# Patient Record
Sex: FEMALE | Race: WHITE | NOT HISPANIC OR LATINO | Employment: OTHER | URBAN - METROPOLITAN AREA
[De-identification: names, ages, dates, MRNs, and addresses within clinical notes are randomized per-mention and may not be internally consistent; named-entity substitution may affect disease eponyms.]

---

## 2017-03-30 ENCOUNTER — HOSPITAL ENCOUNTER (OUTPATIENT)
Dept: RADIOLOGY | Facility: MEDICAL CENTER | Age: 62
End: 2017-03-30
Attending: INTERNAL MEDICINE
Payer: COMMERCIAL

## 2017-03-30 DIAGNOSIS — Z13.9 SCREENING: ICD-10-CM

## 2017-03-30 PROCEDURE — 77063 BREAST TOMOSYNTHESIS BI: CPT

## 2017-06-08 ENCOUNTER — RESOLUTE PROFESSIONAL BILLING HOSPITAL PROF FEE (OUTPATIENT)
Dept: HOSPITALIST | Facility: MEDICAL CENTER | Age: 62
End: 2017-06-08
Payer: COMMERCIAL

## 2017-06-08 ENCOUNTER — APPOINTMENT (OUTPATIENT)
Dept: RADIOLOGY | Facility: MEDICAL CENTER | Age: 62
DRG: 853 | End: 2017-06-08
Attending: EMERGENCY MEDICINE
Payer: COMMERCIAL

## 2017-06-08 ENCOUNTER — HOSPITAL ENCOUNTER (INPATIENT)
Facility: MEDICAL CENTER | Age: 62
LOS: 14 days | DRG: 853 | End: 2017-06-22
Attending: EMERGENCY MEDICINE | Admitting: INTERNAL MEDICINE
Payer: COMMERCIAL

## 2017-06-08 ENCOUNTER — APPOINTMENT (OUTPATIENT)
Dept: RADIOLOGY | Facility: MEDICAL CENTER | Age: 62
DRG: 853 | End: 2017-06-08
Attending: INTERNAL MEDICINE
Payer: COMMERCIAL

## 2017-06-08 DIAGNOSIS — N17.9 AKI (ACUTE KIDNEY INJURY) (HCC): ICD-10-CM

## 2017-06-08 DIAGNOSIS — J96.01 ACUTE RESPIRATORY FAILURE WITH HYPOXIA (HCC): ICD-10-CM

## 2017-06-08 DIAGNOSIS — I48.91 ATRIAL FIBRILLATION, UNSPECIFIED TYPE (HCC): ICD-10-CM

## 2017-06-08 DIAGNOSIS — J18.9 PNEUMONIA DUE TO INFECTIOUS ORGANISM, UNSPECIFIED LATERALITY, UNSPECIFIED PART OF LUNG: ICD-10-CM

## 2017-06-08 DIAGNOSIS — E13.10 DIABETIC KETOACIDOSIS WITHOUT COMA ASSOCIATED WITH OTHER SPECIFIED DIABETES MELLITUS (HCC): ICD-10-CM

## 2017-06-08 DIAGNOSIS — J96.01 ACUTE HYPOXEMIC RESPIRATORY FAILURE (HCC): ICD-10-CM

## 2017-06-08 DIAGNOSIS — E87.1 HYPONATREMIA: ICD-10-CM

## 2017-06-08 DIAGNOSIS — I48.19 PERSISTENT ATRIAL FIBRILLATION (HCC): ICD-10-CM

## 2017-06-08 LAB
ALBUMIN SERPL BCP-MCNC: 3.4 G/DL (ref 3.2–4.9)
ALBUMIN/GLOB SERPL: 1 G/DL
ALP SERPL-CCNC: 147 U/L (ref 30–99)
ALT SERPL-CCNC: 78 U/L (ref 2–50)
AMORPH CRY #/AREA URNS HPF: PRESENT /HPF
ANION GAP SERPL CALC-SCNC: 12 MMOL/L (ref 0–11.9)
ANION GAP SERPL CALC-SCNC: 15 MMOL/L (ref 0–11.9)
APPEARANCE UR: ABNORMAL
APPEARANCE UR: ABNORMAL
APTT PPP: 26.7 SEC (ref 24.7–36)
AST SERPL-CCNC: 72 U/L (ref 12–45)
BACTERIA #/AREA URNS HPF: ABNORMAL /HPF
BACTERIA #/AREA URNS HPF: ABNORMAL /HPF
BASOPHILS # BLD AUTO: 0.2 % (ref 0–1.8)
BASOPHILS # BLD: 0.03 K/UL (ref 0–0.12)
BILIRUB SERPL-MCNC: 1.2 MG/DL (ref 0.1–1.5)
BILIRUB UR QL STRIP.AUTO: NEGATIVE
BILIRUB UR QL STRIP.AUTO: NEGATIVE
BNP SERPL-MCNC: 312 PG/ML (ref 0–100)
BUN SERPL-MCNC: 52 MG/DL (ref 8–22)
BUN SERPL-MCNC: 54 MG/DL (ref 8–22)
CALCIUM SERPL-MCNC: 8.4 MG/DL (ref 8.5–10.5)
CALCIUM SERPL-MCNC: 8.8 MG/DL (ref 8.5–10.5)
CHLORIDE SERPL-SCNC: 93 MMOL/L (ref 96–112)
CHLORIDE SERPL-SCNC: 98 MMOL/L (ref 96–112)
CK SERPL-CCNC: 37 U/L (ref 0–154)
CO2 SERPL-SCNC: 16 MMOL/L (ref 20–33)
CO2 SERPL-SCNC: 18 MMOL/L (ref 20–33)
COLOR UR: ABNORMAL
COLOR UR: ABNORMAL
CREAT SERPL-MCNC: 2.95 MG/DL (ref 0.5–1.4)
CREAT SERPL-MCNC: 3.02 MG/DL (ref 0.5–1.4)
CREAT UR-MCNC: 226.3 MG/DL
CULTURE IF INDICATED INDCX: YES UA CULTURE
EKG IMPRESSION: NORMAL
EKG IMPRESSION: NORMAL
EOSINOPHIL # BLD AUTO: 0.01 K/UL (ref 0–0.51)
EOSINOPHIL NFR BLD: 0.1 % (ref 0–6.9)
EPI CELLS #/AREA URNS HPF: ABNORMAL /HPF
ERYTHROCYTE [DISTWIDTH] IN BLOOD BY AUTOMATED COUNT: 43.8 FL (ref 35.9–50)
EST. AVERAGE GLUCOSE BLD GHB EST-MCNC: 140 MG/DL
GFR SERPL CREATININE-BSD FRML MDRD: 16 ML/MIN/1.73 M 2
GFR SERPL CREATININE-BSD FRML MDRD: 16 ML/MIN/1.73 M 2
GLOBULIN SER CALC-MCNC: 3.3 G/DL (ref 1.9–3.5)
GLUCOSE BLD-MCNC: 203 MG/DL (ref 65–99)
GLUCOSE BLD-MCNC: 227 MG/DL (ref 65–99)
GLUCOSE SERPL-MCNC: 199 MG/DL (ref 65–99)
GLUCOSE SERPL-MCNC: 281 MG/DL (ref 65–99)
GLUCOSE UR STRIP.AUTO-MCNC: NEGATIVE MG/DL
GLUCOSE UR STRIP.AUTO-MCNC: NEGATIVE MG/DL
GRAN CASTS #/AREA URNS LPF: ABNORMAL /LPF
HBA1C MFR BLD: 6.5 % (ref 0–5.6)
HCT VFR BLD AUTO: 30.9 % (ref 37–47)
HGB BLD-MCNC: 10.2 G/DL (ref 12–16)
HYALINE CASTS #/AREA URNS LPF: >10 /LPF
HYALINE CASTS #/AREA URNS LPF: ABNORMAL /LPF
IMM GRANULOCYTES # BLD AUTO: 0.16 K/UL (ref 0–0.11)
IMM GRANULOCYTES NFR BLD AUTO: 1.2 % (ref 0–0.9)
INR PPP: 2.05 (ref 0.87–1.13)
KETONES UR STRIP.AUTO-MCNC: NEGATIVE MG/DL
KETONES UR STRIP.AUTO-MCNC: NEGATIVE MG/DL
LACTATE BLD-SCNC: 1.3 MMOL/L (ref 0.5–2)
LACTATE BLD-SCNC: 1.4 MMOL/L (ref 0.5–2)
LACTATE BLD-SCNC: 1.8 MMOL/L (ref 0.5–2)
LACTATE BLD-SCNC: 3.2 MMOL/L (ref 0.5–2)
LEUKOCYTE ESTERASE UR QL STRIP.AUTO: ABNORMAL
LEUKOCYTE ESTERASE UR QL STRIP.AUTO: ABNORMAL
LIPASE SERPL-CCNC: 10 U/L (ref 11–82)
LV EJECT FRACT  99904: 65
LV EJECT FRACT MOD 2C 99903: 63.81
LV EJECT FRACT MOD 4C 99902: 68.96
LV EJECT FRACT MOD BP 99901: 68.75
LYMPHOCYTES # BLD AUTO: 0.65 K/UL (ref 1–4.8)
LYMPHOCYTES NFR BLD: 4.8 % (ref 22–41)
MAGNESIUM SERPL-MCNC: 1.9 MG/DL (ref 1.5–2.5)
MCH RBC QN AUTO: 28.2 PG (ref 27–33)
MCHC RBC AUTO-ENTMCNC: 33 G/DL (ref 33.6–35)
MCV RBC AUTO: 85.4 FL (ref 81.4–97.8)
MICRO URNS: ABNORMAL
MICRO URNS: ABNORMAL
MONOCYTES # BLD AUTO: 1.03 K/UL (ref 0–0.85)
MONOCYTES NFR BLD AUTO: 7.6 % (ref 0–13.4)
MUCOUS THREADS #/AREA URNS HPF: ABNORMAL /HPF
MUCOUS THREADS #/AREA URNS HPF: ABNORMAL /HPF
NEUTROPHILS # BLD AUTO: 11.65 K/UL (ref 2–7.15)
NEUTROPHILS NFR BLD: 86.1 % (ref 44–72)
NITRITE UR QL STRIP.AUTO: NEGATIVE
NITRITE UR QL STRIP.AUTO: NEGATIVE
NRBC # BLD AUTO: 0.03 K/UL
NRBC BLD AUTO-RTO: 0.2 /100 WBC
PH UR STRIP.AUTO: 5 [PH]
PH UR STRIP.AUTO: 5 [PH]
PHOSPHATE SERPL-MCNC: 5.3 MG/DL (ref 2.5–4.5)
PLATELET # BLD AUTO: 362 K/UL (ref 164–446)
PMV BLD AUTO: 9.7 FL (ref 9–12.9)
POTASSIUM SERPL-SCNC: 4.1 MMOL/L (ref 3.6–5.5)
POTASSIUM SERPL-SCNC: 4.7 MMOL/L (ref 3.6–5.5)
PROCALCITONIN SERPL-MCNC: 0.33 NG/ML
PROT SERPL-MCNC: 6.7 G/DL (ref 6–8.2)
PROT UR QL STRIP: 70 MG/DL
PROT UR QL STRIP: 70 MG/DL
PROTHROMBIN TIME: 23.8 SEC (ref 12–14.6)
RBC # BLD AUTO: 3.62 M/UL (ref 4.2–5.4)
RBC # URNS HPF: >150 /HPF
RBC # URNS HPF: >150 /HPF
RBC UR QL AUTO: ABNORMAL
RBC UR QL AUTO: ABNORMAL
SODIUM SERPL-SCNC: 124 MMOL/L (ref 135–145)
SODIUM SERPL-SCNC: 128 MMOL/L (ref 135–145)
SODIUM UR-SCNC: 16 MMOL/L
SP GR UR STRIP.AUTO: 1.02
SP GR UR STRIP.AUTO: 1.02
TROPONIN I SERPL-MCNC: <0.01 NG/ML (ref 0–0.04)
TSH SERPL DL<=0.005 MIU/L-ACNC: 1.06 UIU/ML (ref 0.3–3.7)
WBC # BLD AUTO: 13.5 K/UL (ref 4.8–10.8)
WBC #/AREA URNS HPF: ABNORMAL /HPF
WBC #/AREA URNS HPF: ABNORMAL /HPF

## 2017-06-08 PROCEDURE — 84443 ASSAY THYROID STIM HORMONE: CPT

## 2017-06-08 PROCEDURE — 80048 BASIC METABOLIC PNL TOTAL CA: CPT

## 2017-06-08 PROCEDURE — 85730 THROMBOPLASTIN TIME PARTIAL: CPT

## 2017-06-08 PROCEDURE — 83605 ASSAY OF LACTIC ACID: CPT | Mod: 91

## 2017-06-08 PROCEDURE — 93005 ELECTROCARDIOGRAM TRACING: CPT

## 2017-06-08 PROCEDURE — 81001 URINALYSIS AUTO W/SCOPE: CPT

## 2017-06-08 PROCEDURE — 94002 VENT MGMT INPAT INIT DAY: CPT

## 2017-06-08 PROCEDURE — 93306 TTE W/DOPPLER COMPLETE: CPT

## 2017-06-08 PROCEDURE — 85025 COMPLETE CBC W/AUTO DIFF WBC: CPT

## 2017-06-08 PROCEDURE — 700105 HCHG RX REV CODE 258: Performed by: EMERGENCY MEDICINE

## 2017-06-08 PROCEDURE — 99291 CRITICAL CARE FIRST HOUR: CPT | Performed by: INTERNAL MEDICINE

## 2017-06-08 PROCEDURE — 80053 COMPREHEN METABOLIC PANEL: CPT

## 2017-06-08 PROCEDURE — 51702 INSERT TEMP BLADDER CATH: CPT

## 2017-06-08 PROCEDURE — A9270 NON-COVERED ITEM OR SERVICE: HCPCS | Performed by: INTERNAL MEDICINE

## 2017-06-08 PROCEDURE — 87086 URINE CULTURE/COLONY COUNT: CPT

## 2017-06-08 PROCEDURE — 84145 PROCALCITONIN (PCT): CPT

## 2017-06-08 PROCEDURE — 71250 CT THORAX DX C-: CPT

## 2017-06-08 PROCEDURE — 84300 ASSAY OF URINE SODIUM: CPT

## 2017-06-08 PROCEDURE — 93010 ELECTROCARDIOGRAM REPORT: CPT | Performed by: INTERNAL MEDICINE

## 2017-06-08 PROCEDURE — 86713 LEGIONELLA ANTIBODY: CPT

## 2017-06-08 PROCEDURE — 93306 TTE W/DOPPLER COMPLETE: CPT | Mod: 26 | Performed by: INTERNAL MEDICINE

## 2017-06-08 PROCEDURE — 82550 ASSAY OF CK (CPK): CPT

## 2017-06-08 PROCEDURE — 87040 BLOOD CULTURE FOR BACTERIA: CPT

## 2017-06-08 PROCEDURE — 83880 ASSAY OF NATRIURETIC PEPTIDE: CPT

## 2017-06-08 PROCEDURE — 85610 PROTHROMBIN TIME: CPT

## 2017-06-08 PROCEDURE — 700102 HCHG RX REV CODE 250 W/ 637 OVERRIDE(OP): Performed by: INTERNAL MEDICINE

## 2017-06-08 PROCEDURE — 71010 DX-CHEST-PORTABLE (1 VIEW): CPT

## 2017-06-08 PROCEDURE — 82570 ASSAY OF URINE CREATININE: CPT

## 2017-06-08 PROCEDURE — 93005 ELECTROCARDIOGRAM TRACING: CPT | Performed by: EMERGENCY MEDICINE

## 2017-06-08 PROCEDURE — 770022 HCHG ROOM/CARE - ICU (200)

## 2017-06-08 PROCEDURE — 84100 ASSAY OF PHOSPHORUS: CPT

## 2017-06-08 PROCEDURE — 74176 CT ABD & PELVIS W/O CONTRAST: CPT

## 2017-06-08 PROCEDURE — 93970 EXTREMITY STUDY: CPT

## 2017-06-08 PROCEDURE — 83735 ASSAY OF MAGNESIUM: CPT

## 2017-06-08 PROCEDURE — 83036 HEMOGLOBIN GLYCOSYLATED A1C: CPT

## 2017-06-08 PROCEDURE — 93005 ELECTROCARDIOGRAM TRACING: CPT | Performed by: INTERNAL MEDICINE

## 2017-06-08 PROCEDURE — 303105 HCHG CATHETER EXTRA

## 2017-06-08 PROCEDURE — 700111 HCHG RX REV CODE 636 W/ 250 OVERRIDE (IP): Performed by: EMERGENCY MEDICINE

## 2017-06-08 PROCEDURE — 84484 ASSAY OF TROPONIN QUANT: CPT

## 2017-06-08 PROCEDURE — 304561 HCHG STAT O2

## 2017-06-08 PROCEDURE — 82962 GLUCOSE BLOOD TEST: CPT

## 2017-06-08 PROCEDURE — 96367 TX/PROPH/DG ADDL SEQ IV INF: CPT

## 2017-06-08 PROCEDURE — 83690 ASSAY OF LIPASE: CPT

## 2017-06-08 PROCEDURE — 99291 CRITICAL CARE FIRST HOUR: CPT

## 2017-06-08 PROCEDURE — 96365 THER/PROPH/DIAG IV INF INIT: CPT

## 2017-06-08 RX ORDER — AMLODIPINE BESYLATE 10 MG/1
10 TABLET ORAL DAILY
COMMUNITY
End: 2017-08-23 | Stop reason: SDUPTHER

## 2017-06-08 RX ORDER — DILTIAZEM HYDROCHLORIDE 60 MG/1
60 TABLET, FILM COATED ORAL 4 TIMES DAILY
Status: ON HOLD | COMMUNITY
End: 2017-06-22

## 2017-06-08 RX ORDER — LEVOTHYROXINE SODIUM 88 UG/1
88 TABLET ORAL
Status: DISCONTINUED | OUTPATIENT
Start: 2017-06-08 | End: 2017-06-22 | Stop reason: HOSPADM

## 2017-06-08 RX ORDER — ACETAMINOPHEN 325 MG/1
650 TABLET ORAL EVERY 6 HOURS PRN
Status: DISCONTINUED | OUTPATIENT
Start: 2017-06-08 | End: 2017-06-22 | Stop reason: HOSPADM

## 2017-06-08 RX ORDER — CEFTRIAXONE 1 G/1
1 INJECTION, POWDER, FOR SOLUTION INTRAMUSCULAR; INTRAVENOUS ONCE
Status: COMPLETED | OUTPATIENT
Start: 2017-06-08 | End: 2017-06-08

## 2017-06-08 RX ORDER — ONDANSETRON 4 MG/1
4 TABLET, ORALLY DISINTEGRATING ORAL EVERY 4 HOURS PRN
Status: DISCONTINUED | OUTPATIENT
Start: 2017-06-08 | End: 2017-06-22 | Stop reason: HOSPADM

## 2017-06-08 RX ORDER — BISACODYL 10 MG
10 SUPPOSITORY, RECTAL RECTAL
Status: DISCONTINUED | OUTPATIENT
Start: 2017-06-08 | End: 2017-06-22 | Stop reason: HOSPADM

## 2017-06-08 RX ORDER — AMOXICILLIN 250 MG
2 CAPSULE ORAL 2 TIMES DAILY
Status: DISCONTINUED | OUTPATIENT
Start: 2017-06-08 | End: 2017-06-22 | Stop reason: HOSPADM

## 2017-06-08 RX ORDER — AZITHROMYCIN 500 MG/1
500 INJECTION, POWDER, LYOPHILIZED, FOR SOLUTION INTRAVENOUS ONCE
Status: COMPLETED | OUTPATIENT
Start: 2017-06-08 | End: 2017-06-08

## 2017-06-08 RX ORDER — SODIUM CHLORIDE 9 MG/ML
2000 INJECTION, SOLUTION INTRAVENOUS ONCE
Status: DISCONTINUED | OUTPATIENT
Start: 2017-06-08 | End: 2017-06-08

## 2017-06-08 RX ORDER — ONDANSETRON 2 MG/ML
4 INJECTION INTRAMUSCULAR; INTRAVENOUS EVERY 4 HOURS PRN
Status: DISCONTINUED | OUTPATIENT
Start: 2017-06-08 | End: 2017-06-22 | Stop reason: HOSPADM

## 2017-06-08 RX ORDER — DEXTROSE MONOHYDRATE 25 G/50ML
25 INJECTION, SOLUTION INTRAVENOUS
Status: DISCONTINUED | OUTPATIENT
Start: 2017-06-08 | End: 2017-06-22 | Stop reason: HOSPADM

## 2017-06-08 RX ORDER — LEVOTHYROXINE SODIUM 88 UG/1
88 TABLET ORAL
COMMUNITY
End: 2017-11-30 | Stop reason: SDUPTHER

## 2017-06-08 RX ORDER — PIOGLITAZONEHYDROCHLORIDE 30 MG/1
30 TABLET ORAL DAILY
Status: ON HOLD | COMMUNITY
End: 2017-06-22

## 2017-06-08 RX ORDER — CLONIDINE HYDROCHLORIDE 0.2 MG/1
0.2 TABLET ORAL 2 TIMES DAILY
Status: ON HOLD | COMMUNITY
End: 2017-06-22

## 2017-06-08 RX ORDER — CARVEDILOL 25 MG/1
25 TABLET ORAL 2 TIMES DAILY WITH MEALS
Status: ON HOLD | COMMUNITY
End: 2017-06-22

## 2017-06-08 RX ORDER — POLYETHYLENE GLYCOL 3350 17 G/17G
1 POWDER, FOR SOLUTION ORAL
Status: DISCONTINUED | OUTPATIENT
Start: 2017-06-08 | End: 2017-06-22 | Stop reason: HOSPADM

## 2017-06-08 RX ORDER — LOSARTAN POTASSIUM AND HYDROCHLOROTHIAZIDE 25; 100 MG/1; MG/1
1 TABLET ORAL DAILY
Status: ON HOLD | COMMUNITY
End: 2017-06-22

## 2017-06-08 RX ORDER — FAMOTIDINE 20 MG/1
20 TABLET, FILM COATED ORAL DAILY
Status: DISCONTINUED | OUTPATIENT
Start: 2017-06-08 | End: 2017-06-09

## 2017-06-08 RX ORDER — EZETIMIBE AND SIMVASTATIN 10; 20 MG/1; MG/1
1 TABLET ORAL NIGHTLY
COMMUNITY
End: 2017-08-23 | Stop reason: SDUPTHER

## 2017-06-08 RX ORDER — SODIUM CHLORIDE 9 MG/ML
500 INJECTION, SOLUTION INTRAVENOUS
Status: DISCONTINUED | OUTPATIENT
Start: 2017-06-08 | End: 2017-06-12

## 2017-06-08 RX ADMIN — FAMOTIDINE 20 MG: 20 TABLET, FILM COATED ORAL at 14:46

## 2017-06-08 RX ADMIN — SENNOSIDES AND DOCUSATE SODIUM 2 TABLET: 8.6; 5 TABLET ORAL at 14:46

## 2017-06-08 RX ADMIN — INSULIN LISPRO 3 UNITS: 100 INJECTION, SOLUTION INTRAVENOUS; SUBCUTANEOUS at 17:48

## 2017-06-08 RX ADMIN — CEFTRIAXONE 1 G: 1 INJECTION, POWDER, FOR SOLUTION INTRAMUSCULAR; INTRAVENOUS at 10:18

## 2017-06-08 RX ADMIN — SENNOSIDES AND DOCUSATE SODIUM 2 TABLET: 8.6; 5 TABLET ORAL at 19:39

## 2017-06-08 RX ADMIN — SODIUM CHLORIDE 2000 ML: 9 INJECTION, SOLUTION INTRAVENOUS at 09:47

## 2017-06-08 RX ADMIN — LEVOTHYROXINE SODIUM 88 MCG: 88 TABLET ORAL at 14:46

## 2017-06-08 RX ADMIN — AZITHROMYCIN MONOHYDRATE 500 MG: 500 INJECTION, POWDER, LYOPHILIZED, FOR SOLUTION INTRAVENOUS at 10:56

## 2017-06-08 ASSESSMENT — PAIN SCALES - GENERAL
PAINLEVEL_OUTOF10: 0
PAINLEVEL_OUTOF10: 0

## 2017-06-08 ASSESSMENT — LIFESTYLE VARIABLES
EVER_SMOKED: NEVER
ALCOHOL_USE: NO

## 2017-06-08 ASSESSMENT — PULMONARY FUNCTION TESTS
EPAP_CMH2O: 4
EPAP_CMH2O: 7

## 2017-06-08 NOTE — ED NOTES
Chief Complaint   Patient presents with   • Weakness   • Other     O2 saturation 58% on room air     Called to lobby for lifting assitance.  Pt unable to get out of car.  Pt appears to be in respiratory distress.  Pt assisted to wheelchair and taken to triage room.  Pt spo2 58% on room air.  Pt placed on non-rebreather mask.  Pt state that she was recently placed on antibiotics for sinus infection.  Pt also recently diagnosed with a-fib. Pt to red 2.

## 2017-06-08 NOTE — ED NOTES
Report received from Darby ALVAREZ. Pt is resting, BIPAP in place, respirations rapid and semi-labored, O2 sat 94%, RT at bedside. Family member given coffee for comfort.

## 2017-06-08 NOTE — PROGRESS NOTES
"I examined the patient 6/8/2017 11:30 AM  Vital Signs:/105 mmHg  Pulse 87  Temp(Src) 36.4 °C (97.5 °F)  Resp 28  Ht 1.676 m (5' 5.98\")  Wt 113.399 kg (250 lb)  BMI 40.37 kg/m2  SpO2 96%  Cardiac examination significant for Irregular Irregular rhythmn  Pulmonary examination significant for Crackles and Rhonchi extensive  Capillary refill is slowed  Peripheral Pulse is 1+   Skin is pale  "

## 2017-06-08 NOTE — ED NOTES
The Medication Reconciliation process has been PARTIALLY completed by interviewing the patient/family with a list but it does not have strengths.  Will call [facility] when they open to obtain med strengths.    Allergies have been reviewed  Antibiotic use in 30 days - none    Home Pharmacy:  [facility]

## 2017-06-08 NOTE — ED NOTES
RACHEL Wisdom at bedside. Bedside report given. Pt transported to CT and then to T623. Alll belongings and family member w/ pt.

## 2017-06-08 NOTE — IP AVS SNAPSHOT
" <p align=\"LEFT\"><IMG SRC=\"//[email or web address]\" alt=\"Image\" WIDTH=\"50%\" HEIGHT=\"200\" BORDER=\"\"></p>                   Name:Heidi Alfaro  Medical Record Number:[id number]  CSN: [id number]    YOB: 1955   Age: 61 y.o.  Sex: female  HT:1.651 m (5' 5\") WT: 107.2 kg (236 lb 5.3 oz)          Admit Date: 6/8/2017     Discharge Date:   Today's Date: 6/22/2017  Attending Doctor:  Aaron Jarvis M.D.                  Allergies:  Review of patient's allergies indicates no known allergies.          Follow-up Information     1. Follow up with Jovan Zelaya M.D.. Go on 6/26/2017.    Specialty:  Internal Medicine    Why:  Please arrive at 8:15am for your appointment.    Contact information    [address] [place] [id number]  [phone]           Medication List      Take these Medications        Instructions    * amiodarone 400 MG tablet   Start taking on:  6/23/2017   Commonly known as:  PACERONE    Take 1 Tab by mouth every day for 5 days.   Dose:  400 mg       * amiodarone 200 MG Tabs   Start taking on:  6/28/2017   Commonly known as:  CORDARONE    Take 1 Tab by mouth every day.   Dose:  200 mg       amlodipine 10 MG Tabs   Commonly known as:  NORVASC    Take 10 mg by mouth every day.   Dose:  10 mg       digoxin 125 MCG Tabs   Commonly known as:  LANOXIN    Take 1 Tab by mouth every day at 6 PM.   Dose:  125 mcg       ELIQUIS 5mg Tabs   Generic drug:  apixaban    Take 5 mg by mouth 2 Times a Day.   Dose:  5 mg       ezetimibe-simvastatin 10-20 MG per tablet   Commonly known as:  VYTORIN    Take 0.5 Tabs by mouth every evening.   Dose:  0.5 Tab       fluconazole 200 MG Tabs   Commonly known as:  DIFLUCAN    Take 1 Tab by mouth every day for 3 days.   Dose:  200 mg       furosemide 20 MG Tabs   Commonly known as:  LASIX    Take 1 Tab by mouth every 8 hours.   Dose:  20 mg       levothyroxine 88 MCG Tabs   Commonly known as:  SYNTHROID    Take 88 mcg by mouth Every morning on an empty stomach.   "   Dose:  88 mcg       losartan 50 MG Tabs   Commonly known as:  COZAAR    Take 1 Tab by mouth every day.   Dose:  50 mg       metformin 500 MG Tabs   Commonly known as:  GLUCOPHAGE    Take 500 mg by mouth 2 times a day, with meals.   Dose:  500 mg       Metoprolol Tartrate 75 MG Tabs    Take 75 mg by mouth every 8 hours.   Dose:  75 mg       nystatin powder   Commonly known as:  MYCOSTATIN    Apply to groin.       ondansetron 4 MG Tbdp   Commonly known as:  ZOFRAN ODT    Take 1 Tab by mouth every four hours as needed for Nausea/Vomiting (give PO if no IV route available).   Dose:  4 mg       potassium chloride 20 MEQ Pack   Commonly known as:  KLOR-CON    Take 1 Packet by mouth 2 Times a Day.   Dose:  20 mEq       * Notice:  This list has 2 medication(s) that are the same as other medications prescribed for you. Read the directions carefully, and ask your doctor or other care provider to review them with you.

## 2017-06-08 NOTE — ED PROVIDER NOTES
"ED Provider Note    CHIEF COMPLAINT  Chief Complaint   Patient presents with   • Weakness   • Other     O2 saturation 58% on room air       HPI  Heidi Alfaro is a 61 y.o. female who presents with shortness of breath, follow last 5 days, dyspnea on exertion PND orthopnea no chest pain, no nausea no vomiting no diaphoresis. Evidently she was diagnosed with atrial fibrillation about a week ago, since that time has had increasing shortness of breath. Shortness of breath worse tonight.    REVIEW OF SYSTEMS  See HPI for further details. History of coronary artery disease hypertension diabetesDenies other G.I., G.U.. endrocine, cardiovascular, respriatory or neurological problems.  All other systems are negative.     PAST MEDICAL HISTORY  Past Medical History   Diagnosis Date   • CAD (coronary artery disease)      afib   • Hypertension    • Diabetes (CMS-[facility])        FAMILY HISTORY  History reviewed. No pertinent family history.    SOCIAL HISTORY  Social History     Social History   • Marital Status: Single     Spouse Name: N/A   • Number of Children: N/A   • Years of Education: N/A     Social History Main Topics   • Smoking status: None   • Smokeless tobacco: None   • Alcohol Use: No   • Drug Use: No   • Sexual Activity: Not Asked     Other Topics Concern   • None     Social History Narrative       SURGICAL HISTORY  History reviewed. No pertinent past surgical history.    CURRENT MEDICATIONS  Home Medications     **Home medications have not yet been reviewed for this encounter**          ALLERGIES  No Known Allergies    PHYSICAL EXAM  VITAL SIGNS: /105 mmHg  Pulse 66  Temp(Src) 36.4 °C (97.5 °F)  Resp 31  Ht 1.676 m (5' 5.98\")  Wt 113.399 kg (250 lb)  BMI 40.37 kg/m2  SpO2 91%  Constitutional: She is markedly obese, in acute respiratory distress  HENT: Normocephalic, Atraumatic, Bilateral external ears normal, Oropharynx moist, No oral exudates, Nose normal.   Eyes: PERRL, EOMI, Conjunctiva normal, No " discharge.   Neck: Normal range of motion, No tenderness, Supple, No stridor.   Lymphatic: No lymphadenopathy noted.   Cardiovascular: Normal heart rate, Normal rhythm, No murmurs, No rubs, No gallops.   Thorax & Lungs: Few rales and wheezes in all lung fields., No chest tenderness.   Abdomen:  No tenderness, no guarding no rigidity and the abdomen is soft.  No masses, No pulsatile masses.  Skin: Warm, Dry, No erythema, No rash.   Back: No tenderness, No CVA tenderness.   Extremities: Intact distal pulses, +2 pitting edema both lower extremities, No tenderness, No cyanosis, No clubbing.   Musculoskeletal: Good range of motion in all major joints. No tenderness to palpation or major deformities noted.   Neurologic: Alert & oriented x 3, Normal motor function, Normal sensory function, No focal deficits noted.   Psychiatric: Affect normal, Judgment normal, Mood normal. Anxiety about her shortness of breath  EKG Interpretation    Interpreted by me    Rhythm: Atrial fibrillation  Rate: 75  Axis: normal  Ectopy: none  Conduction: normal  ST Segments: no acute change  T Waves: no acute change  Q Waves: none    Clinical Impression: I do not have an old EKG to compare to      RADIOLOGY/PROCEDURES     DX-CHEST-PORTABLE (1 VIEW)   Final Result      1.  Hypoinflation with extensive multifocal pneumonia.   2.  Mild cardiomegaly.   3.  No pneumothorax.            COURSE & MEDICAL DECISION MAKING  Pertinent Labs & Imaging studies reviewed. (See chart for details) lactic acid 1.8, BNP 31., Urinalysis greater than 150 red cells/10 white cells electrolytes, sodium critically low 4, bicarb of 16, glucose, 291  Potassium 4.7 last normal troponin normal INR, 2.05 and white count elevated at 13.5 and 31 platelet count normal. 1st lactic acid 3.2  Cultures pending.    She has new onset atrial fibrillation, now shortness of breath,, chest x-ray consistent with pneumonia. She is given aggressive fluids, placed on BiPAP. Blood cultures are  pending. She will be given Rocephin, azithromycin,  Does have a low sodium, even after correction, still hypokalemia. Given IV normal saline. Also mild diabetic ketoacidosis treated with normal saline.  FINAL IMPRESSION  1.   1. Atrial fibrillation, unspecified type (CMS-HCC)    2. Pneumonia due to infectious organism, unspecified laterality, unspecified part of lung    3. Hyponatremia    4. Diabetic ketoacidosis without coma associated with other specified diabetes mellitus            2.   3.     Disposition  I have talked with with hospitalist about admission, pulmonology will consult. I have also spoken with Dr. Janes Zelaya who has that hospitalist admit this patient.    Critical care time that I spent with this patient excluding any procedure, 40 minutes.  Electronically signed by: Oc Don, 6/8/2017 6:11 AM

## 2017-06-08 NOTE — CARE PLAN
Problem: Safety  Goal: Will remain free from falls  Outcome: PROGRESSING AS EXPECTED  Patient educated on unit policies and procedures to prevent fall risk, including: hourly rounding, call light usage, bed alarms, treaded socks and calling for assistance. Patient Heidi verbalizes understanding of teaching. Fall prevention measures assessed and in place.     Problem: Skin Integrity  Goal: Risk for impaired skin integrity will decrease  Outcome: PROGRESSING AS EXPECTED  Skin assessed at start of shift. Nutrition, moisture, sensory, activity, mobility, friction and shear assessed and addressed with patient and family. Carlos botello.

## 2017-06-08 NOTE — PROGRESS NOTES
Pt transferred to [facility] with RT and tech. Pt on bipap, fatigued. Sister, Crystal, at bedside. Skin assessed, no wounds noted. All questions answered at this time.

## 2017-06-08 NOTE — IP AVS SNAPSHOT
6/22/2017    Heidi Alfaro  [address] [place] [id number]    Dear Heidi:    [facility] wants to ensure your discharge home is safe and you or your loved ones have had all of your questions answered regarding your care after you leave the hospital.    Below is a list of resources and contact information should you have any questions regarding your hospital stay, follow-up instructions, or active medical symptoms.    Questions or Concerns Regarding… Contact   Medical Questions Related to Your Discharge  (7 days a week, 8am-5pm) Contact a Nurse Care Coordinator   [phone]   Medical Questions Not Related to Your Discharge  (24 hours a day / 7 days a week)  Contact the Nurse Health Line   [phone]    Medications or Discharge Instructions Refer to your discharge packet   or contact your [facility] Primary Care Provider   [phone]   Follow-up Appointment(s) Schedule your appointment via [employer]   or contact Scheduling [phone]   Billing Review your statement via [employer]  or contact Billing [phone]   Medical Records Review your records via [employer]   or contact Medical Records [phone]     You may receive a telephone call within two days of discharge. This call is to make certain you understand your discharge instructions and have the opportunity to have any questions answered. You can also easily access your medical information, test results and upcoming appointments via the [employer] free online health management tool. You can learn more and sign up at [employer]. For assistance setting up your [employer] account, please call [phone].    Once again, we want to ensure your discharge home is safe and that you have a clear understanding of any next steps in your care. If you have any questions or concerns, please do not hesitate to contact us, we are here for you. Thank you for choosing [facility] for your healthcare needs.    Sincerely,    Your [facility] Healthcare Team

## 2017-06-08 NOTE — RESPIRATORY CARE
Bipap/Cpap mask placed.  Can the patient demonstrate removal of mask? Yes  If no, please notify physician.

## 2017-06-08 NOTE — IP AVS SNAPSHOT
[employer] Access Code: Activation code not generated  Current IndyGeek Status: Patient Declined    Your email address is not on file at [facility].  Email Addresses are required for you to sign up for [employer], please contact [phone] to verify your personal information and to provide your email address prior to attempting to register for [employer].    Heidi Alfaro  [address] [place] [address]    [employer]  A secure, online tool to manage your health information     [employer]’s IndyGeek® is a secure, online tool that connects you to your personalized health information from the privacy of your home -- day or night - making it very easy for you to manage your healthcare. Once the activation process is completed, you can even access your medical information using the [employer]Geek mikey, which is available for free in the Apple Mikey store or Google Play store.     To learn more about IndyGeek, visit [email or web address]    There are two levels of access available (as shown below):   My Chart Features  [facility] Primary Care Doctor [facility]  Specialists [facility]  Urgent  Care Non-[facility] Primary Care Doctor   Email your healthcare team securely and privately 24/7 X X X    Manage appointments: schedule your next appointment; view details of past/upcoming appointments X      Request prescription refills. X      View recent personal medical records, including lab and immunizations X X X X   View health record, including health history, allergies, medications X X X X   Read reports about your outpatient visits, procedures, consult and ER notes X X X X   See your discharge summary, which is a recap of your hospital and/or ER visit that includes your diagnosis, lab results, and care plan X X  X     How to register for [employer]:  Once your e-mail address has been verified, follow the following steps to sign up for [employer].     1. Go to  [email or web address]  2. Click on the Sign Up Now box, which takes you to the New Member  Sign Up page. You will need to provide the following information:  a. Enter your [employer] Access Code exactly as it appears at the top of this page. (You will not need to use this code after you’ve completed the sign-up process. If you do not sign up before the expiration date, you must request a new code.)   b. Enter your date of birth.   c. Enter your home email address.   d. Click Submit, and follow the next screen’s instructions.  3. Create a [employer] ID. This will be your [employer] login ID and cannot be changed, so think of one that is secure and easy to remember.  4. Create a [employer] password. You can change your password at any time.  5. Enter your Password Reset Question and Answer. This can be used at a later time if you forget your password.   6. Enter your e-mail address. This allows you to receive e-mail notifications when new information is available in [employer].  7. Click Sign Up. You can now view your health information.    For assistance activating your [employer] account, call [phone]

## 2017-06-08 NOTE — ED NOTES
Med rec complete per patient's home pharmacy [facility] @[phone].  Last doses verified by med rec tech before me.

## 2017-06-08 NOTE — ED NOTES
Pt respirations remain labored, improved since arrival, pt states feeling better, sister at bedside states that pt became very short of breath last night and has had a nonproductive cough for a couple of weeks, denies pt having any pain or n/v/d recently, skin coloring has improved since arrival, bipap in place sister at bedside

## 2017-06-08 NOTE — CONSULTS
Cardiology Consult Note:    Refugio Rosario  Date & Time note created:    6/8/2017   3:22 PM     Referring MD:  Dr. Manuel    Patient ID:   Name:             Heidi Alfaro     YOB: 1955  Age:                 61 y.o.  female   MRN:               [id number]                                                             Reason for Consult:      Concern for CHF    History of Present Illness:    Patient is on Bipap and can answer limited numbers of questions, history is obtained from chart abstraction and discussion with other medical professionals.  60 y/o F with recent Dx of atrial fibrillation by her PCP.  She was started on a DOAC and a BB.  Additionally she was started or continued on medications for her HTN. She was also diagnosed with a sinus infection and was started on amoxicillin, though it is unclear if this was before or after her diagnosis of atrial fibrillation.  4 days prior to admission she started to develop SOB and a sensation of needing to cough associated with worsening SOB and YOUNG.  She presented to the ER and was found to have new onset renal failure, hyponatremia, and bilateral pulmonary infiltrates.  A CT scan showed small pericardial effusions and groundglass opacities.  She was started on Bipap and transferred to the CCU.        Review of Systems:      Unable to obtain       Past Medical History:   Past Medical History   Diagnosis Date   • CAD (coronary artery disease)      afib   • Hypertension    • Diabetes (CMS-HCC)      pre diabetic     Active Hospital Problems    Diagnosis   • KIRT (acute kidney injury) (CMS-HCC) [N17.9]   • Acute hypoxemic respiratory failure (CMS-HCC) [J96.01]       Past Surgical History:  Past Surgical History   Procedure Laterality Date   • Other       tonsils cataract       Hospital Medications:  Current Facility-Administered Medications   Medication Dose   • Respiratory Care per Protocol     • NS (BOLUS) infusion 500 mL  500 mL   • insulin lispro  (HUMALOG) injection 2-9 Units  2-9 Units   • glucose 4 g chewable tablet 16 g  16 g    And   • dextrose 50% (D50W) injection 25 mL  25 mL   • [START ON 6/9/2017] cefTRIAXone (ROCEPHIN) 2 g in  mL IVPB  2 g   • levothyroxine (SYNTHROID) tablet 88 mcg  88 mcg   • senna-docusate (PERICOLACE or SENOKOT S) 8.6-50 MG per tablet 2 Tab  2 Tab    And   • polyethylene glycol/lytes (MIRALAX) PACKET 1 Packet  1 Packet    And   • magnesium hydroxide (MILK OF MAGNESIA) suspension 30 mL  30 mL    And   • bisacodyl (DULCOLAX) suppository 10 mg  10 mg   • ondansetron (ZOFRAN) syringe/vial injection 4 mg  4 mg   • ondansetron (ZOFRAN ODT) dispertab 4 mg  4 mg   • acetaminophen (TYLENOL) tablet 650 mg  650 mg   • [START ON 6/9/2017] azithromycin (ZITHROMAX) 500 mg in D5W 250 mL IVPB  500 mg   • famotidine (PEPCID) tablet 20 mg  20 mg         Current Outpatient Medications:  Prescriptions prior to admission   Medication Sig Dispense Refill Last Dose   • apixaban (ELIQUIS) 5mg Tab Take 5 mg by mouth 2 Times a Day.   6/7/2017 at pm   • amlodipine (NORVASC) 10 MG Tab Take 10 mg by mouth every day.   6/7/2017 at am   • carvedilol (COREG) 25 MG Tab Take 25 mg by mouth 2 times a day, with meals.   6/7/2017 at pm   • clonidine (CATAPRESS) 0.2 MG Tab Take 0.2 mg by mouth 2 times a day.   6/7/2017 at pm   • ezetimibe-simvastatin (VYTORIN) 10-20 MG per tablet Take 0.5 Tabs by mouth every evening.   6/7/2017 at pm   • levothyroxine (SYNTHROID) 88 MCG Tab Take 88 mcg by mouth Every morning on an empty stomach.   6/7/2017 at am   • losartan-hydrochlorothiazide (HYZAAR) 100-25 MG per tablet Take 1 Tab by mouth every day.   6/7/2017 at am   • metformin (GLUCOPHAGE) 500 MG Tab Take 500 mg by mouth 2 times a day, with meals.   6/7/2017 at pm   • diltiazem (CARDIZEM) 60 MG Tab Take 60 mg by mouth 4 times a day.   6/7/2017 at pm   • pioglitazone (ACTOS) 30 MG Tab Take 30 mg by mouth every day.   6/7/2017 at am       Medication Allergy:  No Known  "Allergies    Family History:  History reviewed. No pertinent family history.    Social History:  Social History     Social History   • Marital Status: Single     Spouse Name: N/A   • Number of Children: N/A   • Years of Education: N/A     Occupational History   • Not on file.     Social History Main Topics   • Smoking status: Never Smoker    • Smokeless tobacco: Not on file   • Alcohol Use: No   • Drug Use: No   • Sexual Activity: Not on file     Other Topics Concern   • Not on file     Social History Narrative         Physical Exam:  Vitals/ General Appearance:   Weight/BMI: Body mass index is 42.26 kg/(m^2).  Blood pressure 129/105, pulse 91, temperature 35.9 °C (96.7 °F), resp. rate 26, height 1.676 m (5' 5.98\"), weight 118.7 kg (261 lb 11 oz), SpO2 94 %, not currently breastfeeding.  Filed Vitals:    06/08/17 1246 06/08/17 1330 06/08/17 1400 06/08/17 1410   BP:       Pulse: 71 73 91    Temp:   35.9 °C (96.7 °F)    Resp: 27 25 26    Height:       Weight:   118.7 kg (261 lb 11 oz)    SpO2: 94%  94% 94%     Oxygen Therapy:  Pulse Oximetry: 94 %, O2 (LPM): 15, O2 Delivery: BIPAP    Constitutional:   Well developed, Well nourished, moderate respiratory distress  HENMT:  Normocephalic, Atraumatic, Oropharynx moist mucous membranes, No oral exudates, Nose normal.  No thyromegaly.  Eyes:  EOMI, Conjunctiva normal, No discharge.  Neck:  Normal range of motion, No cervical tenderness,  no JVD.  Cardiovascular:  Normal heart rate, Normal rhythm, No murmurs, No rubs, No gallops.   Extremitites with intact distal pulses, no cyanosis, or edema.  Lungs:  Normal breath sounds, breath sounds clear to auscultation bilaterally,  no crackles, no wheezing.   Abdomen: Bowel sounds normal, Soft, No tenderness, No guarding, No rebound, No masses, No hepatosplenomegaly.  Skin: Warm, Dry, No erythema, No rash, no induration.  Neurologic: Alert & oriented x 3, No focal deficits noted, cranial nerves II through X are grossly " intact.  Psychiatric: Affect normal, Judgment normal, Mood normal.      MDM (Data Review):     Records reviewed and summarized in current documentation    Lab Data Review:  Recent Results (from the past 24 hour(s))   EKG (ER)    Collection Time: 17  5:55 AM   Result Value Ref Range    Report       [facility]    Test Date:  2017  Pt Name:    NIDA LARES               Department: ER  MRN:        [id number]                      Room:       [facility]  Gender:     F                            Technician: [id number]  :        1955                   Requested By:ER TRIAGE PROTOCOL  Order #:    [id number]                    Reading MD:    Measurements  Intervals                                Axis  Rate:       74                           P:  VA:                                      QRS:        17  QRSD:       104                          T:          3  QT:         440  QTc:        489    Interpretive Statements  ATRIAL FIBRILLATION  BORDERLINE PROLONGED QT INTERVAL  No previous ECG available for comparison     Lactic acid (lactate)    Collection Time: 17  6:07 AM   Result Value Ref Range    Lactic Acid 3.2 (H) 0.5 - 2.0 mmol/L   CBC WITH DIFFERENTIAL    Collection Time: 17  6:07 AM   Result Value Ref Range    WBC 13.5 (H) 4.8 - 10.8 K/uL    RBC 3.62 (L) 4.20 - 5.40 M/uL    Hemoglobin 10.2 (L) 12.0 - 16.0 g/dL    Hematocrit 30.9 (L) 37.0 - 47.0 %    MCV 85.4 81.4 - 97.8 fL    MCH 28.2 27.0 - 33.0 pg    MCHC 33.0 (L) 33.6 - 35.0 g/dL    RDW 43.8 35.9 - 50.0 fL    Platelet Count 362 164 - 446 K/uL    MPV 9.7 9.0 - 12.9 fL    Neutrophils-Polys 86.10 (H) 44.00 - 72.00 %    Lymphocytes 4.80 (L) 22.00 - 41.00 %    Monocytes 7.60 0.00 - 13.40 %    Eosinophils 0.10 0.00 - 6.90 %    Basophils 0.20 0.00 - 1.80 %    Immature Granulocytes 1.20 (H) 0.00 - 0.90 %    Nucleated RBC 0.20 /100 WBC    Neutrophils (Absolute) 11.65 (H) 2.00 - 7.15 K/uL    Lymphs (Absolute) 0.65 (L) 1.00  - 4.80 K/uL    Monos (Absolute) 1.03 (H) 0.00 - 0.85 K/uL    Eos (Absolute) 0.01 0.00 - 0.51 K/uL    Baso (Absolute) 0.03 0.00 - 0.12 K/uL    Immature Granulocytes (abs) 0.16 (H) 0.00 - 0.11 K/uL    NRBC (Absolute) 0.03 K/uL   COMP METABOLIC PANEL    Collection Time: 06/08/17  6:07 AM   Result Value Ref Range    Sodium 124 (L) 135 - 145 mmol/L    Potassium 4.7 3.6 - 5.5 mmol/L    Chloride 93 (L) 96 - 112 mmol/L    Co2 16 (L) 20 - 33 mmol/L    Anion Gap 15.0 (H) 0.0 - 11.9    Glucose 281 (H) 65 - 99 mg/dL    Bun 52 (H) 8 - 22 mg/dL    Creatinine 2.95 (H) 0.50 - 1.40 mg/dL    Calcium 8.8 8.5 - 10.5 mg/dL    AST(SGOT) 72 (H) 12 - 45 U/L    ALT(SGPT) 78 (H) 2 - 50 U/L    Alkaline Phosphatase 147 (H) 30 - 99 U/L    Total Bilirubin 1.2 0.1 - 1.5 mg/dL    Albumin 3.4 3.2 - 4.9 g/dL    Total Protein 6.7 6.0 - 8.2 g/dL    Globulin 3.3 1.9 - 3.5 g/dL    A-G Ratio 1.0 g/dL   BTYPE NATRIURETIC PEPTIDE    Collection Time: 06/08/17  6:07 AM   Result Value Ref Range    B Natriuretic Peptide 312 (H) 0 - 100 pg/mL   LIPASE    Collection Time: 06/08/17  6:07 AM   Result Value Ref Range    Lipase 10 (L) 11 - 82 U/L   PROTHROMBIN TIME    Collection Time: 06/08/17  6:07 AM   Result Value Ref Range    PT 23.8 (H) 12.0 - 14.6 sec    INR 2.05 (H) 0.87 - 1.13   APTT    Collection Time: 06/08/17  6:07 AM   Result Value Ref Range    APTT 26.7 24.7 - 36.0 sec   TROPONIN    Collection Time: 06/08/17  6:07 AM   Result Value Ref Range    Troponin I <0.01 0.00 - 0.04 ng/mL   ESTIMATED GFR    Collection Time: 06/08/17  6:07 AM   Result Value Ref Range    GFR If  19 (A) >60 mL/min/1.73 m 2    GFR If Non  16 (A) >60 mL/min/1.73 m 2   URINALYSIS CULTURE, IF INDICATED    Collection Time: 06/08/17  6:45 AM   Result Value Ref Range    Micro Urine Req Microscopic     Color Dk. Brown     Character Cloudy (A)     Specific Gravity 1.023 <1.035    Ph 5.0 5.0-8.0    Glucose Negative Negative mg/dL    Ketones Negative Negative  mg/dL    Protein 70 (A) Negative mg/dL    Bilirubin Negative Negative    Nitrite Negative Negative    Leukocyte Esterase Moderate (A) Negative    Occult Blood Large (A) Negative    Culture Indicated Yes UA Culture   URINE MICROSCOPIC (W/UA)    Collection Time: 17  6:45 AM   Result Value Ref Range    WBC 5-10 (A) /hpf    RBC >150 (A) /hpf    Bacteria Few (A) None /hpf    Epithelial Cells Few /hpf    Mucous Threads Few /hpf    Hyaline Cast 6-10 (A) /lpf    Granular Casts 0-2 /lpf   Lactic acid (lactate)    Collection Time: 17  8:22 AM   Result Value Ref Range    Lactic Acid 1.8 0.5 - 2.0 mmol/L   EKG (ER)    Collection Time: 17 12:05 PM   Result Value Ref Range    Report       [facility]    Test Date:  2017  Pt Name:    NIDA LARES               Department: ER  MRN:        [id number]                      Room:       [facility]  Gender:     F                            Technician: PC  :        1955                   Requested By:PUSHPA MASTERS  Order #:    [id number]                    Reading MD: Chyna Pak MD    Measurements  Intervals                                Axis  Rate:       76                           P:  DE:                                      QRS:        42  QRSD:       110                          T:          29  QT:         428  QTc:        482    Interpretive Statements  ATRIAL FIBRILLATION  NONSPECIFIC INTRAVENTRICULAR CONDUCTION DELAY  Compared to ECG 2017 05:55:27  NO SIGNIFICANT CHANGE    Electronically Signed On 2017 14:40:07 PDT by Chyna Pak MD     ACCU-CHEK GLUCOSE    Collection Time: 17 12:15 PM   Result Value Ref Range    Glucose - Accu-Ck 203 (H) 65 - 99 mg/dL   Procalcitonin    Collection Time: 17 12:46 PM   Result Value Ref Range    Procalcitonin 0.33 (H) <0.25 ng/mL   Lactic Acid -STAT Once    Collection Time: 17 12:46 PM   Result Value Ref Range    Lactic Acid 1.4 0.5 - 2.0 mmol/L   HEMOGLOBIN A1C     Collection Time: 06/08/17 12:46 PM   Result Value Ref Range    Glycohemoglobin 6.5 (H) 0.0 - 5.6 %    Est Avg Glucose 140 mg/dL   URINE SODIUM RANDOM    Collection Time: 06/08/17 12:46 PM   Result Value Ref Range    Sodium, Urine -per volume 16 mmol/L   URINE CREATININE RANDOM    Collection Time: 06/08/17 12:46 PM   Result Value Ref Range    Creatinine, Random Urine 226.30 mg/dL   TSH (Thyroid Stimulating Hormone)    Collection Time: 06/08/17 12:46 PM   Result Value Ref Range    TSH 1.060 0.300 - 3.700 uIU/mL   MAGNESIUM    Collection Time: 06/08/17 12:46 PM   Result Value Ref Range    Magnesium 1.9 1.5 - 2.5 mg/dL   PHOSPHORUS    Collection Time: 06/08/17 12:46 PM   Result Value Ref Range    Phosphorus 5.3 (H) 2.5 - 4.5 mg/dL   CREATINE KINASE    Collection Time: 06/08/17 12:46 PM   Result Value Ref Range    CPK Total 37 0 - 154 U/L   BASIC METABOLIC PANEL    Collection Time: 06/08/17 12:46 PM   Result Value Ref Range    Sodium 128 (L) 135 - 145 mmol/L    Potassium 4.1 3.6 - 5.5 mmol/L    Chloride 98 96 - 112 mmol/L    Co2 18 (L) 20 - 33 mmol/L    Glucose 199 (H) 65 - 99 mg/dL    Bun 54 (H) 8 - 22 mg/dL    Creatinine 3.02 (H) 0.50 - 1.40 mg/dL    Calcium 8.4 (L) 8.5 - 10.5 mg/dL    Anion Gap 12.0 (H) 0.0 - 11.9   ESTIMATED GFR    Collection Time: 06/08/17 12:46 PM   Result Value Ref Range    GFR If  19 (A) >60 mL/min/1.73 m 2    GFR If Non  16 (A) >60 mL/min/1.73 m 2   URINALYSIS    Collection Time: 06/08/17 12:46 PM   Result Value Ref Range    Micro Urine Req Microscopic     Color Dk. Brown     Character Cloudy (A)     Specific Gravity 1.020 <1.035    Ph 5.0 5.0-8.0    Glucose Negative Negative mg/dL    Ketones Negative Negative mg/dL    Protein 70 (A) Negative mg/dL    Bilirubin Negative Negative    Nitrite Negative Negative    Leukocyte Esterase Moderate (A) Negative    Occult Blood Large (A) Negative   URINE MICROSCOPIC (W/UA)    Collection Time: 06/08/17 12:46 PM   Result Value  Ref Range    WBC 5-10 (A) /hpf    RBC >150 (A) /hpf    Bacteria Few (A) None /hpf    Mucous Threads Few /hpf    Amorphous Crystal Present /hpf    Hyaline Cast >10 (A) /lpf   Lactic Acid Every four hours after STAT order    Collection Time: 06/08/17  2:35 PM   Result Value Ref Range    Lactic Acid 1.3 0.5 - 2.0 mmol/L       Imaging/Procedures Review:    Chest Xray:  Reviewed    EKG:   A-fib with controlled VR    ECHO:  EF normal, no valve disease, dilated RV with preserved RV function    MDM (Assessment and Plan):     Active Hospital Problems    Diagnosis   • JUAN PABLO (acute kidney injury) (CMS-[facility]) [N17.9]   • Acute hypoxemic respiratory failure (CMS-[facility]) [J96.01]     62 y/o F with a-fib with controlled VR and pulmonary infiltrates.  Her presentation does appear consistent with diffuse alveolar hemmorhage but will defer this to pulmonary.  I also discussed the pulmonary-renal syndromes with nephrology but will defer the JUAN PABLO to them.  Her RV dilation is likely from a combination of TRACEY/Obesity and acute pulmonary process.  We will hold off on anticoagulation for now given the concern for DAH.  DAH has been reported in cases of apixaban within a few days of administration.    1. A-fib with controlled VR    - hold all anticoag    - rate controlled, hold meds, start dig load if rate increases    - add metop 25 BID if rate > 110    2. Pulm     - defer    3. Juan Pablo    - defer    Critical Care Time: 45 minutes    Thank for you allowing me to take part in your patient's care, please call should you have any questions or would like to discuss this patient.

## 2017-06-08 NOTE — ED NOTES
Assumed care with ANTONIO Milton. Pt resting comfortably, tolerating bipap without difficulty. Family at bedside. Family provided coffee. Call light in reach.

## 2017-06-08 NOTE — PROGRESS NOTES
Addendum:   CT chest is concerning for diffuse alveolar hemorrhage. Avoid anticoagulation. Unlikely to be infectious. Discussed with Dr Clemente.   CT renal negative for obstructive process.   DVT duplex is negative. Await formal report.   TTE reveals concern for RV dysfunction. Good LV function on this study. Discussed with Dr Rosario.   Nephrology on board.     Case discussed with Dr Zelaya's office (PCP). Recent visits reviewed below.   On 5/31/2017, presented with cough, Runny nose and PND. Treated with Augmentin. Dx Acute sinusitis.   On 06/05/2017, presented with SOB. Very pale. Dx with A-Fib. Started on Eliquis and cardizem. Admission advised, patient refused.   On 06/07/2017, seen again. Feeling much better. Echocardiogram scheduled for outpatient.     Last known renal function per them:   9/30/2016. BUN 23. Creatinine 1.17. HbA1C 6.2.     At this point patter most fits diffuse alveolar hemorrhage c/b hypoxemic respiratory failure.   Patient remains critically ill. High risk of de escalation and needs for emergent intubation.   Maintain BIPAP.   Need for bronchoscopy per pulmonary team.     Anson Manuel M.D.

## 2017-06-08 NOTE — H&P
"PRIMARY CARE PHYSICIAN:  Jovan Zelaya MD    CHIEF COMPLAINT:  \"I passed out.\"    HISTORY OF PRESENTING ILLNESS:  This is a pleasant 61-year-old female who   presents to the emergency room for further evaluation of a plethora of   complaints.  First of all, patient was recently diagnosed with atrial   fibrillation by her primary care physician.  She was initiated on   anticoagulation with Eliquis and carvedilol was added.  She is on a ton of   antihypertensive medications, which includes high dose amlodipine, high dose   carvedilol, high dose clonidine, high dose of losartan and   hydrochlorothiazide.  Roughly 1 week ago, patient was diagnosed with   sinusitis/upper respiratory tract infection and patient received amoxicillin   therapy.  Around 4 days ago, patient has been having development of cough.    She reports that she feels as if she has phlegm, but she cannot cough it out.    Otherwise, over the last 4 days, patient has been having ongoing shortness of   breath with worsening dyspnea on exertion.  She has never required oxygen at   home.  In addition, she denies having any fevers, but has been having chills   at home.  There has been accompanying nausea and vomiting also.  Patient lives   with her sister.  She also had a syncopal episode yesterday.  Prior to this,   she has been independent with activities of daily living and IADLs, but over   the course of last 2 days, patient has been severely weak, lethargic, and   unable to participate in activities of daily living.  At baseline, the patient   has underlying diabetes mellitus.  In addition, she has been having profound   dizziness.  Otherwise, the patient denies having any headaches or chest pain.    She denies any abdominal pain, diarrhea, constipation, blood in bowel   movements or melena.  She denies any dysuria, frequency, urgency, or   hematuria.  She and her family have noticed that she has been having bilateral   lower extremity swelling.  " Otherwise, she denies any feelings of   palpitations, orthopnea, or paroxysmal nocturnal dyspnea, but reports overall   globally being short of breath and having dyspnea on exertion.    HOME MEDICATIONS:  1.  Eliquis 5 mg twice a day.  2.  Amlodipine 10 mg daily.  3.  Carvedilol 25 mg twice a day.  4.  Clonidine 0.2 mg twice a day.  5.  Ezetimibe/simvastatin 10-20 half tablet daily.  6.  Synthroid 88 mcg daily.  7.  Losartan 100 mg daily.  8.  Hydrochlorothiazide 25 mg daily.  9.  Metformin 500 mg twice a day.  10.  Diltiazem 60 mg every 4 hours as needed.  11.  Pioglitazone 30 mg daily.    PAST MEDICAL HISTORY:  1.  Diabetes mellitus type 2.  2.  Atrial fibrillation.  3.  Hypertension.  4.  Hypothyroidism.  5.  Hyperlipidemia.  6.  Coronary artery disease.    PAST SURGICAL HISTORY:  Tonsillectomy and cataract surgery.    FAMILY HISTORY:  Mother with history of stroke, hypertension, congestive heart   failure, and grandmother with history of coronary artery disease.    SOCIAL HISTORY:  Patient does not smoke, use alcohol, or use drugs of abuse.    She is currently living with her sister.    ALLERGIES:  Patient has no known allergies.    REVIEW OF SYSTEMS:  A detailed review of system was reviewed with the patient   and negative other than as listed in the history of presenting illness and   past medical history.    PHYSICAL EXAMINATION:  VITAL SIGNS:  On presentation, temperature of 36.4 degrees Celsius, pulse of   68, respiratory rate of 20, blood pressure of 129/105, weight of 113.3 kg, and   oxygen saturation of 90% on nonrebreather mask on presentation.  GENERAL:  Patient is alert and oriented x4, severely dyspneic and short of   breath.  NECK:  No jugular venous distention evident, but a bedside ultrasound revealed   distended IJ.  CARDIOVASCULAR:  Irregularly irregular rhythm.  S1 plus S2 concern for   underlying apical systolic murmur.  RESPIRATORY:  The patient is on a BiPAP, extensive bilateral crackles and    rhonchi are noted, overall diminished.  No wheezing is noted.  ABDOMEN:  Soft, nontender, and nondistended.  Bowel sounds positive and normal   in frequency.  GENITOURINARY:  System not examined.  MUSCULOSKELETAL:  No joint swelling or tenderness on examination.  SKIN:  No rashes, erythema, or wounds.  Cold and clammy in bilateral lower   extremity.  EXTREMITIES:  Pulses 1+ in bilateral lower extremity, +1 edema, no cyanosis.    LABORATORY STUDIES:  White blood cell count of 13.5, hemoglobin of 10.2, and   platelet count of 362.  Sodium of 124, potassium of 4.7, BUN of 52, and   creatinine of 2.95.  Transaminitis is noted.  Lactic acid level of 3.2.  Beta   natriuretic peptide of 312.  INR of 2.05.  Urinalysis concerning for   underlying urinary tract infection with marked hematuria.    IMAGING STUDIES:  1.  Chest x-ray obtained on presentation reveals hyperinflation with extensive   multifocal pneumonia.  Based on my personal review, this is more concerning   for bilateral profound pulmonary venous congestion and pulmonary edema,   pneumonia atypical etiologies could not be ruled out based upon my review.  2.  EKG obtained today and personally reviewed by me reveals the patient to be   in atrial fibrillation.  Q-waves are noted in lead III.  Minimal ST   depression in lead V3.  Otherwise, there are no ST-T wave changes concerning   for ischemia.    ASSESSMENT AND PLAN:  1.  Severe sepsis.  Patient's presentation is concerning for severe sepsis.    She meets systemic inflammatory response syndrome criteria given her   underlying leukocytosis with tachypnea and hypotension.  Source of sepsis is   presumed pulmonary and genitourinary in origin.  At this point in time, this   patient is not a candidate for aggressive IV fluid hydration that is 30 mL/kg   kilogram crystalloid bolus given her underlying profound acute hypoxemic   respiratory failure requiring BiPAP therapy and concern for underlying acute   systolic  heart failure.  She received a 2 liter IV fluid bolus in the   emergency room.  At this point in time, patient will be admitted to the   hospital.  She will be initiated on sepsis protocol with ongoing monitoring of   her vital signs and lactic acid levels.  Based on clinical merit during her   ongoing evaluations need for further IV fluid hydration and boluses will be   determined.  She meets criteria for severe sepsis given underlying acute   kidney injury, which can be potentially related to sepsis.  2.  Acute hypoxemic respiratory failure, unlikely related to sepsis.  Most   likely based on the imaging findings, there is concern that this may be   secondary to underlying pulmonary edema.  Community-acquired pneumonia could   not be ruled out.  I have discussed the case with Dr. Feliciano who is the   consulting pulmonologist.  Patient at this point in time has been initiated on   BiPAP therapy.  They tried to take the patient off BiPAP, but she could not   tolerate it.  Patient will be admitted in critical condition to the intensive   care unit.  At this point in time for determination of her presentation, we   will obtain a noncontrasted CT of the chest for better evaluation of the lung   parenchyma to see if there are any consolidative findings or not.  In   addition, we will obtain an echocardiogram.  Cardiology consultation will be   also requested.  Patient will be admitted to the intensive care unit in   critical condition.  Antibiotic therapy will be continued and based on her   evaluation, further management will be initiated.  3.  Suspected community-acquired pneumonia.  At this point in time, we will   initiate the patient on empiric antibiotic therapy, obtain culture data   including blood and sputum cultures, check a procalcitonin level, and   deescalate antibiotics as clinically appropriate.  Given her hyponatremia, we   will recommend checking legionella antibodies in addition.  4.  Urinary tract  infection with profound hematuria and underlying acute   kidney injury.  Patient has been initiated on empiric antibiotics.  Deescalate   as clinically appropriate.  Check a CT of the abdomen to rule out obstructive   nephrolithiasis given her underlying hematuria, infection, and sepsis.  This   has been ordered by me and pending at the time of this dictation.  5.  Acute kidney injury.  This can be secondary to multiple etiologies.    First, the patient can be having sepsis with sepsis related end organ   dysfunction.  Secondly, she can be in heart failure based on her presentation   with cardiorenal presentation.  Secondly, patient is on a plethora of   antihypertensive medications.  She had a syncopal episode and this can be   related to hypotension related acute tubular necrosis.  At this point in time,   a CT renal protocol has been ordered, which would rule out obstructive   pathologies.  Further, we will obtain urinalysis and fractional excretion of   sodium.  In addition, I have requested nephrology consultation and Dr. Najjar   will be evaluating this patient.  6.  Atrial fibrillation.  Anticoagulated with Eliquis in the setting of   abnormal renal function.  At this point in time, I have withheld her   anticoagulation.  She should be therapeutically anticoagulated at least for   the next 24 hours.  After that, consider initiation of a heparin drip.  For   now given her hypotension, I am not initiating any rate control.  I have   requested consultation from cardiology, which is pending at the time of this   dictation.  7.  Suspected congestive heart failure.  An echocardiogram has been ordered.    In addition, I have requested cardiology consultation, which is pending at   this time.  This was suspected based on her pulmonary exam findings, bilateral   lower extremity edema, and findings of distended internal jugular on   ultrasound.  In addition, BNP is elevated.  For now withhold diuresis given   concern  for underlying sepsis.  Patient reports she has not seen a   cardiologist at this point in time.  8.  Hyponatremia.  Patient received IV fluid hydration in the emergency room   equivalent to 2 liters.  We will continue to monitor this during her   hospitalization.  We will recheck a basic metabolic panel today around 5 p.m.   to see how her hyponatremia and kidney function is moving after receiving the   IV fluid hydration in the emergency room.  9.  Anion gap metabolic acidosis, presumed secondary to underlying lactic   acidosis secondary to sepsis.  Patient will be monitored on sepsis protocol.    We will repeat the basic metabolic panel later in the day.  10.  Transaminitis.  This is either sepsis related or secondary to underlying   congestive hepatopathy.  We will continue to monitor renal functions.  If   needed, ultrasound of the liver and biliary system will be obtained.  11.  Hypertension.  Patient was hypotensive on presentation concern for   underlying acute tubular necrosis.  Sepsis is noted.  All antihypertensive   therapy has been withheld for now.  12.  Diabetes mellitus type 2, withholding oral hypoglycemic regimen.  Patient   has been initiated on sliding scale insulin therapy.  Patient is no further   candidate for metformin given her deranged renal function.  Hemoglobin A1c has   been requested for evaluation of her diabetic control.  13.  Anemia, probably secondary to consumption from sepsis.  Chronic anemia   could not be ruled out.  Recommend evaluation once her acute issues are   resolved with primary care physician following discharge from the hospital.  14.  Hyperlipidemia.  Check lipid profile.  For now withholding statin given   presentation with transaminitis.  This should be reinitiated as appropriate   during her hospitalization.  15.  Hypothyroidism.  Continue Synthroid and check a TSH level.  16.  Preventive prophylaxis.  Deep vein thrombosis preventive prophylaxis are   for now not  indicated given the patient has been on therapeutic   anticoagulation.  This should be considered over the next 24 hours.  Stress   ulcer prophylaxis have been ordered given use of non-positive pressure   ventilation.  17.  Code status was discussed with the patient.  Patient wants to proceed   with a full code status.  This has been updated in the electronic medical   record system to reflect patient's wishes.    This patient has critical/life threatening illness requiring my direct   presence, involvement, and maximal preparedness.  I have personally spent 40   minutes providing critical care to this patient.    Time spent on critical care excludes time spent on procedures if any.       ____________________________________     MD BOZENA SMITH / ELIZABETH    DD:  06/08/2017 11:45:13  DT:  06/08/2017 13:03:22    D#:  [id number]  Job#:  [id number]

## 2017-06-08 NOTE — IP AVS SNAPSHOT
" Home Care Instructions                                                                                                                  Name:Heidi Alfaro  Medical Record Number:[id number]  CSN: [id number]    YOB: 1955   Age: 61 y.o.  Sex: female  HT:1.651 m (5' 5\") WT: 107.2 kg (236 lb 5.3 oz)          Admit Date: 6/8/2017     Discharge Date:   Today's Date: 6/22/2017  Attending Doctor:  Aaron Jarvis M.D.                  Allergies:  Review of patient's allergies indicates no known allergies.            Discharge Instructions       Discharge Instructions    Discharged to other by ambulance with escort. Discharged via ambulance, hospital escort: Yes.  Special equipment needed: Not Applicable    Be sure to schedule a follow-up appointment with your primary care doctor or any specialists as instructed.     Discharge Plan:   Diet Plan: Discussed  Activity Level: Discussed  Confirmed Follow up Appointment: Appointment Scheduled  Confirmed Symptoms Management: Discussed  Medication Reconciliation Updated: Yes  Influenza Vaccine Indication: Not indicated: Previously immunized this influenza season and > 8 years of age    I understand that a diet low in cholesterol, fat, and sodium is recommended for good health. Unless I have been given specific instructions below for another diet, I accept this instruction as my diet prescription.   Other diet: Regular    Special Instructions: None    · Is patient discharged on Warfarin / Coumadin?   No     · Is patient Post Blood Transfusion?  No      Diet tube feeds, will be re-evaluated at SNF   Activities as tolerated   Follow ups with PCP in 7-10 days, call for appointment   Meds per med rec sheet   No smoking, no alcohol, no caffeine   Wear seat belt in motorized vehicle   Take medications as perscribed   Keep appointments   If symptoms worsen call PCP, 911 or urgent care.      Respiratory failure is when your lungs are not working well and your breathing " (respiratory) system fails. When respiratory failure occurs, it is difficult for your lungs to get enough oxygen, get rid of carbon dioxide, or both. Respiratory failure can be life threatening.   Respiratory failure can be acute or chronic. Acute respiratory failure is sudden, severe, and requires emergency medical treatment. Chronic respiratory failure is less severe, happens over time, and requires ongoing treatment.   WHAT ARE THE CAUSES OF ACUTE RESPIRATORY FAILURE?   Any problem affecting the heart or lungs can cause acute respiratory failure. Some of these causes include the following:  · Chronic bronchitis and emphysema (COPD).    · Blood clot going to a lung (pulmonary embolism).    · Having water in the lungs caused by heart failure, lung injury, or infection (pulmonary edema).    · Collapsed lung (pneumothorax).    · Pneumonia.    · Pulmonary fibrosis.    · Obesity.    · Asthma.    · Heart failure.    · Any type of trauma to the chest that can make breathing difficult.    · Nerve or muscle diseases making chest movements difficult.  WHAT SYMPTOMS SHOULD YOU WATCH FOR?   If you have any of these signs or symptoms, you should seek immediate medical care:   · You have shortness of breath (dyspnea) with or without activity.    · You have rapid, fast breathing (tachypnea).    · You are wheezing.  · You are unable to say more than a few words without having to catch your breath.  · You find it very difficult to function normally.  · You have a fast heart rate.    · You have a bluish color to your finger or toe nail beds.    · You have confusion or drowsiness or both.    HOW WILL MY ACUTE RESPIRATORY FAILURE BE TREATED?   Treatment of acute respiratory failure depends on the cause of the respiratory failure. Usually, you will stay in the intensive care unit so your breathing can be watched closely. Treatment can include the following:  · Oxygen. Oxygen can be delivered through the following:  ¨ Nasal cannula.  This is small tubing that goes in your nose to give you oxygen.  ¨ Face mask. A face mask covers your nose and mouth to give you oxygen.  · Medicine. Different medicines can be given to help with breathing. These can include:  ¨ Nebulizers. Nebulizers deliver medicines to open the air passages (bronchodilators). These medicines help to open or relax the airways in the lungs so you can breathe better. They can also help loosen mucus from your lungs.  ¨ Diuretics. Diuretic medicines can help you breathe better by getting rid of extra water in your body.  ¨ Steroids. Steroid medicines can help decrease swelling (inflammation) in your lungs.  ¨ Antibiotics.  · Chest tube. If you have a collapsed lung (pneumothorax), a chest tube is placed to help reinflate the lung.  · Non-invasive positive pressure ventilation (NPPV). This is a tight-fitting mask that goes over your nose and mouth. The mask has tubing that is attached to a machine. The machine blows air into the tubing, which helps to keep the tiny air sacs (alveoli) in your lungs open. This machine allows you to breathe on your own.  · Ventilator. A ventilator is a breathing machine. When on a ventilator, a breathing tube is put into the lungs. A ventilator is used when you can no longer breathe well enough on your own. You may have low oxygen levels or high carbon dioxide (CO2) levels in your blood. When you are on a ventilator, sedation and pain medicines are given to make you sleep so your lungs can heal.     This information is not intended to replace advice given to you by your health care provider. Make sure you discuss any questions you have with your health care provider.     Document Released: 12/23/2014 Document Revised: 01/08/2016 Document Reviewed: 12/23/2014  [employer] Interactive Patient Education ©2016 [employer]    Acute Kidney Injury  Acute kidney injury is any condition in which there is sudden (acute) damage to the kidneys. Acute kidney injury was  previously known as acute kidney failure or acute renal failure. The kidneys are two organs that lie on either side of the spine between the middle of the back and the front of the abdomen. The kidneys:  · Remove wastes and extra water from the blood.    · Produce important hormones. These help keep bones strong, regulate blood pressure, and help create red blood cells.    · Balance the fluids and chemicals in the blood and tissues.  A small amount of kidney damage may not cause problems, but a large amount of damage may make it difficult or impossible for the kidneys to work the way they should. Acute kidney injury may develop into long-lasting (chronic) kidney disease. It may also develop into a life-threatening disease called end-stage kidney disease. Acute kidney injury can get worse very quickly, so it should be treated right away. Early treatment may prevent other kidney diseases from developing.  CAUSES   · A problem with blood flow to the kidneys. This may be caused by:    ¨ Blood loss.    ¨ Heart disease.    ¨ Severe burns.    ¨ Liver disease.  · Direct damage to the kidneys. This may be caused by:  ¨ Some medicines.    ¨ A kidney infection.    ¨ Poisoning or consuming toxic substances.    ¨ A surgical wound.    ¨ A blow to the kidney area.    · A problem with urine flow. This may be caused by:    ¨ Cancer.    ¨ Kidney stones.    ¨ An enlarged prostate.  SIGNS AND SYMPTOMS   · Swelling (edema) of the legs, ankles, or feet.    · Tiredness (lethargy).    · Nausea or vomiting.    · Confusion.    · Problems with urination, such as:    ¨ Painful or burning feeling during urination.    ¨ Decreased urine production.    ¨ Frequent accidents in children who are potty trained.    ¨ Bloody urine.    · Muscle twitches and cramps.    · Shortness of breath.    · Seizures.    · Chest pain or pressure.  Sometimes, no symptoms are present.   DIAGNOSIS  Acute kidney injury may be detected and diagnosed by tests, including  blood, urine, imaging, or kidney biopsy tests.   TREATMENT  Treatment of acute kidney injury varies depending on the cause and severity of the kidney damage. In mild cases, no treatment may be needed. The kidneys may heal on their own. If acute kidney injury is more severe, your health care provider will treat the cause of the kidney damage, help the kidneys heal, and prevent complications from occurring. Severe cases may require a procedure to remove toxic wastes from the body (dialysis) or surgery to repair kidney damage. Surgery may involve:   · Repair of a torn kidney.    · Removal of an obstruction.  HOME CARE INSTRUCTIONS  · Follow your prescribed diet.  · Take medicines only as directed by your health care provider.   · Do not take any new medicines (prescription, over-the-counter, or nutritional supplements) unless approved by your health care provider. Many medicines can worsen your kidney damage or may need to have the dose adjusted.    · Keep all follow-up visits as directed by your health care provider. This is important.  · Observe your condition to make sure you are healing as expected.  SEEK IMMEDIATE MEDICAL CARE IF:  · You are feeling ill or have severe pain in the back or side.    · Your symptoms return or you have new symptoms.  · You have any symptoms of end-stage kidney disease. These include:    ¨ Persistent itchiness.    ¨ Loss of appetite.    ¨ Headaches.    ¨ Abnormally dark or light skin.  ¨ Numbness in the hands or feet.    ¨ Easy bruising.    ¨ Frequent hiccups.    ¨ Menstruation stops.    · You have a fever.  · You have increased urine production.  · You have pain or bleeding when urinating.  MAKE SURE YOU:   · Understand these instructions.  · Will watch your condition.  · Will get help right away if you are not doing well or get worse.     This information is not intended to replace advice given to you by your health care provider. Make sure you discuss any questions you have with your  health care provider.     Document Released: 07/02/2012 Document Revised: 01/08/2016 Document Reviewed: 08/16/2013  [employer] Interactive Patient Education ©2016 [employer]        Depression / Suicide Risk    As [facility] are [facility], it is important to learn how to keep safe from harming yourself.    Recognize the warning signs:  · Abrupt changes in personality, positive or negative- including increase in energy   · Giving away possessions  · Change in eating patterns- significant weight changes-  positive or negative  · Change in sleeping patterns- unable to sleep or sleeping all the time   · Unwillingness or inability to communicate  · Depression  · Unusual sadness, discouragement and loneliness  · Talk of wanting to die  · Neglect of personal appearance   · Rebelliousness- reckless behavior  · Withdrawal from people/activities they love  · Confusion- inability to concentrate     If you or a loved one observes any of these behaviors or has concerns about self-harm, here's what you can do:  · Talk about it- your feelings and reasons for harming yourself  · Remove any means that you might use to hurt yourself (examples: pills, rope, extension cords, firearm)  · Get professional help from the community (Mental Health, Substance Abuse, psychological counseling)  · Do not be alone:Call your Safe Contact- someone whom you trust who will be there for you.  · Call your local CRISIS HOTLINE 100-6015 or 665-733-1638  · Call your local Children's Mobile Crisis Response Team [place] (383) 788-2316 or www.EVault  · Call the toll free National Suicide Prevention Hotlines   · National Suicide Prevention Lifeline 825-751-GAMK (3699)  · National Hope Line Network 800-SUICIDE (280-4547)        Follow-up Information     1. Follow up with Jovan Zelaya M.D.. Go on 6/26/2017.    Specialty:  Internal Medicine    Why:  Please arrive at 8:15am for your appointment.    Contact  information    [address]   [address]  [place] [address]  [phone]           Discharge Medication Instructions:    Below are the medications your physician expects you to take upon discharge:    Review all your home medications and newly ordered medications with your doctor and/or pharmacist. Follow medication instructions as directed by your doctor and/or pharmacist.    Please keep your medication list with you and share with your physician.               Medication List      START taking these medications        Instructions    Morning Afternoon Evening Bedtime    * amiodarone 400 MG tablet   Start taking on:  6/23/2017   Last time this was given:  400 mg on 6/22/2017  9:23 AM   Commonly known as:  PACERONE        Take 1 Tab by mouth every day for 5 days.   Dose:  400 mg                        * amiodarone 200 MG Tabs   Start taking on:  6/28/2017   Last time this was given:  400 mg on 6/22/2017  9:23 AM   Commonly known as:  CORDARONE        Take 1 Tab by mouth every day.   Dose:  200 mg                        digoxin 125 MCG Tabs   Last time this was given:  125 mcg on 6/21/2017  6:13 PM   Commonly known as:  LANOXIN        Take 1 Tab by mouth every day at 6 PM.   Dose:  125 mcg                        fluconazole 200 MG Tabs   Commonly known as:  DIFLUCAN        Take 1 Tab by mouth every day for 3 days.   Dose:  200 mg                        furosemide 20 MG Tabs   Last time this was given:  20 mg on 6/22/2017  1:27 PM   Commonly known as:  LASIX        Take 1 Tab by mouth every 8 hours.   Dose:  20 mg                        losartan 50 MG Tabs   Last time this was given:  50 mg on 6/22/2017  9:22 AM   Commonly known as:  COZAAR        Take 1 Tab by mouth every day.   Dose:  50 mg                        Metoprolol Tartrate 75 MG Tabs   Last time this was given:  75 mg on 6/22/2017  1:27 PM        Take 75 mg by mouth every 8 hours.   Dose:  75 mg                        nystatin powder   Last time this was given:   1,500,000 Units on 6/22/2017  9:23 AM   Commonly known as:  MYCOSTATIN        Apply to groin.                        ondansetron 4 MG Tbdp   Commonly known as:  ZOFRAN ODT        Take 1 Tab by mouth every four hours as needed for Nausea/Vomiting (give PO if no IV route available).   Dose:  4 mg                        potassium chloride 20 MEQ Pack   Last time this was given:  20 mEq on 6/22/2017  9:22 AM   Commonly known as:  KLOR-CON        Take 1 Packet by mouth 2 Times a Day.   Dose:  20 mEq                        * Notice:  This list has 2 medication(s) that are the same as other medications prescribed for you. Read the directions carefully, and ask your doctor or other care provider to review them with you.      CONTINUE taking these medications        Instructions    Morning Afternoon Evening Bedtime    amlodipine 10 MG Tabs   Commonly known as:  NORVASC        Take 10 mg by mouth every day.   Dose:  10 mg                        ELIQUIS 5mg Tabs   Last time this was given:  5 mg on 6/22/2017  9:22 AM   Generic drug:  apixaban        Take 5 mg by mouth 2 Times a Day.   Dose:  5 mg                        ezetimibe-simvastatin 10-20 MG per tablet   Commonly known as:  VYTORIN        Take 0.5 Tabs by mouth every evening.   Dose:  0.5 Tab                        levothyroxine 88 MCG Tabs   Last time this was given:  88 mcg on 6/22/2017  5:30 AM   Commonly known as:  SYNTHROID        Take 88 mcg by mouth Every morning on an empty stomach.   Dose:  88 mcg                        metformin 500 MG Tabs   Commonly known as:  GLUCOPHAGE        Take 500 mg by mouth 2 times a day, with meals.   Dose:  500 mg                             Where to Get Your Medications      Information about where to get these medications is not yet available     ! Ask your nurse or doctor about these medications    - amiodarone 200 MG Tabs  - amiodarone 400 MG tablet  - digoxin 125 MCG Tabs  - fluconazole 200 MG Tabs  - furosemide 20 MG Tabs  -  losartan 50 MG Tabs  - Metoprolol Tartrate 75 MG Tabs  - nystatin powder  - ondansetron 4 MG Tbdp  - potassium chloride 20 MEQ Pack            Orders for after discharge     REFERRAL TO SKILLED NURSING FACILITY    Complete by:  As directed        REFERRAL TO SKILLED NURSING FACILITY    Complete by:  As directed              Instructions           Diet / Nutrition:    Follow any diet instructions given to you by your doctor or the dietician, including how much salt (sodium) you are allowed each day.    If you are overweight, talk to your doctor about a weight reduction plan.    Activity:    Remain physically active following your doctor's instructions about exercise and activity.    Rest often.     Any time you become even a little tired or short of breath, SIT DOWN and rest.    Worsening Symptoms:    Report any of the following signs and symptoms to the doctor's office immediately:    *Pain of jaw, arm, or neck  *Chest pain not relieved by medication                               *Dizziness or loss of consciousness  *Difficulty breathing even when at rest   *More tired than usual                                       *Bleeding drainage or swelling of surgical site  *Swelling of feet, ankles, legs or stomach                 *Fever (>100ºF)  *Pink or blood tinged sputum  *Weight gain (3lbs/day or 5lbs /week)           *Shock from internal defibrillator (if applicable)  *Palpitations or irregular heartbeats                *Cool and/or numb extremities    Stroke Awareness    Common Risk Factors for Stroke include:    Age  Atrial Fibrillation  Carotid Artery Stenosis  Diabetes Mellitus  Excessive alcohol consumption  High blood pressure  Overweight   Physical inactivity  Smoking    Warning signs and symptoms of a stroke include:    *Sudden numbness or weakness of the face, arm or leg (especially on one side of the body).  *Sudden confusion, trouble speaking or understanding.  *Sudden trouble seeing in one or both  eyes.  *Sudden trouble walking, dizziness, loss of balance or coordination.Sudden severe headache with no known cause.    It is very important to get treatment quickly when a stroke occurs. If you experience any of the above warning signs, call 911 immediately.                   Disclaimer         Quit Smoking / Tobacco Use:    I understand the use of any tobacco products increases my chance of suffering from future heart disease or stroke and could cause other illnesses which may shorten my life. Quitting the use of tobacco products is the single most important thing I can do to improve my health. For further information on smoking / tobacco cessation call a Toll Free Quit Line at 1-904.942.5630 (*[employer]) or 1-431.165.5367 (American Lung Association) or you can access the web based program at www.[employer]TMMI [employer]org.    [place] Tobacco Users Help Line:  (511) 200-7052       Toll Free: 1-277.878.4941  Quit Tobacco Program [facility] (293)669-8911    Crisis Hotline:    [place] Crisis Hotline:  3-578-DYGABAK or 1-198.677.7563    [place] Crisis Hotline:    1-642.255.4190 or 038-816-3072    Discharge Survey:   Thank you for choosing [facility]. We hope we did everything we could to make your hospital stay a pleasant one. You may be receiving a phone survey and we would appreciate your time and participation in answering the questions. Your input is very valuable to us in our efforts to improve our service to our patients and their families.        My signature on this form indicates that:    1. I have reviewed and understand the above information.  2. My questions regarding this information have been answered to my satisfaction.  3. I have formulated a plan with my discharge nurse to obtain my prescribed medications for home.                  Disclaimer         __________________________________                     __________       ________                       Patient Signature                                                  Date                    Time

## 2017-06-09 ENCOUNTER — APPOINTMENT (OUTPATIENT)
Dept: RADIOLOGY | Facility: MEDICAL CENTER | Age: 62
DRG: 853 | End: 2017-06-09
Attending: INTERNAL MEDICINE
Payer: COMMERCIAL

## 2017-06-09 PROBLEM — I10 HTN (HYPERTENSION): Status: ACTIVE | Noted: 2017-06-09

## 2017-06-09 PROBLEM — J96.01 ACUTE HYPOXEMIC RESPIRATORY FAILURE (HCC): Status: RESOLVED | Noted: 2017-06-08 | Resolved: 2017-06-09

## 2017-06-09 PROBLEM — E87.1 HYPONATREMIA: Status: ACTIVE | Noted: 2017-06-09

## 2017-06-09 PROBLEM — E03.9 HYPOTHYROID: Status: ACTIVE | Noted: 2017-06-09

## 2017-06-09 PROBLEM — R65.20 SEVERE SEPSIS(995.92): Status: ACTIVE | Noted: 2017-06-09

## 2017-06-09 PROBLEM — A41.9 SEVERE SEPSIS(995.92): Status: ACTIVE | Noted: 2017-06-09

## 2017-06-09 PROBLEM — I48.91 ATRIAL FIBRILLATION (HCC): Status: ACTIVE | Noted: 2017-06-09

## 2017-06-09 PROBLEM — E11.59 TYPE 2 DIABETES MELLITUS WITH CIRCULATORY DISORDER (HCC): Status: ACTIVE | Noted: 2017-06-09

## 2017-06-09 PROBLEM — J96.01 ACUTE RESPIRATORY FAILURE WITH HYPOXIA (HCC): Status: ACTIVE | Noted: 2017-06-09

## 2017-06-09 LAB
ALBUMIN SERPL BCP-MCNC: 3 G/DL (ref 3.2–4.9)
ALBUMIN/GLOB SERPL: 1 G/DL
ALP SERPL-CCNC: 133 U/L (ref 30–99)
ALT SERPL-CCNC: 58 U/L (ref 2–50)
ANION GAP SERPL CALC-SCNC: 12 MMOL/L (ref 0–11.9)
APPEARANCE FLD: NORMAL
AST SERPL-CCNC: 31 U/L (ref 12–45)
BASE EXCESS BLDA CALC-SCNC: -2 MMOL/L (ref -4–3)
BASOPHILS # BLD AUTO: 0.1 % (ref 0–1.8)
BASOPHILS # BLD: 0.01 K/UL (ref 0–0.12)
BILIRUB SERPL-MCNC: 0.7 MG/DL (ref 0.1–1.5)
BODY FLD TYPE: NORMAL
BODY TEMPERATURE: ABNORMAL DEGREES
BUN SERPL-MCNC: 57 MG/DL (ref 8–22)
C3 SERPL-MCNC: 160 MG/DL (ref 87–200)
C4 SERPL-MCNC: 42 MG/DL (ref 19–52)
CALCIUM SERPL-MCNC: 8.4 MG/DL (ref 8.5–10.5)
CHLORIDE SERPL-SCNC: 98 MMOL/L (ref 96–112)
CHOLEST SERPL-MCNC: 82 MG/DL (ref 100–199)
CO2 BLDA-SCNC: 27 MMOL/L (ref 20–33)
CO2 SERPL-SCNC: 20 MMOL/L (ref 20–33)
COLOR FLD: NORMAL
CREAT SERPL-MCNC: 2.23 MG/DL (ref 0.5–1.4)
EOSINOPHIL # BLD AUTO: 0.03 K/UL (ref 0–0.51)
EOSINOPHIL NFR BLD: 0.3 % (ref 0–6.9)
ERYTHROCYTE [DISTWIDTH] IN BLOOD BY AUTOMATED COUNT: 43.2 FL (ref 35.9–50)
GFR SERPL CREATININE-BSD FRML MDRD: 22 ML/MIN/1.73 M 2
GLOBULIN SER CALC-MCNC: 3 G/DL (ref 1.9–3.5)
GLUCOSE BLD-MCNC: 148 MG/DL (ref 65–99)
GLUCOSE BLD-MCNC: 178 MG/DL (ref 65–99)
GLUCOSE BLD-MCNC: 247 MG/DL (ref 65–99)
GLUCOSE BLD-MCNC: 258 MG/DL (ref 65–99)
GLUCOSE SERPL-MCNC: 152 MG/DL (ref 65–99)
GRAM STN SPEC: NORMAL
HCO3 BLDA-SCNC: 25 MMOL/L (ref 17–25)
HCT VFR BLD AUTO: 29 % (ref 37–47)
HDLC SERPL-MCNC: 28 MG/DL
HGB BLD-MCNC: 9.9 G/DL (ref 12–16)
IMM GRANULOCYTES # BLD AUTO: 0.15 K/UL (ref 0–0.11)
IMM GRANULOCYTES NFR BLD AUTO: 1.3 % (ref 0–0.9)
LDLC SERPL CALC-MCNC: 39 MG/DL
LYMPHOCYTES # BLD AUTO: 0.48 K/UL (ref 1–4.8)
LYMPHOCYTES NFR BLD: 4.1 % (ref 22–41)
LYMPHOCYTES NFR FLD: 3 %
MAGNESIUM SERPL-MCNC: 2 MG/DL (ref 1.5–2.5)
MCH RBC QN AUTO: 28.9 PG (ref 27–33)
MCHC RBC AUTO-ENTMCNC: 34.1 G/DL (ref 33.6–35)
MCV RBC AUTO: 84.5 FL (ref 81.4–97.8)
MONOCYTES # BLD AUTO: 0.97 K/UL (ref 0–0.85)
MONOCYTES NFR BLD AUTO: 8.3 % (ref 0–13.4)
NEUTROPHILS # BLD AUTO: 10.06 K/UL (ref 2–7.15)
NEUTROPHILS NFR BLD: 85.9 % (ref 44–72)
NEUTROPHILS NFR FLD: 97 %
NRBC # BLD AUTO: 0.04 K/UL
NRBC BLD AUTO-RTO: 0.3 /100 WBC
O2/TOTAL GAS SETTING VFR VENT: 100 %
PCO2 BLDA: 54.1 MMHG (ref 26–37)
PCO2 TEMP ADJ BLDA: 54.6 MMHG (ref 26–37)
PH BLDA: 7.27 [PH] (ref 7.4–7.5)
PH TEMP ADJ BLDA: 7.27 [PH] (ref 7.4–7.5)
PHOSPHATE SERPL-MCNC: 4.6 MG/DL (ref 2.5–4.5)
PLATELET # BLD AUTO: 306 K/UL (ref 164–446)
PMV BLD AUTO: 9.1 FL (ref 9–12.9)
PO2 BLDA: 105 MMHG (ref 64–87)
PO2 TEMP ADJ BLDA: 106 MMHG (ref 64–87)
POTASSIUM SERPL-SCNC: 3.4 MMOL/L (ref 3.6–5.5)
PROT SERPL-MCNC: 6 G/DL (ref 6–8.2)
RBC # BLD AUTO: 3.43 M/UL (ref 4.2–5.4)
RBC # FLD: 3000 CELLS/UL
SAO2 % BLDA: 97 % (ref 93–99)
SIGNIFICANT IND 70042: NORMAL
SITE SITE: NORMAL
SODIUM SERPL-SCNC: 130 MMOL/L (ref 135–145)
SOURCE SOURCE: NORMAL
SPECIMEN DRAWN FROM PATIENT: ABNORMAL
TRIGL SERPL-MCNC: 77 MG/DL (ref 0–149)
WBC # BLD AUTO: 11.7 K/UL (ref 4.8–10.8)
WBC # FLD: 320 CELLS/UL

## 2017-06-09 PROCEDURE — 86255 FLUORESCENT ANTIBODY SCREEN: CPT

## 2017-06-09 PROCEDURE — 94002 VENT MGMT INPAT INIT DAY: CPT

## 2017-06-09 PROCEDURE — 0B998ZX DRAINAGE OF LINGULA BRONCHUS, VIA NATURAL OR ARTIFICIAL OPENING ENDOSCOPIC, DIAGNOSTIC: ICD-10-PCS | Performed by: INTERNAL MEDICINE

## 2017-06-09 PROCEDURE — 36556 INSERT NON-TUNNEL CV CATH: CPT

## 2017-06-09 PROCEDURE — 86038 ANTINUCLEAR ANTIBODIES: CPT

## 2017-06-09 PROCEDURE — 36600 WITHDRAWAL OF ARTERIAL BLOOD: CPT

## 2017-06-09 PROCEDURE — 87205 SMEAR GRAM STAIN: CPT

## 2017-06-09 PROCEDURE — 86235 NUCLEAR ANTIGEN ANTIBODY: CPT

## 2017-06-09 PROCEDURE — 82803 BLOOD GASES ANY COMBINATION: CPT

## 2017-06-09 PROCEDURE — 88305 TISSUE EXAM BY PATHOLOGIST: CPT

## 2017-06-09 PROCEDURE — 700111 HCHG RX REV CODE 636 W/ 250 OVERRIDE (IP): Performed by: INTERNAL MEDICINE

## 2017-06-09 PROCEDURE — 82962 GLUCOSE BLOOD TEST: CPT

## 2017-06-09 PROCEDURE — 99291 CRITICAL CARE FIRST HOUR: CPT | Mod: 25 | Performed by: INTERNAL MEDICINE

## 2017-06-09 PROCEDURE — 84100 ASSAY OF PHOSPHORUS: CPT

## 2017-06-09 PROCEDURE — 99233 SBSQ HOSP IP/OBS HIGH 50: CPT | Performed by: HOSPITALIST

## 2017-06-09 PROCEDURE — 02HV33Z INSERTION OF INFUSION DEVICE INTO SUPERIOR VENA CAVA, PERCUTANEOUS APPROACH: ICD-10-PCS | Performed by: INTERNAL MEDICINE

## 2017-06-09 PROCEDURE — 5A1955Z RESPIRATORY VENTILATION, GREATER THAN 96 CONSECUTIVE HOURS: ICD-10-PCS | Performed by: INTERNAL MEDICINE

## 2017-06-09 PROCEDURE — 0B9B8ZX DRAINAGE OF LEFT LOWER LOBE BRONCHUS, VIA NATURAL OR ARTIFICIAL OPENING ENDOSCOPIC, DIAGNOSTIC: ICD-10-PCS | Performed by: INTERNAL MEDICINE

## 2017-06-09 PROCEDURE — 87206 SMEAR FLUORESCENT/ACID STAI: CPT

## 2017-06-09 PROCEDURE — 86225 DNA ANTIBODY NATIVE: CPT

## 2017-06-09 PROCEDURE — 92950 HEART/LUNG RESUSCITATION CPR: CPT

## 2017-06-09 PROCEDURE — 88112 CYTOPATH CELL ENHANCE TECH: CPT

## 2017-06-09 PROCEDURE — 71010 DX-CHEST-PORTABLE (1 VIEW): CPT

## 2017-06-09 PROCEDURE — B548ZZA ULTRASONOGRAPHY OF SUPERIOR VENA CAVA, GUIDANCE: ICD-10-PCS | Performed by: INTERNAL MEDICINE

## 2017-06-09 PROCEDURE — 36556 INSERT NON-TUNNEL CV CATH: CPT | Performed by: INTERNAL MEDICINE

## 2017-06-09 PROCEDURE — 700102 HCHG RX REV CODE 250 W/ 637 OVERRIDE(OP): Performed by: INTERNAL MEDICINE

## 2017-06-09 PROCEDURE — C1751 CATH, INF, PER/CENT/MIDLINE: HCPCS

## 2017-06-09 PROCEDURE — 94003 VENT MGMT INPAT SUBQ DAY: CPT

## 2017-06-09 PROCEDURE — 0BH17EZ INSERTION OF ENDOTRACHEAL AIRWAY INTO TRACHEA, VIA NATURAL OR ARTIFICIAL OPENING: ICD-10-PCS | Performed by: INTERNAL MEDICINE

## 2017-06-09 PROCEDURE — 302978 HCHG BRONCHOSCOPY-DIAGNOSTIC

## 2017-06-09 PROCEDURE — 94640 AIRWAY INHALATION TREATMENT: CPT

## 2017-06-09 PROCEDURE — 31500 INSERT EMERGENCY AIRWAY: CPT

## 2017-06-09 PROCEDURE — 89051 BODY FLUID CELL COUNT: CPT

## 2017-06-09 PROCEDURE — A9270 NON-COVERED ITEM OR SERVICE: HCPCS | Performed by: INTERNAL MEDICINE

## 2017-06-09 PROCEDURE — 80061 LIPID PANEL: CPT

## 2017-06-09 PROCEDURE — 31500 INSERT EMERGENCY AIRWAY: CPT | Performed by: INTERNAL MEDICINE

## 2017-06-09 PROCEDURE — 80053 COMPREHEN METABOLIC PANEL: CPT

## 2017-06-09 PROCEDURE — 700101 HCHG RX REV CODE 250: Performed by: HOSPITALIST

## 2017-06-09 PROCEDURE — 87102 FUNGUS ISOLATION CULTURE: CPT

## 2017-06-09 PROCEDURE — 87015 SPECIMEN INFECT AGNT CONCNTJ: CPT

## 2017-06-09 PROCEDURE — 76937 US GUIDE VASCULAR ACCESS: CPT

## 2017-06-09 PROCEDURE — 86160 COMPLEMENT ANTIGEN: CPT

## 2017-06-09 PROCEDURE — 770022 HCHG ROOM/CARE - ICU (200)

## 2017-06-09 PROCEDURE — 85025 COMPLETE CBC W/AUTO DIFF WBC: CPT

## 2017-06-09 PROCEDURE — 83735 ASSAY OF MAGNESIUM: CPT

## 2017-06-09 PROCEDURE — 87116 MYCOBACTERIA CULTURE: CPT

## 2017-06-09 PROCEDURE — 31645 BRNCHSC W/THER ASPIR 1ST: CPT | Performed by: INTERNAL MEDICINE

## 2017-06-09 PROCEDURE — 700111 HCHG RX REV CODE 636 W/ 250 OVERRIDE (IP)

## 2017-06-09 PROCEDURE — 0B9D8ZX DRAINAGE OF RIGHT MIDDLE LUNG LOBE, VIA NATURAL OR ARTIFICIAL OPENING ENDOSCOPIC, DIAGNOSTIC: ICD-10-PCS | Performed by: INTERNAL MEDICINE

## 2017-06-09 PROCEDURE — 87070 CULTURE OTHR SPECIMN AEROBIC: CPT

## 2017-06-09 PROCEDURE — 700101 HCHG RX REV CODE 250: Performed by: INTERNAL MEDICINE

## 2017-06-09 PROCEDURE — 700105 HCHG RX REV CODE 258: Performed by: INTERNAL MEDICINE

## 2017-06-09 RX ORDER — IPRATROPIUM BROMIDE AND ALBUTEROL SULFATE 2.5; .5 MG/3ML; MG/3ML
3 SOLUTION RESPIRATORY (INHALATION)
Status: DISCONTINUED | OUTPATIENT
Start: 2017-06-09 | End: 2017-06-18

## 2017-06-09 RX ORDER — BISACODYL 10 MG
10 SUPPOSITORY, RECTAL RECTAL
Status: DISCONTINUED | OUTPATIENT
Start: 2017-06-09 | End: 2017-06-09

## 2017-06-09 RX ORDER — FAMOTIDINE 20 MG/1
20 TABLET, FILM COATED ORAL DAILY
Status: DISCONTINUED | OUTPATIENT
Start: 2017-06-10 | End: 2017-06-12

## 2017-06-09 RX ORDER — ROCURONIUM BROMIDE 10 MG/ML
50 INJECTION, SOLUTION INTRAVENOUS ONCE
Status: COMPLETED | OUTPATIENT
Start: 2017-06-09 | End: 2017-06-09

## 2017-06-09 RX ORDER — IPRATROPIUM BROMIDE AND ALBUTEROL SULFATE 2.5; .5 MG/3ML; MG/3ML
3 SOLUTION RESPIRATORY (INHALATION)
Status: DISCONTINUED | OUTPATIENT
Start: 2017-06-09 | End: 2017-06-22 | Stop reason: HOSPADM

## 2017-06-09 RX ORDER — CHLORHEXIDINE GLUCONATE ORAL RINSE 1.2 MG/ML
15 SOLUTION DENTAL 2 TIMES DAILY
Status: DISCONTINUED | OUTPATIENT
Start: 2017-06-09 | End: 2017-06-18

## 2017-06-09 RX ORDER — LIDOCAINE HYDROCHLORIDE 10 MG/ML
1-2 INJECTION, SOLUTION INFILTRATION; PERINEURAL
Status: DISCONTINUED | OUTPATIENT
Start: 2017-06-09 | End: 2017-06-18

## 2017-06-09 RX ORDER — POLYETHYLENE GLYCOL 3350 17 G/17G
1 POWDER, FOR SOLUTION ORAL
Status: DISCONTINUED | OUTPATIENT
Start: 2017-06-09 | End: 2017-06-09

## 2017-06-09 RX ORDER — PROPOFOL 10 MG/ML
100 INJECTION, EMULSION INTRAVENOUS ONCE
Status: COMPLETED | OUTPATIENT
Start: 2017-06-09 | End: 2017-06-09

## 2017-06-09 RX ORDER — IPRATROPIUM BROMIDE AND ALBUTEROL SULFATE 2.5; .5 MG/3ML; MG/3ML
3 SOLUTION RESPIRATORY (INHALATION)
Status: DISCONTINUED | OUTPATIENT
Start: 2017-06-09 | End: 2017-06-09

## 2017-06-09 RX ORDER — AMOXICILLIN 250 MG
2 CAPSULE ORAL 2 TIMES DAILY
Status: DISCONTINUED | OUTPATIENT
Start: 2017-06-09 | End: 2017-06-09

## 2017-06-09 RX ADMIN — ROCURONIUM BROMIDE 50 MG: 10 INJECTION INTRAVENOUS at 17:05

## 2017-06-09 RX ADMIN — AZITHROMYCIN FOR INJECTION INJECTION, POWDER, LYOPHILIZED, FOR SOLUTION 500 MG: 500 INJECTION INTRAVENOUS at 08:47

## 2017-06-09 RX ADMIN — IPRATROPIUM BROMIDE AND ALBUTEROL SULFATE 3 ML: .5; 3 SOLUTION RESPIRATORY (INHALATION) at 22:22

## 2017-06-09 RX ADMIN — CEFTRIAXONE SODIUM 2 G: 2 INJECTION, POWDER, FOR SOLUTION INTRAMUSCULAR; INTRAVENOUS at 09:06

## 2017-06-09 RX ADMIN — CHLORHEXIDINE GLUCONATE 15 ML: 1.2 RINSE ORAL at 21:20

## 2017-06-09 RX ADMIN — SENNOSIDES AND DOCUSATE SODIUM 2 TABLET: 8.6; 5 TABLET ORAL at 08:46

## 2017-06-09 RX ADMIN — PROPOFOL 50 MG: 10 INJECTION, EMULSION INTRAVENOUS at 17:05

## 2017-06-09 RX ADMIN — LEVOTHYROXINE SODIUM 88 MCG: 88 TABLET ORAL at 06:02

## 2017-06-09 RX ADMIN — FENTANYL CITRATE 50 MCG: 50 INJECTION, SOLUTION INTRAMUSCULAR; INTRAVENOUS at 19:14

## 2017-06-09 RX ADMIN — INSULIN LISPRO 3 UNITS: 100 INJECTION, SOLUTION INTRAVENOUS; SUBCUTANEOUS at 17:55

## 2017-06-09 RX ADMIN — SENNOSIDES AND DOCUSATE SODIUM 2 TABLET: 8.6; 5 TABLET ORAL at 22:16

## 2017-06-09 RX ADMIN — FAMOTIDINE 20 MG: 20 TABLET, FILM COATED ORAL at 08:46

## 2017-06-09 RX ADMIN — INSULIN LISPRO 5 UNITS: 100 INJECTION, SOLUTION INTRAVENOUS; SUBCUTANEOUS at 12:25

## 2017-06-09 RX ADMIN — INSULIN LISPRO 2 UNITS: 100 INJECTION, SOLUTION INTRAVENOUS; SUBCUTANEOUS at 00:20

## 2017-06-09 RX ADMIN — PROPOFOL 30 MCG/KG/MIN: 10 INJECTION, EMULSION INTRAVENOUS at 17:53

## 2017-06-09 RX ADMIN — INSULIN LISPRO 2 UNITS: 100 INJECTION, SOLUTION INTRAVENOUS; SUBCUTANEOUS at 23:55

## 2017-06-09 RX ADMIN — PROPOFOL 35 MCG/KG/MIN: 10 INJECTION, EMULSION INTRAVENOUS at 21:22

## 2017-06-09 ASSESSMENT — ENCOUNTER SYMPTOMS
WHEEZING: 0
CONSTITUTIONAL NEGATIVE: 1
ORTHOPNEA: 0
BRUISES/BLEEDS EASILY: 0
WEAKNESS: 0
LOSS OF CONSCIOUSNESS: 0
STRIDOR: 0
COUGH: 0
CHILLS: 0
SORE THROAT: 0
PALPITATIONS: 0
CARDIOVASCULAR NEGATIVE: 1
DIZZINESS: 0
MUSCULOSKELETAL NEGATIVE: 1
SPUTUM PRODUCTION: 0
SHORTNESS OF BREATH: 0
HEMOPTYSIS: 0
EYES NEGATIVE: 1
NEUROLOGICAL NEGATIVE: 1
FEVER: 0
GASTROINTESTINAL NEGATIVE: 1
PND: 0
CLAUDICATION: 0
RESPIRATORY NEGATIVE: 1

## 2017-06-09 ASSESSMENT — PULMONARY FUNCTION TESTS
EPAP_CMH2O: 7
EPAP_CMH2O: 7

## 2017-06-09 ASSESSMENT — PAIN SCALES - GENERAL
PAINLEVEL_OUTOF10: 0

## 2017-06-09 ASSESSMENT — COPD QUESTIONNAIRES
DO YOU EVER COUGH UP ANY MUCUS OR PHLEGM?: YES, A FEW DAYS A WEEK OR MONTH
DURING THE PAST 4 WEEKS HOW MUCH DID YOU FEEL SHORT OF BREATH: NONE/LITTLE OF THE TIME
COPD SCREENING SCORE: 3
HAVE YOU SMOKED AT LEAST 100 CIGARETTES IN YOUR ENTIRE LIFE: NO/DON'T KNOW

## 2017-06-09 NOTE — PROGRESS NOTES
Cardiology Progress Note               Author: Refugio Rosario Date & Time created: 6/9/2017  9:58 AM     Interval History:  Resp status improving  Cr mildly improved  CXR continues to show infiltrate    Review of Systems   Constitutional: Negative.  Negative for fever, chills and malaise/fatigue.   HENT: Negative.  Negative for sore throat.    Eyes: Negative.    Respiratory: Negative.  Negative for cough, hemoptysis, sputum production, shortness of breath, wheezing and stridor.    Cardiovascular: Negative.  Negative for chest pain, palpitations, orthopnea, claudication, leg swelling and PND.   Gastrointestinal: Negative.    Genitourinary: Negative.    Musculoskeletal: Negative.    Skin: Negative.    Neurological: Negative.  Negative for dizziness, loss of consciousness and weakness.   Endo/Heme/Allergies: Negative.  Does not bruise/bleed easily.   All other systems reviewed and are negative.      Physical Exam   Constitutional: She is oriented to person, place, and time. She appears well-developed and well-nourished. No distress.   HENT:   Head: Normocephalic.   Mouth/Throat: Oropharynx is clear and moist.   Eyes: EOM are normal. Pupils are equal, round, and reactive to light. Right eye exhibits no discharge. Left eye exhibits no discharge. No scleral icterus.   Neck: Normal range of motion. Neck supple. No JVD present. No tracheal deviation present.   Cardiovascular: Normal rate, regular rhythm, S1 normal, S2 normal, normal heart sounds, intact distal pulses and normal pulses.  Exam reveals no gallop, no S3, no S4 and no friction rub.    No murmur heard.   No systolic murmur is present    No diastolic murmur is present   Pulses:       Carotid pulses are 2+ on the right side, and 2+ on the left side.       Radial pulses are 2+ on the right side, and 2+ on the left side.        Dorsalis pedis pulses are 2+ on the right side, and 2+ on the left side.        Posterior tibial pulses are 2+ on the right side, and 2+  on the left side.   Pulmonary/Chest: Effort normal and breath sounds normal. No respiratory distress. She has no wheezes. She has no rales.   Abdominal: Soft. Bowel sounds are normal. She exhibits no distension and no mass. There is no tenderness. There is no rebound and no guarding.   Musculoskeletal: She exhibits no edema.   Neurological: She is alert and oriented to person, place, and time. No cranial nerve deficit.   Skin: Skin is warm and dry. She is not diaphoretic. No pallor.   Psychiatric: She has a normal mood and affect. Her behavior is normal. Judgment and thought content normal.   Nursing note and vitals reviewed.      Hemodynamics:  Temp (24hrs), Av.6 °C (97.9 °F), Min:35.9 °C (96.7 °F), Max:37.4 °C (99.3 °F)  Temperature: 37.4 °C (99.3 °F)  Pulse  Av.9  Min: 62  Max: 112Heart Rate (Monitored): (!) 113  NIBP: 128/59 mmHg     Respiratory:    Respiration: (!) 31, Pulse Oximetry: 95 %, O2 Daily Delivery Respiratory : Highflow Nasal Cannula     Work Of Breathing / Effort: Moderate;Shallow  RUL Breath Sounds: Clear, RML Breath Sounds: Diminished, RLL Breath Sounds: Diminished, CAROLANN Breath Sounds: Clear, LLL Breath Sounds: Diminished  Fluids:  Date 17 0700 - 06/10/17 0659   Shift 3867-7250 7653-4644 8153-8014 24 Hour Total   I  N  T  A  K  E   Shift Total       O  U  T  P  U  T   Urine 185   185    Shift Total 185   185   Weight (kg) 112.2 112.2 112.2 112.2       Weight: 112.2 kg (247 lb 5.7 oz)  GI/Nutrition:  Orders Placed This Encounter   Procedures   • DIET ORDER     Standing Status: Standing      Number of Occurrences: 1      Standing Expiration Date:      Order Specific Question:  Diet:     Answer:  Clear Liquid [10]     Lab Results:  Recent Labs      17   0607  17   0355   WBC  13.5*  11.7*   RBC  3.62*  3.43*   HEMOGLOBIN  10.2*  9.9*   HEMATOCRIT  30.9*  29.0*   MCV  85.4  84.5   MCH  28.2  28.9   MCHC  33.0*  34.1   RDW  43.8  43.2   PLATELETCT  362  306   MPV  9.7  9.1      Recent Labs      06/08/17   0607  06/08/17   1246  06/09/17   0355   SODIUM  124*  128*  130*   POTASSIUM  4.7  4.1  3.4*   CHLORIDE  93*  98  98   CO2  16*  18*  20   GLUCOSE  281*  199*  152*   BUN  52*  54*  57*   CREATININE  2.95*  3.02*  2.23*   CALCIUM  8.8  8.4*  8.4*     Recent Labs      06/08/17   0607   APTT  26.7   INR  2.05*     Recent Labs      06/08/17   0607   BNPBTYPENAT  312*     Recent Labs      06/08/17   0607  06/08/17   1246   CPKTOTAL   --   37   TROPONINI  <0.01   --    BNPBTYPENAT  312*   --      Recent Labs      06/09/17   0355   TRIGLYCERIDE  77   HDL  28*   LDL  39         Medical Decision Making, by Problem:  Active Hospital Problems    Diagnosis   • Severe sepsis (CMS-HCC) [A41.9, R65.20]   • Acute respiratory failure with hypoxia (CMS-HCC) [J96.01]   • Atrial fibrillation (CMS-HCC) [I48.91]   • 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  Continue to hold all Xa medications in the setting of her resp status.  I would like a definitive diagnosis as this condition can cross over to other Xa medications, possibly warfarin and/or direct thrombin inhibitors.  I will discuss with Pulm whether we can get a bronch.    EKG reviewed, Medications reviewed, Radiology images reviewed and Labs reviewed  Tamayo catheter: No Tamayo        DVT prophylaxis - mechanical: SCDs  Ulcer prophylaxis: No

## 2017-06-09 NOTE — ASSESSMENT & PLAN NOTE
Cr on admit was 2.95 with baseline 1.1 per review of the records from 2016.   Consistent with ATN, resolved with IVF's and other therapy modalities as described elsewhere.

## 2017-06-09 NOTE — ASSESSMENT & PLAN NOTE
-s/p intubation, now extubated, still requiring 4-5 L O2  -likely dealing with pulmonary edema at this point now too  -continue forced diuresis with current dosing of lasix, no change in dosing planned today

## 2017-06-09 NOTE — PROGRESS NOTES
Received report from RN; assumed patient care.  Assessment complete, A & O x 4, VSS, BiPAP in place with pt able to demonstrate removal.  No c/o pain, repositioning provided for patient comfort.  Updated on POC, verbalizes understanding.  Bed in lowest position, call light within reach, frequent rounding in effect.

## 2017-06-09 NOTE — PROGRESS NOTES
Hospital Medicine Progress Note, Adult, Complex               Author: Isaías Najjar Date & Time created: 6/9/2017  4:37 PM     Interval History:  Pt with recently diagnosed AFib, presented with resp failure, still requiring BiPaP.  Also KIRT, hematuria.  chest CT scan images reviewed     Review of Systems:  Review of Systems   Unable to perform ROS: medical condition       Physical Exam:  Physical Exam   Constitutional: She is oriented to person, place, and time. She appears ill. She appears distressed. Face mask in place.   HENT:   Head: Normocephalic and atraumatic.   Right Ear: External ear normal.   Left Ear: External ear normal.   Nose: Nose normal.   BiPaP mask   Eyes: Right eye exhibits no discharge. Left eye exhibits no discharge. No scleral icterus.   Neck: Normal range of motion. Neck supple. No JVD present. No tracheal deviation present. No thyromegaly present.   Cardiovascular: Normal rate and normal heart sounds.  An irregularly irregular rhythm present.   No murmur heard.  Pulmonary/Chest: No stridor. She is in respiratory distress. She has rhonchi. She has rales.   Abdominal: Soft. Bowel sounds are normal. She exhibits no distension. There is no tenderness. There is no rebound.   Musculoskeletal: She exhibits no edema.   Lymphadenopathy:     She has no cervical adenopathy.   Neurological: She is alert and oriented to person, place, and time.   Skin: Skin is warm and dry. She is not diaphoretic. No erythema.   Psychiatric: She has a normal mood and affect. Her behavior is normal.   Nursing note and vitals reviewed.      Labs:        Invalid input(s): [id number]  Recent Labs      06/08/17   0607  06/08/17   1246   CPKTOTAL   --   37   TROPONINI  <0.01   --    BNPBTYPENAT  312*   --      Recent Labs      06/08/17   0607  06/08/17   1246  06/09/17   0355   SODIUM  124*  128*  130*   POTASSIUM  4.7  4.1  3.4*   CHLORIDE  93*  98  98   CO2  16*  18*  20   BUN  52*  54*  57*   CREATININE  2.95*  3.02*   2.23*   MAGNESIUM   --   1.9  2.0   PHOSPHORUS   --   5.3*  4.6*   CALCIUM  8.8  8.4*  8.4*     Recent Labs      17   0617   1246  17   0355   ALTSGPT  78*   --   58*   ASTSGOT  72*   --   31   ALKPHOSPHAT  147*   --   133*   TBILIRUBIN  1.2   --   0.7   LIPASE  10*   --    --    GLUCOSE  281*  199*  152*     Recent Labs      17   0617   0355   RBC  3.62*  3.43*   HEMOGLOBIN  10.2*  9.9*   HEMATOCRIT  30.9*  29.0*   PLATELETCT  362  306   PROTHROMBTM  23.8*   --    APTT  26.7   --    INR  2.05*   --      Recent Labs      17   0607  17   0355   WBC  13.5*  11.7*   NEUTSPOLYS  86.10*  85.90*   LYMPHOCYTES  4.80*  4.10*   MONOCYTES  7.60  8.30   EOSINOPHILS  0.10  0.30   BASOPHILS  0.20  0.10   ASTSGOT  72*  31   ALTSGPT  78*  58*   ALKPHOSPHAT  147*  133*   TBILIRUBIN  1.2  0.7           Hemodynamics:  Temp (24hrs), Av.9 °C (98.4 °F), Min:36.3 °C (97.3 °F), Max:37.4 °C (99.3 °F)  Temperature: 36.7 °C (98 °F)  Pulse  Av.6  Min: 62  Max: 121Heart Rate (Monitored): (!) 102  NIBP: 112/64 mmHg     Respiratory:    Respiration: (!) 28, Pulse Oximetry: 90 %, O2 Daily Delivery Respiratory : Highflow Nasal Cannula     Work Of Breathing / Effort: Moderate;Shallow  RUL Breath Sounds: Clear, RML Breath Sounds: Diminished, RLL Breath Sounds: Diminished, CAROLANN Breath Sounds: Clear, LLL Breath Sounds: Diminished  Fluids:    Intake/Output Summary (Last 24 hours) at 17 1637  Last data filed at 17 1200   Gross per 24 hour   Intake    420 ml   Output   1450 ml   Net  -1030 ml     Weight: 112.2 kg (247 lb 5.7 oz)  GI/Nutrition:  Orders Placed This Encounter   Procedures   • DIET ORDER     Standing Status: Standing      Number of Occurrences: 1      Standing Expiration Date:      Order Specific Question:  Diet:     Answer:  Clear Liquid [10]     Medical Decision Making, by Problem:  Active Hospital Problems    Diagnosis   • Severe sepsis (CMS-HCC) [A41.9, R65.20]   •  Acute respiratory failure with hypoxia (CMS-HCC) [J96.01]   • Atrial fibrillation (CMS-HCC) [I48.91]   • 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.59]   • Hyponatremia [E87.1]   • Hypothyroid [E03.9]   • KIRT (acute kidney injury) (CMS-HCC) [N17.9]   • Acute hypoxemic respiratory failure (CMS-HCC) [J96.01]       Labs reviewed, Medications reviewed and Radiology images reviewed  Tamayo catheter: Critically Ill - Requiring Accurate Measurement of Urinary Output                1 KIRT: no clear etiology , pulmonary renal syndrome is still a posibility  2 resp failure with possible pulmonary hemorrhage  3 AFib  4 Anemia  5 Hyponatremia  6 Hypokalemia  Plan  no acute need for HD  Await serology test  IV Lasix as needed  Daily labs  Plan for bronchoscopy   If positive pulmonary hemorrhage will start high dose steroid   Renal dose all meds  Avoid nephrotoxins  Prognosis guarded  D/W Dr Mata

## 2017-06-09 NOTE — ASSESSMENT & PLAN NOTE
-continue eliquis  Cardiology consulted.  Converted to sinus on 6/12.  EF 65%.  Re-occurred on 6/16  Amio restarted, remains in a fib, no e/o overt bleeding at this time, monitor closely

## 2017-06-09 NOTE — DISCHARGE PLANNING
Resp failure KIRT.  HF 02.  60/100%.     62 y/o female.   Has family.  [facility] insurance.     This chart has been reviewed by the hospital case management team. Based on the chart review and the patient's current medical status, the anticipated discharge plan is pending medical team collaboration.

## 2017-06-09 NOTE — CONSULTS
DATE OF SERVICE:  06/08/2017    DATE OF CONSULTATION:  06/08/2017    REQUESTING PHYSICIAN:  Dr. Anson Manuel.    REASON FOR CONSULTATION:  Acute kidney injury.    The patient seen and examined, medical records were reviewed.  Unfortunately,   the patient is a poor historian because of the acuity of her respiratory   failure.  She is on BiPAP mask.  Most of the story was through with discussing   the case with Dr. Manuel and reviewing the medical records.    HISTORY OF PRESENT ILLNESS:  The patient is an unfortunate 61-year-old lady   with a past medical history significant for longstanding hypertension,   diabetes mellitus, her recent history starts about couple of weeks ago where   she was diagnosed with sinusitis.  She was started on amoxicillin, however,   earlier this week on Monday, June 6, she was diagnosed with atrial   fibrillation, she was started on anticoagulation with Eliquis and also   carvedilol, patient has decreased p.o. intake, generalized fatigue and   weakness.  She continued to have worsening shortness of breath.  She also had   an episode of syncope yesterday.  Patient presented to the hospital today   where she was found in respiratory failure, also she was hypotensive.    Also, her creatinine was elevated with a baseline creatinine back in September 2016 was 1.1 and today it was 2.9, patient uses Aleve regularly.  She has no   hematuria, no dysuria, no recent use of IV contrast.    PAST MEDICAL HISTORY:  1.  Hypertension.  2.  History of diabetes mellitus.  3.  Obesity.  4.  Recently diagnosed atrial fibrillation.    ALLERGIES:  No known drug allergies.    SOCIAL HISTORY:  Patient has no smoking or alcohol use.  She lives with her   sister.    FAMILY HISTORY:  Positive for hypertension.    REVIEW OF SYSTEMS:  Patient is very short of breath and weak.  The rest of   review of system was unobtainable because of the acuity of the patient's   respiratory failure and she is again on BiPAP  facemask.    HOME MEDICATIONS:  Reviewed.    PHYSICAL EXAMINATION:  GENERAL:  Patient is in mild respiratory failure.  VITAL SIGNS:  Showed blood pressure of 98/59, heart rate was 94.  HEENT:  Patient has face mask.  Sclerae anicteric.  NECK:  No lymphadenopathy.  CHEST:  Normal.  LUNGS:  Coarse breath.  HEART:  S1, S2.  Irregularly irregular.  ABDOMEN:  Soft, nontender.  EXTREMITIES:  There is no lower extremity edema.  SKIN:  No skin rashes.  NEUROLOGIC:  The patient is alert and oriented x3.    LABORATORY DATA:  Recent labs were reviewed.  Also I did review the chest   x-ray myself, which showed cardiomegaly and airspace disease, possible   pneumonia.  Patient has an abdominal CT scan and I reviewed the image of the   kidney that showed no hydronephrosis.    ASSESSMENT AND PLAN:  1.  Acute kidney injury.  The etiology is not very clear.  The possibility   includes prerenal component secondary to decreased cardiac output, there is   also a possibility of acute tubular necrosis from recent hypotension/ischemia,   patient's urinalysis showed hematuria and especially with her representation   of lung disease, we need to rule out pulmonary renal syndrome.  2.  Hyponatremia.  3.  Metabolic acidosis.  4.  Anemia.  5.  Respiratory failure.  6.  Lactic acidosis.    PLAN:  1.  There is no acute need for renal replacement.  2.  I will hold any fluid resuscitation at this point.  3.  Check urine sodium, creatinine as well as protein.  4.  Renal dose all medications.  5.  Avoid nephrotoxins.  6.  Lasix on an as needed basis.  7.  Await cardiology input.  8.  We might consider bronchoscopy to rule out any pulmonary hemorrhage.  9.  Prognosis is guarded.    Plan discussed in detail with Dr. Manuel.       ____________________________________     FADI NAJJAR, MD FN / ELIZABETH    DD:  06/08/2017 14:00:26  DT:  06/08/2017 17:58:02    D#:  [id number]  Job#:  [id number]

## 2017-06-09 NOTE — CARE PLAN
Problem: Nutritional:  Goal: Achieve adequate nutritional intake  Patient’s diet will advance past clears and will consume >50% of meals  Outcome: NOT MET

## 2017-06-09 NOTE — PROGRESS NOTES
Attempted to obtain sputum culture; pt cough non-productive despite coaching.  SpO2 desaturates to 78% after 30s off BiPAP mask.  BiPAP replaced; antibiotics started.

## 2017-06-09 NOTE — PROGRESS NOTES
[facility]    Date of Service: 2017    Chief Complaint  61 y.o. female admitted 2017 with syncope.    Interval Problem Update  Ms. Alfaro has a hx of htn and recently diagnosed afib.  CT chest in the ER revealed alveolar hemorrhage. She required rescue bipap and was admitted to the ICU. This morning she is on high-flow oxygen 100% and has required intubation.   She remains in afib.   Consultants/Specialty  Pulmonology  Cardiology  Nephrology    Disposition  ICU        Review of Systems   Unable to perform ROS: intubated      Physical Exam  Laboratory/Imaging   Hemodynamics  Temp (24hrs), Av.6 °C (97.9 °F), Min:35.9 °C (96.7 °F), Max:37.4 °C (99.3 °F)   Temperature: 37.4 °C (99.3 °F)  Pulse  Av.7  Min: 62  Max: 108 Heart Rate (Monitored): (!) 101  NIBP: 128/59 mmHg      Respiratory      Respiration: (!) 25, Pulse Oximetry: 93 %     Work Of Breathing / Effort: Moderate;Shallow  RUL Breath Sounds: Clear, RML Breath Sounds: Diminished, RLL Breath Sounds: Diminished, CAROLANN Breath Sounds: Clear, LLL Breath Sounds: Diminished    Fluids    Intake/Output Summary (Last 24 hours) at 17 0831  Last data filed at 17 0800   Gross per 24 hour   Intake    100 ml   Output   1240 ml   Net  -1140 ml       Nutrition  Orders Placed This Encounter   Procedures   • Diet NPO     Standing Status: Standing      Number of Occurrences: 1      Standing Expiration Date:      Order Specific Question:  Restrict to:     Answer:  Sips with Medications [3]     Physical Exam   Constitutional: She appears well-nourished. No distress.   HENT:   Head: Normocephalic and atraumatic.   Cardiovascular:   Distant, irregular heart sounds.    Pulmonary/Chest:   Vent, moderate air movement. No wheezing. +crackles.    Abdominal: Soft. She exhibits no distension.   Genitourinary:   Tamayo cath.   Musculoskeletal: She exhibits edema. She exhibits no tenderness.   Neurological:   Sedated on propofol   Skin: No  erythema. There is pallor.       Recent Labs      06/08/17   0607  06/09/17   0355   WBC  13.5*  11.7*   RBC  3.62*  3.43*   HEMOGLOBIN  10.2*  9.9*   HEMATOCRIT  30.9*  29.0*   MCV  85.4  84.5   MCH  28.2  28.9   MCHC  33.0*  34.1   RDW  43.8  43.2   PLATELETCT  362  306   MPV  9.7  9.1     Recent Labs      06/08/17   0607  06/08/17   1246  06/09/17   0355   SODIUM  124*  128*  130*   POTASSIUM  4.7  4.1  3.4*   CHLORIDE  93*  98  98   CO2  16*  18*  20   GLUCOSE  281*  199*  152*   BUN  52*  54*  57*   CREATININE  2.95*  3.02*  2.23*   CALCIUM  8.8  8.4*  8.4*     Recent Labs      06/08/17   0607   APTT  26.7   INR  2.05*     Recent Labs      06/08/17   0607   BNPBTYPENAT  312*     Recent Labs      06/09/17   0355   TRIGLYCERIDE  77   HDL  28*   LDL  39          Assessment/Plan     Severe sepsis (CMS-HCC) (present on admission)  Assessment & Plan  Source appears respiratory. IV abx.  The organ system dysfunction associated with this disease process is the respiratory system as is evidenced by respiratory failure requiring intubation.     Acute respiratory failure with hypoxia (CMS-HCC) (present on admission)  Assessment & Plan  Requiring rescue bipap followed by intubation on 6/9. Bronch done 6/9.  Pulmonology consultation.   Echo showed normal EF at 65%.    CT chest:    Extensive bilateral patchy and groundglass airspace opacities may represent pulmonary edema, multifocal pneumonia. Hemorrhage is also a consideration in the correct clinical setting. Follow-up to radiographic resolution is recommended.    Small bilateral pleural effusions, right greater than left.    Cardiomegaly.    Mildly prominent mediastinal lymph nodes are nonspecific.    Coronary atherosclerotic plaque.    Small hiatal hernia.    KIRT (acute kidney injury) (CMS-HCC) (present on admission)  Assessment & Plan  Cr on admit was 2.95 with baseline 1.1 per review of the records from 2016.   Consistent with ATN. IV fluids have been given.    Nephrology consultation.     Atrial fibrillation (CMS-HCC) (present on admission)  Assessment & Plan  Eliquis as an outpatient.  Recently diagnosed.    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-HCC) (present on admission)  Assessment & Plan  Sliding scale    Hyponatremia (present on admission)  Assessment & Plan  Na 124 on admit. NS given.    Hypothyroid (present on admission)  Assessment & Plan  replacement    HTN (hypertension) (present on admission)  Assessment & Plan  On cardizem, hyzaar 100/25, clonidine, coreg, norvasc as an outpatient.      Core Measures

## 2017-06-09 NOTE — CARE PLAN
Problem: Ventilation Defect:  Goal: Ability to achieve and maintain unassisted ventilation or tolerate decreased levels of ventilator support  Intervention: Support and monitor invasive and noninvasive mechanical ventilation  BiPAP day #2  IPAP 12  EPAP 7  FiO2 80%

## 2017-06-09 NOTE — CONSULTS
DATE OF SERVICE:  06/08/2017    PULMONARY CRITICAL CARE CONSULTATION    REQUESTING PHYSICIAN:  Dr. Manuel.    REASON FOR REQUEST:  Acute hypoxic respiratory failure.    HISTORY OF PRESENT ILLNESS:  This is a 61-year-old female with known past   medical history of diabetes, hypertension and dyslipidemia.  History was taken   from patient herself as well as her half-sister at bedside.  It seems that   the patient had started developing increased dyspnea on exertion, dry cough as   well as pillow orthopnea and paroxysmal nocturnal dyspnea approximately a   week and a half ago.  The patient was seen by an outside provider and   prescribed an antibiotic course for concern of possible sinusitis for which   she was given amoxicillin.  The patient did not notice any improvement, had   continued symptoms progressing in nature over the past several days, was seen   again this past Monday, at which point in time she was identified to be a new   onset atrial fibrillation, was started on new medication for rate control as   well as placed on anticoagulation.  The patient was scheduled for an   echocardiogram later today; however, she continued to have the issue and last   night her half-sister states after her blood pressure medications, she briefly   became unresponsive.  She had to get a cold wet towel to help wake her up at   which point she did and this morning _____ to going for outpatient echo,   decided to come into the emergency room for more definitive management.    Patient denies having at any point fever, chills, or sweats.  Her cough has   been dry in nature, nonproductive, states feels like she needs to bring   something up, but would not come up.  She denies any significant weight loss.    No night sweats.  She does indicate that she has had significant water   retention over the past week.  Her legs are swollen considerably.    Furthermore, of note, patient has had worsening paroxysmal nocturnal dyspnea   and  pillow orthopnea over the course of time.  She denies having had any   headache, visual changes, sore throat, chest pain, nausea, vomiting, diarrhea,   or abdominal pain.    ALLERGIES:  No known drug allergies.    HOME MEDICATIONS:  Eliquis, Norvasc, Coreg, Catapres, Vytorin, Synthroid,   Hyzaar, metformin, Cardizem, Actos.    FAMILY HISTORY:  Noncontributory.    PAST MEDICAL HISTORY:  1.  Diabetes.  2.  Hypertension.  3.  Dyslipidemia.  4.  Recently diagnosed atrial fibrillation.    SOCIAL HISTORY:  She does not smoke tobacco or drink alcohol.    REVIEW OF SYSTEMS:  Otherwise, negative for AMA/CMS criteria.    PHYSICAL EXAMINATION:  VITAL SIGNS:  Temperature 36.4, pulse rate 77, respiratory rate 24, blood   pressure 91/45, satting 95% on 60% FiO2 with BiPAP settings of 12 and 8.  GENERAL:  Patient does not appear in acute distress, able to speak through   mask and appears comfortable at this present time.  HEENT:  Normocephalic and atraumatic, anicteric.  CARDIOVASCULAR:  Irregularly irregular, rate within normal limits.  RESPIRATORY:  Very diminished with bibasilar rales and scattered faint end   expiratory wheeze.  ABDOMEN:  Mildly distended; however, nontender, positive bowel sounds.  EXTREMITIES:  With 1+ edema bilaterally and symmetrically.  NEUROLOGIC: Alert, oriented, and nonfocal.  PSYCHIATRIC:  Normal affect and behavior.    LABORATORY DATA:  White count 13, H and H of 10 and 30, platelet count 362, no   bands.  Positive left shift.  Chem panel shows sodium 124, potassium 4.7,   chloride 93, bicarbonate 16, gap of 15, glucose 281, BUN 52, creatinine 2.95,   calcium 8.8, AST of 72, ALT of 78, alkaline phosphatase 147, total bilirubin   1.2.  Lipase is 10 and lactic acid initially 3.2, shortly after becoming 1.8,   troponin less than 0.01.  BNP is 312.  INR is 2.05.  Urinalysis is with large   occult blood, 70 protein, 5-10 white cells, greater than 150 red blood cells.    IMAGING:  Chest x-ray with diffuse  patchy bilateral opacities, slight   hyperinflation concerning for underlying pulmonary edema versus early ARDS   versus multifocal pneumonia.    ASSESSMENT:  1.  Acute hypoxic respiratory failure per clinical history and examination   consistent with pulmonary edema and heart failure.  2.  Leukocytosis with left shift, no bands.  3.  Hyponatremia in setting of hypervolemia.  4.  Anion gap metabolic acidosis secondary to lactic acidosis.  5.  Acute kidney injury.  Query whether this was related to hypotensive   episodes post-antihypertensive medications at home versus acute tubular   necrosis from sepsis or cardiogenic etiology.  6.  Elevated liver transaminases.  This could be congestive hepatopathy, when   ultrasound probe was placed over IVC, it was very enlarged and   noncompressible.  No complaints of nausea, vomiting, or abdominal discomfort.  7.  Coagulopathy.  The patient is on anticoagulative therapy for her atrial   fibrillation.  8.  Recently diagnosed atrial fibrillation with rapid ventricular response.    The patient has been placed on rate controlling medication as well as   anticoagulative therapy earlier this week.  9.  Chronic hypertension.  10.  Chronic dyslipidemia.  11.  Chronic diabetes.    PLAN:  1.  Acute hypoxic respiratory failure.  I suspect that this is mainly related   to pulmonary edema.  She does, however, have a slightly elevated white count   as well as left shift.  We will initiate RT and O2 protocols.  The patient   will be maintained on BiPAP therapy for the time being.  I will empirically   treat with antibiotic therapy while awaiting return of procalcitonin level.    At the present time, we will hold on aggressive diuresis today given the   patient's intermittent labile blood pressure, as well as what appears to be   acute kidney injury.  We would anticipate initiation of diuresis starting   tomorrow, patient is somewhat stabilized.  2.  Acute kidney injury.  Patient will have  renal imaging to ensure no   underlying obstructive process.  We will check a CPK, urine studies in place.    I suspect that this is related to hypoperfusion in setting of hypotensive   episodes as indicated last night when the patient was unarousable after taking   her medications and was noted to be moderately hypotensive.  She has also had   labile pressures intermittently while in the emergency department.  Further   she has had known atrial fibrillation with rapid ventricular response and   perhaps low renal perfusion secondary to this.  Minimize nephrotoxic   substances and renally dose medications.  3.  Lactic acidosis, improving.  We would hold on further hydration.  I have   placed an ultrasound probe to the patient's IJs, which were very enlarged in   the upright position and poorly compressible as well as her IVC suggesting a   moderate amount of intravascular volume at this present time.  4.  New onset atrial fibrillation, presently rate controlled.  Patient has   been started on anticoagulative therapy prior to this hospitalization.  We   will get an echocardiogram to further evaluate cardiac function.  Meanwhile,   we will continue rate control.  5.  Total critical care time not including billable procedures 50 minutes.       ____________________________________     MD JACKI Parra / ELIZABETH    DD:  06/08/2017 12:21:49  DT:  06/08/2017 18:05:36    D#:  [id number]  Job#:  [id number]

## 2017-06-09 NOTE — PROGRESS NOTES
Pulmonary Critical Care Progress Note        Date of service: 6/9/2017    Chief Complaint: Shortness of Breath    History of Present Illness: A 61 y.o. female with a history of atrial fibrillation who presents from the Emergency Department with pneumonia.    ROS:  Respiratory: unable to perform due to the patient's inability to effectively communicate, Cardiac: unable to perform due to the patient's inability to effectively communicate, GI: unable to perform due to the patient's inability to effectively communicate.    All other systems negative.    Interval Events:  24 hour interval history reviewed     Chest CT showed concern for an alveolar hemorrage.  BiPAP swapped to high flow in order to eat at 0900.  Noted to desat quickly.  Slightly lethargic but alert, no pain.  A-fib rate controlled  Systolic BPs 110-120s  Weak pulses, found with doppler  Afebrile  No BMs, last on 6/7/17.  UOP adequate.  Dark yesterday, clear yellow today.  Day 2/5 azythromycin, ceftriaxone    PFSH:  No change.    Physical Exam:  General:  Awake, alert, following. Remains on BiPAP 12/7 65% FiO2.  HEENT:  PERRL, trachea midline, neck supple  CVS:  Distant, irregular, no murmur  Respiratory:  Clear anteriorly, scattered coarse crackles posteriorly  Abdomen:  Adipose, soft, NT/ND  Extremities:  1-2+ pitting edema, 1+ DP bilaterally upper and lower  Skin:  Warm, dry, perfused.  Cap refill intact.  Neuro/Psych:  Awake, alert, following.    Respiratory:  Pulse Oximetry: 94 %  ImagingAvailable data reviewed         Invalid input(s): [id number]    HemoDynamics:  Pulse: 94, Heart Rate (Monitored): 97  NIBP: 129/62 mmHg     Imaging: Available data reviewed  Recent Labs      06/08/17   0607  06/08/17   1246   CPKTOTAL   --   37   TROPONINI  <0.01   --    BNPBTYPENAT  312*   --        Neuro:  GCS    Imaging: Available data reviewed    Fluids:  Intake/Output       06/07/17 0700 - 06/08/17 0659 (Not Admitted) 06/08/17 0700 - 06/09/17 0659 06/09/17  700 - 06/10/17 0659       Total  Total  Total       Intake    P.O.  --  -- --  --  100 100  320  -- 320    P.O. -- -- -- -- 100 100 320 -- 320    Total Intake -- -- -- -- 100 100 320 -- 320       Output    Urine  --  -- --  200  855 1055  535  -- 535    Indwelling Cathether -- -- -- -- -- -- 535 -- 535    Void (ml) -- -- --  -- -- --    Total Output -- -- --  535 -- 535       Net I/O     -- -- -- -200 -755 -955 -215 -- -215        Weight: 112.2 kg (247 lb 5.7 oz)  Recent Labs      17   0607  17   1246  17   0355   SODIUM  124*  128*  130*   POTASSIUM  4.7  4.1  3.4*   CHLORIDE  93*  98  98   CO2  16*  18*  20   BUN  52*  54*  57*   CREATININE  2.95*  3.02*  2.23*   MAGNESIUM   --   1.9  2.0   PHOSPHORUS   --   5.3*  4.6*   CALCIUM  8.8  8.4*  8.4*       GI/Nutrition:  Imaging: Available data reviewed  Liver Function  Recent Labs      17   0607  17   1246  17   0355   ALTSGPT  78*   --   58*   ASTSGOT  72*   --   31   ALKPHOSPHAT  147*   --   133*   TBILIRUBIN  1.2   --   0.7   LIPASE  10*   --    --    GLUCOSE  281*  199*  152*       Heme:  Recent Labs      17   0607  17   0355   RBC  3.62*  3.43*   HEMOGLOBIN  10.2*  9.9*   HEMATOCRIT  30.9*  29.0*   PLATELETCT  362  306   PROTHROMBTM  23.8*   --    APTT  26.7   --    INR  2.05*   --        Infectious Disease:  Temp  Av.6 °C (97.9 °F)  Min: 35.9 °C (96.7 °F)  Max: 37.4 °C (99.3 °F)  Micro: reviewed  Recent Labs      17   0607  17   0355   WBC  13.5*  11.7*   NEUTSPOLYS  86.10*  85.90*   LYMPHOCYTES  4.80*  4.10*   MONOCYTES  7.60  8.30   EOSINOPHILS  0.10  0.30   BASOPHILS  0.20  0.10   ASTSGOT  72*  31   ALTSGPT  78*  58*   ALKPHOSPHAT  147*  133*   TBILIRUBIN  1.2  0.7     Current Facility-Administered Medications   Medication Dose Frequency Provider Last Rate Last Dose   • Respiratory Care per Protocol   Continuous RT  Anson Manuel M.D.       • NS (BOLUS) infusion 500 mL  500 mL Once PRN Anson Manuel M.D.       • insulin lispro (HUMALOG) injection 2-9 Units  2-9 Units Q6HRS Anson Manuel M.D.   5 Units at 06/09/17 1225   • glucose 4 g chewable tablet 16 g  16 g Q15 MIN PRN Anson Manuel M.D.        And   • dextrose 50% (D50W) injection 25 mL  25 mL Q15 MIN PRN Anson Manuel M.D.       • cefTRIAXone (ROCEPHIN) 2 g in  mL IVPB  2 g Q24HRS Anson Manuel M.D.   Stopped at 06/09/17 0936   • levothyroxine (SYNTHROID) tablet 88 mcg  88 mcg AM ES Anson Manuel M.D.   88 mcg at 06/09/17 0602   • senna-docusate (PERICOLACE or SENOKOT S) 8.6-50 MG per tablet 2 Tab  2 Tab BID Anson Manuel M.D.   2 Tab at 06/09/17 0846    And   • polyethylene glycol/lytes (MIRALAX) PACKET 1 Packet  1 Packet QDAY PRN Anson Manuel M.D.        And   • magnesium hydroxide (MILK OF MAGNESIA) suspension 30 mL  30 mL QDAY PRN Anson Manuel M.D.        And   • bisacodyl (DULCOLAX) suppository 10 mg  10 mg QDAY PRN Anson Manuel M.D.       • ondansetron (ZOFRAN) syringe/vial injection 4 mg  4 mg Q4HRS PRN Anson Manuel M.D.       • ondansetron (ZOFRAN ODT) dispertab 4 mg  4 mg Q4HRS PRN Anson Manuel M.D.       • acetaminophen (TYLENOL) tablet 650 mg  650 mg Q6HRS PRN Anson Manuel M.D.       • azithromycin (ZITHROMAX) 500 mg in D5W 250 mL IVPB  500 mg Q24HRS Anson Manuel M.D.   Stopped at 06/09/17 0947   • famotidine (PEPCID) tablet 20 mg  20 mg DAILY Anson Manuel M.D.   20 mg at 06/09/17 0846     Last reviewed on 6/8/2017  9:34 AM by Mindi Benites, Pharmacy Int    Quality  Measures:  Core Measures & Quality Metrics    Assessment / Plan:    Acute hypoxic respiratory failure    - ? pulmonary edema, pna, DAH   - on BiPAP, 80%   - long d/w nephro, patient; rec intubation, BAL to r/o DAH   - intubated w/o difficulty   - BAL L lingula; NO Hemorrhage seen; no purulence   - most consistent with pulmonary edema  Leukocytosis with left shift   -no bands.   - empiric abx   -  BAL today and f/u cx's  Hyponatremia in setting of hypervolemia.  Anion gap metabolic acidosis    -secondary to lactic acidosis.  Acute kidney injury.     - ? hypotensive episodes post-antihypertensive medications, ATN, doubt pulm-renal syndrome   - serology pending   - BAL clear, no DAH   - would hold off steroids   Elevated liver transaminases.     - ? congestive hepatopathy; IVC,very enlarged and noncompressible  Coagulopathy.     -recently started on apixaban for new atrial fibrillation.  Recently diagnosed atrial fibrillation with rapid ventricular response.     The patient has been placed on rate controlling medication as well as anticoagulative therapy earlier this week.  Chronic hypertension.  Chronic dyslipidemia.  Chronic diabetes.      Discussed patient condition and risk of morbidity and/or mortality with RN, RT and QA team.    The patient remains critically ill.  Critical care time = 35 minutes in directly providing and coordinating critical care and extensive data review.  No time overlap and excludes procedures.       Sancho CABRALES (Toñito), am scribing for, and in the presence of, Antonio Leonardo MD.  Electronically signed by: Sancho Ly (Toñito), 6/9/2017  Antonio CABRALES MD found personally performed the services described in this documentation, as scribed by Sancho Ly in my presence, and it is both accurate and complete.

## 2017-06-09 NOTE — DIETARY
Nutrition Services: Poor PO on Nutrition Admit Screen   61 year old feamle with admit dx of KIRT, acute hypoxemic respiratory failure  Past Pertinent Med Hx: DM type 2, atrial fibrillation, HTN, hypothyroidism, hyperlipidemia, coronary artery disease    Attempted to visit pt, pt was busy. Per rounds pt started CLD and per RN pt is tolerating clears. Also per rounds pt was on high flow oxygen so pt was able to eat and was going to switch back to bipap after pt was finished eating. No PO documented yet in chart.     Ht: 167.6 cm   Wt: 112.2 kg via bed scale  BMI: 39.94  Diet: Clear Liquid x1 day  Pertinent Labs: Na 130, K 3.4, Glucose 152, BUN 57, Creatinine 2.23, Phosphorus 4.6, POC glucose 148, 178, 227  Pertinent Meds: zithromax, rocephin, pepcid, humalog, synthroid, pericolace  Fluids: No IV fluids noted in MAR  Skin: No skin breakdown noted in chart  GI: Last BM 6/9    PLAN/RECOMMEND -   1) Nutrition rep to see patient daily for meal and snack preferences, when diet advances past clears  2) Advance diet past clears when medically feasible. Encourage PO  3) Weekly weights to monitor fluid and nutrition status  4) Obtain supplement order per RD as needed.  5) Please document PO intake for each meal as percentage of meals consumed    RD following

## 2017-06-10 ENCOUNTER — APPOINTMENT (OUTPATIENT)
Dept: RADIOLOGY | Facility: MEDICAL CENTER | Age: 62
DRG: 853 | End: 2017-06-10
Attending: INTERNAL MEDICINE
Payer: COMMERCIAL

## 2017-06-10 LAB
ANION GAP SERPL CALC-SCNC: 10 MMOL/L (ref 0–11.9)
BACTERIA UR CULT: NORMAL
BACTERIA UR CULT: NORMAL
BASE EXCESS BLDA CALC-SCNC: 0 MMOL/L (ref -4–3)
BASOPHILS # BLD AUTO: 0.3 % (ref 0–1.8)
BASOPHILS # BLD: 0.03 K/UL (ref 0–0.12)
BODY TEMPERATURE: ABNORMAL DEGREES
BUN SERPL-MCNC: 41 MG/DL (ref 8–22)
CALCIUM SERPL-MCNC: 8.3 MG/DL (ref 8.5–10.5)
CHLORIDE SERPL-SCNC: 102 MMOL/L (ref 96–112)
CO2 BLDA-SCNC: 25 MMOL/L (ref 20–33)
CO2 SERPL-SCNC: 24 MMOL/L (ref 20–33)
CREAT SERPL-MCNC: 1.56 MG/DL (ref 0.5–1.4)
EOSINOPHIL # BLD AUTO: 0.14 K/UL (ref 0–0.51)
EOSINOPHIL NFR BLD: 1.3 % (ref 0–6.9)
ERYTHROCYTE [DISTWIDTH] IN BLOOD BY AUTOMATED COUNT: 43.7 FL (ref 35.9–50)
GFR SERPL CREATININE-BSD FRML MDRD: 34 ML/MIN/1.73 M 2
GLUCOSE BLD-MCNC: 176 MG/DL (ref 65–99)
GLUCOSE BLD-MCNC: 178 MG/DL (ref 65–99)
GLUCOSE BLD-MCNC: 193 MG/DL (ref 65–99)
GLUCOSE BLD-MCNC: 196 MG/DL (ref 65–99)
GLUCOSE SERPL-MCNC: 190 MG/DL (ref 65–99)
HCO3 BLDA-SCNC: 23.8 MMOL/L (ref 17–25)
HCT VFR BLD AUTO: 31.2 % (ref 37–47)
HGB BLD-MCNC: 10.4 G/DL (ref 12–16)
IMM GRANULOCYTES # BLD AUTO: 0.15 K/UL (ref 0–0.11)
IMM GRANULOCYTES NFR BLD AUTO: 1.4 % (ref 0–0.9)
LYMPHOCYTES # BLD AUTO: 0.57 K/UL (ref 1–4.8)
LYMPHOCYTES NFR BLD: 5.5 % (ref 22–41)
MAGNESIUM SERPL-MCNC: 2.1 MG/DL (ref 1.5–2.5)
MCH RBC QN AUTO: 28.3 PG (ref 27–33)
MCHC RBC AUTO-ENTMCNC: 33.3 G/DL (ref 33.6–35)
MCV RBC AUTO: 84.8 FL (ref 81.4–97.8)
MONOCYTES # BLD AUTO: 0.89 K/UL (ref 0–0.85)
MONOCYTES NFR BLD AUTO: 8.6 % (ref 0–13.4)
NEUTROPHILS # BLD AUTO: 8.62 K/UL (ref 2–7.15)
NEUTROPHILS NFR BLD: 82.9 % (ref 44–72)
NRBC # BLD AUTO: 0 K/UL
NRBC BLD AUTO-RTO: 0 /100 WBC
O2/TOTAL GAS SETTING VFR VENT: 80 %
PCO2 BLDA: 36 MMHG (ref 26–37)
PCO2 TEMP ADJ BLDA: 35.7 MMHG (ref 26–37)
PH BLDA: 7.43 [PH] (ref 7.4–7.5)
PH TEMP ADJ BLDA: 7.43 [PH] (ref 7.4–7.5)
PHOSPHATE SERPL-MCNC: 3.1 MG/DL (ref 2.5–4.5)
PLATELET # BLD AUTO: 304 K/UL (ref 164–446)
PMV BLD AUTO: 8.7 FL (ref 9–12.9)
PO2 BLDA: 188 MMHG (ref 64–87)
PO2 TEMP ADJ BLDA: 187 MMHG (ref 64–87)
POTASSIUM SERPL-SCNC: 3.2 MMOL/L (ref 3.6–5.5)
RBC # BLD AUTO: 3.68 M/UL (ref 4.2–5.4)
RHODAMINE-AURAMINE STN SPEC: NORMAL
SAO2 % BLDA: 100 % (ref 93–99)
SIGNIFICANT IND 70042: NORMAL
SITE SITE: NORMAL
SODIUM SERPL-SCNC: 136 MMOL/L (ref 135–145)
SOURCE SOURCE: NORMAL
SPECIMEN DRAWN FROM PATIENT: ABNORMAL
WBC # BLD AUTO: 10.4 K/UL (ref 4.8–10.8)

## 2017-06-10 PROCEDURE — 700105 HCHG RX REV CODE 258: Performed by: INTERNAL MEDICINE

## 2017-06-10 PROCEDURE — 99291 CRITICAL CARE FIRST HOUR: CPT | Performed by: INTERNAL MEDICINE

## 2017-06-10 PROCEDURE — 83735 ASSAY OF MAGNESIUM: CPT

## 2017-06-10 PROCEDURE — A9270 NON-COVERED ITEM OR SERVICE: HCPCS | Performed by: INTERNAL MEDICINE

## 2017-06-10 PROCEDURE — 700111 HCHG RX REV CODE 636 W/ 250 OVERRIDE (IP): Performed by: INTERNAL MEDICINE

## 2017-06-10 PROCEDURE — 94640 AIRWAY INHALATION TREATMENT: CPT

## 2017-06-10 PROCEDURE — 700102 HCHG RX REV CODE 250 W/ 637 OVERRIDE(OP): Performed by: INTERNAL MEDICINE

## 2017-06-10 PROCEDURE — 700111 HCHG RX REV CODE 636 W/ 250 OVERRIDE (IP): Performed by: HOSPITALIST

## 2017-06-10 PROCEDURE — 94003 VENT MGMT INPAT SUBQ DAY: CPT

## 2017-06-10 PROCEDURE — 770022 HCHG ROOM/CARE - ICU (200)

## 2017-06-10 PROCEDURE — 700105 HCHG RX REV CODE 258

## 2017-06-10 PROCEDURE — 700101 HCHG RX REV CODE 250: Performed by: HOSPITALIST

## 2017-06-10 PROCEDURE — 82962 GLUCOSE BLOOD TEST: CPT

## 2017-06-10 PROCEDURE — 84100 ASSAY OF PHOSPHORUS: CPT

## 2017-06-10 PROCEDURE — 71010 DX-CHEST-PORTABLE (1 VIEW): CPT

## 2017-06-10 PROCEDURE — 700111 HCHG RX REV CODE 636 W/ 250 OVERRIDE (IP)

## 2017-06-10 PROCEDURE — 36600 WITHDRAWAL OF ARTERIAL BLOOD: CPT

## 2017-06-10 PROCEDURE — 82803 BLOOD GASES ANY COMBINATION: CPT

## 2017-06-10 PROCEDURE — 99233 SBSQ HOSP IP/OBS HIGH 50: CPT | Performed by: HOSPITALIST

## 2017-06-10 PROCEDURE — 85025 COMPLETE CBC W/AUTO DIFF WBC: CPT

## 2017-06-10 PROCEDURE — 80048 BASIC METABOLIC PNL TOTAL CA: CPT

## 2017-06-10 RX ORDER — HEPARIN SODIUM 5000 [USP'U]/ML
5000 INJECTION, SOLUTION INTRAVENOUS; SUBCUTANEOUS EVERY 8 HOURS
Status: DISCONTINUED | OUTPATIENT
Start: 2017-06-10 | End: 2017-06-12

## 2017-06-10 RX ORDER — FUROSEMIDE 10 MG/ML
60 INJECTION INTRAMUSCULAR; INTRAVENOUS
Status: DISCONTINUED | OUTPATIENT
Start: 2017-06-10 | End: 2017-06-13

## 2017-06-10 RX ORDER — AZITHROMYCIN 250 MG/1
500 TABLET, FILM COATED ORAL DAILY
Status: COMPLETED | OUTPATIENT
Start: 2017-06-11 | End: 2017-06-12

## 2017-06-10 RX ORDER — POTASSIUM CHLORIDE 1.5 G/1.58G
20 POWDER, FOR SOLUTION ORAL DAILY
Status: DISCONTINUED | OUTPATIENT
Start: 2017-06-10 | End: 2017-06-11

## 2017-06-10 RX ORDER — POTASSIUM CHLORIDE 20 MEQ/1
40 TABLET, EXTENDED RELEASE ORAL DAILY
Status: DISCONTINUED | OUTPATIENT
Start: 2017-06-10 | End: 2017-06-11

## 2017-06-10 RX ORDER — POTASSIUM CHLORIDE 1.5 G/1.58G
20 POWDER, FOR SOLUTION ORAL ONCE
Status: DISCONTINUED | OUTPATIENT
Start: 2017-06-10 | End: 2017-06-10

## 2017-06-10 RX ORDER — SODIUM CHLORIDE 9 MG/ML
INJECTION, SOLUTION INTRAVENOUS
Status: COMPLETED
Start: 2017-06-10 | End: 2017-06-10

## 2017-06-10 RX ADMIN — FUROSEMIDE 60 MG: 10 INJECTION, SOLUTION INTRAVENOUS at 17:06

## 2017-06-10 RX ADMIN — AZITHROMYCIN FOR INJECTION INJECTION, POWDER, LYOPHILIZED, FOR SOLUTION 500 MG: 500 INJECTION INTRAVENOUS at 09:30

## 2017-06-10 RX ADMIN — IPRATROPIUM BROMIDE AND ALBUTEROL SULFATE 3 ML: .5; 3 SOLUTION RESPIRATORY (INHALATION) at 22:05

## 2017-06-10 RX ADMIN — IPRATROPIUM BROMIDE AND ALBUTEROL SULFATE 3 ML: .5; 3 SOLUTION RESPIRATORY (INHALATION) at 14:46

## 2017-06-10 RX ADMIN — IPRATROPIUM BROMIDE AND ALBUTEROL SULFATE 3 ML: .5; 3 SOLUTION RESPIRATORY (INHALATION) at 10:40

## 2017-06-10 RX ADMIN — HEPARIN SODIUM 5000 UNITS: 5000 INJECTION, SOLUTION INTRAVENOUS; SUBCUTANEOUS at 14:24

## 2017-06-10 RX ADMIN — IPRATROPIUM BROMIDE AND ALBUTEROL SULFATE 3 ML: .5; 3 SOLUTION RESPIRATORY (INHALATION) at 18:16

## 2017-06-10 RX ADMIN — PROPOFOL 50 MCG/KG/MIN: 10 INJECTION, EMULSION INTRAVENOUS at 05:23

## 2017-06-10 RX ADMIN — PROPOFOL 50 MCG/KG/MIN: 10 INJECTION, EMULSION INTRAVENOUS at 08:17

## 2017-06-10 RX ADMIN — PROPOFOL 50 MCG/KG/MIN: 10 INJECTION, EMULSION INTRAVENOUS at 11:00

## 2017-06-10 RX ADMIN — PROPOFOL 45 MCG/KG/MIN: 10 INJECTION, EMULSION INTRAVENOUS at 23:21

## 2017-06-10 RX ADMIN — PROPOFOL 50 MCG/KG/MIN: 10 INJECTION, EMULSION INTRAVENOUS at 14:25

## 2017-06-10 RX ADMIN — CHLORHEXIDINE GLUCONATE 15 ML: 1.2 RINSE ORAL at 08:17

## 2017-06-10 RX ADMIN — IPRATROPIUM BROMIDE AND ALBUTEROL SULFATE 3 ML: .5; 3 SOLUTION RESPIRATORY (INHALATION) at 06:42

## 2017-06-10 RX ADMIN — INSULIN LISPRO 2 UNITS: 100 INJECTION, SOLUTION INTRAVENOUS; SUBCUTANEOUS at 12:30

## 2017-06-10 RX ADMIN — HEPARIN SODIUM 5000 UNITS: 5000 INJECTION, SOLUTION INTRAVENOUS; SUBCUTANEOUS at 20:01

## 2017-06-10 RX ADMIN — SENNOSIDES AND DOCUSATE SODIUM 2 TABLET: 8.6; 5 TABLET ORAL at 08:17

## 2017-06-10 RX ADMIN — PROPOFOL 45 MCG/KG/MIN: 10 INJECTION, EMULSION INTRAVENOUS at 19:48

## 2017-06-10 RX ADMIN — POTASSIUM CHLORIDE 20 MEQ: 1.5 POWDER, FOR SOLUTION ORAL at 11:36

## 2017-06-10 RX ADMIN — LEVOTHYROXINE SODIUM 88 MCG: 88 TABLET ORAL at 06:42

## 2017-06-10 RX ADMIN — INSULIN LISPRO 3 UNITS: 100 INJECTION, SOLUTION INTRAVENOUS; SUBCUTANEOUS at 18:05

## 2017-06-10 RX ADMIN — CHLORHEXIDINE GLUCONATE 15 ML: 1.2 RINSE ORAL at 20:00

## 2017-06-10 RX ADMIN — CEFTRIAXONE SODIUM 2 G: 2 INJECTION, POWDER, FOR SOLUTION INTRAMUSCULAR; INTRAVENOUS at 08:47

## 2017-06-10 RX ADMIN — IPRATROPIUM BROMIDE AND ALBUTEROL SULFATE 3 ML: .5; 3 SOLUTION RESPIRATORY (INHALATION) at 02:40

## 2017-06-10 RX ADMIN — PROPOFOL 50 MCG/KG/MIN: 10 INJECTION, EMULSION INTRAVENOUS at 17:05

## 2017-06-10 RX ADMIN — PROPOFOL 35 MCG/KG/MIN: 10 INJECTION, EMULSION INTRAVENOUS at 01:56

## 2017-06-10 RX ADMIN — FUROSEMIDE 60 MG: 10 INJECTION, SOLUTION INTRAVENOUS at 11:37

## 2017-06-10 RX ADMIN — SODIUM CHLORIDE 250 ML: 9 INJECTION, SOLUTION INTRAVENOUS at 09:00

## 2017-06-10 RX ADMIN — FAMOTIDINE 20 MG: 20 TABLET, FILM COATED ORAL at 08:17

## 2017-06-10 RX ADMIN — INSULIN LISPRO 2 UNITS: 100 INJECTION, SOLUTION INTRAVENOUS; SUBCUTANEOUS at 05:21

## 2017-06-10 NOTE — PROGRESS NOTES
Cardiology Progress Note               Author: Laloshwetayas BALDO Marlene Date & Time created: 6/10/2017  8:50 AM     Interval History:  Intubated yesterday for bronch  No bloody BAL fluid  Cr improving    Review of Systems   Unable to perform ROS      Physical Exam   Constitutional: She is oriented to person, place, and time. She appears well-developed and well-nourished. No distress.   HENT:   Head: Normocephalic.   Mouth/Throat: Oropharynx is clear and moist.   Eyes: EOM are normal. Pupils are equal, round, and reactive to light. Right eye exhibits no discharge. Left eye exhibits no discharge. No scleral icterus.   Neck: Normal range of motion. Neck supple. No JVD present. No tracheal deviation present.   Cardiovascular: Normal rate, regular rhythm, S1 normal, S2 normal, normal heart sounds, intact distal pulses and normal pulses.  Exam reveals no gallop, no S3, no S4 and no friction rub.    No murmur heard.   No systolic murmur is present    No diastolic murmur is present   Pulses:       Carotid pulses are 2+ on the right side, and 2+ on the left side.       Radial pulses are 2+ on the right side, and 2+ on the left side.        Dorsalis pedis pulses are 2+ on the right side, and 2+ on the left side.        Posterior tibial pulses are 2+ on the right side, and 2+ on the left side.   Pulmonary/Chest: Effort normal and breath sounds normal. No respiratory distress. She has no wheezes. She has no rales.   Abdominal: Soft. Bowel sounds are normal. She exhibits no distension and no mass. There is no tenderness. There is no rebound and no guarding.   Musculoskeletal: She exhibits no edema.   Neurological: She is alert and oriented to person, place, and time. No cranial nerve deficit.   Skin: Skin is warm and dry. She is not diaphoretic. No pallor.   Psychiatric: She has a normal mood and affect. Her behavior is normal. Judgment and thought content normal.   Nursing note and vitals reviewed.      Hemodynamics:  Temp (24hrs),  Av °C (98.6 °F), Min:36.6 °C (97.9 °F), Max:37.6 °C (99.7 °F)  Temperature: 36.6 °C (97.9 °F)  Pulse  Av.7  Min: 62  Max: 121Heart Rate (Monitored): 91  NIBP: 107/62 mmHg     Respiratory:  [place] Vent Mode: APVCMV, Rate (breaths/min): 26, PEEP/CPAP: 12, FiO2: 50, P Peak (PIP): 28, P MEAN: 17 Respiration: (!) 26, Pulse Oximetry: 99 %, O2 Daily Delivery Respiratory : Highflow Nasal Cannula     Work Of Breathing / Effort: Vented  RUL Breath Sounds: Fine Crackles, RML Breath Sounds: Diminished;Fine Crackles, RLL Breath Sounds: Diminished, CAROLANN Breath Sounds: Fine Crackles, LLL Breath Sounds: Diminished  Fluids:     Weight: 114 kg (251 lb 5.2 oz)  GI/Nutrition:  Orders Placed This Encounter   Procedures   • Diet NPO     Standing Status: Standing      Number of Occurrences: 1      Standing Expiration Date:      Order Specific Question:  Type:     Answer:  Now [1]     Order Specific Question:  Restrict to:     Answer:  Strict [1]     Lab Results:  Recent Labs      17   0607  17   0355  06/10/17   0351   WBC  13.5*  11.7*  10.4   RBC  3.62*  3.43*  3.68*   HEMOGLOBIN  10.2*  9.9*  10.4*   HEMATOCRIT  30.9*  29.0*  31.2*   MCV  85.4  84.5  84.8   MCH  28.2  28.9  28.3   MCHC  33.0*  34.1  33.3*   RDW  43.8  43.2  43.7   PLATELETCT  362  306  304   MPV  9.7  9.1  8.7*     Recent Labs      17   1246  17   0355  06/10/17   0351   SODIUM  128*  130*  136   POTASSIUM  4.1  3.4*  3.2*   CHLORIDE  98  98  102   CO2  18*  20  24   GLUCOSE  199*  152*  190*   BUN  54*  57*  41*   CREATININE  3.02*  2.23*  1.56*   CALCIUM  8.4*  8.4*  8.3*     Recent Labs      17   0607   APTT  26.7   INR  2.05*     Recent Labs      17   0607   BNPBTYPENAT  312*     Recent Labs      17   0607  17   1246   CPKTOTAL   --   37   TROPONINI  <0.01   --    BNPBTYPENAT  312*   --      Recent Labs      17   0355   TRIGLYCERIDE  77   HDL  28*   LDL  39         Medical Decision Making, by  Problem:  Active Hospital Problems    Diagnosis   • Severe sepsis (CMS-HCC) [A41.9, R65.20]   • Acute respiratory failure with hypoxia (CMS-HCC) [J96.01]   • Atrial fibrillation (CMS-HCC) [I48.91]   • 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.59]   • Hyponatremia [E87.1]   • Hypothyroid [E03.9]   • HTN (hypertension) [I10]   • KIRT (acute kidney injury) (CMS-HCC) [N17.9]       Plan:  62 y/o F with pleural effusions, KIRT of unknown etiology improving.  We will hold anticoagulation for now.  Rate is controlled a-fib.  We will await improvement in Cr before pursuiing diuresis.    1. A-fib    - rate controlled    - hold anticoag    2. KIRT    - defer    3. Lung infiltrates    - defer    EKG reviewed, Medications reviewed, Radiology images reviewed and Labs reviewed  Tamayo catheter: No Tamayo        DVT prophylaxis - mechanical: SCDs  Ulcer prophylaxis: No

## 2017-06-10 NOTE — PROCEDURES
Date: 6/9/2017    Procedure:  Semi-urgent orotracheal intubation    Indication: Respiratory failure    Physician:  Dr. Antonio Mata MD    Consent:  Emergent     Procedure:  This patient has developed increasing respiratory failure, failing BiPAP and requires emergent intubation.  RSI given with propofol and rocuronium.  Using a #4 glidescope, a 7.5 ETT was placed on the first attempt w/o complication.  Tube placement confirmed by direct visualization of placement, end-tidal CO2 monitor color change and equal breath sounds.  No immediate complications.  Vitals remained stable throughout the procedure.  A post-procedure CXR will be reviewed.

## 2017-06-10 NOTE — CARE PLAN
Problem: Respiratory:  Goal: Respiratory status will improve  Outcome: PROGRESSING AS EXPECTED  Pt 02 being weaned per RT.  Currently at 40%.  Peep at 12.  Keeping pt suctioned and Lasix given for diuresis.

## 2017-06-10 NOTE — PROGRESS NOTES
Hospital Medicine Progress Note, Adult, Complex               Author: Isaías Najjar Date & Time created: 6/10/2017  12:14 PM     Interval History:  Pt with recently diagnosed AFib, presented with resp failure.  Also KIRT, hematuria.  Events last 24h noted  Now VDRF    Review of Systems:  Review of Systems   Unable to perform ROS: intubated       Physical Exam:  Physical Exam   Constitutional: She appears ill. She appears distressed. She is sedated and intubated.   HENT:   Head: Normocephalic and atraumatic.   Right Ear: External ear normal.   Left Ear: External ear normal.   Nose: Nose normal.   ETT   Eyes: Right eye exhibits no discharge. Left eye exhibits no discharge. No scleral icterus.   Neck: Normal range of motion. Neck supple. No JVD present. No tracheal deviation present. No thyromegaly present.   Cardiovascular: Normal rate and normal heart sounds.  An irregularly irregular rhythm present.   No murmur heard.  Pulmonary/Chest: No stridor. She is intubated. She has rhonchi. She has rales.   Vented  Coarse BS   Abdominal: Soft. Bowel sounds are normal. She exhibits no distension. There is no tenderness. There is no rebound.   Musculoskeletal: She exhibits no edema.   Lymphadenopathy:     She has no cervical adenopathy.   Neurological: She is unresponsive.   Skin: Skin is warm and dry. She is not diaphoretic. No erythema.   Psychiatric: She has a normal mood and affect. Her behavior is normal.   Nursing note and vitals reviewed.      Labs:  Recent Labs      06/09/17   1958  06/10/17   0459   ISTATAPH  7.272*  7.428   ISTATAPCO2  54.1*  36.0   ISTATAPO2  105*  188*   ISTATATCO2  27  25   [id number]  97  100*   ISTATARTHCO3  25.0  23.8   ISTATARTBE  -2  0   ISTATTEMP  37.2 C  36.8 C   ISTATFIO2  100  80   ISTATSPEC  Arterial  Arterial   ISTATAPHTC  7.269*  7.431   [id number]  106*  187*     Recent Labs      06/08/17   0607  06/08/17   1246   CPKTOTAL   --   37   TROPONINI  <0.01   --    BNPBTYPENAT  312*   --       Recent Labs      06/08/17   1246  06/09/17   0355  06/10/17   035   SODIUM  128*  130*  136   POTASSIUM  4.1  3.4*  3.2*   CHLORIDE  98  98  102   CO2  18*  20  24   BUN  54*  57*  41*   CREATININE  3.02*  2.23*  1.56*   MAGNESIUM  1.9  2.0  2.1   PHOSPHORUS  5.3*  4.6*  3.1   CALCIUM  8.4*  8.4*  8.3*     Recent Labs      17   0607  06/08/17   1246  06/09/17   0355  06/10/17   0351   ALTSGPT  78*   --   58*   --    ASTSGOT  72*   --   31   --    ALKPHOSPHAT  147*   --   133*   --    TBILIRUBIN  1.2   --   0.7   --    LIPASE  10*   --    --    --    GLUCOSE  281*  199*  152*  190*     Recent Labs      06/08/17   0607  06/09/17   0355  06/10/17   0351   RBC  3.62*  3.43*  3.68*   HEMOGLOBIN  10.2*  9.9*  10.4*   HEMATOCRIT  30.9*  29.0*  31.2*   PLATELETCT  362  306  304   PROTHROMBTM  23.8*   --    --    APTT  26.7   --    --    INR  2.05*   --    --      Recent Labs      06/08/17   0607  06/09/17   0355  06/10/17   0351   WBC  13.5*  11.7*  10.4   NEUTSPOLYS  86.10*  85.90*  82.90*   LYMPHOCYTES  4.80*  4.10*  5.50*   MONOCYTES  7.60  8.30  8.60   EOSINOPHILS  0.10  0.30  1.30   BASOPHILS  0.20  0.10  0.30   ASTSGOT  72*  31   --    ALTSGPT  78*  58*   --    ALKPHOSPHAT  147*  133*   --    TBILIRUBIN  1.2  0.7   --            Hemodynamics:  Temp (24hrs), Av °C (98.6 °F), Min:36.6 °C (97.9 °F), Max:37.6 °C (99.7 °F)  Temperature: 36.6 °C (97.9 °F)  Pulse  Av.9  Min: 62  Max: 121Heart Rate (Monitored): (!) 102  NIBP: 125/66 mmHg     Respiratory:  [place] Vent Mode: APVCMV, Rate (breaths/min): 26, PEEP/CPAP: 12, FiO2: 40, P Peak (PIP): 27, P MEAN: 17 Respiration: (!) 26, Pulse Oximetry: 99 %, O2 Daily Delivery Respiratory : Highflow Nasal Cannula     Work Of Breathing / Effort: Vented  RUL Breath Sounds: Fine Crackles, RML Breath Sounds: Crackles;Diminished, RLL Breath Sounds: Diminished, CAROLANN Breath Sounds: Fine Crackles, LLL Breath Sounds: Diminished  Fluids:    Intake/Output Summary (Last 24  hours) at 06/10/17 1214  Last data filed at 06/10/17 1200   Gross per 24 hour   Intake 1311.5 ml   Output   2015 ml   Net -703.5 ml     Weight: 114 kg (251 lb 5.2 oz)  GI/Nutrition:  Orders Placed This Encounter   Procedures   • Diet NPO     Standing Status: Standing      Number of Occurrences: 1      Standing Expiration Date:      Order Specific Question:  Type:     Answer:  Now [1]     Order Specific Question:  Restrict to:     Answer:  Strict [1]     Medical Decision Making, by Problem:  Active Hospital Problems    Diagnosis   • Severe sepsis (CMS-HCC) [A41.9, R65.20]   • Acute respiratory failure with hypoxia (CMS-HCC) [J96.01]   • Atrial fibrillation (CMS-HCC) [I48.91]   • 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.59]   • Hyponatremia [E87.1]   • Hypothyroid [E03.9]   • KIRT (acute kidney injury) (CMS-HCC) [N17.9]   • Acute hypoxemic respiratory failure (CMS-HCC) [J96.01]       Labs reviewed, Medications reviewed and Radiology images reviewed  Tamayo catheter: Critically Ill - Requiring Accurate Measurement of Urinary Output                1 KIRT: no clear etiology , pulmonary renal syndrome is less likely , complements normal, no pulmonary hemorrhage, cr is better  2 resp failure  3 AFib  4 Anemia  5 Hyponatremia  6 Hypokalemia  7 volume overload  Plan  no acute need for HD  Await serology test  IV Lasix  K supplements  Daily labs  Renal dose all meds  Avoid nephrotoxins  Prognosis guarded  D/W Dr Mata

## 2017-06-10 NOTE — CARE PLAN
Problem: Fluid Volume:  Goal: Will maintain balanced intake and output  Outcome: PROGRESSING AS EXPECTED  Renal function improving.  Lasix given with good urine output.

## 2017-06-10 NOTE — CARE PLAN
Problem: Mechanical Ventilation  Goal: Safe management of artificial airway and ventilation  Outcome: PROGRESSING AS EXPECTED  In collaboration with RT, new ET tube managed safely.    Problem: Hemodynamic Status  Goal: Vital Signs and Fluid Balance Management  Outcome: PROGRESSING AS EXPECTED  Continuous cardiac monitoring in place; A fib rate controlled.

## 2017-06-10 NOTE — PROGRESS NOTES
Pulmonary Critical Care Progress Note        Date of service: 6/10/2017    Chief Complaint: Shortness of Breath    History of Present Illness: A 61 y.o. female with a history of atrial fibrillation who presents from the Emergency Department with pneumonia.    ROS:  Respiratory: unable to perform due to the patient's inability to effectively communicate, Cardiac: unable to perform due to the patient's inability to effectively communicate, GI: unable to perform due to the patient's inability to effectively communicate.    All other systems negative.    Interval Events:  24 hour interval history reviewed   Intubation and bronchoscopy procedures yesterday.   Sedated, follows commands with sedation vacation. Prop 50.   A fib in 90s, BP ok.   TF at 25   Tamayo with 900 cc UOP, Nephrology following   CXR shows diffuse bilateral alveolar infiltrates, slightly improved   Day 3 abx, Cultures with NGTD       PFSH:  No change.      Physical Exam:  General:  On full ventilatory support.   HEENT:  PERRL, trachea midline, neck supple, left nare cortrak in place with TF tolerated. ETT in place.   CVS:  Distant, irregular and tachycardic at 100, no murmur  Respiratory:  Scattered coarse breath sounds. Good air entry.   Abdomen:  Adipose, soft, NT/ND, bowel sounds present.   Extremities:  1+ lower extremity edema, 1+ DP bilaterally upper and lower extremities.   Skin:  Warm, dry, perfused.  Capillary refill intact.  Neuro/Psych:  Sedated on prop 50.       Respiratory:  Pulse Oximetry: 97 %  ImagingAvailable data reviewed     Recent Labs      06/09/17   1959  06/10/17   0459   ISTATAPH  7.272*  7.428   ISTATAPCO2  54.1*  36.0   ISTATAPO2  105*  188*   ISTATATCO2  27  25   [id number]  97  100*   ISTATARTHCO3  25.0  23.8   ISTATARTBE  -2  0   ISTATTEMP  37.2 C  36.8 C   ISTATFIO2  100  80   ISTATSPEC  Arterial  Arterial   ISTATAPHTC  7.269*  7.431   [id number]  106*  187*       HemoDynamics:  Pulse: 93, Heart Rate (Monitored): 95   NIBP: (!) 92/65 mmHg     Imaging: Available data reviewed  Recent Labs      06/08/17   0607  06/08/17   1246   CPKTOTAL   --   37   TROPONINI  <0.01   --    BNPBTYPENAT  312*   --        Neuro:  GCS Total Ismael Coma Score: 10  Imaging: Available data reviewed    Fluids:  Intake/Output       06/08/17 0700 - 06/09/17 0659 06/09/17 0700 - 06/10/17 0659 06/10/17 0700 - 06/11/17 0659      [id number] 3918-6363 Total 0700-1859 1900-0659 Total 5095-1148 3298-8666 Total       Intake    P.O.  --  100 100  440  -- 440  --  -- --    P.O. -- 100 100 440 -- 440 -- -- --    I.V.  --  -- --  7.4  301.9 309.3  --  -- --    Propofol Volume -- -- -- 7.4 301.9 309.3 -- -- --    Other  --  -- --  --  30 30  --  -- --    Medications (P.O./ Enteral Liquids) -- -- -- -- 30 30 -- -- --    Enteral  --  -- --  --  320 320  --  -- --    Enteral Volume -- -- -- -- 200 200 -- -- --    Free Water / Tube Flush -- -- -- -- 120 120 -- -- --    Total Intake -- 100 100 447.4 651.9 1099.3 -- -- --       Output    Urine  200  855 1055  1095  900 1995  --  -- --    Indwelling Cathether -- -- -- 1095 900 1995 -- -- --    Void (ml)  -- -- -- -- -- --    Stool  --  -- --  --  -- --  --  -- --    Number of Times Stooled -- -- -- -- 1 x 1 x -- -- --    Total Output  8713 145 7212 -- -- --       Net I/O     -200 -755 -955 -647.6 -248.1 -895.7 -- -- --        Weight: 114 kg (251 lb 5.2 oz)  Recent Labs      06/08/17   1246  06/09/17   0355  06/10/17   0351   SODIUM  128*  130*  136   POTASSIUM  4.1  3.4*  3.2*   CHLORIDE  98  98  102   CO2  18*  20  24   BUN  54*  57*  41*   CREATININE  3.02*  2.23*  1.56*   MAGNESIUM  1.9  2.0  2.1   PHOSPHORUS  5.3*  4.6*  3.1   CALCIUM  8.4*  8.4*  8.3*       GI/Nutrition:  Imaging: Available data reviewed     Liver Function  Recent Labs      06/08/17   0607  06/08/17   1246  06/09/17   0355  06/10/17   0351   ALTSGPT  78*   --   58*   --    ASTSGOT  72*   --   31   --    ALKPHOSPHAT  147*   --    133*   --    TBILIRUBIN  1.2   --   0.7   --    LIPASE  10*   --    --    --    GLUCOSE  281*  199*  152*  190*       Heme:  Recent Labs      06/08/17   0607  06/09/17   0355  06/10/17   0351   RBC  3.62*  3.43*  3.68*   HEMOGLOBIN  10.2*  9.9*  10.4*   HEMATOCRIT  30.9*  29.0*  31.2*   PLATELETCT  362  306  304   PROTHROMBTM  23.8*   --    --    APTT  26.7   --    --    INR  2.05*   --    --        Infectious Disease:  Temp  Av.1 °C (98.7 °F)  Min: 36.6 °C (97.9 °F)  Max: 37.6 °C (99.7 °F)  Micro: reviewed     Recent Labs      06/08/17   0607  06/09/17   0355  06/10/17   0351   WBC  13.5*  11.7*  10.4   NEUTSPOLYS  86.10*  85.90*  82.90*   LYMPHOCYTES  4.80*  4.10*  5.50*   MONOCYTES  7.60  8.30  8.60   EOSINOPHILS  0.10  0.30  1.30   BASOPHILS  0.20  0.10  0.30   ASTSGOT  72*  31   --    ALTSGPT  78*  58*   --    ALKPHOSPHAT  147*  133*   --    TBILIRUBIN  1.2  0.7   --      Current Facility-Administered Medications   Medication Dose Frequency Provider Last Rate Last Dose   • propofol (DIPRIVAN) injection  5-80 mcg/kg/min Continuous EMILY ArzolaD 33.7 mL/hr at 06/10/17 0523 50 mcg/kg/min at 06/10/17 0523   • Respiratory Care per Protocol   Continuous RT Antonio Mata M.D.       • chlorhexidine (PERIDEX) 0.12 % solution 15 mL  15 mL BID Antonio Mata M.D.   15 mL at 17   • lidocaine (XYLOCAINE) 1%  injection  1-2 mL Q30 MIN PRN Antonio Mata M.D.       • MD ALERT...Adult ICU Electrolyte Replacement per Pharmacy Protocol   pharmacy to dose Antonio Mata M.D.       • fentaNYL (SUBLIMAZE) injection 25 mcg  25 mcg Q HOUR PRN Antonio Mata M.D.        Or   • fentaNYL (SUBLIMAZE) injection 50 mcg  50 mcg Q HOUR PRN Antonio Mata M.D.        Or   • fentaNYL (SUBLIMAZE) injection 100 mcg  100 mcg Q HOUR PRN Antonio Mata M.D.       • famotidine (PEPCID) tablet 20 mg  20 mg DAILY Antonio Mata M.D.        Or   • famotidine (PEPCID) injection 20 mg  20 mg DAILY Antonio Mata M.D.        • ipratropium-albuterol (DUONEB) nebulizer solution 3 mL  3 mL Q2HRS PRN (RT) Antonio Mata M.D.       • ipratropium-albuterol (DUONEB) nebulizer solution 3 mL  3 mL Q4HRS (RT) Jaime Bush M.D.   3 mL at 06/10/17 0642   • NS (BOLUS) infusion 500 mL  500 mL Once PRN Anson Manuel M.D.       • insulin lispro (HUMALOG) injection 2-9 Units  2-9 Units Q6HRS Anson Manuel M.D.   2 Units at 06/10/17 0521   • glucose 4 g chewable tablet 16 g  16 g Q15 MIN PRN Anson Manuel M.D.        And   • dextrose 50% (D50W) injection 25 mL  25 mL Q15 MIN PRN Anson Manuel M.D.       • cefTRIAXone (ROCEPHIN) 2 g in  mL IVPB  2 g Q24HRS Anson Manuel M.D.   Stopped at 06/09/17 0936   • levothyroxine (SYNTHROID) tablet 88 mcg  88 mcg AM ES Anson Manuel M.D.   88 mcg at 06/10/17 0642   • senna-docusate (PERICOLACE or SENOKOT S) 8.6-50 MG per tablet 2 Tab  2 Tab BID Anson Manuel M.D.   2 Tab at 06/09/17 2216    And   • polyethylene glycol/lytes (MIRALAX) PACKET 1 Packet  1 Packet QDAY PRN Anson Manuel M.D.        And   • magnesium hydroxide (MILK OF MAGNESIA) suspension 30 mL  30 mL QDAY PRN Anson Manuel M.D.        And   • bisacodyl (DULCOLAX) suppository 10 mg  10 mg QDAY PRN Anson Manuel M.D.       • ondansetron (ZOFRAN) syringe/vial injection 4 mg  4 mg Q4HRS PRN Anson Manuel M.D.       • ondansetron (ZOFRAN ODT) dispertab 4 mg  4 mg Q4HRS PRN Anson Manuel M.D.       • acetaminophen (TYLENOL) tablet 650 mg  650 mg Q6HRS PRN Anson Manuel M.D.       • azithromycin (ZITHROMAX) 500 mg in D5W 250 mL IVPB  500 mg Q24HRS Anson Manuel M.D.   Stopped at 06/09/17 0947     Last reviewed on 6/8/2017  9:34 AM by Mindi Benites, Pharmacy Int    Quality  Measures:  Labs reviewed, Medications reviewed and Radiology images reviewed                      Assessment / Plan:    Acute hypoxic respiratory failure    - ? pulmonary edema, pna,   - intubated 6/9   - BAL L lingula; No Hemorrhage seen; no purulence   - most consistent with pulmonary  edema   - start lasix; d/w'd nephro  Leukocytosis with left shift   - no bands.   - empiric abx   - BAL and f/u cx's  Hyponatremia in setting of hypervolemia.  Anion gap metabolic acidosis    -secondary to lactic acidosis.  Acute kidney injury.     - ? hypotensive episodes post-antihypertensive medications, ATN, doubt pulm-renal syndrome   - serology pending   - BAL clear, no DAH   - no steroids   Elevated liver transaminases.     - ? congestive hepatopathy; IVC,very enlarged and noncompressible  Coagulopathy.     -recently started on apixaban for new atrial fibrillation.  Recently diagnosed atrial fibrillation with rapid ventricular response.     The patient has been placed on rate controlling medication as well as anticoagulative therapy earlier this week.  Chronic hypertension.  Chronic dyslipidemia.  Chronic diabetes.        Discussed patient condition and risk of morbidity and/or mortality with RN, RT and QA team.    The patient remains critically ill.  Critical care time = 35 minutes in directly providing and coordinating critical care and extensive data review.  No time overlap and excludes procedures.      Agnes CABRALES (Toñito), am scribing for, and in the presence of, Antonio Mata M.D..  Electronically signed by: Agnes Carvajal (Toñito), 6/10/2017  Antonio CABRALES M.D. personally performed the services described in this documentation, as scribed by Agnes Carvajal in my presence, and it is both accurate and complete.

## 2017-06-10 NOTE — ASSESSMENT & PLAN NOTE
-on multiple agents pta  -will slowly re-start some of these medications  -continue metoprolol  Will add back hyzaar today

## 2017-06-10 NOTE — CARE PLAN
Problem: Nutritional:  Goal: Achieve adequate nutritional intake  Patient’s diet will advance past clears and will consume >50% of meals   Outcome: NOT MET  -TF started 6/10  Goal: Nutrition support tolerated and meeting greater than 85% of estimated needs  Outcome: NOT MET

## 2017-06-10 NOTE — PROGRESS NOTES
[facility] Hospitalist Progress Note    Date of Service: 6/10/2017    Chief Complaint  61 y.o. female admitted 2017 with syncope.    Interval Problem Update  Ms. Alfaro has a hx of htn and recently diagnosed afib.  CT chest in the ER revealed alveolar hemorrhage. She required rescue bipap and was admitted to the ICU. She required intubation on  with bronchoscopy.   She remains in afib rate controlled. She is tolerating tube feeds. 900 ml urine over night.  Her sister is at bedside and we discussed her condition. Ms. Alfaro does not have any children and is not . She has 2 sisters that live locally.  Consultants/Specialty  Pulmonology  Cardiology  Nephrology    Disposition  ICU        Review of Systems   Unable to perform ROS: intubated      Physical Exam  Laboratory/Imaging   Hemodynamics  Temp (24hrs), Av °C (98.6 °F), Min:36.6 °C (97.9 °F), Max:37.6 °C (99.7 °F)   Temperature: 36.6 °C (97.9 °F)  Pulse  Av.7  Min: 62  Max: 121 Heart Rate (Monitored): 91  NIBP: 107/62 mmHg      Respiratory  [place] Vent Mode: APVCMV, Rate (breaths/min): 26, PEEP/CPAP: 12, PEEP/CPAP: 12, FiO2: 50, P Peak (PIP): 28, P MEAN: 17   Respiration: (!) 26, Pulse Oximetry: 99 %, O2 Daily Delivery Respiratory : Highflow Nasal Cannula     Work Of Breathing / Effort: Vented  RUL Breath Sounds: Fine Crackles, RML Breath Sounds: Diminished;Fine Crackles, RLL Breath Sounds: Diminished, CAROLANN Breath Sounds: Fine Crackles, LLL Breath Sounds: Diminished    Fluids    Intake/Output Summary (Last 24 hours) at 06/10/17 0842  Last data filed at 06/10/17 0600   Gross per 24 hour   Intake 1099.3 ml   Output   1810 ml   Net -710.7 ml       Nutrition  Orders Placed This Encounter   Procedures   • Diet NPO     Standing Status: Standing      Number of Occurrences: 1      Standing Expiration Date:      Order Specific Question:  Type:     Answer:  Now [1]     Order Specific Question:  Restrict to:     Answer:  Strict [1]     Physical Exam    Constitutional: She appears well-nourished. No distress.   HENT:   Head: Normocephalic.   Poor dentition.   cortrak Nares.    Cardiovascular:   Distant, irregular heart sounds.    Pulmonary/Chest:   Vent, moderate air movement. No wheezing. +crackles.    Abdominal: Soft. She exhibits no distension.   Genitourinary:   Tamayo cath.   Musculoskeletal: She exhibits edema. She exhibits no tenderness.   Neurological:   Sedated on propofol. She opens her eyes and withdraws to pain.    Skin: No erythema. There is pallor.   Very long toenails with severe onychomycosis.       Recent Labs      06/08/17   0607  06/09/17   0355  06/10/17   0351   WBC  13.5*  11.7*  10.4   RBC  3.62*  3.43*  3.68*   HEMOGLOBIN  10.2*  9.9*  10.4*   HEMATOCRIT  30.9*  29.0*  31.2*   MCV  85.4  84.5  84.8   MCH  28.2  28.9  28.3   MCHC  33.0*  34.1  33.3*   RDW  43.8  43.2  43.7   PLATELETCT  362  306  304   MPV  9.7  9.1  8.7*     Recent Labs      06/08/17   1246  06/09/17   0355  06/10/17   0351   SODIUM  128*  130*  136   POTASSIUM  4.1  3.4*  3.2*   CHLORIDE  98  98  102   CO2  18*  20  24   GLUCOSE  199*  152*  190*   BUN  54*  57*  41*   CREATININE  3.02*  2.23*  1.56*   CALCIUM  8.4*  8.4*  8.3*     Recent Labs      06/08/17   0607   APTT  26.7   INR  2.05*     Recent Labs      06/08/17   0607   BNPBTYPENAT  312*     Recent Labs      06/09/17   0355   TRIGLYCERIDE  77   HDL  28*   LDL  39          Assessment/Plan     Severe sepsis (CMS-HCC) (present on admission)  Assessment & Plan  Source appears respiratory. IV abx.  The organ system dysfunction associated with this disease process is the respiratory system as is evidenced by respiratory failure requiring intubation.     Acute respiratory failure with hypoxia (CMS-HCC) (present on admission)  Assessment & Plan  Requiring rescue bipap followed by intubation on 6/9. Bronch done 6/9.   PEEP 12 with FiO2 80%.   Pulmonology consultation.   Echo showed normal EF at 65%.    CT  chest:    Extensive bilateral patchy and groundglass airspace opacities may represent pulmonary edema, multifocal pneumonia. Hemorrhage is also a consideration in the correct clinical setting. Follow-up to radiographic resolution is recommended.    Small bilateral pleural effusions, right greater than left.    Cardiomegaly.    Mildly prominent mediastinal lymph nodes are nonspecific.    Coronary atherosclerotic plaque.    Small hiatal hernia.    KIRT (acute kidney injury) (CMS-[facility]) (present on admission)  Assessment & Plan  Cr on admit was 2.95 with baseline 1.1 per review of the records from 2016.   Consistent with ATN. IV fluids have been given.   Nephrology consultation.   Follow daily BMP.    Atrial fibrillation (CMS-HCC) (present on admission)  Assessment & Plan  Eliquis as an outpatient. Rate control on coreg as an outpatient.    Recently diagnosed.  Holding full-dose anticoag given possible pulmonary hemorrhage seen on CT chest.     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-[facility]) (present on admission)  Assessment & Plan  Sliding scale    Hyponatremia (present on admission)  Assessment & Plan  Na 124 on admit. NS given.    Hypothyroid (present on admission)  Assessment & Plan  replacement    HTN (hypertension) (present on admission)  Assessment & Plan  On cardizem, hyzaar 100/25, clonidine, coreg, norvasc as an outpatient.      Labs reviewed and Medications reviewed  Tamayo catheter: Unconscious / Sedated Patient on a Ventilator  Central line in place: Sepsis    DVT Prophylaxis: Heparin

## 2017-06-10 NOTE — CARE PLAN
Problem: Ventilation Defect:  Goal: Ability to achieve and maintain unassisted ventilation or tolerate decreased levels of ventilator support  Adult Ventilation Update    Total Vent Days: 2      Patient Lines/Drains/Airways Status    Active Airway      Name: Placement date: Placement time: Site: Days:     Airway Group ET Tube 7.5 06/09/17 1710    2                 Plateau Pressure (Q Shift): 24 (06/10/17 0240)  Static Compliance (ml / cm H2O): 37 (06/10/17 0240)    Patient failed trials because of Barriers to Wean: FiO2 >50% OR PEEP >8 (PMA Only) (06/09/17 2005)    Sputum/Suction  Cough: Productive (06/10/17 0400)  Sputum Amount: Moderate (06/10/17 0400)  Sputum Color: Tan;Clear (06/10/17 0400)  Sputum Consistency: Frothy;Thin (06/10/17 0400)    Events/Summary/Plan: Vent check - no changes @ this time (06/10/17 0240)  APVCMV  RR 26  Vt 360  PEEP 12  FiO2 50%

## 2017-06-10 NOTE — DIETARY
"Nutrition Support (Gastric Cortrak) Assessment - Female    Heidi Alfaro is a 61 y.o. female with admitting DX of KIRT (acute kidney injury) (CMS-[facility])  Acute hypoxemic respiratory failure (CMS-[facility])  Pertinent History: Diabetes mellitus type 2, a-fib, hypertension, hypothyroidism, hyperlipidemia, CAD, obesity.   Allergies: Review of patient's allergies indicates no known allergies.    Height: 167.6 cm (5' 6\")  Weight: 114 kg (251 lb 5.2 oz)  Weight to Use in Calculations: 114 kg (251 lb 5.2 oz)  Ideal Body Weight: 59 kg (130 lb 1.1 oz)  Percent Ideal Body Weight: 193.2  Body mass index is 40.58 kg/(m^2).    Pertinent Labs:   Recent Labs      06/08/17   0607   06/09/17   0355   06/09/17   2354  06/10/17   0351  06/10/17   0518   SODIUM 101  124*   < >  130*   --    --   136   --    POTASSIUM 102  4.7   < >  3.4*   --    --   3.2*   --      52*   < >  57*   --    --   41*   --    CREATININE 109  2.95*   < >  2.23*   --    --   1.56*   --    GLUCOSE 112  281*   < >  152*   --    --   190*   --    CALCIUM 105  8.8   < >  8.4*   --    --   8.3*   --    PHOSPHORUS 120   --    < >  4.6*   --    --   3.1   --      72*   --   31   --    --    --    --      78*   --   58*   --    --    --    --    ALBUMIN 111  3.4   --   3.0*   --    --    --    --    TOTAL BILIRUBIN 113  1.2   --   0.7   --    --    --    --    MAGNESIUM 561   --    < >  2.0   --    --   2.1   --    WBC 1501  13.5*   --   11.7*   --    --   10.4   --    HGB 1503  10.2*   --   9.9*   --    --   10.4*   --    HCT 1504  30.9*   --   29.0*   --    --   31.2*   --    RBC 1502  3.62*   --   3.43*   --    --   3.68*   --    ACCU CHECK GLUCOSE 788   --    < >   --    < >  196*   --   178*    < > = values in this interval not displayed.   adjusted calcium = 9.1 (WNL)    Last BM: 06/09/17  Pertinent Medications: Rocephin, Pepcid, Lasix, SSI, Synthroid, electrolyte replacement per RPh, K-Dur, Klor-Con, bowel care, propofol.   Pertinent Fluids: free " water from TF, med flushes.  Skin:  No pressure ulcers noted per chart review.      Estimated Needs per MSJ x1 = 1719 kcal/day; per PSU = 1926 kcal/day x65-70% REE (Temp (24hrs), Av °C (98.6 °F), Min:36.6 °C (97.9 °F), Max:37.6 °C (99.7 °F)):  Total Calories / day: 1252 - 1348 (Calories / k - 12)  Total Grams Protein / day: 89 - 118 (Grams Protein / k.5 - 2.0)  Total Fluids ml / day: 1713 ml        Assessment / Evaluation: Enteral nutrition support appropriate d/t pt intubated, unable to tolerate PO diet safely at this time.  Pt with gastric Cortrak, hx obesity and diabetes mellitus type 2 will benefit from carbohydrate-controlled TF at this time to aid in managing POC BS.  Propofol currently providing 887 kcal from lipid - RD to monitor needs and adjust TF prn.  Due to difficulty meeting increased protein needs indicated with hypocaloric feeds, will feed closer to REE until propofol requirements are lower.     Plan / Recommendation:   1. WITH propofol, please start Peptamen Intense VHP at 25 ml/hr and advance to goal rate per protocol of 45 ml/hr continuously = 1080 kcal (1967 kcal with propofol), 101 grams protein, 907 mL free water.  2. Without propofol, please change TF formula to Replete with Fiber, goal rate of 80 ml/hr continuously = 1920 kcal, 120 grams protein, 1613 ml free water per day.   3. Fluids per MD.

## 2017-06-10 NOTE — PROGRESS NOTES
Dr. Mata at bedside; pt continues to need max BiPAP support.  Pt agrees to intubation, bronchoscopy, and central line placement.

## 2017-06-10 NOTE — PROCEDURES
DATE OF SERVICE:  06/09/2017    NAME OF PROCREDURE:  Diagnostic bronchoscopy with serial bronchoalveolar   lavage, left lingula.    INDICATION:  Diffuse infiltrates, rule out alveolar hemorrhage syndrome.    Consent obtained from patient and included in the medical record after   description of risks, benefits, indications, and alternatives of the   procedure.    PROCEDURE:  The patient is on BiPAP, in respiratory distress with diffuse   bilateral infiltrates and acute renal failure, possibility of diffuse alveolar   hemorrhage syndrome.  The bronchoscopy is indicated as above.  Patient had   been semi-electively intubated.  Bronchoscopy was then indicated for serial   sequential BAL to rule out alveolar hemorrhage syndrome.    A flexible fiberoptic bronchoscope was inserted through the endotracheal tube   without difficulty.  All airways were examined to subsegmental level.  Airway   mucosa was normal in appearance.  There were no significant purulence or   endobronchial secretions.  There was no evidence of any airway bleeding.    The bronchoscope was wedged into the segment of the left lingula.  30 mL of   saline was instilled sequentially and collected in 3 separate traps.  There   was good return of clear foamy BAL fluid with no evidence of alveolar   hemorrhage.    Bronchoscope was then wedged into the segment of the right middle lobe and   another 30 mL of saline was instilled with good return of relatively clear   foamy BAL fluid.  This will be sent for appropriate culture.  There were no   immediate complications.    IMPRESSION:  1.  Relatively normal airway exam with no purulence or any evidence of   alveolar hemorrhage.  2.  Serial bronchoalveolar lavage left lingula with no evidence of alveolar   hemorrhage.  3.  Bronchoalveolar lavage right middle lobe and sent for culture.    The patient tolerated the procedure well with no immediate complications.       ____________________________________     RODRIGO  MD CLEO KUMAR / ELIZABETH    DD:  06/09/2017 19:18:57  DT:  06/09/2017 21:20:01    D#:  [id number]  Job#:  [id number]

## 2017-06-10 NOTE — PROCEDURES
"Date: 6/9/2017    Procedure:  Central Venous Catheter Insertion    Indication: Respiratory failure    Physician:  Dr. Antonio Mata MD    Consent:  Obtained from NOK and included in the medical record; time out performed    Procedure:  The patient was placed in the [place] position.  Appropriate \"time out\" was performed.  The left neck was prepped and draped in the usual sterile fashion.  Under full body sterile barrier precautions, a triple lumen central venous catheter was placed without difficulty in the left IJ position.  US was used for localization and placement.  All lumens were flushed with sterile saline and sterile caps were applied.  The line was sutured in place and a sterile dressing was applied.  There were no immediate complications.  A post-procedure CXR will be reviewed.  Estimated blood loss < 5cc.      "

## 2017-06-10 NOTE — RESPIRATORY CARE
Cardiopulmonary Resuscitation/Intubation Assist    Intubation assist performed yes  Reason for intubation respiratory failure  Positive Color Change on EZCap? yes  Difficult Intubation/Number of attempts 1  Evidence of aspiration no           Rate (breaths/min): 22 (06/09/17 1710)  Vt Target (mL): 360 (06/09/17 1710)  FiO2: 100 (06/09/17 1710)  PEEP/CPAP: 12 (06/09/17 1710)       Events/Summary/Plan: Pt intubated (06/09/17 1710)

## 2017-06-10 NOTE — PROGRESS NOTES
Cortrak Placement    Tube Team verified patient name and medical record number prior to tube placement.  Cortrak tube (43 inches, 10 Ivorian) placed at 85 cm in left nare.  Per Cortrak picture, tube appears to be in the small bowel.  Nursing Instructions: Awaiting KUB to confirm placement before use for medications or feeding. Once placement confirmed, flush tube with 30 ml of water, and then remove and save stylet, in patient medication drawer.

## 2017-06-10 NOTE — PROGRESS NOTES
Monitor Summary:    Atrial fibrillation at rates of     - / 0.08 / -      12h chart check and report given to oncoming RN.

## 2017-06-11 ENCOUNTER — APPOINTMENT (OUTPATIENT)
Dept: RADIOLOGY | Facility: MEDICAL CENTER | Age: 62
DRG: 853 | End: 2017-06-11
Attending: INTERNAL MEDICINE
Payer: COMMERCIAL

## 2017-06-11 LAB
ANION GAP SERPL CALC-SCNC: 10 MMOL/L (ref 0–11.9)
BACTERIA SPEC RESP CULT: NORMAL
BASE EXCESS BLDA CALC-SCNC: 7 MMOL/L (ref -4–3)
BASOPHILS # BLD AUTO: 0.1 % (ref 0–1.8)
BASOPHILS # BLD: 0.01 K/UL (ref 0–0.12)
BODY TEMPERATURE: ABNORMAL DEGREES
BUN SERPL-MCNC: 32 MG/DL (ref 8–22)
CALCIUM SERPL-MCNC: 8.6 MG/DL (ref 8.5–10.5)
CHLORIDE SERPL-SCNC: 99 MMOL/L (ref 96–112)
CO2 BLDA-SCNC: 32 MMOL/L (ref 20–33)
CO2 SERPL-SCNC: 29 MMOL/L (ref 20–33)
CREAT SERPL-MCNC: 1.16 MG/DL (ref 0.5–1.4)
EOSINOPHIL # BLD AUTO: 0.48 K/UL (ref 0–0.51)
EOSINOPHIL NFR BLD: 5.1 % (ref 0–6.9)
ERYTHROCYTE [DISTWIDTH] IN BLOOD BY AUTOMATED COUNT: 44 FL (ref 35.9–50)
GFR SERPL CREATININE-BSD FRML MDRD: 47 ML/MIN/1.73 M 2
GLUCOSE BLD-MCNC: 131 MG/DL (ref 65–99)
GLUCOSE BLD-MCNC: 140 MG/DL (ref 65–99)
GLUCOSE BLD-MCNC: 162 MG/DL (ref 65–99)
GLUCOSE BLD-MCNC: 209 MG/DL (ref 65–99)
GLUCOSE SERPL-MCNC: 154 MG/DL (ref 65–99)
GRAM STN SPEC: NORMAL
HCO3 BLDA-SCNC: 30.7 MMOL/L (ref 17–25)
HCT VFR BLD AUTO: 32.5 % (ref 37–47)
HGB BLD-MCNC: 10.8 G/DL (ref 12–16)
IMM GRANULOCYTES # BLD AUTO: 0.13 K/UL (ref 0–0.11)
IMM GRANULOCYTES NFR BLD AUTO: 1.4 % (ref 0–0.9)
L PNEUMO IGG TITR SER IF: NORMAL {TITER}
LYMPHOCYTES # BLD AUTO: 0.86 K/UL (ref 1–4.8)
LYMPHOCYTES NFR BLD: 9.2 % (ref 22–41)
MAGNESIUM SERPL-MCNC: 1.6 MG/DL (ref 1.5–2.5)
MCH RBC QN AUTO: 28.1 PG (ref 27–33)
MCHC RBC AUTO-ENTMCNC: 33.2 G/DL (ref 33.6–35)
MCV RBC AUTO: 84.6 FL (ref 81.4–97.8)
MONOCYTES # BLD AUTO: 1.02 K/UL (ref 0–0.85)
MONOCYTES NFR BLD AUTO: 10.9 % (ref 0–13.4)
NEUTROPHILS # BLD AUTO: 6.85 K/UL (ref 2–7.15)
NEUTROPHILS NFR BLD: 73.3 % (ref 44–72)
NRBC # BLD AUTO: 0 K/UL
NRBC BLD AUTO-RTO: 0 /100 WBC
NUCLEAR IGG SER QL IA: NORMAL
O2/TOTAL GAS SETTING VFR VENT: 40 %
PCO2 BLDA: 41.4 MMHG (ref 26–37)
PCO2 TEMP ADJ BLDA: 40.9 MMHG (ref 26–37)
PH BLDA: 7.48 [PH] (ref 7.4–7.5)
PH TEMP ADJ BLDA: 7.48 [PH] (ref 7.4–7.5)
PHOSPHATE SERPL-MCNC: 2.6 MG/DL (ref 2.5–4.5)
PLATELET # BLD AUTO: 343 K/UL (ref 164–446)
PMV BLD AUTO: 8.6 FL (ref 9–12.9)
PO2 BLDA: 88 MMHG (ref 64–87)
PO2 TEMP ADJ BLDA: 86 MMHG (ref 64–87)
POTASSIUM SERPL-SCNC: 3 MMOL/L (ref 3.6–5.5)
RBC # BLD AUTO: 3.84 M/UL (ref 4.2–5.4)
SAO2 % BLDA: 97 % (ref 93–99)
SIGNIFICANT IND 70042: NORMAL
SITE SITE: NORMAL
SODIUM SERPL-SCNC: 138 MMOL/L (ref 135–145)
SOURCE SOURCE: NORMAL
SPECIMEN DRAWN FROM PATIENT: ABNORMAL
TRIGL SERPL-MCNC: 131 MG/DL (ref 0–149)
WBC # BLD AUTO: 9.4 K/UL (ref 4.8–10.8)

## 2017-06-11 PROCEDURE — A9270 NON-COVERED ITEM OR SERVICE: HCPCS | Performed by: INTERNAL MEDICINE

## 2017-06-11 PROCEDURE — 700101 HCHG RX REV CODE 250: Performed by: HOSPITALIST

## 2017-06-11 PROCEDURE — 36600 WITHDRAWAL OF ARTERIAL BLOOD: CPT

## 2017-06-11 PROCEDURE — 700102 HCHG RX REV CODE 250 W/ 637 OVERRIDE(OP): Performed by: INTERNAL MEDICINE

## 2017-06-11 PROCEDURE — 82803 BLOOD GASES ANY COMBINATION: CPT

## 2017-06-11 PROCEDURE — 700111 HCHG RX REV CODE 636 W/ 250 OVERRIDE (IP)

## 2017-06-11 PROCEDURE — 770022 HCHG ROOM/CARE - ICU (200)

## 2017-06-11 PROCEDURE — 700111 HCHG RX REV CODE 636 W/ 250 OVERRIDE (IP): Performed by: HOSPITALIST

## 2017-06-11 PROCEDURE — 94640 AIRWAY INHALATION TREATMENT: CPT

## 2017-06-11 PROCEDURE — 83735 ASSAY OF MAGNESIUM: CPT

## 2017-06-11 PROCEDURE — 80048 BASIC METABOLIC PNL TOTAL CA: CPT

## 2017-06-11 PROCEDURE — 84100 ASSAY OF PHOSPHORUS: CPT

## 2017-06-11 PROCEDURE — 71010 DX-CHEST-PORTABLE (1 VIEW): CPT

## 2017-06-11 PROCEDURE — 700111 HCHG RX REV CODE 636 W/ 250 OVERRIDE (IP): Performed by: INTERNAL MEDICINE

## 2017-06-11 PROCEDURE — 94003 VENT MGMT INPAT SUBQ DAY: CPT

## 2017-06-11 PROCEDURE — 99233 SBSQ HOSP IP/OBS HIGH 50: CPT | Performed by: HOSPITALIST

## 2017-06-11 PROCEDURE — 700105 HCHG RX REV CODE 258: Performed by: INTERNAL MEDICINE

## 2017-06-11 PROCEDURE — 82962 GLUCOSE BLOOD TEST: CPT | Mod: 91

## 2017-06-11 PROCEDURE — 84478 ASSAY OF TRIGLYCERIDES: CPT

## 2017-06-11 PROCEDURE — 85025 COMPLETE CBC W/AUTO DIFF WBC: CPT

## 2017-06-11 PROCEDURE — 99291 CRITICAL CARE FIRST HOUR: CPT | Performed by: INTERNAL MEDICINE

## 2017-06-11 RX ORDER — MAGNESIUM SULFATE HEPTAHYDRATE 40 MG/ML
2 INJECTION, SOLUTION INTRAVENOUS ONCE
Status: COMPLETED | OUTPATIENT
Start: 2017-06-11 | End: 2017-06-11

## 2017-06-11 RX ORDER — POTASSIUM CHLORIDE 29.8 MG/ML
40 INJECTION INTRAVENOUS ONCE
Status: COMPLETED | OUTPATIENT
Start: 2017-06-11 | End: 2017-06-11

## 2017-06-11 RX ORDER — POTASSIUM CHLORIDE 1.5 G/1.58G
40 POWDER, FOR SOLUTION ORAL 2 TIMES DAILY
Status: DISCONTINUED | OUTPATIENT
Start: 2017-06-11 | End: 2017-06-14

## 2017-06-11 RX ADMIN — HEPARIN SODIUM 5000 UNITS: 5000 INJECTION, SOLUTION INTRAVENOUS; SUBCUTANEOUS at 13:31

## 2017-06-11 RX ADMIN — CHLORHEXIDINE GLUCONATE 15 ML: 1.2 RINSE ORAL at 08:37

## 2017-06-11 RX ADMIN — HEPARIN SODIUM 5000 UNITS: 5000 INJECTION, SOLUTION INTRAVENOUS; SUBCUTANEOUS at 21:06

## 2017-06-11 RX ADMIN — IPRATROPIUM BROMIDE AND ALBUTEROL SULFATE 3 ML: .5; 3 SOLUTION RESPIRATORY (INHALATION) at 18:29

## 2017-06-11 RX ADMIN — PROPOFOL 50 MCG/KG/MIN: 10 INJECTION, EMULSION INTRAVENOUS at 16:36

## 2017-06-11 RX ADMIN — FUROSEMIDE 60 MG: 10 INJECTION, SOLUTION INTRAVENOUS at 05:15

## 2017-06-11 RX ADMIN — POTASSIUM CHLORIDE 40 MEQ: 1.5 POWDER, FOR SOLUTION ORAL at 21:06

## 2017-06-11 RX ADMIN — IPRATROPIUM BROMIDE AND ALBUTEROL SULFATE 3 ML: .5; 3 SOLUTION RESPIRATORY (INHALATION) at 10:50

## 2017-06-11 RX ADMIN — SENNOSIDES AND DOCUSATE SODIUM 2 TABLET: 8.6; 5 TABLET ORAL at 08:37

## 2017-06-11 RX ADMIN — PROPOFOL 50 MCG/KG/MIN: 10 INJECTION, EMULSION INTRAVENOUS at 22:38

## 2017-06-11 RX ADMIN — PROPOFOL 50 MCG/KG/MIN: 10 INJECTION, EMULSION INTRAVENOUS at 08:38

## 2017-06-11 RX ADMIN — FUROSEMIDE 60 MG: 10 INJECTION, SOLUTION INTRAVENOUS at 15:00

## 2017-06-11 RX ADMIN — CHLORHEXIDINE GLUCONATE 15 ML: 1.2 RINSE ORAL at 21:06

## 2017-06-11 RX ADMIN — POTASSIUM CHLORIDE 40 MEQ: 29.8 INJECTION, SOLUTION INTRAVENOUS at 10:17

## 2017-06-11 RX ADMIN — LEVOTHYROXINE SODIUM 88 MCG: 88 TABLET ORAL at 05:23

## 2017-06-11 RX ADMIN — IPRATROPIUM BROMIDE AND ALBUTEROL SULFATE 3 ML: .5; 3 SOLUTION RESPIRATORY (INHALATION) at 14:00

## 2017-06-11 RX ADMIN — PROPOFOL 50 MCG/KG/MIN: 10 INJECTION, EMULSION INTRAVENOUS at 19:42

## 2017-06-11 RX ADMIN — INSULIN LISPRO 3 UNITS: 100 INJECTION, SOLUTION INTRAVENOUS; SUBCUTANEOUS at 23:39

## 2017-06-11 RX ADMIN — INSULIN LISPRO 4 UNITS: 100 INJECTION, SOLUTION INTRAVENOUS; SUBCUTANEOUS at 12:03

## 2017-06-11 RX ADMIN — PROPOFOL 50 MCG/KG/MIN: 10 INJECTION, EMULSION INTRAVENOUS at 02:13

## 2017-06-11 RX ADMIN — PROPOFOL 50 MCG/KG/MIN: 10 INJECTION, EMULSION INTRAVENOUS at 05:07

## 2017-06-11 RX ADMIN — IPRATROPIUM BROMIDE AND ALBUTEROL SULFATE 3 ML: .5; 3 SOLUTION RESPIRATORY (INHALATION) at 02:12

## 2017-06-11 RX ADMIN — CEFTRIAXONE SODIUM 2 G: 2 INJECTION, POWDER, FOR SOLUTION INTRAMUSCULAR; INTRAVENOUS at 08:37

## 2017-06-11 RX ADMIN — SENNOSIDES AND DOCUSATE SODIUM 2 TABLET: 8.6; 5 TABLET ORAL at 21:06

## 2017-06-11 RX ADMIN — AZITHROMYCIN 500 MG: 250 TABLET, FILM COATED ORAL at 08:37

## 2017-06-11 RX ADMIN — PROPOFOL 50 MCG/KG/MIN: 10 INJECTION, EMULSION INTRAVENOUS at 12:05

## 2017-06-11 RX ADMIN — INSULIN LISPRO 3 UNITS: 100 INJECTION, SOLUTION INTRAVENOUS; SUBCUTANEOUS at 17:04

## 2017-06-11 RX ADMIN — HEPARIN SODIUM 5000 UNITS: 5000 INJECTION, SOLUTION INTRAVENOUS; SUBCUTANEOUS at 05:08

## 2017-06-11 RX ADMIN — IPRATROPIUM BROMIDE AND ALBUTEROL SULFATE 3 ML: .5; 3 SOLUTION RESPIRATORY (INHALATION) at 07:27

## 2017-06-11 RX ADMIN — MAGNESIUM SULFATE IN WATER 2 G: 40 INJECTION, SOLUTION INTRAVENOUS at 08:49

## 2017-06-11 RX ADMIN — IPRATROPIUM BROMIDE AND ALBUTEROL SULFATE 3 ML: .5; 3 SOLUTION RESPIRATORY (INHALATION) at 22:22

## 2017-06-11 RX ADMIN — FAMOTIDINE 20 MG: 20 TABLET, FILM COATED ORAL at 08:37

## 2017-06-11 RX ADMIN — PROPOFOL 50 MCG/KG/MIN: 10 INJECTION, EMULSION INTRAVENOUS at 13:33

## 2017-06-11 RX ADMIN — POTASSIUM CHLORIDE 20 MEQ: 1.5 POWDER, FOR SOLUTION ORAL at 08:37

## 2017-06-11 ASSESSMENT — ENCOUNTER SYMPTOMS
PALPITATIONS: 0
SORE THROAT: 0
EYES NEGATIVE: 1
DIZZINESS: 0
CONSTITUTIONAL NEGATIVE: 1
CARDIOVASCULAR NEGATIVE: 1
WHEEZING: 0
CLAUDICATION: 0
ORTHOPNEA: 0
GASTROINTESTINAL NEGATIVE: 1
CHILLS: 0
BRUISES/BLEEDS EASILY: 0
WEAKNESS: 0
SHORTNESS OF BREATH: 0
FEVER: 0
HEMOPTYSIS: 0
COUGH: 0
NEUROLOGICAL NEGATIVE: 1
RESPIRATORY NEGATIVE: 1
PND: 0
LOSS OF CONSCIOUSNESS: 0
STRIDOR: 0
MUSCULOSKELETAL NEGATIVE: 1
SPUTUM PRODUCTION: 0

## 2017-06-11 NOTE — CARE PLAN
Problem: Infection  Goal: Will remain free from infection  Intervention: Assess signs and symptoms of infection  Patients individual infection risk assessed. Monitored for signs and symptoms of infection throughout shift. Washed hands or foamed in and out every encounter. Utilized universal precautions. Scrubbed hub before access to IV lines per protocol.           Problem: Pain Management  Goal: Pain level will decrease to patient’s comfort goal  Intervention: Follow pain managment plan developed in collaboration with patient and Interdisciplinary Team  Pain assessed and documented per unit protocol and appropriate pharmacological and non-pharmacological interventions implemented. Reviewed pain goals with patient and family. Nods no to pain

## 2017-06-11 NOTE — CARE PLAN
Problem: Respiratory:  Goal: Respiratory status will improve  Collaboration w/ RT. Continuous O2 monitoring in place. Pt suctioned Q2h and PRN. Oral care performed     Problem: Skin Integrity  Goal: Risk for impaired skin integrity will decrease  Pt turned Q2h. Pillows used for positioning. Sheets/linen kept clean and dry. Skin assessed and documented

## 2017-06-11 NOTE — PROGRESS NOTES
Pulmonary Critical Care Progress Note        Date of service: 6/11/2017    Chief Complaint: Shortness of Breath    History of Present Illness: A 61 y.o. female with a history of atrial fibrillation who presents from the Emergency Department with pneumonia.    ROS:  Respiratory: unable to perform due to the patient's inability to effectively communicate, Cardiac: unable to perform due to the patient's inability to effectively communicate, GI: unable to perform due to the patient's inability to effectively communicate.    All other systems negative.    Interval Events:  24 hour interval history reviewed     No significant overnight events.   Following commands. Prop 50.   Afib 70s-120s  Systolic BP 90s-120s after Lasix this morning.   No mobility   PEEP 12 40% FiO2. On CMV settings.     CXR: Unchanged. Diffuse pulmonary edema. Patchy infiltrates. Small bilateral pleural effusions.     PFSH:  No change.      Physical Exam:  General:  On full ventilatory support.   HEENT:  PERRL, trachea midline, neck supple, left nare cortrak in place with TF tolerated. ETT in place. Left IJ CDC is CDI.   CVS:  Distant and irregular and tachycardic at 100, no murmur  Respiratory:  Coarse anteriorly. No wheezing. Good air entry.   Abdomen:  soft, NT/ND, bowel sounds present.   Extremities:  1+ lower extremity edema, 2+ DP bilaterally upper and lower extremities.   Skin: Cool and dry. Capillary refill intact.  Neuro/Psych:  Sedated on Propofol. Moves all extremities symmetrically. No focal neurologic deficits.     Respiratory:  Pulse Oximetry: 98 %  ImagingAvailable data reviewed     Recent Labs      06/09/17   1958  06/10/17   0459   ISTATAPH  7.272*  7.428   ISTATAPCO2  54.1*  36.0   ISTATAPO2  105*  188*   ISTATATCO2  27  25   [id number]  97  100*   ISTATARTHCO3  25.0  23.8   ISTATARTBE  -2  0   ISTATTEMP  37.2 C  36.8 C   ISTATFIO2  100  80   ISTATSPEC  Arterial  Arterial   ISTATAPHTC  7.269*  7.431   [id number]  106*  187*        HemoDynamics:  Pulse: (!) 110, Heart Rate (Monitored): (!) 110  NIBP: 130/76 mmHg     Imaging: Available data reviewed  Recent Labs      06/08/17   1246   CPKTOTAL  37       Neuro:  GCS Total Ismael Coma Score: 10  Imaging: Available data reviewed    Fluids:  Intake/Output       06/09/17 0700 - 06/10/17 0659 06/10/17 0700 - 06/11/17 0659 06/11/17 0700 - 06/12/17 0659      0700-1859 1900-0659 Total 0700-1859 1900-0659 Total 0700-1859 1900-0659 Total       Intake    P.O.  440  -- 440  --  -- --  --  -- --    P.O. 440 -- 440 -- -- -- -- -- --    I.V.  7.4  301.9 309.3  337  383.3 720.3  --  -- --    Propofol Volume 7.4 301.9 309.3 337 383.3 720.3 -- -- --    Other  --  30 30  60  60 120  --  -- --    Medications (P.O./ Enteral Liquids) -- 30 30 60 60 120 -- -- --    Enteral  --  320 320  420  520 940  --  -- --    Enteral Volume -- 200 200 300 460 760 -- -- --    Free Water / Tube Flush -- 120 120 120 60 180 -- -- --    Total Intake 447.4 651.9 1099.3 817 963.3 1780.3 -- -- --       Output    Urine  1095  900 1995  4005  3000 7005  --  -- --    Indwelling Cathether 5473 724 8413 4005 3000 7005 -- -- --    Stool  --  -- --  --  -- --  --  -- --    Number of Times Stooled -- 1 x 1 x 1 x -- 1 x -- -- --    Total Output 7329 096 4915 4005 3000 7005 -- -- --       Net I/O     -647.6 -248.1 -895.7 -3188 -2036.7 -5224.7 -- -- --        Weight: 115 kg (253 lb 8.5 oz)  Recent Labs      06/09/17   0355  06/10/17   0351  06/11/17   0424   SODIUM  130*  136  138   POTASSIUM  3.4*  3.2*  3.0*   CHLORIDE  98  102  99   CO2  20  24  29   BUN  57*  41*  32*   CREATININE  2.23*  1.56*  1.16   MAGNESIUM  2.0  2.1  1.6   PHOSPHORUS  4.6*  3.1  2.6   CALCIUM  8.4*  8.3*  8.6       GI/Nutrition:  Imaging: Available data reviewed     Liver Function  Recent Labs      06/09/17   0355  06/10/17   0351  06/11/17   0424   ALTSGPT  58*   --    --    ASTSGOT  31   --    --    ALKPHOSPHAT  133*   --    --    TBILIRUBIN  0.7   --    --     GLUCOSE  152*  190*  154*       Heme:  Recent Labs      06/09/17   0355  06/10/17   0351  06/11/17   0424   RBC  3.43*  3.68*  3.84*   HEMOGLOBIN  9.9*  10.4*  10.8*   HEMATOCRIT  29.0*  31.2*  32.5*   PLATELETCT  306  304  343       Infectious Disease:  Temp  Av.9 °C (98.4 °F)  Min: 36.4 °C (97.5 °F)  Max: 37.6 °C (99.7 °F)  Micro: reviewed     Recent Labs      06/09/17   0355  06/10/17   0351  06/11/17   0424   WBC  11.7*  10.4  9.4   NEUTSPOLYS  85.90*  82.90*  73.30*   LYMPHOCYTES  4.10*  5.50*  9.20*   MONOCYTES  8.30  8.60  10.90   EOSINOPHILS  0.30  1.30  5.10   BASOPHILS  0.10  0.30  0.10   ASTSGOT  31   --    --    ALTSGPT  58*   --    --    ALKPHOSPHAT  133*   --    --    TBILIRUBIN  0.7   --    --      Current Facility-Administered Medications   Medication Dose Frequency Provider Last Rate Last Dose   • insulin lispro (HUMALOG) injection 3-14 Units  3-14 Units Q6HRS Antonio Mata M.D.   Stopped at 17 0000   • heparin injection 5,000 Units  5,000 Units Q8HRS Jaime Bush M.D.   5,000 Units at 17 0508   • furosemide (LASIX) injection 60 mg  60 mg BID DIURETIC Fadi Najjar, M.D.   60 mg at 17 0515   • potassium chloride SA (Kdur) tablet 40 mEq  40 mEq DAILY Fadi Najjar, M.D.        Or   • potassium chloride (KLOR-CON) 20 MEQ packet 20 mEq  20 mEq DAILY Fadi Najjar, M.D.   20 mEq at 06/10/17 1136   • azithromycin (ZITHROMAX) tablet 500 mg  500 mg DAILY Anson Manuel M.D.       • propofol (DIPRIVAN) injection  5-80 mcg/kg/min Continuous Sid Patino PHARMD 33.7 mL/hr at 17 0507 50 mcg/kg/min at 17 0507   • Respiratory Care per Protocol   Continuous RT Antonio Mata M.D.       • chlorhexidine (PERIDEX) 0.12 % solution 15 mL  15 mL BID Antonio Mata M.D.   15 mL at 06/10/17 2000   • lidocaine (XYLOCAINE) 1%  injection  1-2 mL Q30 MIN PRN Antonio Mata M.D.       • MD ALERT...Adult ICU Electrolyte Replacement per Pharmacy Protocol   pharmacy to dose Antonio CULP  ERAN Mata       • fentaNYL (SUBLIMAZE) injection 25 mcg  25 mcg Q HOUR PRN Antonio Mata M.D.        Or   • fentaNYL (SUBLIMAZE) injection 50 mcg  50 mcg Q HOUR PRN Antonio Mata M.D.        Or   • fentaNYL (SUBLIMAZE) injection 100 mcg  100 mcg Q HOUR PRN Antonio Mata M.D.       • famotidine (PEPCID) tablet 20 mg  20 mg DAILY Antonio Mata M.D.   20 mg at 06/10/17 0817    Or   • famotidine (PEPCID) injection 20 mg  20 mg DAILY Antonio Mata M.D.       • ipratropium-albuterol (DUONEB) nebulizer solution 3 mL  3 mL Q2HRS PRN (RT) Antonio Mata M.D.       • ipratropium-albuterol (DUONEB) nebulizer solution 3 mL  3 mL Q4HRS (RT) Jaime Bush M.D.   3 mL at 06/11/17 0212   • NS (BOLUS) infusion 500 mL  500 mL Once PRN Anson Manuel M.D.       • glucose 4 g chewable tablet 16 g  16 g Q15 MIN PRN Anson Manuel M.D.        And   • dextrose 50% (D50W) injection 25 mL  25 mL Q15 MIN PRN Anson Manuel M.D.       • cefTRIAXone (ROCEPHIN) 2 g in  mL IVPB  2 g Q24HRS Anson Manuel M.D.   Stopped at 06/10/17 0917   • levothyroxine (SYNTHROID) tablet 88 mcg  88 mcg AM ES Anson Manuel M.D.   88 mcg at 06/11/17 0523   • senna-docusate (PERICOLACE or SENOKOT S) 8.6-50 MG per tablet 2 Tab  2 Tab BID Anson Manuel M.D.   Stopped at 06/10/17 2100    And   • polyethylene glycol/lytes (MIRALAX) PACKET 1 Packet  1 Packet QDAY PRN Anson Manuel M.D.        And   • magnesium hydroxide (MILK OF MAGNESIA) suspension 30 mL  30 mL QDAY PRN Anson Manuel M.D.        And   • bisacodyl (DULCOLAX) suppository 10 mg  10 mg QDAY PRN Anson Manuel M.D.       • ondansetron (ZOFRAN) syringe/vial injection 4 mg  4 mg Q4HRS PRN Anson Manuel M.D.       • ondansetron (ZOFRAN ODT) dispertab 4 mg  4 mg Q4HRS PRN Anson Manuel M.D.       • acetaminophen (TYLENOL) tablet 650 mg  650 mg Q6HRS PRN Anson Manuel M.D.         Last reviewed on 6/8/2017  9:34 AM by Mindi Benites, Pharmacy Int    Quality  Measures:  Labs reviewed, Medications reviewed  and Radiology images reviewed                      Assessment / Plan:    Acute hypoxic respiratory failure    - ? pulmonary edema, pna,   - intubated 6/9   - decrease PEEP today   - BAL L lingula; No Hemorrhage seen; no purulence   - most consistent with pulmonary edema   - lasix - 7L diuresis; d/w'd nephro - continue    - may start SBTs in am  Leukocytosis with left shift   - no bands.   - empiric abx - short course; f/u BAL  Hyponatremia in setting of hypervolemia.  Anion gap metabolic acidosis    -secondary to lactic acidosis.  Acute kidney injury.     - ? hypotensive episodes post-antihypertensive medications, ATN, doubt pulm-renal syndrome   - serology pending   - BAL clear, no DAH   - no steroids   Elevated liver transaminases.     - ? congestive hepatopathy; IVC,very enlarged and noncompressible  Coagulopathy.     -recently started on apixaban for new atrial fibrillation.  Recently diagnosed atrial fibrillation with rapid ventricular response.     The patient has been placed on rate controlling medication as well as anticoagulative therapy earlier this week.  Chronic hypertension.  Chronic dyslipidemia.  Chronic diabetes.      Discussed patient condition and risk of morbidity and/or mortality with RN, RT and QA team.    The patient remains critically ill.  Critical care time = 34 minutes in directly providing and coordinating critical care and extensive data review.  No time overlap and excludes procedures.    Kay CABRALES (Scribe), am scribing for, and in the presence of, Antonio Mata M.D.  Electronically signed by: Kay Stevenson ([place]), 6/11/2017  Antonio CABRALES M.D. personally performed the services described in this documentation, as scribed by Kay Stevenson in my presence, and it is both accurate and complete.

## 2017-06-11 NOTE — PROGRESS NOTES
Cardiology Progress Note               Author: Refugio Rosario Date & Time created: 6/11/2017  12:36 PM     Interval History:  Cr normalizing  Minimal vent needs  Rate borderline controlled    Review of Systems   Unable to perform ROS  Constitutional: Negative.  Negative for fever, chills and malaise/fatigue.   HENT: Negative.  Negative for sore throat.    Eyes: Negative.    Respiratory: Negative.  Negative for cough, hemoptysis, sputum production, shortness of breath, wheezing and stridor.    Cardiovascular: Negative.  Negative for chest pain, palpitations, orthopnea, claudication, leg swelling and PND.   Gastrointestinal: Negative.    Genitourinary: Negative.    Musculoskeletal: Negative.    Skin: Negative.    Neurological: Negative.  Negative for dizziness, loss of consciousness and weakness.   Endo/Heme/Allergies: Negative.  Does not bruise/bleed easily.   All other systems reviewed and are negative.      Physical Exam   Constitutional: She appears well-developed and well-nourished. No distress.   HENT:   Head: Normocephalic.   Mouth/Throat: Oropharynx is clear and moist.   Eyes: EOM are normal. Pupils are equal, round, and reactive to light. Right eye exhibits no discharge. Left eye exhibits no discharge. No scleral icterus.   Neck: Normal range of motion. Neck supple. No JVD present. No tracheal deviation present.   Cardiovascular: Normal rate, regular rhythm, S1 normal, S2 normal, normal heart sounds, intact distal pulses and normal pulses.  Exam reveals no gallop, no S3, no S4 and no friction rub.    No murmur heard.   No systolic murmur is present    No diastolic murmur is present   Pulses:       Carotid pulses are 2+ on the right side, and 2+ on the left side.       Radial pulses are 2+ on the right side, and 2+ on the left side.        Dorsalis pedis pulses are 2+ on the right side, and 2+ on the left side.        Posterior tibial pulses are 2+ on the right side, and 2+ on the left side.    Pulmonary/Chest: Effort normal and breath sounds normal. No respiratory distress. She has no wheezes. She has no rales.   Abdominal: Soft. Bowel sounds are normal. She exhibits no distension and no mass. There is no tenderness. There is no rebound and no guarding.   Musculoskeletal: She exhibits no edema.   Neurological: No cranial nerve deficit.   Skin: Skin is warm and dry. She is not diaphoretic. No pallor.   Nursing note and vitals reviewed.      Hemodynamics:  Temp (24hrs), Av.9 °C (98.5 °F), Min:36.4 °C (97.5 °F), Max:37.6 °C (99.7 °F)  Temperature: 37.1 °C (98.7 °F)  Pulse  Av.4  Min: 62  Max: 121Heart Rate (Monitored): 99  NIBP: 105/66 mmHg     Respiratory:  [place] Vent Mode: APVCMV, Rate (breaths/min): 20, PEEP/CPAP: 12, FiO2: 40, P Peak (PIP): 23, P MEAN: 15 Respiration: 20, Pulse Oximetry: 95 %     Work Of Breathing / Effort: Vented  RUL Breath Sounds: Crackles, RML Breath Sounds: Crackles, RLL Breath Sounds: Diminished, CAROLANN Breath Sounds: Clear, LLL Breath Sounds: Diminished  Fluids:  Date 17 0700 - 17 0659   Shift 0755-7552 5894-5512 7248-8903 24 Hour Total   I  N  T  A  K  E   I.V. 134.8   134.8    Enteral 210   210    Shift Total 344.8   344.8   O  U  T  P  U  T   Urine 1800   1800    Shift Total 1800   1800   Weight (kg) 115 115 115 115       Weight: 115 kg (253 lb 8.5 oz)  GI/Nutrition:  Orders Placed This Encounter   Procedures   • Diet NPO     Standing Status: Standing      Number of Occurrences: 1      Standing Expiration Date:      Order Specific Question:  Type:     Answer:  Now [1]     Order Specific Question:  Restrict to:     Answer:  Strict [1]     Lab Results:  Recent Labs      17   0355  06/10/17   0351  17   0424   WBC  11.7*  10.4  9.4   RBC  3.43*  3.68*  3.84*   HEMOGLOBIN  9.9*  10.4*  10.8*   HEMATOCRIT  29.0*  31.2*  32.5*   MCV  84.5  84.8  84.6   MCH  28.9  28.3  28.1   MCHC  34.1  33.3*  33.2*   RDW  43.2  43.7  44.0   PLATELETCT  306  304   343   MPV  9.1  8.7*  8.6*     Recent Labs      06/09/17   0355  06/10/17   0351  06/11/17   0424   SODIUM  130*  136  138   POTASSIUM  3.4*  3.2*  3.0*   CHLORIDE  98  102  99   CO2  20  24  29   GLUCOSE  152*  190*  154*   BUN  57*  41*  32*   CREATININE  2.23*  1.56*  1.16   CALCIUM  8.4*  8.3*  8.6             Recent Labs      06/08/17   1246   CPKTOTAL  37     Recent Labs      06/09/17   0355  06/11/17   0424   TRIGLYCERIDE  77  131   HDL  28*   --    LDL  39   --          Medical Decision Making, by Problem:  Active Hospital Problems    Diagnosis   • Severe sepsis (CMS-[facility]) [A41.9, R65.20]   • Acute respiratory failure with hypoxia (CMS-[facility]) [J96.01]   • Atrial fibrillation (CMS-[facility]) [I48.91]   • 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-[facility]) [E11.59]   • Hyponatremia [E87.1]   • Hypothyroid [E03.9]   • HTN (hypertension) [I10]   • KIRT (acute kidney injury) (CMS-[facility]) [N17.9]       Plan:  62 y/o F with unknown cause of decompensation, likely no longer DAH.  We will likely hold her Xa inhibitors and recommend either a DTI or coumadin.    1. A-fib    - rate controlled    - hold anticoag for now    2. KIRT    - resolving    EKG reviewed, Medications reviewed, Radiology images reviewed and Labs reviewed  Tamayo catheter: Critically Ill - Requiring Accurate Measurement of Urinary Output      DVT Prophylaxis: Heparin  DVT prophylaxis - mechanical: SCDs  Ulcer prophylaxis: No

## 2017-06-11 NOTE — CARE PLAN
Problem: Ventilation Defect:  Goal: Ability to achieve and maintain unassisted ventilation or tolerate decreased levels of ventilator support  Intervention: Support and monitor invasive and noninvasive mechanical ventilation  Adult Ventilation Update    Total Vent Days: 3      Patient Lines/Drains/Airways Status    Active Airway      Name: Placement date: Placement time: Site: Days:     Airway Group ET Tube 7.5 06/09/17  1710    1                 ICough: Non Productive (06/11/17 1600)  Sputum Amount: Small (06/11/17 1600)  Sputum Color: White;Bloody;Grey (06/11/17 1600)  Sputum Consistency: Thick (06/11/17 1600)    Mobility Group  Activity Performed: Unable to mobilize (06/11/17 1600)  Time Activity Tolerated: 60 min (06/09/17 1200)  Assistance / Tolerance: Assistance of Two or More (06/11/17 0800)  Pt Calls for Assistance: No (06/11/17 1600)  Staff Present for Mobilization: RN (06/09/17 1200)  Assistive Devices: Hand held assist (06/09/17 1200)  Reason Not Mobilized: Unstable condition (06/11/17 1600)  Mobilization Comments: PEEP 10 (06/11/17 1600)    Events/Summary/Plan: PEEP weaned to 10, pt tolerating well progressing as expected

## 2017-06-11 NOTE — CARE PLAN
Problem: Ventilation Defect:  Goal: Ability to achieve and maintain unassisted ventilation or tolerate decreased levels of ventilator support  Adult Ventilation Update    Total Vent Days: 2      Patient Lines/Drains/Airways Status    Active Airway      Name: Placement date: Placement time: Site: Days:     Airway Group ET Tube 7.5 06/09/17 1710    1                 In the last 24 hours, the patient tolerated SBT for none on settings of n/a.             Plateau Pressure (Q Shift): 26 (06/10/17 0642)  Static Compliance (ml / cm H2O): 61 (06/10/17 1446)    Patient failed trials because of Barriers to Wean: Continuous Propofol Infusion;FiO2 >50% OR PEEP >8 (PMA Only) (06/10/17 1446)  Barriers to SBT    Length of Weaning Trial      n/a day post trache.  The patient is currently in pulmonary wean according to protocol.       ASV Inspiratory Pressures  % Spontaneous n/a    Sputum/Suction small  Cough: Productive (06/10/17 1600)  Sputum Amount: Small (06/10/17 1600)  Sputum Color: Yellow (06/10/17 1600)  Sputum Consistency: Thin (06/10/17 1600)    Mobility Group  Activity Performed: Unable to mobilize (06/09/17 2000)  Time Activity Tolerated: 60 min (06/09/17 1200)  Assistance / Tolerance: Assistance of Two or More;Tolerates Well;Tires quickly;General Weakness (06/09/17 1200)  Pt Calls for Assistance: No (06/10/17 1600)  Staff Present for Mobilization: RN (06/09/17 1200)  Assistive Devices: Hand held assist (06/09/17 1200)  Reason Not Mobilized: Unstable condition (intubated less than 24 hours) (06/10/17 1600)  Mobilization Comments:  (intubated less than 24 hrs) (06/10/17 0800)    Events/Summary/Plan: Dr. Mata changed rate to 20, no further changes at this time (06/10/17 1446)no SBT attempt as PE$EP at 12 and Propofol infusion, DuoNeb rxs given q4 hr as per order

## 2017-06-12 ENCOUNTER — APPOINTMENT (OUTPATIENT)
Dept: RADIOLOGY | Facility: MEDICAL CENTER | Age: 62
DRG: 853 | End: 2017-06-12
Attending: INTERNAL MEDICINE
Payer: COMMERCIAL

## 2017-06-12 PROBLEM — I27.20 PULMONARY HYPERTENSION (HCC): Status: ACTIVE | Noted: 2017-06-12

## 2017-06-12 LAB
ALBUMIN SERPL BCP-MCNC: 2.8 G/DL (ref 3.2–4.9)
ALBUMIN/GLOB SERPL: 0.8 G/DL
ALP SERPL-CCNC: 200 U/L (ref 30–99)
ALT SERPL-CCNC: 28 U/L (ref 2–50)
ANCA IGG TITR SER IF: NORMAL {TITER}
ANION GAP SERPL CALC-SCNC: 9 MMOL/L (ref 0–11.9)
AST SERPL-CCNC: 14 U/L (ref 12–45)
BASE EXCESS BLDA CALC-SCNC: 12 MMOL/L (ref -4–3)
BASOPHILS # BLD AUTO: 0.3 % (ref 0–1.8)
BASOPHILS # BLD: 0.03 K/UL (ref 0–0.12)
BILIRUB SERPL-MCNC: 0.4 MG/DL (ref 0.1–1.5)
BODY TEMPERATURE: ABNORMAL DEGREES
BUN SERPL-MCNC: 33 MG/DL (ref 8–22)
CALCIUM SERPL-MCNC: 9 MG/DL (ref 8.5–10.5)
CHLORIDE SERPL-SCNC: 98 MMOL/L (ref 96–112)
CO2 BLDA-SCNC: 38 MMOL/L (ref 20–33)
CO2 SERPL-SCNC: 33 MMOL/L (ref 20–33)
CREAT SERPL-MCNC: 1.02 MG/DL (ref 0.5–1.4)
CRP SERPL HS-MCNC: 8.1 MG/DL (ref 0–0.75)
EOSINOPHIL # BLD AUTO: 0.37 K/UL (ref 0–0.51)
EOSINOPHIL NFR BLD: 4.1 % (ref 0–6.9)
ERYTHROCYTE [DISTWIDTH] IN BLOOD BY AUTOMATED COUNT: 44.6 FL (ref 35.9–50)
GFR SERPL CREATININE-BSD FRML MDRD: 55 ML/MIN/1.73 M 2
GLOBULIN SER CALC-MCNC: 3.3 G/DL (ref 1.9–3.5)
GLUCOSE BLD-MCNC: 143 MG/DL (ref 65–99)
GLUCOSE BLD-MCNC: 161 MG/DL (ref 65–99)
GLUCOSE BLD-MCNC: 169 MG/DL (ref 65–99)
GLUCOSE BLD-MCNC: 179 MG/DL (ref 65–99)
GLUCOSE SERPL-MCNC: 166 MG/DL (ref 65–99)
HCO3 BLDA-SCNC: 36.6 MMOL/L (ref 17–25)
HCT VFR BLD AUTO: 34 % (ref 37–47)
HGB BLD-MCNC: 11.1 G/DL (ref 12–16)
IMM GRANULOCYTES # BLD AUTO: 0.14 K/UL (ref 0–0.11)
IMM GRANULOCYTES NFR BLD AUTO: 1.5 % (ref 0–0.9)
LYMPHOCYTES # BLD AUTO: 0.83 K/UL (ref 1–4.8)
LYMPHOCYTES NFR BLD: 9.1 % (ref 22–41)
MAGNESIUM SERPL-MCNC: 1.7 MG/DL (ref 1.5–2.5)
MCH RBC QN AUTO: 28 PG (ref 27–33)
MCHC RBC AUTO-ENTMCNC: 32.6 G/DL (ref 33.6–35)
MCV RBC AUTO: 85.6 FL (ref 81.4–97.8)
MONOCYTES # BLD AUTO: 1.12 K/UL (ref 0–0.85)
MONOCYTES NFR BLD AUTO: 12.3 % (ref 0–13.4)
NEUTROPHILS # BLD AUTO: 6.59 K/UL (ref 2–7.15)
NEUTROPHILS NFR BLD: 72.7 % (ref 44–72)
NRBC # BLD AUTO: 0 K/UL
NRBC BLD AUTO-RTO: 0 /100 WBC
O2/TOTAL GAS SETTING VFR VENT: 40 %
PCO2 BLDA: 49.2 MMHG (ref 26–37)
PCO2 TEMP ADJ BLDA: 47.7 MMHG (ref 26–37)
PH BLDA: 7.48 [PH] (ref 7.4–7.5)
PH TEMP ADJ BLDA: 7.49 [PH] (ref 7.4–7.5)
PHOSPHATE SERPL-MCNC: 2.6 MG/DL (ref 2.5–4.5)
PLATELET # BLD AUTO: 374 K/UL (ref 164–446)
PMV BLD AUTO: 9 FL (ref 9–12.9)
PO2 BLDA: 96 MMHG (ref 64–87)
PO2 TEMP ADJ BLDA: 92 MMHG (ref 64–87)
POTASSIUM SERPL-SCNC: 4 MMOL/L (ref 3.6–5.5)
PREALB SERPL-MCNC: 12 MG/DL (ref 18–38)
PROT SERPL-MCNC: 6.1 G/DL (ref 6–8.2)
RBC # BLD AUTO: 3.97 M/UL (ref 4.2–5.4)
SAO2 % BLDA: 98 % (ref 93–99)
SODIUM SERPL-SCNC: 140 MMOL/L (ref 135–145)
SPECIMEN DRAWN FROM PATIENT: ABNORMAL
WBC # BLD AUTO: 9.1 K/UL (ref 4.8–10.8)

## 2017-06-12 PROCEDURE — 99291 CRITICAL CARE FIRST HOUR: CPT | Performed by: INTERNAL MEDICINE

## 2017-06-12 PROCEDURE — 84100 ASSAY OF PHOSPHORUS: CPT

## 2017-06-12 PROCEDURE — A9270 NON-COVERED ITEM OR SERVICE: HCPCS | Performed by: HOSPITALIST

## 2017-06-12 PROCEDURE — 83735 ASSAY OF MAGNESIUM: CPT

## 2017-06-12 PROCEDURE — 94003 VENT MGMT INPAT SUBQ DAY: CPT

## 2017-06-12 PROCEDURE — 700102 HCHG RX REV CODE 250 W/ 637 OVERRIDE(OP): Performed by: HOSPITALIST

## 2017-06-12 PROCEDURE — A9270 NON-COVERED ITEM OR SERVICE: HCPCS | Performed by: INTERNAL MEDICINE

## 2017-06-12 PROCEDURE — 700111 HCHG RX REV CODE 636 W/ 250 OVERRIDE (IP): Performed by: INTERNAL MEDICINE

## 2017-06-12 PROCEDURE — 700111 HCHG RX REV CODE 636 W/ 250 OVERRIDE (IP): Performed by: HOSPITALIST

## 2017-06-12 PROCEDURE — 86140 C-REACTIVE PROTEIN: CPT

## 2017-06-12 PROCEDURE — 85025 COMPLETE CBC W/AUTO DIFF WBC: CPT

## 2017-06-12 PROCEDURE — 700105 HCHG RX REV CODE 258: Performed by: INTERNAL MEDICINE

## 2017-06-12 PROCEDURE — 700102 HCHG RX REV CODE 250 W/ 637 OVERRIDE(OP): Performed by: INTERNAL MEDICINE

## 2017-06-12 PROCEDURE — 71010 DX-CHEST-PORTABLE (1 VIEW): CPT

## 2017-06-12 PROCEDURE — 80053 COMPREHEN METABOLIC PANEL: CPT

## 2017-06-12 PROCEDURE — 700101 HCHG RX REV CODE 250: Performed by: HOSPITALIST

## 2017-06-12 PROCEDURE — 770022 HCHG ROOM/CARE - ICU (200)

## 2017-06-12 PROCEDURE — 700102 HCHG RX REV CODE 250 W/ 637 OVERRIDE(OP)

## 2017-06-12 PROCEDURE — 94640 AIRWAY INHALATION TREATMENT: CPT

## 2017-06-12 PROCEDURE — A9270 NON-COVERED ITEM OR SERVICE: HCPCS

## 2017-06-12 PROCEDURE — 94150 VITAL CAPACITY TEST: CPT

## 2017-06-12 PROCEDURE — 82803 BLOOD GASES ANY COMBINATION: CPT

## 2017-06-12 PROCEDURE — 700111 HCHG RX REV CODE 636 W/ 250 OVERRIDE (IP)

## 2017-06-12 PROCEDURE — 36600 WITHDRAWAL OF ARTERIAL BLOOD: CPT

## 2017-06-12 PROCEDURE — 99232 SBSQ HOSP IP/OBS MODERATE 35: CPT | Performed by: HOSPITALIST

## 2017-06-12 PROCEDURE — 82962 GLUCOSE BLOOD TEST: CPT | Mod: 91

## 2017-06-12 PROCEDURE — 84134 ASSAY OF PREALBUMIN: CPT

## 2017-06-12 RX ORDER — MAGNESIUM SULFATE HEPTAHYDRATE 40 MG/ML
2 INJECTION, SOLUTION INTRAVENOUS ONCE
Status: COMPLETED | OUTPATIENT
Start: 2017-06-12 | End: 2017-06-12

## 2017-06-12 RX ORDER — FAMOTIDINE 20 MG/1
20 TABLET, FILM COATED ORAL 2 TIMES DAILY
Status: DISCONTINUED | OUTPATIENT
Start: 2017-06-12 | End: 2017-06-18

## 2017-06-12 RX ADMIN — IPRATROPIUM BROMIDE AND ALBUTEROL SULFATE 3 ML: .5; 3 SOLUTION RESPIRATORY (INHALATION) at 06:16

## 2017-06-12 RX ADMIN — HEPARIN SODIUM 5000 UNITS: 5000 INJECTION, SOLUTION INTRAVENOUS; SUBCUTANEOUS at 05:46

## 2017-06-12 RX ADMIN — IPRATROPIUM BROMIDE AND ALBUTEROL SULFATE 3 ML: .5; 3 SOLUTION RESPIRATORY (INHALATION) at 14:53

## 2017-06-12 RX ADMIN — PROPOFOL 55 MCG/KG/MIN: 10 INJECTION, EMULSION INTRAVENOUS at 12:37

## 2017-06-12 RX ADMIN — APIXABAN 5 MG: 5 TABLET, FILM COATED ORAL at 11:50

## 2017-06-12 RX ADMIN — FAMOTIDINE 20 MG: 20 TABLET, FILM COATED ORAL at 19:57

## 2017-06-12 RX ADMIN — CHLORHEXIDINE GLUCONATE 15 ML: 1.2 RINSE ORAL at 19:58

## 2017-06-12 RX ADMIN — POTASSIUM CHLORIDE 40 MEQ: 1.5 POWDER, FOR SOLUTION ORAL at 08:28

## 2017-06-12 RX ADMIN — CEFTRIAXONE SODIUM 2 G: 2 INJECTION, POWDER, FOR SOLUTION INTRAMUSCULAR; INTRAVENOUS at 08:27

## 2017-06-12 RX ADMIN — FAMOTIDINE 20 MG: 20 TABLET, FILM COATED ORAL at 08:28

## 2017-06-12 RX ADMIN — PROPOFOL 55 MCG/KG/MIN: 10 INJECTION, EMULSION INTRAVENOUS at 15:17

## 2017-06-12 RX ADMIN — PROPOFOL 50 MCG/KG/MIN: 10 INJECTION, EMULSION INTRAVENOUS at 04:09

## 2017-06-12 RX ADMIN — PROPOFOL 50 MCG/KG/MIN: 10 INJECTION, EMULSION INTRAVENOUS at 21:25

## 2017-06-12 RX ADMIN — LEVOTHYROXINE SODIUM 88 MCG: 88 TABLET ORAL at 05:46

## 2017-06-12 RX ADMIN — IPRATROPIUM BROMIDE AND ALBUTEROL SULFATE 3 ML: .5; 3 SOLUTION RESPIRATORY (INHALATION) at 02:19

## 2017-06-12 RX ADMIN — FUROSEMIDE 60 MG: 10 INJECTION, SOLUTION INTRAVENOUS at 15:13

## 2017-06-12 RX ADMIN — INSULIN LISPRO 3 UNITS: 100 INJECTION, SOLUTION INTRAVENOUS; SUBCUTANEOUS at 06:05

## 2017-06-12 RX ADMIN — SENNOSIDES AND DOCUSATE SODIUM 2 TABLET: 8.6; 5 TABLET ORAL at 19:57

## 2017-06-12 RX ADMIN — FUROSEMIDE 60 MG: 10 INJECTION, SOLUTION INTRAVENOUS at 05:46

## 2017-06-12 RX ADMIN — FENTANYL CITRATE 25 MCG: 50 INJECTION, SOLUTION INTRAMUSCULAR; INTRAVENOUS at 15:26

## 2017-06-12 RX ADMIN — PROPOFOL 60 MCG/KG/MIN: 10 INJECTION, EMULSION INTRAVENOUS at 10:15

## 2017-06-12 RX ADMIN — INSULIN LISPRO 3 UNITS: 100 INJECTION, SOLUTION INTRAVENOUS; SUBCUTANEOUS at 17:27

## 2017-06-12 RX ADMIN — PROPOFOL 50 MCG/KG/MIN: 10 INJECTION, EMULSION INTRAVENOUS at 18:34

## 2017-06-12 RX ADMIN — CHLORHEXIDINE GLUCONATE 15 ML: 1.2 RINSE ORAL at 08:29

## 2017-06-12 RX ADMIN — MAGNESIUM SULFATE IN WATER 2 G: 40 INJECTION, SOLUTION INTRAVENOUS at 09:20

## 2017-06-12 RX ADMIN — IPRATROPIUM BROMIDE AND ALBUTEROL SULFATE 3 ML: .5; 3 SOLUTION RESPIRATORY (INHALATION) at 19:06

## 2017-06-12 RX ADMIN — POLYETHYLENE GLYCOL 3350 1 PACKET: 17 POWDER, FOR SOLUTION ORAL at 09:20

## 2017-06-12 RX ADMIN — IPRATROPIUM BROMIDE AND ALBUTEROL SULFATE 3 ML: .5; 3 SOLUTION RESPIRATORY (INHALATION) at 23:39

## 2017-06-12 RX ADMIN — PROPOFOL 50 MCG/KG/MIN: 10 INJECTION, EMULSION INTRAVENOUS at 00:59

## 2017-06-12 RX ADMIN — SENNOSIDES AND DOCUSATE SODIUM 2 TABLET: 8.6; 5 TABLET ORAL at 08:29

## 2017-06-12 RX ADMIN — IPRATROPIUM BROMIDE AND ALBUTEROL SULFATE 3 ML: .5; 3 SOLUTION RESPIRATORY (INHALATION) at 11:38

## 2017-06-12 RX ADMIN — AZITHROMYCIN 500 MG: 250 TABLET, FILM COATED ORAL at 08:28

## 2017-06-12 RX ADMIN — PROPOFOL 50 MCG/KG/MIN: 10 INJECTION, EMULSION INTRAVENOUS at 07:39

## 2017-06-12 RX ADMIN — APIXABAN 5 MG: 5 TABLET, FILM COATED ORAL at 19:57

## 2017-06-12 RX ADMIN — POTASSIUM CHLORIDE 40 MEQ: 1.5 POWDER, FOR SOLUTION ORAL at 19:57

## 2017-06-12 ASSESSMENT — PULMONARY FUNCTION TESTS: FVC: .8

## 2017-06-12 ASSESSMENT — LIFESTYLE VARIABLES: REASON UNABLE TO ASSESS: INTUBATED/SEDATED

## 2017-06-12 NOTE — PROGRESS NOTES
Pulmonary Critical Care Progress Note        Date of service: 6/12/2017    Interval Events:  24 hour interval history reviewed  Reason for visit:  Respiratory failure, acute pulmonary edema  Unable to provide CC or ROS due to intubation and sedation      Converted to normal SR this morning at 6AM  Increased agitation today, increased Propofol   Follows commands   Systolic BP in 120s   Afebrile   TF running at goal, tolerating   On bed rest  PEEP 10 - lower to 8  Vent day 4   CMV settings      PFSH:  No change.    Respiratory:  Pulse Oximetry: 97 %  CXR with improved edema  Few crackles bilaterally    Recent Labs      06/09/17   1958  06/10/17   0459  06/11/17   0408   ISTATAPH  7.272*  7.428  7.478   ISTATAPCO2  54.1*  36.0  41.4*   ISTATAPO2  105*  188*  88*   ISTATATCO2  27  25  32   [id number]  97  100*  97   ISTATARTHCO3  25.0  23.8  30.7*   ISTATARTBE  -2  0  7*   ISTATTEMP  37.2 C  36.8 C  98.1 F   ISTATFIO2  100  80  40   ISTATSPEC  Arterial  Arterial  Arterial   ISTATAPHTC  7.269*  7.431  7.482   [id number]  106*  187*  86       HemoDynamics:  Pulse: 97, Heart Rate (Monitored): (!) 115  NIBP: (!) 87/64 mmHg     SR    Neuro:  Sedated  Propofol 50    Fluids:  Intake/Output       06/10/17 0700 - 06/11/17 0659 06/11/17 0700 - 06/12/17 0659 06/12/17 0700 - 06/13/17 0659      0700-1859 1900-0659 Total 0700-1859 1900-0659 Total 4790-4972 9139-0341 Total       Intake    I.V.  337  383.3 720.3  404.4  337 741.4  --  -- --    Propofol Volume 337 383.3 720.3 404.4 337 741.4 -- -- --    Other  60  60 120  --  30 30  --  -- --    Medications (P.O./ Enteral Liquids) 60 60 120 -- 30 30 -- -- --    Enteral  420  520 940  630  480 1110  --  -- --    Enteral Volume 300 460 760 540 450 990 -- -- --    Free Water / Tube Flush 120 60 180 90 30 120 -- -- --    Total Intake 817 963.3 1780.3 1034.4 847 1881.4 -- -- --       Output    Urine  4005  3000 7005  3600  400 4000  --  -- --    Indwelling Cathether 4005 3000 7005 3600 400  4000 -- -- --    Stool  --  -- --  --  -- --  --  -- --    Number of Times Stooled 1 x -- 1 x 0 x -- 0 x -- -- --    Total Output 4005 3000 7005 3600 400 4000 -- -- --       Net I/O     -3188 -2036.7 -5224.7 -2565.6 447 -3558.6 -- -- --           Recent Labs      06/10/17   0351  06/11/17   042   SODIUM  136  138   POTASSIUM  3.2*  3.0*   CHLORIDE  102  99   CO2  24  29   BUN  41*  32*   CREATININE  1.56*  1.16   MAGNESIUM  2.1  1.6   PHOSPHORUS  3.1  2.6   CALCIUM  8.3*  8.6       GI/Nutrition:  Abd soft ND/NT  Tolerating enteral TF    Liver Function  Recent Labs      06/10/17   0351  06/11/17   0424   GLUCOSE  190*  154*       Heme:  Recent Labs      06/10/17   0351  06/11/17   0424   RBC  3.68*  3.84*   HEMOGLOBIN  10.4*  10.8*   HEMATOCRIT  31.2*  32.5*   PLATELETCT  304  343       Infectious Disease:  Temp  Av.8 °C (98.3 °F)  Min: 36.6 °C (97.9 °F)  Max: 37.1 °C (98.7 °F)    Recent Labs      06/10/17   0351  06/11/17   0424   WBC  10.4  9.4   NEUTSPOLYS  82.90*  73.30*   LYMPHOCYTES  5.50*  9.20*   MONOCYTES  8.60  10.90   EOSINOPHILS  1.30  5.10   BASOPHILS  0.30  0.10     Current Facility-Administered Medications   Medication Dose Frequency Provider Last Rate Last Dose   • potassium chloride (KLOR-CON) 20 MEQ packet 40 mEq  40 mEq BID Antonio Mata M.D.   40 mEq at 17   • insulin lispro (HUMALOG) injection 3-14 Units  3-14 Units Q6HRS Antonio Mata M.D.   3 Units at 17   • heparin injection 5,000 Units  5,000 Units Q8HRS Jaime Bush M.D.   5,000 Units at 17   • furosemide (LASIX) injection 60 mg  60 mg BID DIURETIC Fadi Najjar, M.D.   60 mg at 17 1500   • azithromycin (ZITHROMAX) tablet 500 mg  500 mg DAILY Anson Manuel M.D.   500 mg at 17 0837   • propofol (DIPRIVAN) injection  5-80 mcg/kg/min Continuous Sid Patino, TORRI 33.7 mL/hr at 17 0409 50 mcg/kg/min at 17 0409   • Respiratory Care per Protocol   Continuous RT Antonio Mata,  M.D.       • chlorhexidine (PERIDEX) 0.12 % solution 15 mL  15 mL BID Antonio Mata M.D.   15 mL at 06/11/17 2106   • lidocaine (XYLOCAINE) 1%  injection  1-2 mL Q30 MIN PRN Antonio Mata M.D.       • MD ALERT...Adult ICU Electrolyte Replacement per Pharmacy Protocol   pharmacy to dose Antonio Mata M.D.       • fentaNYL (SUBLIMAZE) injection 25 mcg  25 mcg Q HOUR PRN Antonio Mata M.D.        Or   • fentaNYL (SUBLIMAZE) injection 50 mcg  50 mcg Q HOUR PRN Antonio Mata M.D.        Or   • fentaNYL (SUBLIMAZE) injection 100 mcg  100 mcg Q HOUR PRN Antonio Mata M.D.       • famotidine (PEPCID) tablet 20 mg  20 mg DAILY Antonio Mata M.D.   20 mg at 06/11/17 0837    Or   • famotidine (PEPCID) injection 20 mg  20 mg DAILY Antonio Mata M.D.       • ipratropium-albuterol (DUONEB) nebulizer solution 3 mL  3 mL Q2HRS PRN (RT) Antonio Mata M.D.       • ipratropium-albuterol (DUONEB) nebulizer solution 3 mL  3 mL Q4HRS (RT) Jaime Bush M.D.   3 mL at 06/12/17 0219   • NS (BOLUS) infusion 500 mL  500 mL Once PRN Anson Manuel M.D.       • glucose 4 g chewable tablet 16 g  16 g Q15 MIN PRN Anson Manuel M.D.        And   • dextrose 50% (D50W) injection 25 mL  25 mL Q15 MIN PRN Anson Manuel M.D.       • cefTRIAXone (ROCEPHIN) 2 g in  mL IVPB  2 g Q24HRS Antonio Mata M.D.   Stopped at 06/11/17 0907   • levothyroxine (SYNTHROID) tablet 88 mcg  88 mcg AM ES Anson Manuel M.D.   88 mcg at 06/11/17 0523   • senna-docusate (PERICOLACE or SENOKOT S) 8.6-50 MG per tablet 2 Tab  2 Tab BID Anson Manuel M.D.   2 Tab at 06/11/17 2106    And   • polyethylene glycol/lytes (MIRALAX) PACKET 1 Packet  1 Packet QDAY PRN Anson Manuel M.D.        And   • magnesium hydroxide (MILK OF MAGNESIA) suspension 30 mL  30 mL QDAY PRN Anson Manuel M.D.        And   • bisacodyl (DULCOLAX) suppository 10 mg  10 mg QDAY PRN Anson Manuel M.D.       • ondansetron (ZOFRAN) syringe/vial injection 4 mg  4 mg Q4HRS PRN Anson Manuel M.D.        • ondansetron (ZOFRAN ODT) dispertab 4 mg  4 mg Q4HRS PRN Anson Manuel M.D.       • acetaminophen (TYLENOL) tablet 650 mg  650 mg Q6HRS PRN Anson Manuel M.D.         Last reviewed on 6/8/2017  9:34 AM by Mindi Benites, Pharmacy Int    Quality  Measures:  Labs reviewed, Medications reviewed and Radiology images reviewed  Tamayo catheter: Critically Ill - Requiring Accurate Measurement of Urinary Output  Central line in place: Need for access      DVT prophylaxis - mechanical: SCDs  Ulcer prophylaxis: Yes  Antibiotics: Treating active infection/contamination beyond 24 hours perioperative coverage          Assessment and Plan:    VDRF - intubated 6/9   - decrease PEEP   - SBT as tolerated  Acute hypoxemic respiratory failure  Acute pulmonary edema   - force diuresis as tolerated  Query pneumonia   - finish 5 days of Rocephin and Zithromax today - observe off antibiotics   - leukocytosis resolved  PAF - now in SR   - optimize K and Mg   - anticoagulated with apixaban  Suspect acute on chronic diastolic heart failure   - force diuresis as tolerated  DM 2 - cont insulin  Hx of HTN  Dyslipidemia    Critical Care Time:  35 minutes  79018  No time overlap  Time excludes procedures  Discussed with RN, RT, Team    I, Kay Stevenson (Scribe), am scribing for, and in the presence of, Evens Riggs M.D.  Electronically signed by: Kay Stevenson (Scribe), 6/12/2017  IEvens M.D. personally performed the services described in this documentation, as scribed by Kay Stevenson in my presence, and it is both accurate and complete.

## 2017-06-12 NOTE — PROGRESS NOTES
[facility] Hospitalist Progress Note    Date of Service: 2017    Chief Complaint  61 y.o. female admitted 2017 with syncope.    Interval Problem Update  Ms. Alfaro has a hx of htn and recently diagnosed afib.  CT chest in the ER revealed alveolar hemorrhage. She required rescue bipap and was admitted to the ICU. She required intubation on  with bronchoscopy.   She was in afib that converted to sinus this morning. She is tolerating tube feeds. 3,000 ml urine over night.   Her sister is at bedside and we discussed her condition. She is on propofol at 55. She is tolerating diuresis well. I discussed with her nurse.   Consultants/Specialty  Pulmonology  Cardiology  Nephrology  I discussed with Dr. Riggs on Hot Rounds.   I discussed with Dr. Sebastian.  Disposition  ICU        Review of Systems   Unable to perform ROS: intubated      Physical Exam  Laboratory/Imaging   Hemodynamics  Temp (24hrs), Av.8 °C (98.2 °F), Min:36.6 °C (97.9 °F), Max:37 °C (98.6 °F)   Temperature: 36.8 °C (98.2 °F)  Pulse  Av.7  Min: 62  Max: 121 Heart Rate (Monitored): 87  NIBP: 133/69 mmHg      Respiratory  [place] Vent Mode: APVCMV, Rate (breaths/min): 20, PEEP/CPAP: 10, PEEP/CPAP: 10, FiO2: 30, P Peak (PIP): 27, P MEAN: 15   Respiration: 20, Pulse Oximetry: 98 %     Work Of Breathing / Effort: Vented  RUL Breath Sounds: Clear, RML Breath Sounds: Diminished, RLL Breath Sounds: Diminished, CAROLANN Breath Sounds: Clear, LLL Breath Sounds: Diminished    Fluids    Intake/Output Summary (Last 24 hours) at 17 0813  Last data filed at 17 0600   Gross per 24 hour   Intake 1851.4 ml   Output   4300 ml   Net -2448.6 ml       Nutrition  Orders Placed This Encounter   Procedures   • Diet NPO     Standing Status: Standing      Number of Occurrences: 1      Standing Expiration Date:      Order Specific Question:  Type:     Answer:  Now [1]     Order Specific Question:  Restrict to:     Answer:  Strict [1]     Physical Exam    Constitutional: She appears well-nourished. No distress.   HENT:   Poor dentition.   cortrak Nares.    Eyes: No scleral icterus.   Neck:   Right sided central line without bleeding.   Cardiovascular:   Distant, regular heart sounds.    Pulmonary/Chest: She has rales.   Vent, moderate air movement. No wheezing.    Abdominal: Soft. She exhibits no distension.   Genitourinary:   Tamayo cath.   Musculoskeletal: She exhibits edema. She exhibits no tenderness.   Edema hands   Neurological:   Sedated though opens eyes to stimulation.     Skin: She is not diaphoretic. No erythema. There is pallor.   Very long, sharp toenails with onychomycosis.       Recent Labs      06/10/17   0351  06/11/17   0424  06/12/17   0600   WBC  10.4  9.4  9.1   RBC  3.68*  3.84*  3.97*   HEMOGLOBIN  10.4*  10.8*  11.1*   HEMATOCRIT  31.2*  32.5*  34.0*   MCV  84.8  84.6  85.6   MCH  28.3  28.1  28.0   MCHC  33.3*  33.2*  32.6*   RDW  43.7  44.0  44.6   PLATELETCT  304  343  374   MPV  8.7*  8.6*  9.0     Recent Labs      06/10/17   0351  06/11/17   0424  06/12/17   0600   SODIUM  136  138  140   POTASSIUM  3.2*  3.0*  4.0   CHLORIDE  102  99  98   CO2  24  29  33   GLUCOSE  190*  154*  166*   BUN  41*  32*  33*   CREATININE  1.56*  1.16  1.02   CALCIUM  8.3*  8.6  9.0             Recent Labs      06/11/17   0424   TRIGLYCERIDE  131          Assessment/Plan     Severe sepsis (CMS-HCC) (present on admission)  Assessment & Plan  Source appears respiratory. IV abx azithromycin and rocephin with stop dates. Query non-infectious SIRS with cardiac source.  The organ system dysfunction associated with this disease process is the respiratory system as is evidenced by respiratory failure requiring intubation.     Acute respiratory failure with hypoxia (CMS-HCC) (present on admission)  Assessment & Plan  Requiring rescue bipap followed by intubation on 6/9. Bronch done 6/9.      Pulmonology consulted and following.   Echo showed normal EF at  65%.  Aggressive IV diuresis.     CT chest:    Extensive bilateral patchy and groundglass airspace opacities may represent pulmonary edema, multifocal pneumonia. Hemorrhage is also a consideration in the correct clinical setting. Follow-up to radiographic resolution is recommended.    Small bilateral pleural effusions, right greater than left.    Cardiomegaly.    Mildly prominent mediastinal lymph nodes are nonspecific.    Coronary atherosclerotic plaque.    Small hiatal hernia.    KITR (acute kidney injury) (CMS-[facility]) (present on admission)  Assessment & Plan  Cr on admit was 2.95 with baseline 1.1 per review of the records from 2016.   Consistent with ATN. IV fluids have been given.   Nephrology consultation.   Follow daily BMP.    Atrial fibrillation (CMS-[facility]) (present on admission)  Assessment & Plan  Eliquis as an outpatient. Rate control on coreg as an outpatient.    Recently diagnosed.  Initiatllly full-dose anticoag given possible pulmonary hemorrhage seen on CT chest.  Okay to start Eliquis  Cardiology consulted.  Converted to sinus on 6/12.  EF 65%.    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-[facility]) (present on admission)  Assessment & Plan  Sliding scale    Hyponatremia (present on admission)  Assessment & Plan  Na 124 on admit. NS given.    Hypothyroid (present on admission)  Assessment & Plan  replacement    HTN (hypertension) (present on admission)  Assessment & Plan  On cardizem, hyzaar 100/25, clonidine, coreg, norvasc as an outpatient.    Pulmonary hypertension (CMS-[facility]) (present on admission)  Assessment & Plan  RVSP 50 mm Hg  She is high risk of volume overload.      Labs reviewed and Medications reviewed  Tamayo catheter: Unconscious / Sedated Patient on a Ventilator  Central line in place: Sepsis    DVT Prophylaxis: Heparin

## 2017-06-12 NOTE — CARE PLAN
Adult Ventilation Update    Total Vent Days: 4    Patient Lines/Drains/Airways Status    Active Airway     Name: Placement date: Placement time: Site: Days:    Airway Group ET Tube 7.5 06/09/17 1710    2                    Plateau Pressure (Q Shift): 22 (06/11/17 1831)  Static Compliance (ml / cm H2O): 37 (06/12/17 0428)    Patient failed trials because of Barriers to Wean: FiO2 >50% OR PEEP >8 (PMA Only) (06/11/17 1831)  Barriers to SBT    Length of Weaning Trial          Sputum/Suction   Cough: Non Productive (06/12/17 0400)  Sputum Amount: Small (06/12/17 0400)  Sputum Color: White;Bloody;Grey (06/12/17 0400)  Sputum Consistency: Thick (06/12/17 0400)    Mobility Group  Activity Performed: Unable to mobilize (06/11/17 2000)  Time Activity Tolerated: 60 min (06/09/17 1200)  Assistance / Tolerance: Assistance of Two or More (06/11/17 0800)  Pt Calls for Assistance: No (06/11/17 2000)  Staff Present for Mobilization: RN (06/09/17 1200)  Assistive Devices: Hand held assist (06/09/17 1200)  Reason Not Mobilized: Unstable condition (06/11/17 2000)  Mobilization Comments: PEEP 10 (06/11/17 2000)    Events/Summary/Plan: PEEP weaned to 10, pt tolerating well progressing as expected

## 2017-06-12 NOTE — PROGRESS NOTES
Hospital Medicine Progress Note, Adult, Complex               Author: Isaías Najjar Date & Time created: 6/11/2017  6:18 PM     Interval History:  Pt with recently diagnosed AFib, presented with resp failure.  Also KIRT, hematuria.  Events last 24h noted  Still VDRF    Review of Systems:  Review of Systems   Unable to perform ROS: intubated       Physical Exam:  Physical Exam   Constitutional: She appears ill. She appears distressed. She is sedated and intubated.   HENT:   Head: Normocephalic and atraumatic.   Right Ear: External ear normal.   Left Ear: External ear normal.   Nose: Nose normal.   ETT   Eyes: Right eye exhibits no discharge. Left eye exhibits no discharge. No scleral icterus.   Neck: Normal range of motion. Neck supple. No JVD present. No tracheal deviation present. No thyromegaly present.   Cardiovascular: Normal rate and normal heart sounds.  An irregularly irregular rhythm present.   No murmur heard.  Pulmonary/Chest: No stridor. She is intubated. She has rhonchi. She has rales.   Vented  Coarse BS   Abdominal: Soft. Bowel sounds are normal. She exhibits no distension. There is no tenderness. There is no rebound.   Musculoskeletal: She exhibits no edema.   Lymphadenopathy:     She has no cervical adenopathy.   Neurological: She is unresponsive.   Skin: Skin is warm and dry. She is not diaphoretic. No erythema.   Psychiatric: She has a normal mood and affect. Her behavior is normal.   Nursing note and vitals reviewed.      Labs:  Recent Labs      06/09/17   1958  06/10/17   0459  06/11/17   0408   ISTATAPH  7.272*  7.428  7.478   ISTATAPCO2  54.1*  36.0  41.4*   ISTATAPO2  105*  188*  88*   ISTATATCO2  27  25  32   [id number]  97  100*  97   ISTATARTHCO3  25.0  23.8  30.7*   ISTATARTBE  -2  0  7*   ISTATTEMP  37.2 C  36.8 C  98.1 F   ISTATFIO2  100  80  40   ISTATSPEC  Arterial  Arterial  Arterial   ISTATAPHTC  7.269*  7.431  7.482   [id number]  106*  187*  86         Recent Labs      06/09/17    0355  06/10/17   0351  06/11/17   0424   SODIUM  130*  136  138   POTASSIUM  3.4*  3.2*  3.0*   CHLORIDE  98  102  99   CO2  20  24  29   BUN  57*  41*  32*   CREATININE  2.23*  1.56*  1.16   MAGNESIUM  2.0  2.1  1.6   PHOSPHORUS  4.6*  3.1  2.6   CALCIUM  8.4*  8.3*  8.6     Recent Labs      06/09/17   0355  06/10/17   0351  06/11/17   0424   ALTSGPT  58*   --    --    ASTSGOT  31   --    --    ALKPHOSPHAT  133*   --    --    TBILIRUBIN  0.7   --    --    GLUCOSE  152*  190*  154*     Recent Labs      06/09/17   0355  06/10/17   0351  06/11/17   0424   RBC  3.43*  3.68*  3.84*   HEMOGLOBIN  9.9*  10.4*  10.8*   HEMATOCRIT  29.0*  31.2*  32.5*   PLATELETCT  306  304  343     Recent Labs      06/09/17   0355  06/10/17   0351  06/11/17   0424   WBC  11.7*  10.4  9.4   NEUTSPOLYS  85.90*  82.90*  73.30*   LYMPHOCYTES  4.10*  5.50*  9.20*   MONOCYTES  8.30  8.60  10.90   EOSINOPHILS  0.30  1.30  5.10   BASOPHILS  0.10  0.30  0.10   ASTSGOT  31   --    --    ALTSGPT  58*   --    --    ALKPHOSPHAT  133*   --    --    TBILIRUBIN  0.7   --    --            Hemodynamics:  Temp (24hrs), Av.7 °C (98.1 °F), Min:36.4 °C (97.5 °F), Max:37.1 °C (98.7 °F)  Temperature: 36.8 °C (98.2 °F)  Pulse  Av.9  Min: 62  Max: 121Heart Rate (Monitored): 93  NIBP: 120/75 mmHg     Respiratory:  [place] Vent Mode: APVCMV, Rate (breaths/min): 20, PEEP/CPAP: 10, FiO2: 40, P Peak (PIP): 21, P MEAN: 13 Respiration: 13, Pulse Oximetry: 100 %     Work Of Breathing / Effort: Vented  RUL Breath Sounds: Clear, RML Breath Sounds: Diminished, RLL Breath Sounds: Diminished, CAROLANN Breath Sounds: Clear, LLL Breath Sounds: Diminished  Fluids:    Intake/Output Summary (Last 24 hours) at 17 1818  Last data filed at 17 1800   Gross per 24 hour   Intake 1997.66 ml   Output   6600 ml   Net -4602.34 ml     Weight: 115 kg (253 lb 8.5 oz)  GI/Nutrition:  Orders Placed This Encounter   Procedures   • Diet NPO     Standing Status: Standing      Number  of Occurrences: 1      Standing Expiration Date:      Order Specific Question:  Type:     Answer:  Now [1]     Order Specific Question:  Restrict to:     Answer:  Strict [1]     Medical Decision Making, by Problem:  Active Hospital Problems    Diagnosis   • Severe sepsis (CMS-HCC) [A41.9, R65.20]   • Acute respiratory failure with hypoxia (CMS-HCC) [J96.01]   • Atrial fibrillation (CMS-HCC) [I48.91]   • 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.59]   • Hyponatremia [E87.1]   • Hypothyroid [E03.9]   • KIRT (acute kidney injury) (CMS-HCC) [N17.9]   • Acute hypoxemic respiratory failure (CMS-HCC) [J96.01]       Labs reviewed, Medications reviewed and Radiology images reviewed  Tamayo catheter: Critically Ill - Requiring Accurate Measurement of Urinary Output                1 KIRT: no clear etiology , pulmonary renal syndrome is less likely , complements normal, no pulmonary hemorrhage, cr is better, UOP is much better  2 resp failure  3 AFib  4 Anemia  5 Hyponatremia  6 Hypokalemia  7 volume overload  Plan  no acute need for HD  Continue Lasix  K supplements  Daily labs  Renal dose all meds  Avoid nephrotoxins  Prognosis guarded  D/W Dr Mata

## 2017-06-12 NOTE — CARE PLAN
Problem: Nutritional:  Goal: Nutrition support tolerated and meeting greater than 85% of estimated needs  Outcome: MET Date Met:  06/12/17

## 2017-06-12 NOTE — PROGRESS NOTES
Cardiology Progress Note               Author: Nishant Sebastian Date & Time created: 2017  9:48 AM     Interval History:  Pt admitted with sepsis,KIRT and VDRF. Cardiology consulted for atrial fib and history of HTN.    Review of Systems   Unable to perform ROS: intubated       Physical Exam   Neck: No JVD present.   Cardiovascular: Normal rate, regular rhythm, S1 normal and S2 normal.  Exam reveals no gallop, no S3 and no S4.    No murmur heard.  Pulmonary/Chest: Effort normal. She has no rales.   Abdominal: Soft. There is no tenderness.   Musculoskeletal: She exhibits no edema.       Hemodynamics:  Temp (24hrs), Av.7 °C (98 °F), Min:36.2 °C (97.2 °F), Max:37 °C (98.6 °F)  Temperature: 36.2 °C (97.2 °F)  Pulse  Av.5  Min: 62  Max: 121Heart Rate (Monitored): 83  NIBP: 115/69 mmHg     Respiratory:  [place] Vent Mode: APVCMV, Rate (breaths/min): 20, PEEP/CPAP: 10, FiO2: 30, P Peak (PIP): 27, P MEAN: 15 Respiration: 20, Pulse Oximetry: 94 %     Work Of Breathing / Effort: Vented  RUL Breath Sounds: Clear, RML Breath Sounds: Diminished, RLL Breath Sounds: Diminished, CAROLANN Breath Sounds: Clear, LLL Breath Sounds: Diminished  Fluids:  Date 17 0700 - 17 0659   Shift 0670-4127 6289-7711 4583-4191 24 Hour Total   I  N  T  A  K  E   I.V. 138   138    Other 100   100    Enteral 210   210    Shift Total 448   448   O  U  T  P  U  T   Urine 1400   1400    Shift Total 1400   1400   Weight (kg) 103.9 103.9 103.9 103.9       Weight: 103.9 kg (229 lb 0.9 oz)  GI/Nutrition:  Orders Placed This Encounter   Procedures   • Diet NPO     Standing Status: Standing      Number of Occurrences: 1      Standing Expiration Date:      Order Specific Question:  Type:     Answer:  Now [1]     Order Specific Question:  Restrict to:     Answer:  Strict [1]     Lab Results:  Recent Labs      06/10/17   0351  17   0424  17   0600   WBC  10.4  9.4  9.1   RBC  3.68*  3.84*  3.97*   HEMOGLOBIN  10.4*  10.8*  11.1*    HEMATOCRIT  31.2*  32.5*  34.0*   MCV  84.8  84.6  85.6   MCH  28.3  28.1  28.0   MCHC  33.3*  33.2*  32.6*   RDW  43.7  44.0  44.6   PLATELETCT  304  343  374   MPV  8.7*  8.6*  9.0     Recent Labs      06/10/17   0351  06/11/17   0424  06/12/17   0600   SODIUM  136  138  140   POTASSIUM  3.2*  3.0*  4.0   CHLORIDE  102  99  98   CO2  24  29  33   GLUCOSE  190*  154*  166*   BUN  41*  32*  33*   CREATININE  1.56*  1.16  1.02   CALCIUM  8.3*  8.6  9.0                 Recent Labs      06/11/17   0424   TRIGLYCERIDE  131       CTA Chest:    Extensive bilateral patchy and groundglass airspace opacities may represent pulmonary edema, multifocal pneumonia. Hemorrhage is also a consideration in the correct clinical setting. Follow-up to radiographic resolution is recommended.    Small bilateral pleural effusions, right greater than left.    Cardiomegaly.    Mildly prominent mediastinal lymph nodes are nonspecific.    Coronary atherosclerotic plaque.    Small hiatal hernia.           Reading Provider Reading Date     Ciaran Hoskins M.D. Jun 8, 2017           Echo:  CONCLUSIONS  No prior study is available for comparison.   Left ventricular ejection fraction is visually estimated to be 65%.  Mild concentric left ventricular hypertrophy.  Diastolic function is difficult to assess with atrial fibrillation.    Estimated right ventricular systolic pressure is 50 mmHg.  Small pleural effusion noted.  Moderate tricuspid regurgitation.      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 Renal function continues to improve.    Plan:  Would recommend anticoagulation if no contraindications.    Core Measures

## 2017-06-12 NOTE — PROGRESS NOTES
Nephrology Progress Note, Adult, Complex               Author: Doris Knoxduncanbarbara Date & Time created: 6/12/2017  11:31 AM     Interval History:  Pt with recently diagnosed AFib, presented with resp failure, int/vent, KIRT  Cood UOP  Creat improving  No acute events  Review of Systems:  Review of Systems   Unable to perform ROS: intubated       Physical Exam:  Physical Exam   Constitutional: She appears ill. She appears distressed. She is sedated and intubated.   HENT:   Head: Normocephalic and atraumatic.   Right Ear: External ear normal.   Left Ear: External ear normal.   Nose: Nose normal.   ETT   Eyes: Right eye exhibits no discharge. Left eye exhibits no discharge. No scleral icterus.   Neck: Normal range of motion. Neck supple. No JVD present. No tracheal deviation present. No thyromegaly present.   Cardiovascular: Normal rate and normal heart sounds.  An irregularly irregular rhythm present.   No murmur heard.  Pulmonary/Chest: No stridor. She is intubated. She has rhonchi. She has rales.   Vented  Coarse BS   Abdominal: Soft. Bowel sounds are normal. She exhibits no distension. There is no tenderness. There is no rebound.   Musculoskeletal: She exhibits no edema.   Lymphadenopathy:     She has no cervical adenopathy.   Neurological: She is unresponsive.   Skin: Skin is warm and dry. She is not diaphoretic. No erythema.   Psychiatric: She has a normal mood and affect. Her behavior is normal.   Nursing note and vitals reviewed.      Labs:  Recent Labs      06/10/17   0459  06/11/17   0408  06/12/17   0426   ISTATAPH  7.428  7.478  7.479   ISTATAPCO2  36.0  41.4*  49.2*   ISTATAPO2  188*  88*  96*   ISTATATCO2  25  32  38*   RHYPNDZ0BPP  100*  97  98   ISTATARTHCO3  23.8  30.7*  36.6*   ISTATARTBE  0  7*  12*   ISTATTEMP  36.8 C  98.1 F  97.3 F   ISTATFIO2  80  40  40   ISTATSPEC  Arterial  Arterial  Arterial   ISTATAPHTC  7.431  7.482  7.490   [id number]  187*  86  92*         Recent Labs      06/10/17   0351   17   SODIUM  136  138  140   POTASSIUM  3.2*  3.0*  4.0   CHLORIDE  102  99  98   CO2  24  29  33   BUN  41*  32*  33*   CREATININE  1.56*  1.16  1.02   MAGNESIUM  2.1  1.6  1.7   PHOSPHORUS  3.1  2.6  2.6   CALCIUM  8.3*  8.6  9.0     Recent Labs      06/10/17   0351  06/11/17   0424  06/12/17   0600   ALTSGPT   --    --   28   ASTSGOT   --    --   14   ALKPHOSPHAT   --    --   200*   TBILIRUBIN   --    --   0.4   PREALBUMIN   --    --   12.0*   GLUCOSE  190*  154*  166*     Recent Labs      06/10/17   0351  06/11/17   0424  06/12/17   0600   RBC  3.68*  3.84*  3.97*   HEMOGLOBIN  10.4*  10.8*  11.1*   HEMATOCRIT  31.2*  32.5*  34.0*   PLATELETCT  304  343  374     Recent Labs      06/10/17   0351  06/11/17   0424  06/12/17   0600   WBC  10.4  9.4  9.1   NEUTSPOLYS  82.90*  73.30*  72.70*   LYMPHOCYTES  5.50*  9.20*  9.10*   MONOCYTES  8.60  10.90  12.30   EOSINOPHILS  1.30  5.10  4.10   BASOPHILS  0.30  0.10  0.30   ASTSGOT   --    --   14   ALTSGPT   --    --   28   ALKPHOSPHAT   --    --   200*   TBILIRUBIN   --    --   0.4           Hemodynamics:  Temp (24hrs), Av.7 °C (98 °F), Min:36.2 °C (97.2 °F), Max:37 °C (98.6 °F)  Temperature: 36.2 °C (97.2 °F)  Pulse  Av.5  Min: 62  Max: 121Heart Rate (Monitored): 83  NIBP: 115/69 mmHg     Respiratory:  [place] Vent Mode: APVCMV, Rate (breaths/min): 20, PEEP/CPAP: 10, FiO2: 30, P Peak (PIP): 27, P MEAN: 15 Respiration: 20, Pulse Oximetry: 94 %     Work Of Breathing / Effort: Vented  RUL Breath Sounds: Clear, RML Breath Sounds: Diminished, RLL Breath Sounds: Diminished, CAROLANN Breath Sounds: Clear, LLL Breath Sounds: Diminished  Fluids:    Intake/Output Summary (Last 24 hours) at 17 1131  Last data filed at 17 1000   Gross per 24 hour   Intake 2141.99 ml   Output   5100 ml   Net -2958.01 ml     Weight: 103.9 kg (229 lb 0.9 oz)  GI/Nutrition:  Orders Placed This Encounter   Procedures   • Diet NPO     Standing Status:  Standing      Number of Occurrences: 1      Standing Expiration Date:      Order Specific Question:  Type:     Answer:  Now [1]     Order Specific Question:  Restrict to:     Answer:  Strict [1]     Medical Decision Making, by Problem:  Active Hospital Problems    Diagnosis   • Severe sepsis (CMS-HCC) [A41.9, R65.20]   • Acute respiratory failure with hypoxia (CMS-HCC) [J96.01]   • Atrial fibrillation (CMS-HCC) [I48.91]   • 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.59]   • Hyponatremia [E87.1]   • Hypothyroid [E03.9]   • KIRT (acute kidney injury) (CMS-HCC) [N17.9]   • Acute hypoxemic respiratory failure (CMS-HCC) [J96.01]       Labs reviewed, Medications reviewed and Radiology images reviewed  Tamayo catheter: Critically Ill - Requiring Accurate Measurement of Urinary Output                1 KIRT: recovering  2 resp failure/vent  3 AFib  4 Anemia: Hb stable  5 Electrolytes : well controlled  7 volume overload  Plan  no acute need for HD  Daily labs  Renal dose all meds  Avoid nephrotoxins  Prognosis guarded

## 2017-06-13 ENCOUNTER — APPOINTMENT (OUTPATIENT)
Dept: RADIOLOGY | Facility: MEDICAL CENTER | Age: 62
DRG: 853 | End: 2017-06-13
Attending: INTERNAL MEDICINE
Payer: COMMERCIAL

## 2017-06-13 PROBLEM — I25.10 CORONARY ARTERY CALCIFICATION SEEN ON CT SCAN: Status: ACTIVE | Noted: 2017-06-13

## 2017-06-13 LAB
ANION GAP SERPL CALC-SCNC: 6 MMOL/L (ref 0–11.9)
BACTERIA BLD CULT: NORMAL
BACTERIA BLD CULT: NORMAL
BASE EXCESS BLDA CALC-SCNC: 14 MMOL/L (ref -4–3)
BASOPHILS # BLD AUTO: 0.3 % (ref 0–1.8)
BASOPHILS # BLD: 0.03 K/UL (ref 0–0.12)
BODY TEMPERATURE: ABNORMAL DEGREES
BUN SERPL-MCNC: 36 MG/DL (ref 8–22)
CALCIUM SERPL-MCNC: 8.9 MG/DL (ref 8.5–10.5)
CHLORIDE SERPL-SCNC: 100 MMOL/L (ref 96–112)
CO2 BLDA-SCNC: 40 MMOL/L (ref 20–33)
CO2 SERPL-SCNC: 35 MMOL/L (ref 20–33)
CREAT SERPL-MCNC: 1.03 MG/DL (ref 0.5–1.4)
EOSINOPHIL # BLD AUTO: 0.36 K/UL (ref 0–0.51)
EOSINOPHIL NFR BLD: 4 % (ref 0–6.9)
ERYTHROCYTE [DISTWIDTH] IN BLOOD BY AUTOMATED COUNT: 45 FL (ref 35.9–50)
GFR SERPL CREATININE-BSD FRML MDRD: 54 ML/MIN/1.73 M 2
GLUCOSE BLD-MCNC: 142 MG/DL (ref 65–99)
GLUCOSE BLD-MCNC: 144 MG/DL (ref 65–99)
GLUCOSE BLD-MCNC: 165 MG/DL (ref 65–99)
GLUCOSE BLD-MCNC: 179 MG/DL (ref 65–99)
GLUCOSE BLD-MCNC: 182 MG/DL (ref 65–99)
GLUCOSE SERPL-MCNC: 165 MG/DL (ref 65–99)
HCO3 BLDA-SCNC: 38.6 MMOL/L (ref 17–25)
HCT VFR BLD AUTO: 33.8 % (ref 37–47)
HGB BLD-MCNC: 10.9 G/DL (ref 12–16)
IMM GRANULOCYTES # BLD AUTO: 0.21 K/UL (ref 0–0.11)
IMM GRANULOCYTES NFR BLD AUTO: 2.3 % (ref 0–0.9)
LYMPHOCYTES # BLD AUTO: 0.93 K/UL (ref 1–4.8)
LYMPHOCYTES NFR BLD: 10.4 % (ref 22–41)
MAGNESIUM SERPL-MCNC: 1.9 MG/DL (ref 1.5–2.5)
MCH RBC QN AUTO: 27.7 PG (ref 27–33)
MCHC RBC AUTO-ENTMCNC: 32.2 G/DL (ref 33.6–35)
MCV RBC AUTO: 85.8 FL (ref 81.4–97.8)
MONOCYTES # BLD AUTO: 1.04 K/UL (ref 0–0.85)
MONOCYTES NFR BLD AUTO: 11.6 % (ref 0–13.4)
NEUTROPHILS # BLD AUTO: 6.41 K/UL (ref 2–7.15)
NEUTROPHILS NFR BLD: 71.4 % (ref 44–72)
NRBC # BLD AUTO: 0 K/UL
NRBC BLD AUTO-RTO: 0 /100 WBC
O2/TOTAL GAS SETTING VFR VENT: 35 %
PCO2 BLDA: 47.2 MMHG (ref 26–37)
PCO2 TEMP ADJ BLDA: 48.3 MMHG (ref 26–37)
PH BLDA: 7.52 [PH] (ref 7.4–7.5)
PH TEMP ADJ BLDA: 7.51 [PH] (ref 7.4–7.5)
PLATELET # BLD AUTO: 365 K/UL (ref 164–446)
PMV BLD AUTO: 8.7 FL (ref 9–12.9)
PO2 BLDA: 77 MMHG (ref 64–87)
PO2 TEMP ADJ BLDA: 79 MMHG (ref 64–87)
POTASSIUM SERPL-SCNC: 4.2 MMOL/L (ref 3.6–5.5)
RBC # BLD AUTO: 3.94 M/UL (ref 4.2–5.4)
SAO2 % BLDA: 96 % (ref 93–99)
SIGNIFICANT IND 70042: NORMAL
SIGNIFICANT IND 70042: NORMAL
SITE SITE: NORMAL
SITE SITE: NORMAL
SODIUM SERPL-SCNC: 141 MMOL/L (ref 135–145)
SOURCE SOURCE: NORMAL
SOURCE SOURCE: NORMAL
SPECIMEN DRAWN FROM PATIENT: ABNORMAL
WBC # BLD AUTO: 9 K/UL (ref 4.8–10.8)

## 2017-06-13 PROCEDURE — 700102 HCHG RX REV CODE 250 W/ 637 OVERRIDE(OP): Performed by: INTERNAL MEDICINE

## 2017-06-13 PROCEDURE — 99291 CRITICAL CARE FIRST HOUR: CPT | Performed by: INTERNAL MEDICINE

## 2017-06-13 PROCEDURE — 700111 HCHG RX REV CODE 636 W/ 250 OVERRIDE (IP)

## 2017-06-13 PROCEDURE — A9270 NON-COVERED ITEM OR SERVICE: HCPCS

## 2017-06-13 PROCEDURE — A9270 NON-COVERED ITEM OR SERVICE: HCPCS | Performed by: HOSPITALIST

## 2017-06-13 PROCEDURE — 770022 HCHG ROOM/CARE - ICU (200)

## 2017-06-13 PROCEDURE — 99233 SBSQ HOSP IP/OBS HIGH 50: CPT | Performed by: HOSPITALIST

## 2017-06-13 PROCEDURE — 82962 GLUCOSE BLOOD TEST: CPT | Mod: 91

## 2017-06-13 PROCEDURE — 700101 HCHG RX REV CODE 250: Performed by: HOSPITALIST

## 2017-06-13 PROCEDURE — 700102 HCHG RX REV CODE 250 W/ 637 OVERRIDE(OP)

## 2017-06-13 PROCEDURE — 700105 HCHG RX REV CODE 258

## 2017-06-13 PROCEDURE — 85025 COMPLETE CBC W/AUTO DIFF WBC: CPT

## 2017-06-13 PROCEDURE — 700111 HCHG RX REV CODE 636 W/ 250 OVERRIDE (IP): Performed by: INTERNAL MEDICINE

## 2017-06-13 PROCEDURE — 71010 DX-CHEST-PORTABLE (1 VIEW): CPT

## 2017-06-13 PROCEDURE — 83735 ASSAY OF MAGNESIUM: CPT

## 2017-06-13 PROCEDURE — 94003 VENT MGMT INPAT SUBQ DAY: CPT

## 2017-06-13 PROCEDURE — A9270 NON-COVERED ITEM OR SERVICE: HCPCS | Performed by: INTERNAL MEDICINE

## 2017-06-13 PROCEDURE — 700111 HCHG RX REV CODE 636 W/ 250 OVERRIDE (IP): Performed by: HOSPITALIST

## 2017-06-13 PROCEDURE — 80048 BASIC METABOLIC PNL TOTAL CA: CPT

## 2017-06-13 PROCEDURE — 82803 BLOOD GASES ANY COMBINATION: CPT

## 2017-06-13 PROCEDURE — 700102 HCHG RX REV CODE 250 W/ 637 OVERRIDE(OP): Performed by: HOSPITALIST

## 2017-06-13 PROCEDURE — 36600 WITHDRAWAL OF ARTERIAL BLOOD: CPT

## 2017-06-13 PROCEDURE — 94640 AIRWAY INHALATION TREATMENT: CPT

## 2017-06-13 RX ORDER — FUROSEMIDE 20 MG/1
40 TABLET ORAL
Status: DISCONTINUED | OUTPATIENT
Start: 2017-06-13 | End: 2017-06-14

## 2017-06-13 RX ORDER — MAGNESIUM SULFATE HEPTAHYDRATE 40 MG/ML
2 INJECTION, SOLUTION INTRAVENOUS ONCE
Status: COMPLETED | OUTPATIENT
Start: 2017-06-13 | End: 2017-06-13

## 2017-06-13 RX ORDER — ATORVASTATIN CALCIUM 40 MG/1
40 TABLET, FILM COATED ORAL
Status: DISCONTINUED | OUTPATIENT
Start: 2017-06-13 | End: 2017-06-22 | Stop reason: HOSPADM

## 2017-06-13 RX ORDER — SODIUM CHLORIDE 9 MG/ML
INJECTION, SOLUTION INTRAVENOUS
Status: COMPLETED
Start: 2017-06-13 | End: 2017-06-13

## 2017-06-13 RX ADMIN — IPRATROPIUM BROMIDE AND ALBUTEROL SULFATE 3 ML: .5; 3 SOLUTION RESPIRATORY (INHALATION) at 19:19

## 2017-06-13 RX ADMIN — SENNOSIDES AND DOCUSATE SODIUM 2 TABLET: 8.6; 5 TABLET ORAL at 21:19

## 2017-06-13 RX ADMIN — APIXABAN 5 MG: 5 TABLET, FILM COATED ORAL at 08:32

## 2017-06-13 RX ADMIN — FUROSEMIDE 40 MG: 20 TABLET ORAL at 16:30

## 2017-06-13 RX ADMIN — CHLORHEXIDINE GLUCONATE 15 ML: 1.2 RINSE ORAL at 08:32

## 2017-06-13 RX ADMIN — INSULIN LISPRO 3 UNITS: 100 INJECTION, SOLUTION INTRAVENOUS; SUBCUTANEOUS at 12:38

## 2017-06-13 RX ADMIN — POTASSIUM CHLORIDE 40 MEQ: 1.5 POWDER, FOR SOLUTION ORAL at 21:19

## 2017-06-13 RX ADMIN — POTASSIUM CHLORIDE 40 MEQ: 1.5 POWDER, FOR SOLUTION ORAL at 08:32

## 2017-06-13 RX ADMIN — MAGNESIUM SULFATE IN WATER 2 G: 40 INJECTION, SOLUTION INTRAVENOUS at 07:31

## 2017-06-13 RX ADMIN — IPRATROPIUM BROMIDE AND ALBUTEROL SULFATE 3 ML: .5; 3 SOLUTION RESPIRATORY (INHALATION) at 14:30

## 2017-06-13 RX ADMIN — INSULIN LISPRO 3 UNITS: 100 INJECTION, SOLUTION INTRAVENOUS; SUBCUTANEOUS at 22:50

## 2017-06-13 RX ADMIN — IPRATROPIUM BROMIDE AND ALBUTEROL SULFATE 3 ML: .5; 3 SOLUTION RESPIRATORY (INHALATION) at 06:30

## 2017-06-13 RX ADMIN — IPRATROPIUM BROMIDE AND ALBUTEROL SULFATE 3 ML: .5; 3 SOLUTION RESPIRATORY (INHALATION) at 03:00

## 2017-06-13 RX ADMIN — PROPOFOL 50 MCG/KG/MIN: 10 INJECTION, EMULSION INTRAVENOUS at 03:16

## 2017-06-13 RX ADMIN — PROPOFOL 50 MCG/KG/MIN: 10 INJECTION, EMULSION INTRAVENOUS at 00:21

## 2017-06-13 RX ADMIN — SODIUM CHLORIDE 500 ML: 9 INJECTION, SOLUTION INTRAVENOUS at 19:30

## 2017-06-13 RX ADMIN — PROPOFOL 50 MCG/KG/MIN: 10 INJECTION, EMULSION INTRAVENOUS at 06:56

## 2017-06-13 RX ADMIN — FAMOTIDINE 20 MG: 20 TABLET, FILM COATED ORAL at 08:32

## 2017-06-13 RX ADMIN — IPRATROPIUM BROMIDE AND ALBUTEROL SULFATE 3 ML: .5; 3 SOLUTION RESPIRATORY (INHALATION) at 23:12

## 2017-06-13 RX ADMIN — SENNOSIDES AND DOCUSATE SODIUM 2 TABLET: 8.6; 5 TABLET ORAL at 08:32

## 2017-06-13 RX ADMIN — FAMOTIDINE 20 MG: 20 TABLET, FILM COATED ORAL at 21:00

## 2017-06-13 RX ADMIN — PROPOFOL 50 MCG/KG/MIN: 10 INJECTION, EMULSION INTRAVENOUS at 19:25

## 2017-06-13 RX ADMIN — APIXABAN 5 MG: 5 TABLET, FILM COATED ORAL at 21:31

## 2017-06-13 RX ADMIN — ATORVASTATIN CALCIUM 40 MG: 40 TABLET, FILM COATED ORAL at 21:19

## 2017-06-13 RX ADMIN — IPRATROPIUM BROMIDE AND ALBUTEROL SULFATE 3 ML: .5; 3 SOLUTION RESPIRATORY (INHALATION) at 10:56

## 2017-06-13 RX ADMIN — LEVOTHYROXINE SODIUM 88 MCG: 88 TABLET ORAL at 05:03

## 2017-06-13 RX ADMIN — FUROSEMIDE 60 MG: 10 INJECTION, SOLUTION INTRAVENOUS at 04:53

## 2017-06-13 RX ADMIN — CHLORHEXIDINE GLUCONATE 15 ML: 1.2 RINSE ORAL at 21:19

## 2017-06-13 RX ADMIN — FUROSEMIDE 40 MG: 20 TABLET ORAL at 09:39

## 2017-06-13 RX ADMIN — INSULIN LISPRO 3 UNITS: 100 INJECTION, SOLUTION INTRAVENOUS; SUBCUTANEOUS at 18:31

## 2017-06-13 NOTE — PROGRESS NOTES
Cardiology Progress Note               Author: Nishant Sebastian Date & Time created: 2017  8:59 AM     Interval History:  Pt admitted with sepsis,KIRT and VDRF. Cardiology consulted for atrial fib and history of HTN.    Review of Systems   Unable to perform ROS: intubated       Physical Exam   Neck: No JVD present.   Cardiovascular: Normal rate, regular rhythm, S1 normal and S2 normal.  Exam reveals no gallop, no S3 and no S4.    No murmur heard.  Pulmonary/Chest: Effort normal. She has no rales.   Abdominal: Soft. There is no tenderness.   Musculoskeletal: She exhibits no edema.       Hemodynamics:  Temp (24hrs), Av.5 °C (97.7 °F), Min:36.2 °C (97.2 °F), Max:36.8 °C (98.2 °F)  Temperature: 36.6 °C (97.9 °F)  Pulse  Av.4  Min: 62  Max: 121Heart Rate (Monitored): (!) 102  NIBP: 146/71 mmHg     Respiratory:  [place] Vent Mode: Spont, Rate (breaths/min): 16, PEEP/CPAP: 8, FiO2: 30, P Peak (PIP): 15, P MEAN: 10 Respiration: (!) 30, Pulse Oximetry: 93 %     Work Of Breathing / Effort: Vented  RUL Breath Sounds: Coarse Crackles, RML Breath Sounds: Coarse Crackles, RLL Breath Sounds: Coarse Crackles, CAROLANN Breath Sounds: Coarse Crackles, LLL Breath Sounds: Coarse Crackles  Fluids:  Date 17 0700 - 17 0659   Shift 4359-2814 0253-4565 2134-8681 24 Hour Total   I  N  T  A  K  E   I.V. 138   138    Other 100   100    Enteral 210   210    Shift Total 448   448   O  U  T  P  U  T   Urine 1400   1400    Shift Total 1400   1400   Weight (kg) 103.9 103.9 103.9 103.9       Weight: 105.3 kg (232 lb 2.3 oz)  GI/Nutrition:  Orders Placed This Encounter   Procedures   • Diet NPO     Standing Status: Standing      Number of Occurrences: 1      Standing Expiration Date:      Order Specific Question:  Type:     Answer:  Now [1]     Order Specific Question:  Restrict to:     Answer:  Strict [1]     Lab Results:  Recent Labs      17   0424  17   0600  17   0418   WBC  9.4  9.1  9.0   RBC  3.84*  3.97*   3.94*   HEMOGLOBIN  10.8*  11.1*  10.9*   HEMATOCRIT  32.5*  34.0*  33.8*   MCV  84.6  85.6  85.8   MCH  28.1  28.0  27.7   MCHC  33.2*  32.6*  32.2*   RDW  44.0  44.6  45.0   PLATELETCT  343  374  365   MPV  8.6*  9.0  8.7*     Recent Labs      06/11/17   0424  06/12/17   0600  06/13/17   0418   SODIUM  138  140  141   POTASSIUM  3.0*  4.0  4.2   CHLORIDE  99  98  100   CO2  29  33  35*   GLUCOSE  154*  166*  165*   BUN  32*  33*  36*   CREATININE  1.16  1.02  1.03   CALCIUM  8.6  9.0  8.9                 Recent Labs      06/11/17   0424   TRIGLYCERIDE  131       CTA Chest:    Extensive bilateral patchy and groundglass airspace opacities may represent pulmonary edema, multifocal pneumonia. Hemorrhage is also a consideration in the correct clinical setting. Follow-up to radiographic resolution is recommended.    Small bilateral pleural effusions, right greater than left.    Cardiomegaly.    Mildly prominent mediastinal lymph nodes are nonspecific.    Coronary atherosclerotic plaque.    Small hiatal hernia.           Reading Provider Reading Date     Ciaran Hoskins M.D. Jun 8, 2017           Echo:  CONCLUSIONS  No prior study is available for comparison.   Left ventricular ejection fraction is visually estimated to be 65%.  Mild concentric left ventricular hypertrophy.  Diastolic function is difficult to assess with atrial fibrillation.    Estimated right ventricular systolic pressure is 50 mmHg.  Small pleural effusion noted.  Moderate tricuspid regurgitation.  Exam Date:         06/08/2017    Medical Decision Making, by Problem:  Active Hospital Problems    Diagnosis   • Severe sepsis (CMS-[facility]) [A41.9, R65.20]   • Acute respiratory failure with hypoxia (CMS-[facility]) [J96.01]   • Atrial fibrillation (CMS-[facility]) [I48.91]   • 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-[facility]) [E11.59]   • Hyponatremia [E87.1]   • Hypothyroid [E03.9]   • HTN (hypertension) [I10]   • KIRT (acute kidney injury) (CMS-[facility]) [N17.9]      Pt now in SR but still on vent. Renal function improved. Coronary calcium on chest CT noted.    Plan:  Will start statin  Change furosemide to po    Core Measures

## 2017-06-13 NOTE — PROGRESS NOTES
Pulmonary Critical Care Progress Note     Date of service: 6/13/2017    Interval Events:  24 hour interval history reviewed  Reason for visit:  Respiratory failure, acute pulmonary edema  Unable to provide CC or ROS due to intubation and sedation      Prop 50  Nods appropriately, follows commands, moves all extremities when sedation weaned  SR,  systolic.   SBT 1 hr yesterday 5/8 and 1hr today, NiF -28, VC 0.8, RSBI 60  CXR with unchanged from yesterday        PFSH:  No change.    Respiratory:  Pulse Oximetry: 95 %  Vent:  CMV, PEEP 8, 40%  Vent waveforms and airway  reviewed in detail  CXR with unchanged edema and bilateral opacities  Coarse crackles bilaterally  No wheezing    Recent Labs      06/11/17   0408  06/12/17   0426  06/13/17   0443   ISTATAPH  7.478  7.479  7.520*   ISTATAPCO2  41.4*  49.2*  47.2*   ISTATAPO2  88*  96*  77   ISTATATCO2  32  38*  40*   NITMFOB9PTH  97  98  96   ISTATARTHCO3  30.7*  36.6*  38.6*   ISTATARTBE  7*  12*  14*   ISTATTEMP  98.1 F  97.3 F  99.5 F   ISTATFIO2  40  40  35   ISTATSPEC  Arterial  Arterial  Arterial   ISTATAPHTC  7.482  7.490  7.512*   [id number]  86  92*  79     HemoDynamics:  Pulse: 94, Heart Rate (Monitored): 93  NIBP: 136/68 mmHg     SR    Neuro:  Sedated  Propofol 50    Fluids:  Intake/Output       06/11/17 0700 - 06/12/17 0659 06/12/17 0700 - 06/13/17 0659 06/13/17 0700 - 06/14/17 0659      0380-8742 4040-3477 Total 5327-6337 5199-5585 Total 1496-7398 0600-8511 Total       Intake    I.V.  404.4  404.4 808.8  436.9  405 841.9  --  -- --    Propofol Volume 404.4 404.4 808.8 436.9 405 841.9 -- -- --    Other  --  30 30  130  -- 130  --  -- --    Medications (P.O./ Enteral Liquids) -- 30 30 130 -- 130 -- -- --    Enteral  630  570 1200  660  540 1200  --  -- --    Enteral Volume   -- -- --    Free Water / Tube Flush 90 30 120 120 -- 120 -- -- --    Total Intake 1034.4 1004.4 2038.8 1226.9 945 2171.9 -- -- --       Output     Urine  3600   5600  2900  1350 4250  --  -- --    Indwelling Cathether 3600  560 2900 1350 4250 -- -- --    Stool  --  -- --  --  -- --  --  -- --    Number of Times Stooled 0 x -- 0 x 1 x -- 1 x -- -- --    Total Output 3600  5600 2900 1350 4250 -- -- --       Net I/O     -2565.6 -995.6 -3561.2 -1673.1 -405.1 -2078.1 -- -- --        Weight: 105.3 kg (232 lb 2.3 oz)  Recent Labs      17   SODIUM  138  140  141   POTASSIUM  3.0*  4.0  4.2   CHLORIDE  99  98  100   CO2  29  33  35*   BUN  32*  33*  36*   CREATININE  1.16  1.02  1.03   MAGNESIUM  1.6  1.7  1.9   PHOSPHORUS  2.6  2.6   --    CALCIUM  8.6  9.0  8.9     GI/Nutrition:  Abd soft ND/NT  Tolerating enteral TF    Liver Function  Recent Labs      17   ALTSGPT   --   28   --    ASTSGOT   --   14   --    ALKPHOSPHAT   --   200*   --    TBILIRUBIN   --   0.4   --    PREALBUMIN   --   12.0*   --    GLUCOSE  154*  166*  165*     Heme:  Recent Labs      17   RBC  3.84*  3.97*  3.94*   HEMOGLOBIN  10.8*  11.1*  10.9*   HEMATOCRIT  32.5*  34.0*  33.8*   PLATELETCT  343  374  365     Infectious Disease:  Temp  Av.4 °C (97.6 °F)  Min: 36.2 °C (97.2 °F)  Max: 36.8 °C (98.2 °F)     Recent Labs      17   WBC  9.4  9.1  9.0   NEUTSPOLYS  73.30*  72.70*  71.40   LYMPHOCYTES  9.20*  9.10*  10.40*   MONOCYTES  10.90  12.30  11.60   EOSINOPHILS  5.10  4.10  4.00   BASOPHILS  0.10  0.30  0.30   ASTSGOT   --   14   --    ALTSGPT   --   28   --    ALKPHOSPHAT   --   200*   --    TBILIRUBIN   --   0.4   --      Current Facility-Administered Medications   Medication Dose Frequency Provider Last Rate Last Dose   • magnesium sulfate IVPB premix 2 g  2 g Once Jaime Bush M.D.       • famotidine (PEPCID) tablet 20 mg  20 mg BID Josefa Moreno, PHARMD   20 mg at 17   •  apixaban (ELIQUIS) 2.5mg tablet 5 mg  5 mg BID Jaime Bush M.D.   5 mg at 06/12/17 1957   • potassium chloride (KLOR-CON) 20 MEQ packet 40 mEq  40 mEq BID Antonio Mata M.D.   40 mEq at 06/12/17 1957   • insulin lispro (HUMALOG) injection 3-14 Units  3-14 Units Q6HRS Antonio Mata M.D.   Stopped at 06/13/17 0000   • furosemide (LASIX) injection 60 mg  60 mg BID DIURETIC Fadi Najjar, M.D.   60 mg at 06/13/17 0453   • propofol (DIPRIVAN) injection  5-80 mcg/kg/min Continuous EMILY ArzolaD 33.7 mL/hr at 06/13/17 0656 50 mcg/kg/min at 06/13/17 0656   • Respiratory Care per Protocol   Continuous RT Antonio Mata M.D.       • chlorhexidine (PERIDEX) 0.12 % solution 15 mL  15 mL BID Antonio Mata M.D.   15 mL at 06/12/17 1958   • lidocaine (XYLOCAINE) 1%  injection  1-2 mL Q30 MIN PRN Antonio Mata M.D.       • MD ALERT...Adult ICU Electrolyte Replacement per Pharmacy Protocol   pharmacy to dose Antonio Mata M.D.       • fentaNYL (SUBLIMAZE) injection 25 mcg  25 mcg Q HOUR PRN Antonio Mata M.D.   25 mcg at 06/12/17 1526    Or   • fentaNYL (SUBLIMAZE) injection 50 mcg  50 mcg Q HOUR PRN Antonio Mata M.D.        Or   • fentaNYL (SUBLIMAZE) injection 100 mcg  100 mcg Q HOUR PRN Antonio Mata M.D.       • ipratropium-albuterol (DUONEB) nebulizer solution 3 mL  3 mL Q2HRS PRN (RT) Antonio Mata M.D.       • ipratropium-albuterol (DUONEB) nebulizer solution 3 mL  3 mL Q4HRS (RT) Jaime Bush M.D.   3 mL at 06/13/17 0630   • glucose 4 g chewable tablet 16 g  16 g Q15 MIN PRN Anson Manuel M.D.        And   • dextrose 50% (D50W) injection 25 mL  25 mL Q15 MIN PRN Anson Manuel M.D.       • levothyroxine (SYNTHROID) tablet 88 mcg  88 mcg AM ES Anson Manuel M.D.   88 mcg at 06/13/17 0503   • senna-docusate (PERICOLACE or SENOKOT S) 8.6-50 MG per tablet 2 Tab  2 Tab BID Anson Manuel M.D.   2 Tab at 06/12/17 1957    And   • polyethylene glycol/lytes (MIRALAX) PACKET 1 Packet  1 Packet QDAY PRN  Anson Manuel M.D.   1 Packet at 06/12/17 0920    And   • magnesium hydroxide (MILK OF MAGNESIA) suspension 30 mL  30 mL QDAY PRN Anson Manuel M.D.        And   • bisacodyl (DULCOLAX) suppository 10 mg  10 mg QDAY PRN Anson Manuel M.D.       • ondansetron (ZOFRAN) syringe/vial injection 4 mg  4 mg Q4HRS PRN Anson Manuel M.D.       • ondansetron (ZOFRAN ODT) dispertab 4 mg  4 mg Q4HRS PRN Anson Manuel M.D.       • acetaminophen (TYLENOL) tablet 650 mg  650 mg Q6HRS PRN Anson Manuel M.D.         Last reviewed on 6/8/2017  9:34 AM by Mindi Benites, Pharmacy Int    Quality  Measures:  Labs reviewed, Medications reviewed and Radiology images reviewed  Tamayo catheter: Critically Ill - Requiring Accurate Measurement of Urinary Output  Central line in place: Need for access      DVT prophylaxis - mechanical: SCDs  Ulcer prophylaxis: Yes  Antibiotics: Treating active infection/contamination beyond 24 hours perioperative coverage      Assessment and Plan:    VDRF - intubated 6/9   - cont vent support   - SBT as tolerated  Acute hypoxemic respiratory failure  Acute pulmonary edema   - force diuresis as tolerated  Query pneumonia   - finished 5 days of Rocephin and Zithromax 6/12 - observe off antibiotics  PAF - now in SR   - optimize K and Mg   - anticoagulated with apixaban  Suspect acute on chronic diastolic heart failure   - force diuresis as tolerated  DM 2 - cont insulin  Hx of HTN  Dyslipidemia    Critical Care Time:  32 minutes  22899  No time overlap  Time excludes procedures  Discussed with RN, RT, Team    I, Lilli Abel (Scribe), am scribing for, and in the presence of, Evens Riggs M.D.    Electronically signed by: Lilli Abel (Scribe), 6/13/2017    IEvens M.D. personally performed the services described in this documentation, as scribed by Lilli Abel in my presence, and it is both accurate and complete.

## 2017-06-13 NOTE — PROGRESS NOTES
Nephrology Progress Note, Adult, Complex               Author: Doris Knoxduncanbarbara Date & Time created: 6/13/2017  1:56 PM     Interval History:  Pt with recently diagnosed AFib, presented with resp failure, int/vent, KIRT  Cood UOP  Creat stable  No acute events  Review of Systems:  Review of Systems   Unable to perform ROS: intubated       Physical Exam:  Physical Exam   Constitutional: She appears well-developed and well-nourished. She appears lethargic. She appears ill. No distress. She is sedated and intubated.   HENT:   Head: Normocephalic and atraumatic.   Right Ear: External ear normal.   Left Ear: External ear normal.   Nose: Nose normal.   ETT   Eyes: Right eye exhibits no discharge. Left eye exhibits no discharge. No scleral icterus.   Neck: Normal range of motion. Neck supple. No JVD present. No tracheal deviation present. No thyromegaly present.   Cardiovascular: Normal rate and normal heart sounds.  An irregularly irregular rhythm present.   No murmur heard.  Pulmonary/Chest: No stridor. She is intubated. She has no wheezes. She has rhonchi. She has no rales.   Vented  Coarse BS   Abdominal: Soft. Bowel sounds are normal. She exhibits no distension. There is no tenderness. There is no rebound.   Musculoskeletal: She exhibits no edema.   Lymphadenopathy:     She has no cervical adenopathy.   Neurological: She appears lethargic.   Skin: Skin is warm and dry. She is not diaphoretic. No erythema.   Psychiatric: She has a normal mood and affect. Her behavior is normal.   Nursing note and vitals reviewed.      Labs:  Recent Labs      06/11/17   0408  06/12/17   0426  06/13/17   0443   ISTATAPH  7.478  7.479  7.520*   ISTATAPCO2  41.4*  49.2*  47.2*   ISTATAPO2  88*  96*  77   ISTATATCO2  32  38*  40*   YLCUJEA7QGH  97  98  96   ISTATARTHCO3  30.7*  36.6*  38.6*   ISTATARTBE  7*  12*  14*   ISTATTEMP  98.1 F  97.3 F  99.5 F   ISTATFIO2  40  40  35   ISTATSPEC  Arterial  Arterial  Arterial   ISTATAPHTC  7.482  7.490   7.512*   [id number]  86  92*  79         Recent Labs      17   SODIUM  138  140  141   POTASSIUM  3.0*  4.0  4.2   CHLORIDE  99  98  100   CO2  29  33  35*   BUN  32*  33*  36*   CREATININE  1.16  1.02  1.03   MAGNESIUM  1.6  1.7  1.9   PHOSPHORUS  2.6  2.6   --    CALCIUM  8.6  9.0  8.9     Recent Labs      17   ALTSGPT   --   28   --    ASTSGOT   --   14   --    ALKPHOSPHAT   --   200*   --    TBILIRUBIN   --   0.4   --    PREALBUMIN   --   12.0*   --    GLUCOSE  154*  166*  165*     Recent Labs      17   RBC  3.84*  3.97*  3.94*   HEMOGLOBIN  10.8*  11.1*  10.9*   HEMATOCRIT  32.5*  34.0*  33.8*   PLATELETCT  343  374  365     Recent Labs      17   WBC  9.4  9.1  9.0   NEUTSPOLYS  73.30*  72.70*  71.40   LYMPHOCYTES  9.20*  9.10*  10.40*   MONOCYTES  10.90  12.30  11.60   EOSINOPHILS  5.10  4.10  4.00   BASOPHILS  0.10  0.30  0.30   ASTSGOT   --   14   --    ALTSGPT   --   28   --    ALKPHOSPHAT   --   200*   --    TBILIRUBIN   --   0.4   --            Hemodynamics:  Temp (24hrs), Av.1 °C (98.8 °F), Min:36.3 °C (97.3 °F), Max:37.9 °C (100.3 °F)  Temperature: 37.9 °C (100.3 °F)  Pulse  Av.5  Min: 62  Max: 121Heart Rate (Monitored): 94  NIBP: 123/62 mmHg     Respiratory:  [place] Vent Mode: APVCMV, Rate (breaths/min): 16, PEEP/CPAP: 8, FiO2: 30, P Peak (PIP): 24, P MEAN: 13 Respiration: 19, Pulse Oximetry: 95 %     Work Of Breathing / Effort: Vented  RUL Breath Sounds: Coarse Crackles, RML Breath Sounds: Coarse Crackles, RLL Breath Sounds: Coarse Crackles, CAROLANN Breath Sounds: Coarse Crackles, LLL Breath Sounds: Coarse Crackles  Fluids:    Intake/Output Summary (Last 24 hours) at 17 1356  Last data filed at 17 1200   Gross per 24 hour   Intake 1787.5 ml   Output   4105 ml   Net -2317.5 ml     Weight: 105.3 kg  (232 lb 2.3 oz)  GI/Nutrition:  Orders Placed This Encounter   Procedures   • Diet NPO     Standing Status: Standing      Number of Occurrences: 1      Standing Expiration Date:      Order Specific Question:  Type:     Answer:  Now [1]     Order Specific Question:  Restrict to:     Answer:  Strict [1]     Medical Decision Making, by Problem:  Active Hospital Problems    Diagnosis   • Severe sepsis (CMS-HCC) [A41.9, R65.20]   • Acute respiratory failure with hypoxia (CMS-HCC) [J96.01]   • Atrial fibrillation (CMS-HCC) [I48.91]   • 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.59]   • Hyponatremia [E87.1]   • Hypothyroid [E03.9]   • KIRT (acute kidney injury) (CMS-HCC) [N17.9]   • Acute hypoxemic respiratory failure (CMS-HCC) [J96.01]       Labs reviewed, Medications reviewed and Radiology images reviewed  Tamayo catheter: Critically Ill - Requiring Accurate Measurement of Urinary Output                1 KIRT: recovered  2 resp failure/vent  3 AFib  4 Anemia: Hb stable  5 Electrolytes : well controlled  7 volume overload: on lasix  Plan  no acute need for HD  Daily labs  Renal dose all meds  Avoid nephrotoxins  Will sign off -please call with questions

## 2017-06-13 NOTE — CARE PLAN
Problem: Ventilation Defect:  Goal: Ability to achieve and maintain unassisted ventilation or tolerate decreased levels of ventilator support  Intervention: Support and monitor invasive and noninvasive mechanical ventilation  Adult Ventilation Update    Total Vent Days: 4      Patient Lines/Drains/Airways Status    Active Airway      Name: Placement date: Placement time: Site: Days:     Airway Group ET Tube 7.5 06/09/17  1710    3                 In the last 24 hours, the patient tolerated SBT for 1 on settings of 5/8.    #FVC / Vital Capacity (liters) : 0.8 (06/12/17 1557)  NIF (cm H2O) : -31 (06/12/17 1557)  Rapid Shallow Breathing Index (RR/VT): 58 (06/12/17 1557)  Plateau Pressure (Q Shift): 22 (06/11/17 1831)  Static Compliance (ml / cm H2O): 56 (06/12/17 1557)    Cough: Non Productive (06/12/17 1600)  Sputum Amount: Small (06/12/17 1600)  Sputum Color: White;Bloody;Grey (06/12/17 1600)  Sputum Consistency: Thick (06/12/17 1600)    Mobility Group  Activity Performed: Edge of bed (06/12/17 1600)  Time Activity Tolerated: 7 min (06/12/17 1600)  Assistance / Tolerance: Assistance of Two or More;Tires quickly (06/12/17 1600)  Pt Calls for Assistance: No (06/12/17 1600)  Staff Present for Mobilization: RN (06/12/17 1600)  Assistive Devices: Hand held assist (06/12/17 1600)  Reason Not Mobilized: Unstable condition (06/11/17 2000)  Mobilization Comments: PEEP 10 (06/11/17 2000)    Events/Summary/Plan: Pt tolerated SBT and mobilization well, try again in AM, with extubation likely.      Problem: Bronchoconstriction:  Goal: Improve in air movement and diminished wheezing  Intervention: Implement inhaled treatments  Deuo Q4

## 2017-06-13 NOTE — CARE PLAN
Problem: Bowel/Gastric:  Goal: Will not experience complications related to bowel motility  Intervention: Implement interventions to promote bowel evacuation if inadequate bowel movements in past 48 hours  No BM since 6-10, Miralax given this AM with medium brown bowel movement today.      Problem: Mobility  Goal: Risk for activity intolerance will decrease  Intervention: Encourage patient to increase activity level in collaboration with Interdisciplinary Team  Edge of bed x 7 minutes, ROM with all extremities, tolerated well

## 2017-06-14 ENCOUNTER — APPOINTMENT (OUTPATIENT)
Dept: RADIOLOGY | Facility: MEDICAL CENTER | Age: 62
DRG: 853 | End: 2017-06-14
Attending: INTERNAL MEDICINE
Payer: COMMERCIAL

## 2017-06-14 LAB
ALBUMIN SERPL BCP-MCNC: 3.1 G/DL (ref 3.2–4.9)
ALBUMIN/GLOB SERPL: 0.9 G/DL
ALP SERPL-CCNC: 229 U/L (ref 30–99)
ALT SERPL-CCNC: 32 U/L (ref 2–50)
ANION GAP SERPL CALC-SCNC: 9 MMOL/L (ref 0–11.9)
APPEARANCE UR: CLEAR
AST SERPL-CCNC: 24 U/L (ref 12–45)
BASE EXCESS BLDA CALC-SCNC: 13 MMOL/L (ref -4–3)
BASOPHILS # BLD AUTO: 0.5 % (ref 0–1.8)
BASOPHILS # BLD: 0.04 K/UL (ref 0–0.12)
BILIRUB SERPL-MCNC: 0.3 MG/DL (ref 0.1–1.5)
BILIRUB UR QL STRIP.AUTO: NEGATIVE
BNP SERPL-MCNC: 58 PG/ML (ref 0–100)
BODY TEMPERATURE: ABNORMAL DEGREES
BUN SERPL-MCNC: 43 MG/DL (ref 8–22)
CALCIUM SERPL-MCNC: 9.4 MG/DL (ref 8.5–10.5)
CHLORIDE SERPL-SCNC: 102 MMOL/L (ref 96–112)
CO2 BLDA-SCNC: 40 MMOL/L (ref 20–33)
CO2 SERPL-SCNC: 34 MMOL/L (ref 20–33)
COLOR UR: YELLOW
CREAT SERPL-MCNC: 0.99 MG/DL (ref 0.5–1.4)
EOSINOPHIL # BLD AUTO: 0.2 K/UL (ref 0–0.51)
EOSINOPHIL NFR BLD: 2.3 % (ref 0–6.9)
EPI CELLS #/AREA URNS HPF: ABNORMAL /HPF
ERYTHROCYTE [DISTWIDTH] IN BLOOD BY AUTOMATED COUNT: 47.1 FL (ref 35.9–50)
GFR SERPL CREATININE-BSD FRML MDRD: 57 ML/MIN/1.73 M 2
GLOBULIN SER CALC-MCNC: 3.4 G/DL (ref 1.9–3.5)
GLUCOSE BLD-MCNC: 165 MG/DL (ref 65–99)
GLUCOSE BLD-MCNC: 180 MG/DL (ref 65–99)
GLUCOSE BLD-MCNC: 192 MG/DL (ref 65–99)
GLUCOSE SERPL-MCNC: 181 MG/DL (ref 65–99)
GLUCOSE UR STRIP.AUTO-MCNC: NEGATIVE MG/DL
HCO3 BLDA-SCNC: 38.2 MMOL/L (ref 17–25)
HCT VFR BLD AUTO: 35.1 % (ref 37–47)
HGB BLD-MCNC: 11 G/DL (ref 12–16)
IMM GRANULOCYTES # BLD AUTO: 0.27 K/UL (ref 0–0.11)
IMM GRANULOCYTES NFR BLD AUTO: 3.1 % (ref 0–0.9)
KETONES UR STRIP.AUTO-MCNC: NEGATIVE MG/DL
LEUKOCYTE ESTERASE UR QL STRIP.AUTO: ABNORMAL
LYMPHOCYTES # BLD AUTO: 0.95 K/UL (ref 1–4.8)
LYMPHOCYTES NFR BLD: 10.9 % (ref 22–41)
MAGNESIUM SERPL-MCNC: 2.2 MG/DL (ref 1.5–2.5)
MCH RBC QN AUTO: 27.5 PG (ref 27–33)
MCHC RBC AUTO-ENTMCNC: 31.3 G/DL (ref 33.6–35)
MCV RBC AUTO: 87.8 FL (ref 81.4–97.8)
MICRO URNS: ABNORMAL
MONOCYTES # BLD AUTO: 1.06 K/UL (ref 0–0.85)
MONOCYTES NFR BLD AUTO: 12.1 % (ref 0–13.4)
MUCOUS THREADS #/AREA URNS HPF: ABNORMAL /HPF
NEUTROPHILS # BLD AUTO: 6.21 K/UL (ref 2–7.15)
NEUTROPHILS NFR BLD: 71.1 % (ref 44–72)
NITRITE UR QL STRIP.AUTO: NEGATIVE
NRBC # BLD AUTO: 0 K/UL
NRBC BLD AUTO-RTO: 0 /100 WBC
O2/TOTAL GAS SETTING VFR VENT: 35 %
PCO2 BLDA: 52.6 MMHG (ref 26–37)
PCO2 TEMP ADJ BLDA: 53.4 MMHG (ref 26–37)
PH BLDA: 7.47 [PH] (ref 7.4–7.5)
PH TEMP ADJ BLDA: 7.46 [PH] (ref 7.4–7.5)
PH UR STRIP.AUTO: 5.5 [PH]
PHOSPHATE SERPL-MCNC: 3.1 MG/DL (ref 2.5–4.5)
PLATELET # BLD AUTO: 352 K/UL (ref 164–446)
PMV BLD AUTO: 8.7 FL (ref 9–12.9)
PO2 BLDA: 94 MMHG (ref 64–87)
PO2 TEMP ADJ BLDA: 96 MMHG (ref 64–87)
POTASSIUM SERPL-SCNC: 4 MMOL/L (ref 3.6–5.5)
PROT SERPL-MCNC: 6.5 G/DL (ref 6–8.2)
PROT UR QL STRIP: NEGATIVE MG/DL
RBC # BLD AUTO: 4 M/UL (ref 4.2–5.4)
RBC # URNS HPF: >150 /HPF
RBC UR QL AUTO: ABNORMAL
SAO2 % BLDA: 98 % (ref 93–99)
SODIUM SERPL-SCNC: 145 MMOL/L (ref 135–145)
SP GR UR STRIP.AUTO: 1.01
SPECIMEN DRAWN FROM PATIENT: ABNORMAL
TRIGL SERPL-MCNC: 164 MG/DL (ref 0–149)
WBC # BLD AUTO: 8.7 K/UL (ref 4.8–10.8)
WBC #/AREA URNS HPF: ABNORMAL /HPF

## 2017-06-14 PROCEDURE — 94640 AIRWAY INHALATION TREATMENT: CPT

## 2017-06-14 PROCEDURE — A9270 NON-COVERED ITEM OR SERVICE: HCPCS | Performed by: INTERNAL MEDICINE

## 2017-06-14 PROCEDURE — 71010 DX-CHEST-PORTABLE (1 VIEW): CPT

## 2017-06-14 PROCEDURE — 700102 HCHG RX REV CODE 250 W/ 637 OVERRIDE(OP): Performed by: INTERNAL MEDICINE

## 2017-06-14 PROCEDURE — 82803 BLOOD GASES ANY COMBINATION: CPT

## 2017-06-14 PROCEDURE — 99291 CRITICAL CARE FIRST HOUR: CPT | Performed by: INTERNAL MEDICINE

## 2017-06-14 PROCEDURE — 700102 HCHG RX REV CODE 250 W/ 637 OVERRIDE(OP): Performed by: HOSPITALIST

## 2017-06-14 PROCEDURE — 700102 HCHG RX REV CODE 250 W/ 637 OVERRIDE(OP)

## 2017-06-14 PROCEDURE — 700101 HCHG RX REV CODE 250: Performed by: HOSPITALIST

## 2017-06-14 PROCEDURE — 99232 SBSQ HOSP IP/OBS MODERATE 35: CPT | Performed by: HOSPITALIST

## 2017-06-14 PROCEDURE — 700111 HCHG RX REV CODE 636 W/ 250 OVERRIDE (IP): Performed by: INTERNAL MEDICINE

## 2017-06-14 PROCEDURE — 36600 WITHDRAWAL OF ARTERIAL BLOOD: CPT

## 2017-06-14 PROCEDURE — 700111 HCHG RX REV CODE 636 W/ 250 OVERRIDE (IP)

## 2017-06-14 PROCEDURE — 81001 URINALYSIS AUTO W/SCOPE: CPT

## 2017-06-14 PROCEDURE — 80053 COMPREHEN METABOLIC PANEL: CPT

## 2017-06-14 PROCEDURE — 84100 ASSAY OF PHOSPHORUS: CPT

## 2017-06-14 PROCEDURE — 84478 ASSAY OF TRIGLYCERIDES: CPT

## 2017-06-14 PROCEDURE — 770022 HCHG ROOM/CARE - ICU (200)

## 2017-06-14 PROCEDURE — 83735 ASSAY OF MAGNESIUM: CPT

## 2017-06-14 PROCEDURE — 700101 HCHG RX REV CODE 250: Performed by: INTERNAL MEDICINE

## 2017-06-14 PROCEDURE — 82962 GLUCOSE BLOOD TEST: CPT | Mod: 91

## 2017-06-14 PROCEDURE — 94003 VENT MGMT INPAT SUBQ DAY: CPT

## 2017-06-14 PROCEDURE — 85025 COMPLETE CBC W/AUTO DIFF WBC: CPT

## 2017-06-14 PROCEDURE — 83880 ASSAY OF NATRIURETIC PEPTIDE: CPT

## 2017-06-14 PROCEDURE — A9270 NON-COVERED ITEM OR SERVICE: HCPCS

## 2017-06-14 PROCEDURE — 94690 O2 UPTK REST INDIRECT: CPT

## 2017-06-14 PROCEDURE — A9270 NON-COVERED ITEM OR SERVICE: HCPCS | Performed by: HOSPITALIST

## 2017-06-14 RX ORDER — POTASSIUM CHLORIDE 1.5 G/1.58G
20 POWDER, FOR SOLUTION ORAL 2 TIMES DAILY
Status: DISCONTINUED | OUTPATIENT
Start: 2017-06-14 | End: 2017-06-22 | Stop reason: HOSPADM

## 2017-06-14 RX ORDER — FUROSEMIDE 20 MG/1
20 TABLET ORAL
Status: DISCONTINUED | OUTPATIENT
Start: 2017-06-14 | End: 2017-06-15

## 2017-06-14 RX ADMIN — IPRATROPIUM BROMIDE AND ALBUTEROL SULFATE 3 ML: .5; 3 SOLUTION RESPIRATORY (INHALATION) at 07:01

## 2017-06-14 RX ADMIN — PROPOFOL 50 MCG/KG/MIN: 10 INJECTION, EMULSION INTRAVENOUS at 03:43

## 2017-06-14 RX ADMIN — FENTANYL CITRATE 100 MCG: 50 INJECTION, SOLUTION INTRAMUSCULAR; INTRAVENOUS at 12:29

## 2017-06-14 RX ADMIN — IPRATROPIUM BROMIDE AND ALBUTEROL SULFATE 3 ML: .5; 3 SOLUTION RESPIRATORY (INHALATION) at 05:03

## 2017-06-14 RX ADMIN — FENTANYL CITRATE 100 MCG: 50 INJECTION, SOLUTION INTRAMUSCULAR; INTRAVENOUS at 06:28

## 2017-06-14 RX ADMIN — ATORVASTATIN CALCIUM 40 MG: 40 TABLET, FILM COATED ORAL at 20:12

## 2017-06-14 RX ADMIN — PROPOFOL 15 MCG/KG/MIN: 10 INJECTION, EMULSION INTRAVENOUS at 15:07

## 2017-06-14 RX ADMIN — PROPOFOL 50 MCG/KG/MIN: 10 INJECTION, EMULSION INTRAVENOUS at 00:55

## 2017-06-14 RX ADMIN — PROPOFOL 40 MCG/KG/MIN: 10 INJECTION, EMULSION INTRAVENOUS at 19:00

## 2017-06-14 RX ADMIN — SENNOSIDES AND DOCUSATE SODIUM 2 TABLET: 8.6; 5 TABLET ORAL at 20:11

## 2017-06-14 RX ADMIN — APIXABAN 5 MG: 5 TABLET, FILM COATED ORAL at 20:12

## 2017-06-14 RX ADMIN — INSULIN LISPRO 3 UNITS: 100 INJECTION, SOLUTION INTRAVENOUS; SUBCUTANEOUS at 05:19

## 2017-06-14 RX ADMIN — INSULIN LISPRO 3 UNITS: 100 INJECTION, SOLUTION INTRAVENOUS; SUBCUTANEOUS at 17:46

## 2017-06-14 RX ADMIN — LEVOTHYROXINE SODIUM 88 MCG: 88 TABLET ORAL at 05:10

## 2017-06-14 RX ADMIN — APIXABAN 5 MG: 5 TABLET, FILM COATED ORAL at 09:11

## 2017-06-14 RX ADMIN — SENNOSIDES AND DOCUSATE SODIUM 2 TABLET: 8.6; 5 TABLET ORAL at 09:11

## 2017-06-14 RX ADMIN — INSULIN LISPRO 3 UNITS: 100 INJECTION, SOLUTION INTRAVENOUS; SUBCUTANEOUS at 12:38

## 2017-06-14 RX ADMIN — IPRATROPIUM BROMIDE AND ALBUTEROL SULFATE 3 ML: .5; 3 SOLUTION RESPIRATORY (INHALATION) at 18:22

## 2017-06-14 RX ADMIN — FUROSEMIDE 20 MG: 20 TABLET ORAL at 16:20

## 2017-06-14 RX ADMIN — LIDOCAINE HYDROCHLORIDE 2 ML: 10 INJECTION, SOLUTION INFILTRATION; PERINEURAL at 11:15

## 2017-06-14 RX ADMIN — IPRATROPIUM BROMIDE AND ALBUTEROL SULFATE 3 ML: .5; 3 SOLUTION RESPIRATORY (INHALATION) at 11:16

## 2017-06-14 RX ADMIN — POTASSIUM CHLORIDE 20 MEQ: 1.5 POWDER, FOR SOLUTION ORAL at 20:11

## 2017-06-14 RX ADMIN — FAMOTIDINE 20 MG: 20 TABLET, FILM COATED ORAL at 09:11

## 2017-06-14 RX ADMIN — FAMOTIDINE 20 MG: 20 TABLET, FILM COATED ORAL at 20:11

## 2017-06-14 RX ADMIN — IPRATROPIUM BROMIDE AND ALBUTEROL SULFATE 3 ML: .5; 3 SOLUTION RESPIRATORY (INHALATION) at 14:50

## 2017-06-14 RX ADMIN — CHLORHEXIDINE GLUCONATE 15 ML: 1.2 RINSE ORAL at 09:11

## 2017-06-14 RX ADMIN — IPRATROPIUM BROMIDE AND ALBUTEROL SULFATE 3 ML: .5; 3 SOLUTION RESPIRATORY (INHALATION) at 23:22

## 2017-06-14 RX ADMIN — FUROSEMIDE 40 MG: 20 TABLET ORAL at 05:11

## 2017-06-14 RX ADMIN — PROPOFOL 40 MCG/KG/MIN: 10 INJECTION, EMULSION INTRAVENOUS at 22:39

## 2017-06-14 RX ADMIN — CHLORHEXIDINE GLUCONATE 15 ML: 1.2 RINSE ORAL at 20:11

## 2017-06-14 RX ADMIN — POTASSIUM CHLORIDE 20 MEQ: 1.5 POWDER, FOR SOLUTION ORAL at 09:11

## 2017-06-14 NOTE — CARE PLAN
Problem: Ventilation Defect:  Goal: Ability to achieve and maintain unassisted ventilation or tolerate decreased levels of ventilator support  Outcome: PROGRESSING AS EXPECTED  Adult Ventilation Update    Total Vent Days: 6      Patient Lines/Drains/Airways Status    Active Airway      Name: Placement date: Placement time: Site: Days:     Airway Group ET Tube 7.5 06/09/17 1710    4                 In the last 24 hours, the patient tolerated SBT for 2 1 hour sessions, 5/8  #FVC / Vital Capacity (liters) :  (pt unable to perform) (06/14/17 0910)  NIF (cm H2O) :  (pt unable to perform) (06/14/17 0910)  Rapid Shallow Breathing Index (RR/VT): 63 (06/14/17 0910)  Plateau Pressure (Q Shift):  (pt overbreathing vent) (06/14/17 0702)  Static Compliance (ml / cm H2O): 41 (06/14/17 1456)    Patient failed trials because of Barriers to Wean: FiO2 >50% OR PEEP >8 (PMA Only) (06/11/17 1831)  Barriers to SBT Weaning Trial Stopped due to:: Pt weaned for 1 hour and returned to rest settings per protocol (06/12/17 1557)  Length of Weaning Trial Length of Weaning Trial (Hours): 1 (06/12/17 1557)    Cough: Productive (06/14/17 1200)  Sputum Amount: Large;Copious (06/14/17 1200)  Sputum Color: White (06/14/17 1200)  Sputum Consistency: Thick (06/14/17 1200)    Mobility Group  Activity Performed: Unable to mobilize (06/14/17 0702)  Time Activity Tolerated: 10 min (06/13/17 1800)  Assistance / Tolerance: Assistance of Two or More;Tires quickly (06/12/17 1600)  Pt Calls for Assistance: No (06/14/17 0800)  Staff Present for Mobilization: RN (06/12/17 1600)  Assistive Devices: Hand held assist (06/13/17 0400)  Reason Not Mobilized: Unstable condition (06/11/17 2000)  Mobilization Comments: Tolerated well (06/13/17 1800)    Events/Summary/Plan: SBT ended (06/14/17 1456)

## 2017-06-14 NOTE — CARE PLAN
Problem: Respiratory:  Goal: Respiratory status will improve  Outcome: PROGRESSING AS EXPECTED  Pt off sedation for 9 hours today  Tolerated well.  SBT x 2.

## 2017-06-14 NOTE — CARE PLAN
Problem: Mobility  Goal: Risk for activity intolerance will decrease  Outcome: PROGRESSING SLOWER THAN EXPECTED  Pt up to bedside for 10 minutes

## 2017-06-14 NOTE — CARE PLAN
Problem: Ventilation Defect:  Goal: Ability to achieve and maintain unassisted ventilation or tolerate decreased levels of ventilator support  Intervention: Support and monitor invasive and noninvasive mechanical ventilation  Adult Ventilation Update    Total Vent Days: 6  CMV: 16/360/+8/40%      Patient Lines/Drains/Airways Status    Active Airway      Name: Placement date:           Airway Group ET Tube 7.5 06/09/17                  In the last 24 hours, the patient tolerated SBT for 2 hours on settings of 5/8.    Plateau Pressure (Q Shift): 23   Static Compliance (ml / cm H2O): 27     Sputum/Suction   Cough: Productive   Sputum Amount: Small  Sputum Color: White   Sputum Consistency: Thin;Thick     Mobility Group  Activity Performed: Edge of bed (06/13/17 1800)  Time Activity Tolerated: 10 min (06/13/17 1800)    Events/Summary/Plan: No ventilatory changes made overnight. FiO2 titrated per SpO2 and ABG results.

## 2017-06-14 NOTE — PROGRESS NOTES
Monitor summary:      0.20/0.08/0.32     Sinus Rhythm to sinus tachycardia 90s to low 100s without ectopy.

## 2017-06-14 NOTE — PROGRESS NOTES
Pulmonary Critical Care Progress Note     Date of service: 6/14/2017    Interval Events:  24 hour interval history reviewed  Reason for visit:  Respiratory failure, acute pulmonary edema  Unable to provide CC or ROS due to intubation and sedation      Awake, alert, follows commands.  Propofol paused for SBT.  CXR with improved edema  SR      PFSH:  No change.    Respiratory:  Pulse Oximetry: 97 %  Vent:  CMV, PEEP 8, 35%  Vent waveforms and airway  reviewed in detail  CXR with improved edema  Coarse crackles bilaterally  No wheezing    Recent Labs      06/12/17   0426  06/13/17   0443  06/14/17   0511   ISTATAPH  7.479  7.520*  7.470   ISTATAPCO2  49.2*  47.2*  52.6*   ISTATAPO2  96*  77  94*   ISTATATCO2  38*  40*  40*   VIDDHJH2SQX  98  96  98   ISTATARTHCO3  36.6*  38.6*  38.2*   ISTATARTBE  12*  14*  13*   ISTATTEMP  97.3 F  99.5 F  99.2 F   ISTATFIO2  40  35  35   ISTATSPEC  Arterial  Arterial  Arterial   ISTATAPHTC  7.490  7.512*  7.464   PEHXHVCA3AG  92*  79  96*     HemoDynamics:  Pulse: 100, Heart Rate (Monitored): 92  NIBP: 150/78 mmHg     SR    Neuro:  Sedated, arouses and nods  Propofol off    Fluids:  Intake/Output       06/12/17 0700 - 06/13/17 0659 06/13/17 0700 - 06/14/17 0659 06/14/17 0700 - 06/15/17 0659      0700-1859 5370-7020 Total 4601-0664 5290-5515 Total 3538-3275 2622-8225 Total       Intake    I.V.  436.9  405 841.9  118  404.4 522.4  --  -- --    Propofol Volume 436.9 405 841.9 118 404.4 522.4 -- -- --    Other  130  -- 130  --  -- --  --  -- --    Medications (P.O./ Enteral Liquids) 130 -- 130 -- -- -- -- -- --    Enteral  660  540 1200  540  540 1080  --  -- --    Enteral Volume   -- -- --    Free Water / Tube Flush 120 -- 120 -- -- -- -- -- --    Total Intake 1226.9 945 2171.9 658 944.4 1602.4 -- -- --       Output    Urine  2900  1350 4250  2320  1500 3820  --  -- --    Indwelling Cathether 2900 1350 4250 2320 1500 3820 -- -- --    Stool  --  -- --  --   -- --  --  -- --    Number of Times Stooled 1 x -- 1 x -- -- -- -- -- --    Total Output 2900 1350 4250 2320 1500 3820 -- -- --       Net I/O     -1673.1 -405.1 -2078.1 -1662.1 -555.6 -2217.7 -- -- --           Recent Labs      17   0418   SODIUM  140  141   POTASSIUM  4.0  4.2   CHLORIDE  98  100   CO2  33  35*   BUN  33*  36*   CREATININE  1.02  1.03   MAGNESIUM  1.7  1.9   PHOSPHORUS  2.6   --    CALCIUM  9.0  8.9     GI/Nutrition:  Abd soft ND/NT  Tolerating enteral TF    Liver Function  Recent Labs      17   ALTSGPT  28   --    ASTSGOT  14   --    ALKPHOSPHAT  200*   --    TBILIRUBIN  0.4   --    PREALBUMIN  12.0*   --    GLUCOSE  166*  165*     Heme:  Recent Labs      17   0500   RBC  3.97*  3.94*  4.00*   HEMOGLOBIN  11.1*  10.9*  11.0*   HEMATOCRIT  34.0*  33.8*  35.1*   PLATELETCT  374  365  352     Infectious Disease:  Temp  Av.7 °C (99.9 °F)  Min: 37.2 °C (99 °F)  Max: 38.4 °C (101.1 °F)     Recent Labs      17   0500   WBC  9.1  9.0  8.7   NEUTSPOLYS  72.70*  71.40  71.10   LYMPHOCYTES  9.10*  10.40*  10.90*   MONOCYTES  12.30  11.60  12.10   EOSINOPHILS  4.10  4.00  2.30   BASOPHILS  0.30  0.30  0.50   ASTSGOT  14   --    --    ALTSGPT  28   --    --    ALKPHOSPHAT  200*   --    --    TBILIRUBIN  0.4   --    --      Current Facility-Administered Medications   Medication Dose Frequency Provider Last Rate Last Dose   • atorvastatin (LIPITOR) tablet 40 mg  40 mg QHS Nishant Sebastian M.D.   40 mg at 17   • furosemide (LASIX) tablet 40 mg  40 mg BID DIURETIC Nishant Sebastian M.D.   40 mg at 17 0511   • famotidine (PEPCID) tablet 20 mg  20 mg BID Josefa Moreno PHARMD   20 mg at 17 2100   • apixaban (ELIQUIS) 2.5mg tablet 5 mg  5 mg BID Jaime Bush M.D.   5 mg at 17   • potassium chloride (KLOR-CON) 20 MEQ packet 40 mEq  40 mEq BID  Antonio Mata M.D.   40 mEq at 06/13/17 2119   • insulin lispro (HUMALOG) injection 3-14 Units  3-14 Units Q6HRS Antonio Mata M.D.   3 Units at 06/14/17 0519   • propofol (DIPRIVAN) injection  5-80 mcg/kg/min Continuous Sid Patino PHARMD 33.7 mL/hr at 06/14/17 0343 50 mcg/kg/min at 06/14/17 0343   • Respiratory Care per Protocol   Continuous RT Antonio Mata M.D.       • chlorhexidine (PERIDEX) 0.12 % solution 15 mL  15 mL BID Antonio Mata M.D.   15 mL at 06/13/17 2119   • lidocaine (XYLOCAINE) 1%  injection  1-2 mL Q30 MIN PRN Antonio Mata M.D.       • MD ALERT...Adult ICU Electrolyte Replacement per Pharmacy Protocol   pharmacy to dose Antonio Mata M.D.       • fentaNYL (SUBLIMAZE) injection 25 mcg  25 mcg Q HOUR PRN Antonio Mata M.D.   25 mcg at 06/12/17 1526    Or   • fentaNYL (SUBLIMAZE) injection 50 mcg  50 mcg Q HOUR PRN Antonio Mata M.D.        Or   • fentaNYL (SUBLIMAZE) injection 100 mcg  100 mcg Q HOUR PRN Antonio Mata M.D.       • ipratropium-albuterol (DUONEB) nebulizer solution 3 mL  3 mL Q2HRS PRN (RT) Antonio Mata M.D.       • ipratropium-albuterol (DUONEB) nebulizer solution 3 mL  3 mL Q4HRS (RT) Jaime Bush M.D.   3 mL at 06/14/17 0503   • glucose 4 g chewable tablet 16 g  16 g Q15 MIN PRN Anson Manuel M.D.        And   • dextrose 50% (D50W) injection 25 mL  25 mL Q15 MIN PRN Anson Manuel M.D.       • levothyroxine (SYNTHROID) tablet 88 mcg  88 mcg AM ES Anson Manuel M.D.   88 mcg at 06/14/17 0510   • senna-docusate (PERICOLACE or SENOKOT S) 8.6-50 MG per tablet 2 Tab  2 Tab BID Anson Manuel M.D.   2 Tab at 06/13/17 2119    And   • polyethylene glycol/lytes (MIRALAX) PACKET 1 Packet  1 Packet QDAY PRN Anson Manuel M.D.   1 Packet at 06/12/17 0920    And   • magnesium hydroxide (MILK OF MAGNESIA) suspension 30 mL  30 mL QDAY PRN Anson Manuel M.D.        And   • bisacodyl (DULCOLAX) suppository 10 mg  10 mg QDAY PRN Anson Manuel M.D.       • ondansetron (ZOFRAN)  syringe/vial injection 4 mg  4 mg Q4HRS PRN Anson Manuel M.D.       • ondansetron (ZOFRAN ODT) dispertab 4 mg  4 mg Q4HRS PRN Anson Manuel M.D.       • acetaminophen (TYLENOL) tablet 650 mg  650 mg Q6HRS PRN Anson Manuel M.D.         Last reviewed on 6/8/2017  9:34 AM by Mindi Benites, Pharmacy Int    Quality  Measures:  Labs reviewed, Medications reviewed and Radiology images reviewed  Tamayo catheter: Critically Ill - Requiring Accurate Measurement of Urinary Output  Central line in place: Need for access      DVT prophylaxis - mechanical: SCDs  Ulcer prophylaxis: Yes        Assessment and Plan:    VDRF - intubated 6/9   - cont vent support   - SBT as tolerated  Acute hypoxemic respiratory failure  Acute pulmonary edema   - force diuresis as tolerated   - decrease Lasix with increased azotemia  Query pneumonia   - finished 5 days of Rocephin and Zithromax 6/12 - observe off antibiotics  Fever - source not clear   - check UA   - WBC decreased   - hold on antibiotics for now  PAF - now in SR   - optimize K and Mg   - anticoagulated with apixaban  Suspect acute on chronic diastolic heart failure   - force diuresis as tolerated  DM 2 - cont insulin  Hx of HTN  Dyslipidemia    Critical Care Time:  35 minutes  29763  No time overlap  Time excludes procedures  Discussed with RN, RT, Team    ILilli (Scribe), am scribing for, and in the presence of, Evens Riggs M.D.    Electronically signed by: Lilli Abel ([place]), 6/14/2017    IEvens M.D. personally performed the services described in this documentation, as scribed by Lilli Abel in my presence, and it is both accurate and complete.

## 2017-06-14 NOTE — PROGRESS NOTES
[facility] Hospitalist Progress Note    Date of Service: 2017    Chief Complaint  61 y.o. female admitted 2017 with syncope.    Interval Problem Update  Ms. Alfaro has a hx of htn and recently diagnosed afib.  CT chest in the ER revealed alveolar hemorrhage. She required rescue bipap and was admitted to the ICU. She required intubation on  with bronchoscopy.   She was in afib that converted to sinus rhythm    Patient seen and examined today. ICU Care  Care and plan discussed in IDT/Hot rounds.  Lines and assistive devices reviewed.    Patient tolerating treatment and therapies.  All Data, Medication data reviewed.  Case discussed with nursing as available.  Plan of Care reviewed with patient and notified of changes.   Pt remains intubated, renal function better, nephro s/o, Bp better, follows commands, CXR no change, SBT's in progress, h/h stable, on AC, getting diuresis    Consultants/Specialty  Pulmonology  Cardiology  Nephrology    Disposition  ICU        Review of Systems   Unable to perform ROS: intubated      Physical Exam  Laboratory/Imaging   Hemodynamics  Temp (24hrs), Av.6 °C (99.6 °F), Min:36.6 °C (97.9 °F), Max:38.4 °C (101.1 °F)   Temperature: (!) 38.4 °C (101.1 °F)  Pulse  Av.6  Min: 62  Max: 121 Heart Rate (Monitored): 100  NIBP: 133/69 mmHg      Respiratory  [place] Vent Mode: Spont, Rate (breaths/min): 16, PEEP/CPAP: 8, PEEP/CPAP: 8, FiO2: 30, P Peak (PIP): 22, P MEAN: 12   Respiration: 20, Pulse Oximetry: 95 %     Work Of Breathing / Effort: Vented  RUL Breath Sounds: Coarse Crackles, RML Breath Sounds: Coarse Crackles, RLL Breath Sounds: Coarse Crackles, CAROLANN Breath Sounds: Coarse Crackles, LLL Breath Sounds: Coarse Crackles    Fluids    Intake/Output Summary (Last 24 hours) at 17 1704  Last data filed at 17 1600   Gross per 24 hour   Intake 1609.5 ml   Output   3820 ml   Net -2210.5 ml       Nutrition  Orders Placed This Encounter   Procedures   • Diet NPO      Standing Status: Standing      Number of Occurrences: 1      Standing Expiration Date:      Order Specific Question:  Type:     Answer:  Now [1]     Order Specific Question:  Restrict to:     Answer:  Strict [1]     Physical Exam   Constitutional: She appears well-nourished. No distress.   HENT:   Poor dentition.   cortrak Nares.    Eyes: No scleral icterus.   Neck:   Right sided central line without bleeding.   Cardiovascular:   Distant, regular heart sounds.    Pulmonary/Chest: She has rales.   Vent, moderate air movement. No wheezing.    Abdominal: Soft. She exhibits no distension.   Genitourinary:   Tamayo cath.   Musculoskeletal: She exhibits edema. She exhibits no tenderness.   Edema hands   Neurological:   Sedated though opens eyes to stimulation.     Skin: She is not diaphoretic. No erythema. There is pallor.   Very long, sharp toenails with onychomycosis.       Recent Labs      06/11/17 0424  06/12/17   0600 06/13/17   0418   WBC  9.4  9.1  9.0   RBC  3.84*  3.97*  3.94*   HEMOGLOBIN  10.8*  11.1*  10.9*   HEMATOCRIT  32.5*  34.0*  33.8*   MCV  84.6  85.6  85.8   MCH  28.1  28.0  27.7   MCHC  33.2*  32.6*  32.2*   RDW  44.0  44.6  45.0   PLATELETCT  343  374  365   MPV  8.6*  9.0  8.7*     Recent Labs      06/11/17   0424  06/12/17   0600  06/13/17   0418   SODIUM  138  140  141   POTASSIUM  3.0*  4.0  4.2   CHLORIDE  99  98  100   CO2  29  33  35*   GLUCOSE  154*  166*  165*   BUN  32*  33*  36*   CREATININE  1.16  1.02  1.03   CALCIUM  8.6  9.0  8.9             Recent Labs      06/11/17   0424   TRIGLYCERIDE  131          Assessment/Plan     Severe sepsis (CMS-HCC) (present on admission)  Assessment & Plan  Source appears respiratory. IV abx azithromycin and rocephin with stop dates. Query non-infectious SIRS with cardiac source.  The organ system dysfunction associated with this disease process is the respiratory system as is evidenced by respiratory failure requiring intubation.     Acute respiratory  failure with hypoxia (CMS-HCC) (present on admission)  Assessment & Plan    VDRF, intubated  SBT's slowly better  Pulmonology consulted and following.   Echo showed normal EF at 65%.  Aggressive IV diuresis.     CT chest:    Extensive bilateral patchy and groundglass airspace opacities may represent pulmonary edema, multifocal pneumonia. Hemorrhage is also a consideration in the correct clinical setting. Follow-up to radiographic resolution is recommended.    Small bilateral pleural effusions, right greater than left.    Cardiomegaly.    Mildly prominent mediastinal lymph nodes are nonspecific.    Coronary atherosclerotic plaque.    Small hiatal hernia.    KIRT (acute kidney injury) (CMS-[facility]) (present on admission)  Assessment & Plan  Cr on admit was 2.95 with baseline 1.1 per review of the records from 2016.   Consistent with ATN. IV fluids have been given.   Nephrology consultation.   Improved, now  Nephro s/o    Atrial fibrillation (CMS-[facility]) (present on admission)  Assessment & Plan  Eliquis as an outpatient. Rate control on coreg as an outpatient.    Recently diagnosed.  C/w Eliquis  Cardiology consulted.  Converted to sinus on 6/12.  EF 65%.    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-[facility]) (present on admission)  Assessment & Plan  Sliding scale    Hyponatremia (present on admission)  Assessment & Plan  resolved    Hypothyroid (present on admission)  Assessment & Plan  replacement    HTN (hypertension) (present on admission)  Assessment & Plan  On cardizem, hyzaar 100/25, clonidine, coreg, norvasc as an outpatient.    Pulmonary hypertension (CMS-[facility]) (present on admission)  Assessment & Plan  RVSP 50 mm Hg  She is high risk of volume overload.    - Morbid obesity  Plan  C/w diuresis  Vent weaning  Electrolyte balance  AC  See orders  Am Labs  Pt is critically ill in ICU  Time spent 35 min, no overlap  Labs reviewed and Medications reviewed  Tamayo catheter: Unconscious / Sedated Patient on a  Ventilator  Central line in place: Sepsis    DVT Prophylaxis: Heparin

## 2017-06-14 NOTE — CARE PLAN
Problem: Mechanical Ventilation  Goal: Safe management of artificial airway and ventilation  Oral care provided, suctioning of ET tube performed, HOB elevated to 30 degrees or greater, DVT prophylaxis in place, peptic ulcer prophylaxis in place, spontaneous breathing trials managed by RRT, collaboration with RRT performed.     Problem: Pain  Goal: Alleviation of Pain or a reduction in pain to the patient’s comfort goal  Pharmacologic and non-pharmacologic methods in use to control pain.

## 2017-06-14 NOTE — PROGRESS NOTES
Cardiology Progress Note               Author: Nishant Sebastian Date & Time created: 2017  8:28 AM     Interval History:  Pt admitted with sepsis,KIRT and VDRF. Cardiology consulted for atrial fib and history of HTN.    Review of Systems   Unable to perform ROS: intubated       Physical Exam   Neck: No JVD present.   Cardiovascular: Normal rate, regular rhythm, S1 normal and S2 normal.  Exam reveals no gallop, no S3 and no S4.    No murmur heard.  Pulmonary/Chest: Effort normal. She has no rales.   Abdominal: Soft. There is no tenderness.   Musculoskeletal: She exhibits no edema.       Hemodynamics:  Temp (24hrs), Av.7 °C (99.9 °F), Min:37.2 °C (99 °F), Max:38.4 °C (101.1 °F)  Temperature: 37.2 °C (99 °F)  Pulse  Av.2  Min: 62  Max: 121Heart Rate (Monitored): (!) 101  NIBP: 143/79 mmHg     Respiratory:  [place] Vent Mode: Spont, Rate (breaths/min): 16, PEEP/CPAP: 8, FiO2: 30, P Peak (PIP): 17, P MEAN: 10 Respiration: (!) 25, Pulse Oximetry: 98 %     Work Of Breathing / Effort: Vented  RUL Breath Sounds: Coarse Crackles, RML Breath Sounds: Coarse Crackles, RLL Breath Sounds: Coarse Crackles, CAROLANN Breath Sounds: Coarse Crackles, LLL Breath Sounds: Coarse Crackles  Fluids:  Date 17 0700 - 17 0659   Shift 4929-7070 7683-0572 4980-1573 24 Hour Total   I  N  T  A  K  E   I.V. 138   138    Other 100   100    Enteral 210   210    Shift Total 448   448   O  U  T  P  U  T   Urine 1400   1400    Shift Total 1400   1400   Weight (kg) 103.9 103.9 103.9 103.9       Weight: 103.4 kg (227 lb 15.3 oz)  GI/Nutrition:  Orders Placed This Encounter   Procedures   • Diet NPO     Standing Status: Standing      Number of Occurrences: 1      Standing Expiration Date:      Order Specific Question:  Type:     Answer:  Now [1]     Order Specific Question:  Restrict to:     Answer:  Strict [1]     Lab Results:  Recent Labs      17   0600  17   0418  17   0500   WBC  9.1  9.0  8.7   RBC  3.97*  3.94*   4.00*   HEMOGLOBIN  11.1*  10.9*  11.0*   HEMATOCRIT  34.0*  33.8*  35.1*   MCV  85.6  85.8  87.8   MCH  28.0  27.7  27.5   MCHC  32.6*  32.2*  31.3*   RDW  44.6  45.0  47.1   PLATELETCT  374  365  352   MPV  9.0  8.7*  8.7*     Recent Labs      06/12/17   0600  06/13/17   0418  06/14/17   0500   SODIUM  140  141  145   POTASSIUM  4.0  4.2  4.0   CHLORIDE  98  100  102   CO2  33  35*  34*   GLUCOSE  166*  165*  181*   BUN  33*  36*  43*   CREATININE  1.02  1.03  0.99   CALCIUM  9.0  8.9  9.4         Recent Labs      06/14/17   0500   BNPBTYPENAT  58     Recent Labs      06/14/17   0500   BNPBTYPENAT  58     Recent Labs      06/14/17   0500   TRIGLYCERIDE  164*       CTA Chest:    Extensive bilateral patchy and groundglass airspace opacities may represent pulmonary edema, multifocal pneumonia. Hemorrhage is also a consideration in the correct clinical setting. Follow-up to radiographic resolution is recommended.    Small bilateral pleural effusions, right greater than left.    Cardiomegaly.    Mildly prominent mediastinal lymph nodes are nonspecific.    Coronary atherosclerotic plaque.    Small hiatal hernia.           Reading Provider Reading Date     Ciaran Hoskins M.D. Jun 8, 2017           Echo:  CONCLUSIONS  No prior study is available for comparison.   Left ventricular ejection fraction is visually estimated to be 65%.  Mild concentric left ventricular hypertrophy.  Diastolic function is difficult to assess with atrial fibrillation.    Estimated right ventricular systolic pressure is 50 mmHg.  Small pleural effusion noted.  Moderate tricuspid regurgitation.  Exam Date:         06/08/2017    Medical Decision Making, by Problem:  Active Hospital Problems    Diagnosis   • Severe sepsis (CMS-HCC) [A41.9, R65.20]   • Acute respiratory failure with hypoxia (CMS-HCC) [J96.01]   • Atrial fibrillation (CMS-HCC) [I48.91]   • 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-[facility]) [E11.59]   • Hyponatremia  [E87.1]   • Hypothyroid [E03.9]   • HTN (hypertension) [I10]   • KIRT (acute kidney injury) (CMS-HCC) [N17.9]     Pt now in SR but still on vent. Renal function improved and pt on eliquis at 5 mg/bid  Plan:  Continue current Rx    Core Measures

## 2017-06-15 ENCOUNTER — APPOINTMENT (OUTPATIENT)
Dept: RADIOLOGY | Facility: MEDICAL CENTER | Age: 62
DRG: 853 | End: 2017-06-15
Attending: INTERNAL MEDICINE
Payer: COMMERCIAL

## 2017-06-15 LAB
ANION GAP SERPL CALC-SCNC: 7 MMOL/L (ref 0–11.9)
BASE EXCESS BLDA CALC-SCNC: 10 MMOL/L (ref -4–3)
BASOPHILS # BLD AUTO: 0.8 % (ref 0–1.8)
BASOPHILS # BLD: 0.08 K/UL (ref 0–0.12)
BODY TEMPERATURE: ABNORMAL DEGREES
BUN SERPL-MCNC: 41 MG/DL (ref 8–22)
CALCIUM SERPL-MCNC: 9.2 MG/DL (ref 8.5–10.5)
CHLORIDE SERPL-SCNC: 107 MMOL/L (ref 96–112)
CO2 BLDA-SCNC: 36 MMOL/L (ref 20–33)
CO2 SERPL-SCNC: 33 MMOL/L (ref 20–33)
CREAT SERPL-MCNC: 0.96 MG/DL (ref 0.5–1.4)
EOSINOPHIL # BLD AUTO: 0.27 K/UL (ref 0–0.51)
EOSINOPHIL NFR BLD: 2.8 % (ref 0–6.9)
ERYTHROCYTE [DISTWIDTH] IN BLOOD BY AUTOMATED COUNT: 48.1 FL (ref 35.9–50)
GFR SERPL CREATININE-BSD FRML MDRD: 59 ML/MIN/1.73 M 2
GLUCOSE BLD-MCNC: 163 MG/DL (ref 65–99)
GLUCOSE BLD-MCNC: 167 MG/DL (ref 65–99)
GLUCOSE BLD-MCNC: 182 MG/DL (ref 65–99)
GLUCOSE BLD-MCNC: 185 MG/DL (ref 65–99)
GLUCOSE BLD-MCNC: 192 MG/DL (ref 65–99)
GLUCOSE SERPL-MCNC: 181 MG/DL (ref 65–99)
HCO3 BLDA-SCNC: 34.6 MMOL/L (ref 17–25)
HCT VFR BLD AUTO: 35 % (ref 37–47)
HGB BLD-MCNC: 10.7 G/DL (ref 12–16)
IMM GRANULOCYTES # BLD AUTO: 0.21 K/UL (ref 0–0.11)
IMM GRANULOCYTES NFR BLD AUTO: 2.2 % (ref 0–0.9)
LYMPHOCYTES # BLD AUTO: 0.88 K/UL (ref 1–4.8)
LYMPHOCYTES NFR BLD: 9.1 % (ref 22–41)
MAGNESIUM SERPL-MCNC: 1.9 MG/DL (ref 1.5–2.5)
MCH RBC QN AUTO: 27.5 PG (ref 27–33)
MCHC RBC AUTO-ENTMCNC: 30.6 G/DL (ref 33.6–35)
MCV RBC AUTO: 90 FL (ref 81.4–97.8)
MONOCYTES # BLD AUTO: 1.02 K/UL (ref 0–0.85)
MONOCYTES NFR BLD AUTO: 10.5 % (ref 0–13.4)
NEUTROPHILS # BLD AUTO: 7.21 K/UL (ref 2–7.15)
NEUTROPHILS NFR BLD: 74.6 % (ref 44–72)
NRBC # BLD AUTO: 0 K/UL
NRBC BLD AUTO-RTO: 0 /100 WBC
O2/TOTAL GAS SETTING VFR VENT: 35 %
PCO2 BLDA: 48.1 MMHG (ref 26–37)
PCO2 TEMP ADJ BLDA: 48 MMHG (ref 26–37)
PH BLDA: 7.46 [PH] (ref 7.4–7.5)
PH TEMP ADJ BLDA: 7.46 [PH] (ref 7.4–7.5)
PLATELET # BLD AUTO: 353 K/UL (ref 164–446)
PMV BLD AUTO: 9 FL (ref 9–12.9)
PO2 BLDA: 84 MMHG (ref 64–87)
PO2 TEMP ADJ BLDA: 84 MMHG (ref 64–87)
POTASSIUM SERPL-SCNC: 4 MMOL/L (ref 3.6–5.5)
RBC # BLD AUTO: 3.89 M/UL (ref 4.2–5.4)
SAO2 % BLDA: 97 % (ref 93–99)
SODIUM SERPL-SCNC: 147 MMOL/L (ref 135–145)
SPECIMEN DRAWN FROM PATIENT: ABNORMAL
WBC # BLD AUTO: 9.7 K/UL (ref 4.8–10.8)

## 2017-06-15 PROCEDURE — 700102 HCHG RX REV CODE 250 W/ 637 OVERRIDE(OP): Performed by: HOSPITALIST

## 2017-06-15 PROCEDURE — 700105 HCHG RX REV CODE 258

## 2017-06-15 PROCEDURE — 700102 HCHG RX REV CODE 250 W/ 637 OVERRIDE(OP): Performed by: INTERNAL MEDICINE

## 2017-06-15 PROCEDURE — 99233 SBSQ HOSP IP/OBS HIGH 50: CPT | Performed by: HOSPITALIST

## 2017-06-15 PROCEDURE — A9270 NON-COVERED ITEM OR SERVICE: HCPCS | Performed by: INTERNAL MEDICINE

## 2017-06-15 PROCEDURE — 700111 HCHG RX REV CODE 636 W/ 250 OVERRIDE (IP): Performed by: HOSPITALIST

## 2017-06-15 PROCEDURE — 11721 DEBRIDE NAIL 6 OR MORE: CPT

## 2017-06-15 PROCEDURE — 85025 COMPLETE CBC W/AUTO DIFF WBC: CPT

## 2017-06-15 PROCEDURE — 94003 VENT MGMT INPAT SUBQ DAY: CPT

## 2017-06-15 PROCEDURE — 700111 HCHG RX REV CODE 636 W/ 250 OVERRIDE (IP)

## 2017-06-15 PROCEDURE — 770022 HCHG ROOM/CARE - ICU (200)

## 2017-06-15 PROCEDURE — A9270 NON-COVERED ITEM OR SERVICE: HCPCS

## 2017-06-15 PROCEDURE — 99291 CRITICAL CARE FIRST HOUR: CPT | Performed by: INTERNAL MEDICINE

## 2017-06-15 PROCEDURE — A9270 NON-COVERED ITEM OR SERVICE: HCPCS | Performed by: HOSPITALIST

## 2017-06-15 PROCEDURE — 700101 HCHG RX REV CODE 250: Performed by: HOSPITALIST

## 2017-06-15 PROCEDURE — 80048 BASIC METABOLIC PNL TOTAL CA: CPT

## 2017-06-15 PROCEDURE — 94640 AIRWAY INHALATION TREATMENT: CPT

## 2017-06-15 PROCEDURE — 82803 BLOOD GASES ANY COMBINATION: CPT

## 2017-06-15 PROCEDURE — 83735 ASSAY OF MAGNESIUM: CPT

## 2017-06-15 PROCEDURE — 71010 DX-CHEST-PORTABLE (1 VIEW): CPT

## 2017-06-15 PROCEDURE — 93010 ELECTROCARDIOGRAM REPORT: CPT | Performed by: INTERNAL MEDICINE

## 2017-06-15 PROCEDURE — 700102 HCHG RX REV CODE 250 W/ 637 OVERRIDE(OP)

## 2017-06-15 PROCEDURE — 93005 ELECTROCARDIOGRAM TRACING: CPT | Performed by: INTERNAL MEDICINE

## 2017-06-15 PROCEDURE — 36600 WITHDRAWAL OF ARTERIAL BLOOD: CPT

## 2017-06-15 PROCEDURE — 82962 GLUCOSE BLOOD TEST: CPT | Mod: 91

## 2017-06-15 RX ORDER — FUROSEMIDE 20 MG/1
20 TABLET ORAL EVERY 8 HOURS
Status: DISCONTINUED | OUTPATIENT
Start: 2017-06-15 | End: 2017-06-22 | Stop reason: HOSPADM

## 2017-06-15 RX ORDER — DIGOXIN 0.25 MG/ML
500 INJECTION INTRAMUSCULAR; INTRAVENOUS ONCE
Status: DISCONTINUED | OUTPATIENT
Start: 2017-06-16 | End: 2017-06-16

## 2017-06-15 RX ORDER — DIGOXIN 0.25 MG/ML
INJECTION INTRAMUSCULAR; INTRAVENOUS
Status: COMPLETED
Start: 2017-06-15 | End: 2017-06-15

## 2017-06-15 RX ORDER — MAGNESIUM SULFATE HEPTAHYDRATE 40 MG/ML
2 INJECTION, SOLUTION INTRAVENOUS ONCE
Status: COMPLETED | OUTPATIENT
Start: 2017-06-15 | End: 2017-06-15

## 2017-06-15 RX ORDER — SODIUM CHLORIDE 9 MG/ML
INJECTION, SOLUTION INTRAVENOUS
Status: COMPLETED
Start: 2017-06-15 | End: 2017-06-15

## 2017-06-15 RX ADMIN — CHLORHEXIDINE GLUCONATE 15 ML: 1.2 RINSE ORAL at 09:25

## 2017-06-15 RX ADMIN — DIGOXIN 500 MCG: 0.25 INJECTION INTRAMUSCULAR; INTRAVENOUS at 23:50

## 2017-06-15 RX ADMIN — INSULIN LISPRO 3 UNITS: 100 INJECTION, SOLUTION INTRAVENOUS; SUBCUTANEOUS at 22:37

## 2017-06-15 RX ADMIN — INSULIN LISPRO 3 UNITS: 100 INJECTION, SOLUTION INTRAVENOUS; SUBCUTANEOUS at 17:31

## 2017-06-15 RX ADMIN — SENNOSIDES AND DOCUSATE SODIUM 2 TABLET: 8.6; 5 TABLET ORAL at 21:28

## 2017-06-15 RX ADMIN — LIDOCAINE HYDROCHLORIDE 2 ML: 10 INJECTION, SOLUTION INFILTRATION; PERINEURAL at 19:54

## 2017-06-15 RX ADMIN — APIXABAN 5 MG: 5 TABLET, FILM COATED ORAL at 21:28

## 2017-06-15 RX ADMIN — CHLORHEXIDINE GLUCONATE 15 ML: 1.2 RINSE ORAL at 21:28

## 2017-06-15 RX ADMIN — FAMOTIDINE 20 MG: 20 TABLET, FILM COATED ORAL at 21:28

## 2017-06-15 RX ADMIN — IPRATROPIUM BROMIDE AND ALBUTEROL SULFATE 3 ML: .5; 3 SOLUTION RESPIRATORY (INHALATION) at 03:08

## 2017-06-15 RX ADMIN — ATORVASTATIN CALCIUM 40 MG: 40 TABLET, FILM COATED ORAL at 21:27

## 2017-06-15 RX ADMIN — SODIUM CHLORIDE 500 ML: 9 INJECTION, SOLUTION INTRAVENOUS at 22:37

## 2017-06-15 RX ADMIN — POTASSIUM CHLORIDE 20 MEQ: 1.5 POWDER, FOR SOLUTION ORAL at 21:28

## 2017-06-15 RX ADMIN — PROPOFOL 40 MCG/KG/MIN: 10 INJECTION, EMULSION INTRAVENOUS at 02:15

## 2017-06-15 RX ADMIN — INSULIN LISPRO 3 UNITS: 100 INJECTION, SOLUTION INTRAVENOUS; SUBCUTANEOUS at 05:36

## 2017-06-15 RX ADMIN — PROPOFOL 30 MCG/KG/MIN: 10 INJECTION, EMULSION INTRAVENOUS at 23:16

## 2017-06-15 RX ADMIN — APIXABAN 5 MG: 5 TABLET, FILM COATED ORAL at 10:51

## 2017-06-15 RX ADMIN — IPRATROPIUM BROMIDE AND ALBUTEROL SULFATE 3 ML: .5; 3 SOLUTION RESPIRATORY (INHALATION) at 06:52

## 2017-06-15 RX ADMIN — SENNOSIDES AND DOCUSATE SODIUM 2 TABLET: 8.6; 5 TABLET ORAL at 09:24

## 2017-06-15 RX ADMIN — INSULIN LISPRO 3 UNITS: 100 INJECTION, SOLUTION INTRAVENOUS; SUBCUTANEOUS at 00:05

## 2017-06-15 RX ADMIN — PROPOFOL 30 MCG/KG/MIN: 10 INJECTION, EMULSION INTRAVENOUS at 05:41

## 2017-06-15 RX ADMIN — FUROSEMIDE 20 MG: 20 TABLET ORAL at 21:28

## 2017-06-15 RX ADMIN — POTASSIUM CHLORIDE 20 MEQ: 1.5 POWDER, FOR SOLUTION ORAL at 09:24

## 2017-06-15 RX ADMIN — IPRATROPIUM BROMIDE AND ALBUTEROL SULFATE 3 ML: .5; 3 SOLUTION RESPIRATORY (INHALATION) at 14:10

## 2017-06-15 RX ADMIN — FAMOTIDINE 20 MG: 20 TABLET, FILM COATED ORAL at 09:25

## 2017-06-15 RX ADMIN — LEVOTHYROXINE SODIUM 88 MCG: 88 TABLET ORAL at 05:33

## 2017-06-15 RX ADMIN — IPRATROPIUM BROMIDE AND ALBUTEROL SULFATE 3 ML: .5; 3 SOLUTION RESPIRATORY (INHALATION) at 18:44

## 2017-06-15 RX ADMIN — INSULIN LISPRO 3 UNITS: 100 INJECTION, SOLUTION INTRAVENOUS; SUBCUTANEOUS at 10:57

## 2017-06-15 RX ADMIN — FUROSEMIDE 20 MG: 20 TABLET ORAL at 05:33

## 2017-06-15 RX ADMIN — IPRATROPIUM BROMIDE AND ALBUTEROL SULFATE 3 ML: .5; 3 SOLUTION RESPIRATORY (INHALATION) at 11:17

## 2017-06-15 RX ADMIN — PROPOFOL 15 MCG/KG/MIN: 10 INJECTION, EMULSION INTRAVENOUS at 13:50

## 2017-06-15 RX ADMIN — MAGNESIUM SULFATE IN WATER 2 G: 40 INJECTION, SOLUTION INTRAVENOUS at 09:25

## 2017-06-15 RX ADMIN — IPRATROPIUM BROMIDE AND ALBUTEROL SULFATE 3 ML: .5; 3 SOLUTION RESPIRATORY (INHALATION) at 22:29

## 2017-06-15 NOTE — CARE PLAN
Problem: Ventilation Defect:  Goal: Ability to achieve and maintain unassisted ventilation or tolerate decreased levels of ventilator support  Outcome: NOT MET  Adult Ventilation Update    Total Vent Days: 7      Patient Lines/Drains/Airways Status    Active Airway      Name: Placement date: Placement time: Site: Days:     Airway Group ET Tube 7.5 06/09/17  1710    5                 In the last 24 hours, the patient tolerated SBT for 2 on settings of 5/8 with day shift.    #FVC / Vital Capacity (liters) :  (pt unable to perform) (06/14/17 0910)  NIF (cm H2O) :  (pt unable to perform) (06/14/17 0910)  Rapid Shallow Breathing Index (RR/VT): 63 (06/14/17 0910)  Plateau Pressure (Q Shift):  (pt overbreathing vent) (06/14/17 0702)  Static Compliance (ml / cm H2O): 33 (06/15/17 0532)    Patient failed trials because of Barriers to Wean: FiO2 >50% OR PEEP >8 (PMA Only) (06/11/17 1831)  Length of Weaning Trial Length of Weaning Trial (Hours): 2 (pt SBT with day shift) (06/14/17 1822)    Sputum/Suction yes  Cough: Productive (06/15/17 0400)  Sputum Amount: Small (06/15/17 0400)  Sputum Color: White (06/15/17 0400)  Sputum Consistency: Thick (06/15/17 0400)    Mobility Group  Activity Performed: Edge of bed (06/15/17 0000)  Time Activity Tolerated: 2 min (06/15/17 0000)  Assistance / Tolerance: Assistance of Two or More (06/15/17 0000)  Pt Calls for Assistance: No (06/15/17 0000)  Staff Present for Mobilization: RN (06/15/17 0000)  Assistive Devices: Hand held assist (06/13/17 0400)  Reason Not Mobilized: Bed rest (Per MD written order for bedrest. ) (06/14/17 1600)  Mobilization Comments: Tolerated well (06/13/17 1800)    Events/Summary/Plan: pt rested on vent

## 2017-06-15 NOTE — PROGRESS NOTES
Cardiology Progress Note               Author: Nishant Sebastian Date & Time created: 6/15/2017  8:36 AM     Interval History:  Pt admitted with sepsis,KIRT and VDRF. Cardiology consulted for atrial fib and history of HTN.    Review of Systems   Unable to perform ROS: intubated       Physical Exam   Neck: No JVD present.   Cardiovascular: Normal rate, regular rhythm, S1 normal and S2 normal.  Exam reveals no gallop, no S3 and no S4.    No murmur heard.  Pulmonary/Chest: Effort normal. She has no rales.   Abdominal: Soft. There is no tenderness.   Musculoskeletal: She exhibits no edema.       Hemodynamics:  Temp (24hrs), Av.8 °C (100 °F), Min:37.1 °C (98.7 °F), Max:38.4 °C (101.2 °F)  Temperature: 37.5 °C (99.5 °F)  Pulse  Av.4  Min: 62  Max: 121Heart Rate (Monitored): 86  NIBP: 132/84 mmHg     Respiratory:  [place] Vent Mode: APVCMV, Rate (breaths/min): 16, PEEP/CPAP: 8, FiO2: 30, P Peak (PIP): 21, P MEAN: 12 Respiration: (!) 22, Pulse Oximetry: 96 %     Work Of Breathing / Effort: Vented  RUL Breath Sounds: Clear, RML Breath Sounds: Diminished, RLL Breath Sounds: Diminished, CAROLANN Breath Sounds: Clear, LLL Breath Sounds: Diminished  Fluids:  Date 17 0700 - 17 0659   Shift 9488-9248 7020-7554 9098-0554 24 Hour Total   I  N  T  A  K  E   I.V. 138   138    Other 100   100    Enteral 210   210    Shift Total 448   448   O  U  T  P  U  T   Urine 1400   1400    Shift Total 1400   1400   Weight (kg) 103.9 103.9 103.9 103.9       Weight: 103.8 kg (228 lb 13.4 oz)  GI/Nutrition:  Orders Placed This Encounter   Procedures   • Diet NPO     Standing Status: Standing      Number of Occurrences: 1      Standing Expiration Date:      Order Specific Question:  Type:     Answer:  Now [1]     Order Specific Question:  Restrict to:     Answer:  Strict [1]     Lab Results:  Recent Labs      17   0418  17   0500  06/15/17   0450   WBC  9.0  8.7  9.7   RBC  3.94*  4.00*  3.89*   HEMOGLOBIN  10.9*  11.0*   10.7*   HEMATOCRIT  33.8*  35.1*  35.0*   MCV  85.8  87.8  90.0   MCH  27.7  27.5  27.5   MCHC  32.2*  31.3*  30.6*   RDW  45.0  47.1  48.1   PLATELETCT  365  352  353   MPV  8.7*  8.7*  9.0     Recent Labs      06/13/17   0418  06/14/17   0500  06/15/17   0450   SODIUM  141  145  147*   POTASSIUM  4.2  4.0  4.0   CHLORIDE  100  102  107   CO2  35*  34*  33   GLUCOSE  165*  181*  181*   BUN  36*  43*  41*   CREATININE  1.03  0.99  0.96   CALCIUM  8.9  9.4  9.2         Recent Labs      06/14/17   0500   BNPBTYPENAT  58     Recent Labs      06/14/17   0500   BNPBTYPENAT  58     Recent Labs      06/14/17   0500   TRIGLYCERIDE  164*       CTA Chest:    Extensive bilateral patchy and groundglass airspace opacities may represent pulmonary edema, multifocal pneumonia. Hemorrhage is also a consideration in the correct clinical setting. Follow-up to radiographic resolution is recommended.    Small bilateral pleural effusions, right greater than left.    Cardiomegaly.    Mildly prominent mediastinal lymph nodes are nonspecific.    Coronary atherosclerotic plaque.    Small hiatal hernia.           Reading Provider Reading Date     Ciaran Hoskins M.D. Jun 8, 2017           Echo:  CONCLUSIONS  No prior study is available for comparison.   Left ventricular ejection fraction is visually estimated to be 65%.  Mild concentric left ventricular hypertrophy.  Diastolic function is difficult to assess with atrial fibrillation.    Estimated right ventricular systolic pressure is 50 mmHg.  Small pleural effusion noted.  Moderate tricuspid regurgitation.  Exam Date:         06/08/2017    Medical Decision Making, by Problem:    Patient Active Problem List    Diagnosis Date Noted   • Severe sepsis (CMS-HCC) 06/09/2017     Priority: High   • Acute respiratory failure with hypoxia (CMS-[facility]) 06/09/2017     Priority: Medium   • Coronary artery calcification seen on CT scan 06/13/2017   • Pulmonary hypertension (CMS-[facility]) 06/12/2017   • Atrial  fibrillation (CMS-HCC) 06/09/2017   • 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-HCC) 06/09/2017   • Hyponatremia 06/09/2017   • Hypothyroid 06/09/2017   • HTN (hypertension) 06/09/2017   • KIRT (acute kidney injury) (CMS-HCC) 06/08/2017         Pt now in SR but still on vent. Renal function improved. Coronary calcium on chest CT noted. Pt continues to maintain a SR now that sepsis has improved    Plan:    Will sign off, please call prn.    Core Measures

## 2017-06-15 NOTE — DIETARY
"Nutrition Services: Weekly TF update; metabolic cart study completed    Current TF regimen with propofol: Peptamen Intense VHP @ goal rate 45 ml/hr, providing 1080 kcal (+Kcal from propofol), 101 grams protein, and 907 ml free water per day.    Height: 167.6 cm (5' 6\")  Weight: 103.8 kg (228 lb 13.4 oz)  Ideal Body Weight: 58.968 kg (130 lb)  Percent Ideal Body Weight: 176  Body mass index is 36.95 kg/(m^2).    Labs: Na 147, glu 181, BUN 41, GFR 59, POC glu/24 hours 180-192, pre-albumin 12.0 (), CRP 8.1 ()  Medications: Pepcid, lasix, SSI, adult ICU electrolyte replacement protocol, potassium chloride, propofol, senokot; prn bowel meds  Fluids: None noted  Skin: No breakdown noted   GI: Last BM      Estimated Needs: REE per MSJ = 1617 kcal/day; RMR per PSU (vent L/min 6.5, T max/24 hours 38.1) = 1716 kcal/day (65-70% = 5891-1241 kcal/day  Total Calories / day: 1142 - 1453 (Calories / k - 14)  Total Grams Protein / day: 118+ (Grams Protein / kg IBW: 2.0+) (1.0-1.2 grams/kg = 104-125 grams/day)       Metabolic cart study completed : REE = 3442 kcal/day; strong study per RQ 0.77 and Covar 7%. However, height used was incorrect by one inch. 65-70% REE per obesity adjustment = 2998-3084 kcal/day.    Assessment / Evaluation:  Admit day 7.  Pt remains intubated. Continue diuresis per PMA.  TF @ goal with propofol. Propofol @ 10.1 ml/hr provides 267 kcal/day.   Results of metabolic cart study are significantly higher than per predictive equations. Average of the 3: 2258 kcal/day; 65-70% = 3596-0171 kcal/day. Will adjust TF to better meet estimated needs.     Plan / Recommendation:   While on propofol, increase TF rate to Peptamen Intense VHP @ 50 ml/hr to provide 1200 kcal (+Kcal from propofol), 112 grams protein, and 1008 ml free water per day.  When propofol D/c'd, change TF to Replete with Fiber and increase to goal rate 70 ml/hr to provide 1680 kcal, 105 grams protein, and 1411 ml free water per " day.   Fluids per MD. MAX following.

## 2017-06-15 NOTE — PROGRESS NOTES
[facility] Hospitalist Progress Note    Date of Service: 6/15/2017    Chief Complaint  61 y.o. female admitted 2017 with syncope.    Interval Problem Update  Ms. Alfaro has a hx of htn and recently diagnosed afib.  CT chest in the ER revealed alveolar hemorrhage. She required rescue bipap and was admitted to the ICU. She required intubation on  with bronchoscopy.   She was in afib that converted to sinus rhythm    Patient seen and examined today. ICU Care  Care and plan discussed in IDT/Hot rounds.  Lines and assistive devices reviewed.    Patient tolerating treatment and therapies.  All Data, Medication data reviewed.  Case discussed with nursing as available.  Plan of Care reviewed with patient and notified of changes.   Pt remains intubated, renal function better, nephro s/o, Bp better, follows commands, CXR no change, SBT's in progress, h/h stable, on AC, getting diuresis   Pt remains vented and sedated, in SR, awakes and follows  Less pulm edema on CXR  6/15 Pt remains intubated, more awake, follow, in SR, Cxr better, less fluid, Pt weak, follows sluggishly, not moving LE much, on Spont curently, family at bedside      Consultants/Specialty  Pulmonology  Cardiology  Nephrology    Disposition  ICU        Review of Systems   Unable to perform ROS: intubated      Physical Exam  Laboratory/Imaging   Hemodynamics  Temp (24hrs), Av.5 °C (99.5 °F), Min:37.1 °C (98.7 °F), Max:38.1 °C (100.5 °F)   Temperature: 37.1 °C (98.7 °F)  Pulse  Av.3  Min: 62  Max: 121 Heart Rate (Monitored): 90  NIBP: 139/73 mmHg      Respiratory  [place] Vent Mode: Spont, Rate (breaths/min): 16, PEEP/CPAP: 8, PEEP/CPAP: 8, FiO2: 30, P Peak (PIP): 16, P MEAN: 10   Respiration: 20, Pulse Oximetry: 100 %     Work Of Breathing / Effort: Vented  RUL Breath Sounds: Clear, RML Breath Sounds: Diminished, RLL Breath Sounds: Diminished, CAROLANN Breath Sounds: Clear, LLL Breath Sounds: Diminished    Fluids    Intake/Output Summary (Last  24 hours) at 06/15/17 1459  Last data filed at 06/15/17 1430   Gross per 24 hour   Intake 2291.21 ml   Output   1300 ml   Net 991.21 ml       Nutrition  Orders Placed This Encounter   Procedures   • Diet NPO     Standing Status: Standing      Number of Occurrences: 1      Standing Expiration Date:      Order Specific Question:  Type:     Answer:  Now [1]     Order Specific Question:  Restrict to:     Answer:  Strict [1]     Physical Exam   Constitutional: She appears well-nourished. No distress.   HENT:   Poor dentition.   cortrak Nares.    Eyes: No scleral icterus.   Neck:   Right sided central line without bleeding.   Cardiovascular:   Distant, regular heart sounds.    Pulmonary/Chest: She has rales.   Vent, moderate air movement. No wheezing.    Abdominal: Soft. She exhibits no distension.   Genitourinary:   Tamayo cath.   Musculoskeletal: She exhibits edema. She exhibits no tenderness.   Edema hands   Neurological:   Sedated though opens eyes to stimulation.     Skin: She is not diaphoretic. No erythema. There is pallor.   Very long, sharp toenails with onychomycosis.       Recent Labs      06/13/17   0418  06/14/17   0500  06/15/17   0450   WBC  9.0  8.7  9.7   RBC  3.94*  4.00*  3.89*   HEMOGLOBIN  10.9*  11.0*  10.7*   HEMATOCRIT  33.8*  35.1*  35.0*   MCV  85.8  87.8  90.0   MCH  27.7  27.5  27.5   MCHC  32.2*  31.3*  30.6*   RDW  45.0  47.1  48.1   PLATELETCT  365  352  353   MPV  8.7*  8.7*  9.0     Recent Labs      06/13/17   0418  06/14/17   0500  06/15/17   0450   SODIUM  141  145  147*   POTASSIUM  4.2  4.0  4.0   CHLORIDE  100  102  107   CO2  35*  34*  33   GLUCOSE  165*  181*  181*   BUN  36*  43*  41*   CREATININE  1.03  0.99  0.96   CALCIUM  8.9  9.4  9.2         Recent Labs      06/14/17   0500   BNPBTYPENAT  58     Recent Labs      06/14/17   0500   TRIGLYCERIDE  164*          Assessment/Plan     Severe sepsis (CMS-HCC) (present on admission)  Assessment & Plan  Source appears respiratory. IV abx  azithromycin and rocephin with stop dates. Query non-infectious SIRS with cardiac source.  The organ system dysfunction associated with this disease process is the respiratory system as is evidenced by respiratory failure requiring intubation.     Resolved/improved    Acute respiratory failure with hypoxia (CMS-[facility]) (present on admission)  Assessment & Plan    VDRF, intubated  SBT's slowly better  Pulmonology consulted and following.   Echo showed normal EF at 65%.  Aggressive IV diuresis.   SBT's      KIRT (acute kidney injury) (CMS-HCC) (present on admission)  Assessment & Plan  Cr on admit was 2.95 with baseline 1.1 per review of the records from 2016.   Consistent with ATN. IV fluids have been given.   Nephrology consultation.   Improved, now  Nephro s/o    Atrial fibrillation (CMS-HCC) (present on admission)  Assessment & Plan  Eliquis as an outpatient. Rate control on coreg as an outpatient.    Recently diagnosed.  C/w Eliquis  Cardiology consulted.  Converted to sinus on 6/12.  EF 65%.    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-HCC) (present on admission)  Assessment & Plan  Sliding scale    Hyponatremia (present on admission)  Assessment & Plan  resolved    Hypothyroid (present on admission)  Assessment & Plan  replacement    HTN (hypertension) (present on admission)  Assessment & Plan  On cardizem, hyzaar 100/25, clonidine, coreg, norvasc as an outpatient.    Pulmonary hypertension (CMS-HCC) (present on admission)  Assessment & Plan  RVSP 50 mm Hg  She is high risk of volume overload.    - Morbid obesity  - weakness, lethargy  Plan  C/w diuresis as tolerated  Vent weaning, wean sedation  Resp. care  Electrolyte balance  AC to continue  See orders  Am Labs  Pt is critically ill in ICU  Time spent 35 min, no overlap  Labs reviewed and Medications reviewed  Tamayo catheter: Unconscious / Sedated Patient on a Ventilator  Central line in place: Sepsis    DVT Prophylaxis: Heparin

## 2017-06-15 NOTE — PROGRESS NOTES
[facility] Hospitalist Progress Note    Date of Service: 2017    Chief Complaint  61 y.o. female admitted 2017 with syncope.    Interval Problem Update  Ms. Alfaro has a hx of htn and recently diagnosed afib.  CT chest in the ER revealed alveolar hemorrhage. She required rescue bipap and was admitted to the ICU. She required intubation on  with bronchoscopy.   She was in afib that converted to sinus rhythm    Patient seen and examined today. ICU Care  Care and plan discussed in IDT/Hot rounds.  Lines and assistive devices reviewed.    Patient tolerating treatment and therapies.  All Data, Medication data reviewed.  Case discussed with nursing as available.  Plan of Care reviewed with patient and notified of changes.   Pt remains intubated, renal function better, nephro s/o, Bp better, follows commands, CXR no change, SBT's in progress, h/h stable, on AC, getting diuresis   Pt remains vented and sedated, in SR, awakes and follows  Less pulm edema on CXR    Consultants/Specialty  Pulmonology  Cardiology  Nephrology    Disposition  ICU        Review of Systems   Unable to perform ROS: intubated      Physical Exam  Laboratory/Imaging   Hemodynamics  Temp (24hrs), Av.9 °C (100.2 °F), Min:37.2 °C (99 °F), Max:38.4 °C (101.2 °F)   Temperature: (!) 38.1 °C (100.5 °F)  Pulse  Av.6  Min: 62  Max: 121 Heart Rate (Monitored): 98  NIBP: 141/79 mmHg      Respiratory  [place] Vent Mode: APVCMV, Rate (breaths/min): 16, PEEP/CPAP: 8, PEEP/CPAP: 8, FiO2: 30, P Peak (PIP): 18, P MEAN: 11   Respiration: (!) 6, Pulse Oximetry: 97 %     Work Of Breathing / Effort: Vented  RUL Breath Sounds: Diminished, RML Breath Sounds: Diminished, RLL Breath Sounds: Diminished, CAROLANN Breath Sounds: Diminished, LLL Breath Sounds: Diminished    Fluids    Intake/Output Summary (Last 24 hours) at 17 1809  Last data filed at 17 1700   Gross per 24 hour   Intake 1680.51 ml   Output   2410 ml   Net -729.49 ml        Nutrition  Orders Placed This Encounter   Procedures   • Diet NPO     Standing Status: Standing      Number of Occurrences: 1      Standing Expiration Date:      Order Specific Question:  Type:     Answer:  Now [1]     Order Specific Question:  Restrict to:     Answer:  Strict [1]     Physical Exam   Constitutional: She appears well-nourished. No distress.   HENT:   Poor dentition.   cortrak Nares.    Eyes: No scleral icterus.   Neck:   Right sided central line without bleeding.   Cardiovascular:   Distant, regular heart sounds.    Pulmonary/Chest: She has rales.   Vent, moderate air movement. No wheezing.    Abdominal: Soft. She exhibits no distension.   Genitourinary:   Tamayo cath.   Musculoskeletal: She exhibits edema. She exhibits no tenderness.   Edema hands   Neurological:   Sedated though opens eyes to stimulation.     Skin: She is not diaphoretic. No erythema. There is pallor.   Very long, sharp toenails with onychomycosis.       Recent Labs      06/12/17   0600  06/13/17   0418  06/14/17   0500   WBC  9.1  9.0  8.7   RBC  3.97*  3.94*  4.00*   HEMOGLOBIN  11.1*  10.9*  11.0*   HEMATOCRIT  34.0*  33.8*  35.1*   MCV  85.6  85.8  87.8   MCH  28.0  27.7  27.5   MCHC  32.6*  32.2*  31.3*   RDW  44.6  45.0  47.1   PLATELETCT  374  365  352   MPV  9.0  8.7*  8.7*     Recent Labs      06/12/17   0600  06/13/17   0418  06/14/17   0500   SODIUM  140  141  145   POTASSIUM  4.0  4.2  4.0   CHLORIDE  98  100  102   CO2  33  35*  34*   GLUCOSE  166*  165*  181*   BUN  33*  36*  43*   CREATININE  1.02  1.03  0.99   CALCIUM  9.0  8.9  9.4         Recent Labs      06/14/17   0500   BNPBTYPENAT  58     Recent Labs      06/14/17   0500   TRIGLYCERIDE  164*          Assessment/Plan     Severe sepsis (CMS-HCC) (present on admission)  Assessment & Plan  Source appears respiratory. IV abx azithromycin and rocephin with stop dates. Query non-infectious SIRS with cardiac source.  The organ system dysfunction associated with  this disease process is the respiratory system as is evidenced by respiratory failure requiring intubation.     Resolved    Acute respiratory failure with hypoxia (CMS-HCC) (present on admission)  Assessment & Plan    VDRF, intubated  SBT's slowly better  Pulmonology consulted and following.   Echo showed normal EF at 65%.  Aggressive IV diuresis.       CT chest:    Extensive bilateral patchy and groundglass airspace opacities may represent pulmonary edema, multifocal pneumonia. Hemorrhage is also a consideration in the correct clinical setting. Follow-up to radiographic resolution is recommended.    Small bilateral pleural effusions, right greater than left.    Cardiomegaly.    Mildly prominent mediastinal lymph nodes are nonspecific.    Coronary atherosclerotic plaque.    Small hiatal hernia.    KIRT (acute kidney injury) (CMS-HCC) (present on admission)  Assessment & Plan  Cr on admit was 2.95 with baseline 1.1 per review of the records from 2016.   Consistent with ATN. IV fluids have been given.   Nephrology consultation.   Improved, now  Nephro s/o    Atrial fibrillation (CMS-[facility]) (present on admission)  Assessment & Plan  Eliquis as an outpatient. Rate control on coreg as an outpatient.    Recently diagnosed.  C/w Eliquis  Cardiology consulted.  Converted to sinus on 6/12.  EF 65%.    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-HCC) (present on admission)  Assessment & Plan  Sliding scale    Hyponatremia (present on admission)  Assessment & Plan  resolved    Hypothyroid (present on admission)  Assessment & Plan  replacement    HTN (hypertension) (present on admission)  Assessment & Plan  On cardizem, hyzaar 100/25, clonidine, coreg, norvasc as an outpatient.    Pulmonary hypertension (CMS-[facility]) (present on admission)  Assessment & Plan  RVSP 50 mm Hg  She is high risk of volume overload.    - Morbid obesity  Plan  C/w diuresis  Vent weaning  Electrolyte balance  AC  See orders  Am Labs  Pt is  critically ill in ICU  Time spent 35 min, no overlap  Labs reviewed and Medications reviewed  Tamayo catheter: Unconscious / Sedated Patient on a Ventilator  Central line in place: Sepsis    DVT Prophylaxis: Heparin

## 2017-06-15 NOTE — WOUND TEAM
Wrapped both feet with wet, warm washcloths x 10 minutes.  Note that nails are curved into tips of toes and left second toe nail is cracked and all mycotic.  Trimmed and filed without issue after cleaning feet and between toes revealing intact skin.  Discussed with staff RN.

## 2017-06-15 NOTE — CARE PLAN
Problem: Ventilation Defect:  Goal: Ability to achieve and maintain unassisted ventilation or tolerate decreased levels of ventilator support  Outcome: PROGRESSING AS EXPECTED  Adult Ventilation Update    Total Vent Days: 7      Patient Lines/Drains/Airways Status    Active Airway      Name: Placement date: Placement time: Site: Days:     Airway Group ET Tube 7.5 06/09/17 1710    5                 In the last 24 hours, the patient tolerated SBT for 2.5 hours, 5/8    #FVC / Vital Capacity (liters) :  (pt unable to perform) (06/15/17 1559)  NIF (cm H2O) :  (pt unable to perform) (06/15/17 1559)  Rapid Shallow Breathing Index (RR/VT): 146 (06/15/17 1559)  Plateau Pressure (Q Shift): 19 (06/15/17 0652)  Static Compliance (ml / cm H2O): 44 (06/15/17 1559)    Patient failed trials because of Barriers to Wean: FiO2 >50% OR PEEP >8 (PMA Only) (06/11/17 1831)  Barriers to SBT Weaning Trial Stopped due to:: RSBI >105 (Rapid Shallow Breathing Index);Pt weaned for 1 hour and returned to rest settings per protocol (06/15/17 1559)  Length of Weaning Trial Length of Weaning Trial (Hours): 1 hour (06/15/17 0800)    Cough: Non Productive (06/15/17 1410)  Sputum Amount: Unable to Evaluate (06/15/17 1410)  Sputum Color: Unable to Evaluate (06/15/17 1410)  Sputum Consistency: Unable to Evaluate (06/15/17 1410)    Mobility Group  Activity Performed: Unable to mobilize (06/15/17 0652)  Time Activity Tolerated: 2 min (06/15/17 0000)  Assistance / Tolerance: Assistance of Two or More (06/15/17 0000)  Pt Calls for Assistance: No (06/15/17 0800)  Staff Present for Mobilization: RN (06/15/17 0000)  Assistive Devices: Hand held assist (06/13/17 0400)  Reason Not Mobilized: Bed rest (Per MD written order for bedrest. ) (06/14/17 1600)  Mobilization Comments: Tolerated well (06/13/17 1800)    Events/Summary/Plan: SBT ended (06/15/17 1559)

## 2017-06-15 NOTE — CARE PLAN
Problem: Venous Thromboembolism (VTW)/Deep Vein Thrombosis (DVT) Prevention:  Goal: Patient will participate in Venous Thrombosis (VTE)/Deep Vein Thrombosis (DVT)Prevention Measures  Intervention: Ensure patient wears graduated elastic stockings (CYNTHIA hose) and/or SCDs, if ordered, when in bed or chair (Remove at least once per shift for skin check)  SCDs in place      Problem: Skin Integrity  Goal: Risk for impaired skin integrity will decrease  Intervention: Assess risk factors for impaired skin integrity and/or pressure ulcers  Turned every 2 hours

## 2017-06-16 ENCOUNTER — APPOINTMENT (OUTPATIENT)
Dept: RADIOLOGY | Facility: MEDICAL CENTER | Age: 62
DRG: 853 | End: 2017-06-16
Attending: INTERNAL MEDICINE
Payer: COMMERCIAL

## 2017-06-16 LAB
ALBUMIN SERPL BCP-MCNC: 2.9 G/DL (ref 3.2–4.9)
ALBUMIN/GLOB SERPL: 0.9 G/DL
ALP SERPL-CCNC: 229 U/L (ref 30–99)
ALT SERPL-CCNC: 31 U/L (ref 2–50)
ANION GAP SERPL CALC-SCNC: 6 MMOL/L (ref 0–11.9)
AST SERPL-CCNC: 24 U/L (ref 12–45)
BASE EXCESS BLDA CALC-SCNC: 7 MMOL/L (ref -4–3)
BASOPHILS # BLD AUTO: 0.7 % (ref 0–1.8)
BASOPHILS # BLD: 0.06 K/UL (ref 0–0.12)
BILIRUB SERPL-MCNC: 0.3 MG/DL (ref 0.1–1.5)
BNP SERPL-MCNC: 126 PG/ML (ref 0–100)
BODY TEMPERATURE: ABNORMAL DEGREES
BUN SERPL-MCNC: 40 MG/DL (ref 8–22)
CALCIUM SERPL-MCNC: 9 MG/DL (ref 8.5–10.5)
CHLORIDE SERPL-SCNC: 110 MMOL/L (ref 96–112)
CO2 BLDA-SCNC: 33 MMOL/L (ref 20–33)
CO2 SERPL-SCNC: 30 MMOL/L (ref 20–33)
CREAT SERPL-MCNC: 0.86 MG/DL (ref 0.5–1.4)
EKG IMPRESSION: NORMAL
EOSINOPHIL # BLD AUTO: 0.25 K/UL (ref 0–0.51)
EOSINOPHIL NFR BLD: 3 % (ref 0–6.9)
ERYTHROCYTE [DISTWIDTH] IN BLOOD BY AUTOMATED COUNT: 49.1 FL (ref 35.9–50)
GFR SERPL CREATININE-BSD FRML MDRD: >60 ML/MIN/1.73 M 2
GLOBULIN SER CALC-MCNC: 3.3 G/DL (ref 1.9–3.5)
GLUCOSE BLD-MCNC: 165 MG/DL (ref 65–99)
GLUCOSE BLD-MCNC: 170 MG/DL (ref 65–99)
GLUCOSE BLD-MCNC: 182 MG/DL (ref 65–99)
GLUCOSE BLD-MCNC: 200 MG/DL (ref 65–99)
GLUCOSE SERPL-MCNC: 178 MG/DL (ref 65–99)
HCO3 BLDA-SCNC: 31.9 MMOL/L (ref 17–25)
HCT VFR BLD AUTO: 35 % (ref 37–47)
HGB BLD-MCNC: 10.7 G/DL (ref 12–16)
IMM GRANULOCYTES # BLD AUTO: 0.12 K/UL (ref 0–0.11)
IMM GRANULOCYTES NFR BLD AUTO: 1.4 % (ref 0–0.9)
LYMPHOCYTES # BLD AUTO: 1.01 K/UL (ref 1–4.8)
LYMPHOCYTES NFR BLD: 12.1 % (ref 22–41)
MAGNESIUM SERPL-MCNC: 2 MG/DL (ref 1.5–2.5)
MCH RBC QN AUTO: 28 PG (ref 27–33)
MCHC RBC AUTO-ENTMCNC: 30.6 G/DL (ref 33.6–35)
MCV RBC AUTO: 91.6 FL (ref 81.4–97.8)
MONOCYTES # BLD AUTO: 1.09 K/UL (ref 0–0.85)
MONOCYTES NFR BLD AUTO: 13.1 % (ref 0–13.4)
NEUTROPHILS # BLD AUTO: 5.82 K/UL (ref 2–7.15)
NEUTROPHILS NFR BLD: 69.7 % (ref 44–72)
NRBC # BLD AUTO: 0 K/UL
NRBC BLD AUTO-RTO: 0 /100 WBC
O2/TOTAL GAS SETTING VFR VENT: 30 %
PCO2 BLDA: 46.5 MMHG (ref 26–37)
PCO2 TEMP ADJ BLDA: 45.9 MMHG (ref 26–37)
PH BLDA: 7.44 [PH] (ref 7.4–7.5)
PH TEMP ADJ BLDA: 7.45 [PH] (ref 7.4–7.5)
PHOSPHATE SERPL-MCNC: 2.7 MG/DL (ref 2.5–4.5)
PLATELET # BLD AUTO: 323 K/UL (ref 164–446)
PMV BLD AUTO: 9.3 FL (ref 9–12.9)
PO2 BLDA: 84 MMHG (ref 64–87)
PO2 TEMP ADJ BLDA: 83 MMHG (ref 64–87)
POTASSIUM SERPL-SCNC: 4 MMOL/L (ref 3.6–5.5)
PROT SERPL-MCNC: 6.2 G/DL (ref 6–8.2)
RBC # BLD AUTO: 3.82 M/UL (ref 4.2–5.4)
SAO2 % BLDA: 97 % (ref 93–99)
SODIUM SERPL-SCNC: 146 MMOL/L (ref 135–145)
SPECIMEN DRAWN FROM PATIENT: ABNORMAL
WBC # BLD AUTO: 8.4 K/UL (ref 4.8–10.8)

## 2017-06-16 PROCEDURE — 36600 WITHDRAWAL OF ARTERIAL BLOOD: CPT

## 2017-06-16 PROCEDURE — 700105 HCHG RX REV CODE 258

## 2017-06-16 PROCEDURE — A9270 NON-COVERED ITEM OR SERVICE: HCPCS | Performed by: INTERNAL MEDICINE

## 2017-06-16 PROCEDURE — 94640 AIRWAY INHALATION TREATMENT: CPT

## 2017-06-16 PROCEDURE — 85025 COMPLETE CBC W/AUTO DIFF WBC: CPT

## 2017-06-16 PROCEDURE — 700102 HCHG RX REV CODE 250 W/ 637 OVERRIDE(OP): Performed by: INTERNAL MEDICINE

## 2017-06-16 PROCEDURE — 82962 GLUCOSE BLOOD TEST: CPT | Mod: 91

## 2017-06-16 PROCEDURE — 80053 COMPREHEN METABOLIC PANEL: CPT

## 2017-06-16 PROCEDURE — A9270 NON-COVERED ITEM OR SERVICE: HCPCS | Performed by: HOSPITALIST

## 2017-06-16 PROCEDURE — 770022 HCHG ROOM/CARE - ICU (200)

## 2017-06-16 PROCEDURE — 83735 ASSAY OF MAGNESIUM: CPT

## 2017-06-16 PROCEDURE — 82803 BLOOD GASES ANY COMBINATION: CPT

## 2017-06-16 PROCEDURE — 99291 CRITICAL CARE FIRST HOUR: CPT | Performed by: INTERNAL MEDICINE

## 2017-06-16 PROCEDURE — 700111 HCHG RX REV CODE 636 W/ 250 OVERRIDE (IP)

## 2017-06-16 PROCEDURE — 700102 HCHG RX REV CODE 250 W/ 637 OVERRIDE(OP): Performed by: HOSPITALIST

## 2017-06-16 PROCEDURE — 83880 ASSAY OF NATRIURETIC PEPTIDE: CPT

## 2017-06-16 PROCEDURE — 99233 SBSQ HOSP IP/OBS HIGH 50: CPT | Performed by: HOSPITALIST

## 2017-06-16 PROCEDURE — 94003 VENT MGMT INPAT SUBQ DAY: CPT

## 2017-06-16 PROCEDURE — A9270 NON-COVERED ITEM OR SERVICE: HCPCS

## 2017-06-16 PROCEDURE — 71010 DX-CHEST-PORTABLE (1 VIEW): CPT

## 2017-06-16 PROCEDURE — 84100 ASSAY OF PHOSPHORUS: CPT

## 2017-06-16 PROCEDURE — 700111 HCHG RX REV CODE 636 W/ 250 OVERRIDE (IP): Performed by: INTERNAL MEDICINE

## 2017-06-16 PROCEDURE — 700105 HCHG RX REV CODE 258: Performed by: INTERNAL MEDICINE

## 2017-06-16 PROCEDURE — 700102 HCHG RX REV CODE 250 W/ 637 OVERRIDE(OP)

## 2017-06-16 PROCEDURE — 700101 HCHG RX REV CODE 250: Performed by: HOSPITALIST

## 2017-06-16 PROCEDURE — 700101 HCHG RX REV CODE 250: Performed by: INTERNAL MEDICINE

## 2017-06-16 RX ADMIN — POTASSIUM CHLORIDE 20 MEQ: 1.5 POWDER, FOR SOLUTION ORAL at 21:10

## 2017-06-16 RX ADMIN — FUROSEMIDE 20 MG: 20 TABLET ORAL at 14:16

## 2017-06-16 RX ADMIN — IPRATROPIUM BROMIDE AND ALBUTEROL SULFATE 3 ML: .5; 3 SOLUTION RESPIRATORY (INHALATION) at 18:26

## 2017-06-16 RX ADMIN — PROPOFOL 10 MCG/KG/MIN: 10 INJECTION, EMULSION INTRAVENOUS at 06:06

## 2017-06-16 RX ADMIN — FENTANYL CITRATE 25 MCG: 50 INJECTION, SOLUTION INTRAMUSCULAR; INTRAVENOUS at 12:15

## 2017-06-16 RX ADMIN — INSULIN LISPRO 3 UNITS: 100 INJECTION, SOLUTION INTRAVENOUS; SUBCUTANEOUS at 12:34

## 2017-06-16 RX ADMIN — IPRATROPIUM BROMIDE AND ALBUTEROL SULFATE 3 ML: .5; 3 SOLUTION RESPIRATORY (INHALATION) at 02:39

## 2017-06-16 RX ADMIN — LEVOTHYROXINE SODIUM 88 MCG: 88 TABLET ORAL at 05:03

## 2017-06-16 RX ADMIN — AMIODARONE HYDROCHLORIDE 1 MG/MIN: 50 INJECTION, SOLUTION INTRAVENOUS at 08:10

## 2017-06-16 RX ADMIN — FUROSEMIDE 20 MG: 20 TABLET ORAL at 04:56

## 2017-06-16 RX ADMIN — DEXMEDETOMIDINE HYDROCHLORIDE 0.5 MCG/KG/HR: 100 INJECTION, SOLUTION INTRAVENOUS at 23:08

## 2017-06-16 RX ADMIN — FAMOTIDINE 20 MG: 20 TABLET, FILM COATED ORAL at 07:52

## 2017-06-16 RX ADMIN — FENTANYL CITRATE 100 MCG: 50 INJECTION, SOLUTION INTRAMUSCULAR; INTRAVENOUS at 00:15

## 2017-06-16 RX ADMIN — ATORVASTATIN CALCIUM 40 MG: 40 TABLET, FILM COATED ORAL at 21:10

## 2017-06-16 RX ADMIN — IPRATROPIUM BROMIDE AND ALBUTEROL SULFATE 3 ML: .5; 3 SOLUTION RESPIRATORY (INHALATION) at 10:33

## 2017-06-16 RX ADMIN — INSULIN LISPRO 3 UNITS: 100 INJECTION, SOLUTION INTRAVENOUS; SUBCUTANEOUS at 05:13

## 2017-06-16 RX ADMIN — POTASSIUM CHLORIDE 20 MEQ: 1.5 POWDER, FOR SOLUTION ORAL at 07:52

## 2017-06-16 RX ADMIN — IPRATROPIUM BROMIDE AND ALBUTEROL SULFATE 3 ML: .5; 3 SOLUTION RESPIRATORY (INHALATION) at 22:33

## 2017-06-16 RX ADMIN — IPRATROPIUM BROMIDE AND ALBUTEROL SULFATE 3 ML: .5; 3 SOLUTION RESPIRATORY (INHALATION) at 06:20

## 2017-06-16 RX ADMIN — FAMOTIDINE 20 MG: 20 TABLET, FILM COATED ORAL at 21:10

## 2017-06-16 RX ADMIN — SENNOSIDES AND DOCUSATE SODIUM 2 TABLET: 8.6; 5 TABLET ORAL at 07:52

## 2017-06-16 RX ADMIN — CHLORHEXIDINE GLUCONATE 15 ML: 1.2 RINSE ORAL at 07:52

## 2017-06-16 RX ADMIN — SENNOSIDES AND DOCUSATE SODIUM 2 TABLET: 8.6; 5 TABLET ORAL at 21:10

## 2017-06-16 RX ADMIN — LIDOCAINE HYDROCHLORIDE 2 ML: 10 INJECTION, SOLUTION INFILTRATION; PERINEURAL at 11:11

## 2017-06-16 RX ADMIN — METOPROLOL TARTRATE 25 MG: 25 TABLET, FILM COATED ORAL at 21:10

## 2017-06-16 RX ADMIN — APIXABAN 5 MG: 5 TABLET, FILM COATED ORAL at 08:19

## 2017-06-16 RX ADMIN — FUROSEMIDE 20 MG: 20 TABLET ORAL at 21:10

## 2017-06-16 RX ADMIN — METOPROLOL TARTRATE 25 MG: 25 TABLET, FILM COATED ORAL at 16:30

## 2017-06-16 RX ADMIN — CHLORHEXIDINE GLUCONATE 15 ML: 1.2 RINSE ORAL at 21:10

## 2017-06-16 RX ADMIN — IPRATROPIUM BROMIDE AND ALBUTEROL SULFATE 3 ML: .5; 3 SOLUTION RESPIRATORY (INHALATION) at 14:29

## 2017-06-16 RX ADMIN — AMIODARONE HYDROCHLORIDE 150 MG: 50 INJECTION, SOLUTION INTRAVENOUS at 07:52

## 2017-06-16 RX ADMIN — INSULIN LISPRO 3 UNITS: 100 INJECTION, SOLUTION INTRAVENOUS; SUBCUTANEOUS at 17:28

## 2017-06-16 RX ADMIN — APIXABAN 5 MG: 5 TABLET, FILM COATED ORAL at 21:11

## 2017-06-16 RX ADMIN — DEXMEDETOMIDINE HYDROCHLORIDE 0.2 MCG/KG/HR: 100 INJECTION, SOLUTION INTRAVENOUS at 12:15

## 2017-06-16 RX ADMIN — INSULIN LISPRO 3 UNITS: 100 INJECTION, SOLUTION INTRAVENOUS; SUBCUTANEOUS at 22:56

## 2017-06-16 NOTE — PROGRESS NOTES
[facility] Hospitalist Progress Note    Date of Service: 2017    Chief Complaint  61 y.o. female admitted 2017 with syncope.    Interval Problem Update  Ms. Alfaro has a hx of htn and recently diagnosed afib.  CT chest in the ER revealed alveolar hemorrhage. She required rescue bipap and was admitted to the ICU. She required intubation on  with bronchoscopy.   She was in afib that converted to sinus rhythm    Patient seen and examined today. ICU Care  Care and plan discussed in IDT/Hot rounds.  Lines and assistive devices reviewed.    Patient tolerating treatment and therapies.  All Data, Medication data reviewed.  Case discussed with nursing as available.  Plan of Care reviewed with patient and notified of changes.   Pt remains intubated, renal function better, nephro s/o, Bp better, follows commands, CXR no change, SBT's in progress, h/h stable, on AC, getting diuresis   Pt remains vented and sedated, in SR, awakes and follows  Less pulm edema on CXR  6/15 Pt remains intubated, more awake, follow, in SR, Cxr better, less fluid, Pt weak, follows sluggishly, not moving LE much, on Spont curently, family at bedside   Pt with recurrence of AFIB with RVR this am, amio restarted, cards informed, Pt on eliquis, Digoxin given, resp. Status better/ improved      Consultants/Specialty  Pulmonology  Cardiology  Nephrology    Disposition  ICU        Review of Systems   Unable to perform ROS: intubated      Physical Exam  Laboratory/Imaging   Hemodynamics  Temp (24hrs), Av.1 °C (98.8 °F), Min:36.4 °C (97.6 °F), Max:37.7 °C (99.8 °F)   Temperature: 36.4 °C (97.6 °F)  Pulse  Av.8  Min: 62  Max: 121 Heart Rate (Monitored): 89  NIBP: 133/77 mmHg      Respiratory  [place] Vent Mode: Spont, Rate (breaths/min): 16, PEEP/CPAP: 8, PEEP/CPAP: 8, FiO2: 30, P Peak (PIP): 15, P MEAN: 10   Respiration: (!) 29, Pulse Oximetry: 97 %     Work Of Breathing / Effort: Vented  RUL Breath Sounds: Clear, RML Breath  Sounds: Diminished, RLL Breath Sounds: Diminished, CAROLANN Breath Sounds: Clear, LLL Breath Sounds: Diminished    Fluids    Intake/Output Summary (Last 24 hours) at 06/16/17 1531  Last data filed at 06/16/17 1400   Gross per 24 hour   Intake 2316.8 ml   Output   1800 ml   Net  516.8 ml       Nutrition  Orders Placed This Encounter   Procedures   • Diet NPO     Standing Status: Standing      Number of Occurrences: 1      Standing Expiration Date:      Order Specific Question:  Type:     Answer:  Now [1]     Order Specific Question:  Restrict to:     Answer:  Strict [1]     Physical Exam   Constitutional: She appears well-nourished. No distress.   HENT:   Poor dentition.   cortrak Nares.    Eyes: No scleral icterus.   Neck:   Right sided central line without bleeding.   Cardiovascular: An irregularly irregular rhythm present. Tachycardia present.    Pulmonary/Chest: She has rales.   Vent, moderate air movement. No wheezing.    Abdominal: Soft. She exhibits no distension.   Genitourinary:   Tamayo cath.   Musculoskeletal: She exhibits edema. She exhibits no tenderness.   Edema hands   Neurological:   Sedated though opens eyes to stimulation.     Skin: She is not diaphoretic. No erythema. There is pallor.   Very long, sharp toenails with onychomycosis.       Recent Labs      06/14/17   0500  06/15/17   0450  06/16/17   0503   WBC  8.7  9.7  8.4   RBC  4.00*  3.89*  3.82*   HEMOGLOBIN  11.0*  10.7*  10.7*   HEMATOCRIT  35.1*  35.0*  35.0*   MCV  87.8  90.0  91.6   MCH  27.5  27.5  28.0   MCHC  31.3*  30.6*  30.6*   RDW  47.1  48.1  49.1   PLATELETCT  352  353  323   MPV  8.7*  9.0  9.3     Recent Labs      06/14/17   0500  06/15/17   0450  06/16/17   0503   SODIUM  145  147*  146*   POTASSIUM  4.0  4.0  4.0   CHLORIDE  102  107  110   CO2  34*  33  30   GLUCOSE  181*  181*  178*   BUN  43*  41*  40*   CREATININE  0.99  0.96  0.86   CALCIUM  9.4  9.2  9.0         Recent Labs      06/14/17   0500  06/16/17   0503   BNPBTYPENAT   58  126*     Recent Labs      06/14/17   0500   TRIGLYCERIDE  164*          Assessment/Plan     Severe sepsis (CMS-HCC) (present on admission)  Assessment & Plan  Source appears respiratory. IV abx azithromycin and rocephin with stop dates. Query non-infectious SIRS with cardiac source.  The organ system dysfunction associated with this disease process is the respiratory system as is evidenced by respiratory failure requiring intubation.     Resolved/improved    Acute respiratory failure with hypoxia (CMS-[facility]) (present on admission)  Assessment & Plan    VDRF, intubated  SBT's slowly better  Pulmonology consulted and following.   Echo showed normal EF at 65%.  Aggressive IV diuresis.   SBT's      KIRT (acute kidney injury) (CMS-[facility]) (present on admission)  Assessment & Plan  Cr on admit was 2.95 with baseline 1.1 per review of the records from 2016.   Consistent with ATN. IV fluids have been given.   Nephrology consultation.   Improved, now  Nephro s/o    Atrial fibrillation (CMS-[facility]) (present on admission)  Assessment & Plan  Eliquis as an outpatient.   Rate control on coreg as an outpatient.    Recently diagnosed.  C/w Eliquis  Cardiology consulted.  Converted to sinus on 6/12.  EF 65%.  Re-occurred on 6/16  Amio restarted    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-[facility]) (present on admission)  Assessment & Plan  Sliding scale    Hyponatremia (present on admission)  Assessment & Plan  resolved    Hypothyroid (present on admission)  Assessment & Plan  replacement    HTN (hypertension) (present on admission)  Assessment & Plan  On cardizem, hyzaar 100/25, clonidine, coreg, norvasc as an outpatient.    Pulmonary hypertension (CMS-[facility]) (present on admission)  Assessment & Plan  RVSP 50 mm Hg  She is high risk of volume overload.    - Morbid obesity  - weakness, lethargy  Plan  C/w amiodarone, BBL for rate control for now  C/w diuresis as tolerated  Vent weaning, wean sedation  Resp. care  Electrolyte  balance  AC to continue  See orders  Am Labs  Pt is critically ill in ICU  Time spent 35 min, no overlap  Labs reviewed and Medications reviewed  Tamayo catheter: Unconscious / Sedated Patient on a Ventilator  Central line in place: Sepsis    DVT Prophylaxis: Heparin

## 2017-06-16 NOTE — PROGRESS NOTES
Spoke with Dr. Rosario via phone.  Order obtained for 1 time dose of digoxin.  Given.  Monitoring results

## 2017-06-16 NOTE — CARE PLAN
Problem: Mobility  Goal: Risk for activity intolerance will decrease  Outcome: PROGRESSING SLOWER THAN EXPECTED  Edge of bed for 5 mins. Pt encouraged to hold self up (could not due to weakness) and kick feet.     Problem: Skin Integrity  Goal: Risk for impaired skin integrity will decrease  Outcome: PROGRESSING AS EXPECTED  Q2 turns, head to toe assessment. Barrier wipes used. Tube feed in place.

## 2017-06-16 NOTE — CARE PLAN
Adult Ventilation Update    Total Vent Days: 8    Patient Lines/Drains/Airways Status    Active Airway     Name: Placement date: Placement time: Site: Days:    Airway Group ET Tube 7.5 06/09/17 1710    6              In the last 24 hours, the patient tolerated SBT for 2.5 hours on settings of 5/8.    #FVC / Vital Capacity (liters) :  (pt unable to perform) (06/15/17 1559)  NIF (cm H2O) :  (pt unable to perform) (06/15/17 1559)  Rapid Shallow Breathing Index (RR/VT): 146 (06/15/17 1559)  Plateau Pressure (Q Shift): 20 (06/15/17 1847)  Static Compliance (ml / cm H2O): 33 (06/16/17 0242)    Patient failed trials because of Barriers to Wean: FiO2 >50% OR PEEP >8 (PMA Only) (06/11/17 1831)  Barriers to SBT Weaning Trial Stopped due to:: RSBI >105 (Rapid Shallow Breathing Index);Pt weaned for 1 hour and returned to rest settings per protocol (06/15/17 1559)  Length of Weaning Trial Length of Weaning Trial (Hours): 1 hour (06/15/17 0800)        Sputum/Suction   Cough: Productive (06/16/17 0400)  Sputum Amount: Small (06/16/17 0400)  Sputum Color: White (06/16/17 0400)  Sputum Consistency: Thick (06/16/17 0400)    Mobility Group  Activity Performed: Edge of bed (06/15/17 1600)  Time Activity Tolerated: 5 min (06/15/17 1600)  Assistance / Tolerance: Assistance of Two or More (06/15/17 1600)  Pt Calls for Assistance: No (06/15/17 2000)  Staff Present for Mobilization: RN (x3) (06/15/17 1600)  Assistive Devices: Hand held assist (06/13/17 0400)  Reason Not Mobilized: Bed rest (Per MD written order for bedrest. ) (06/14/17 1600)  Mobilization Comments: Tolerated well (06/13/17 1800)

## 2017-06-16 NOTE — PROGRESS NOTES
Pulmonary Critical Care Progress Note     Date of service: 6/16/2017    Interval Events:  24 hour interval history reviewed  Reason for visit:  Respiratory failure, acute pulmonary edema  Unable to provide CC or ROS due to intubation and sedation       Follow commands, tracking, awake.   Propofol 10   Converted to Afib with RVR last night, received x1 Digoxin push. Dr. Rosario (Cards) aware  Amiodarone started  -150 systolic  Tolerating TF, BM this AM  Good urine output via uriostegui  No N/V/P  Afebrile   Mobilized edge of bed yesterday - tolerate  No SBT overnight due to Afib/RVR - will SBT today   CXR with improved left lower lobe   Free water flushes started yesterday       PFSH:  No change.    Respiratory:  Pulse Oximetry: 97 %  Vent:  CMV, PEEP 8, 35%  Vent waveforms and airway  reviewed in detail  CXR with improved LLL opacification  Few coarse crackles bilaterally  No wheezing    Recent Labs      06/14/17   0511  06/15/17   0533   ISTATAPH  7.470  7.464   ISTATAPCO2  52.6*  48.1*   ISTATAPO2  94*  84   ISTATATCO2  40*  36*   [id number]  98  97   ISTATARTHCO3  38.2*  34.6*   ISTATARTBE  13*  10*   ISTATTEMP  99.2 F  98.5 F   ISTATFIO2  35  35   ISTATSPEC  Arterial  Arterial   ISTATAPHTC  7.464  7.465   [id number]  96*  84     HemoDynamics:  Pulse: (!) 102, Heart Rate (Monitored): (!) 105  NIBP: 125/73 mmHg     AF  Digoxin dose last night  Amiodarone    Neuro:  Sedated, arouses and nods  Propofol 10    Fluids:  Intake/Output       06/14/17 0700 - 06/15/17 0659 06/15/17 0700 - 06/16/17 0659 06/16/17 0700 - 06/17/17 0659      9632-1697 2017-0989 Total 5674-7668 7524-8148 Total 2315-0010 3905-8609 Total       Intake    I.V.  31.1  374.3 405.5  224.8  162.4 387.2  --  -- --    Propofol Volume 31.1 374.3 405.5 124.8 162.4 287.2 -- -- --    IV Piggyback Volume -- -- -- 100 -- 100 -- -- --    Other  120  120 240  120  30 150  --  -- --    Medications (P.O./ Enteral Liquids) 120 120 240 120 30 150 -- -- --     Enteral  630  690 1320  770  1030 1800  --  -- --    Enteral Volume   -- -- --    Free Water / Tube Flush 90 150 240 210 430 640 -- -- --    Total Intake 781.1 1184.3 1965.5 1114.8 1222.4 2337.2 -- -- --       Output    Urine  1060  800 1860  850  850 1700  --  -- --    Indwelling Cathether 5861 630 0079  -- -- --    Total Output 7385 058 8413  -- -- --       Net I/O     -278.9 384.3 105.5 264.8 372.4 637.2 -- -- --        Weight: 103.8 kg (228 lb 13.4 oz)  Recent Labs      17   0500  06/15/17   045   SODIUM  145  147*   POTASSIUM  4.0  4.0   CHLORIDE  102  107   CO2  34*  33   BUN  43*  41*   CREATININE  0.99  0.96   MAGNESIUM  2.2  1.9   PHOSPHORUS  3.1   --    CALCIUM  9.4  9.2     GI/Nutrition:  Abd soft ND/NT  Tolerating enteral TF    Liver Function  Recent Labs      17   0500  06/15/17   045   ALTSGPT  32   --    ASTSGOT  24   --    ALKPHOSPHAT  229*   --    TBILIRUBIN  0.3   --    GLUCOSE  181*  181*     Heme:  Recent Labs      17   0500  06/15/17   0450  17   0503   RBC  4.00*  3.89*  3.82*   HEMOGLOBIN  11.0*  10.7*  10.7*   HEMATOCRIT  35.1*  35.0*  35.0*   PLATELETCT  352  353  323     Infectious Disease:  Temp  Av.3 °C (99.2 °F)  Min: 37.1 °C (98.7 °F)  Max: 37.7 °C (99.8 °F)     Recent Labs      17   0500  06/15/17   0450  17   0503   WBC  8.7  9.7  8.4   NEUTSPOLYS  71.10  74.60*  69.70   LYMPHOCYTES  10.90*  9.10*  12.10*   MONOCYTES  12.10  10.50  13.10   EOSINOPHILS  2.30  2.80  3.00   BASOPHILS  0.50  0.80  0.70   ASTSGOT  24   --    --    ALTSGPT  32   --    --    ALKPHOSPHAT  229*   --    --    TBILIRUBIN  0.3   --    --      Current Facility-Administered Medications   Medication Dose Frequency Provider Last Rate Last Dose   • furosemide (LASIX) tablet 20 mg  20 mg Q8HRS Oni Elder M.D.   20 mg at 17 0456   • digoxin (LANOXIN) injection 500 mcg  500 mcg Once Refugio Rosario M.D.       •  potassium chloride (KLOR-CON) 20 MEQ packet 20 mEq  20 mEq BID Evens Cadena M.D.   20 mEq at 06/15/17 2128   • atorvastatin (LIPITOR) tablet 40 mg  40 mg QHS Nishant Sebastian M.D.   40 mg at 06/15/17 2127   • famotidine (PEPCID) tablet 20 mg  20 mg BID Josefa Moreno PHARMD   20 mg at 06/15/17 2128   • apixaban (ELIQUIS) 2.5mg tablet 5 mg  5 mg BID Jaime Bush M.D.   5 mg at 06/15/17 2128   • insulin lispro (HUMALOG) injection 3-14 Units  3-14 Units Q6HRS Antonio Mata M.D.   3 Units at 06/16/17 0513   • propofol (DIPRIVAN) injection  5-80 mcg/kg/min Continuous EMILY ArzolaD 13.5 mL/hr at 06/16/17 0200 20 mcg/kg/min at 06/16/17 0200   • Respiratory Care per Protocol   Continuous RT Antonio Mata M.D.       • chlorhexidine (PERIDEX) 0.12 % solution 15 mL  15 mL BID Antonio Mata M.D.   15 mL at 06/15/17 2128   • lidocaine (XYLOCAINE) 1%  injection  1-2 mL Q30 MIN PRN Antonio Mata M.D.   2 mL at 06/1955   • MD ALERT...Adult ICU Electrolyte Replacement per Pharmacy Protocol   pharmacy to dose Antonio Mata M.D.       • fentaNYL (SUBLIMAZE) injection 25 mcg  25 mcg Q HOUR PRN Antonio Mata M.D.   25 mcg at 06/12/17 1526    Or   • fentaNYL (SUBLIMAZE) injection 50 mcg  50 mcg Q HOUR PRN Antonio Mata M.D.        Or   • fentaNYL (SUBLIMAZE) injection 100 mcg  100 mcg Q HOUR PRN Antonio Mata M.D.   100 mcg at 06/16/17 0015   • ipratropium-albuterol (DUONEB) nebulizer solution 3 mL  3 mL Q2HRS PRN (RT) Antonio Mata M.D.       • ipratropium-albuterol (DUONEB) nebulizer solution 3 mL  3 mL Q4HRS (RT) Jaime Bush M.D.   3 mL at 06/16/17 0239   • glucose 4 g chewable tablet 16 g  16 g Q15 MIN PRN Anson Manuel M.D.        And   • dextrose 50% (D50W) injection 25 mL  25 mL Q15 MIN PRN Anson Manuel M.D.       • levothyroxine (SYNTHROID) tablet 88 mcg  88 mcg AM ES Anson Manuel M.D.   88 mcg at 06/16/17 0503   • senna-docusate (PERICOLACE or SENOKOT S) 8.6-50 MG per tablet 2  Tab  2 Tab BID Anson Manuel M.D.   2 Tab at 06/15/17 2128    And   • polyethylene glycol/lytes (MIRALAX) PACKET 1 Packet  1 Packet QDAY PRN Anson Manuel M.D.   1 Packet at 06/12/17 0920    And   • magnesium hydroxide (MILK OF MAGNESIA) suspension 30 mL  30 mL QDAY PRN Anson Manuel M.D.        And   • bisacodyl (DULCOLAX) suppository 10 mg  10 mg QDAY PRN Anson Manuel M.D.       • ondansetron (ZOFRAN) syringe/vial injection 4 mg  4 mg Q4HRS PRN Anson Manuel M.D.       • ondansetron (ZOFRAN ODT) dispertab 4 mg  4 mg Q4HRS PRN Anson Manuel M.D.       • acetaminophen (TYLENOL) tablet 650 mg  650 mg Q6HRS PRN Anson Manuel M.D.         Last reviewed on 6/8/2017  9:34 AM by Mindi Benites, Pharmacy Int    Quality  Measures:  Labs reviewed, Medications reviewed and Radiology images reviewed  Tamayo catheter: Critically Ill - Requiring Accurate Measurement of Urinary Output  Central line in place: Need for access      DVT prophylaxis - mechanical: SCDs  Ulcer prophylaxis: Yes            Assessment and Plan:    VDRF - intubated 6/9   - cont vent support   - SBT as tolerated  Acute hypoxemic respiratory failure  Acute pulmonary edema   - force diuresis as tolerated  Query pneumonia   - finished 5 days of Rocephin and Zithromax 6/12 - observe off antibiotics  Fever - resolved   - UA negative   - no leukocytosis  PAF   - load with digoxin   - start amiodarone   - optimize K and Mg   - anticoagulated with apixaban  Suspect acute on chronic diastolic heart failure   - force diuresis as tolerated  DM 2 - cont insulin  Hx of HTN  Dyslipidemia  Hypernatremia   - cont free water    Critical Care Time:  35 minutes  99953  No time overlap  Time excludes procedures  Discussed with RN, RT, Team    ILilli (Toñito), perfecto scribing for, and in the presence of, Evens Riggs M.D.    Electronically signed by: Lilli Kenyon), 6/16/2017    IEvens M.D. personally performed the services described in this  documentation, as scribed by Lilli Abel in my presence, and it is both accurate and complete.

## 2017-06-16 NOTE — PROGRESS NOTES
After turning patient patient went into AFIB with RVR with a rate of 150.  Quickly recovers but continues to fluctuate between Afib and ST.  Ekg ordered and awaiting result before calling Cardiology.

## 2017-06-17 ENCOUNTER — APPOINTMENT (OUTPATIENT)
Dept: RADIOLOGY | Facility: MEDICAL CENTER | Age: 62
DRG: 853 | End: 2017-06-17
Attending: INTERNAL MEDICINE
Payer: COMMERCIAL

## 2017-06-17 LAB
ANION GAP SERPL CALC-SCNC: 7 MMOL/L (ref 0–11.9)
BASOPHILS # BLD AUTO: 1.1 % (ref 0–1.8)
BASOPHILS # BLD: 0.09 K/UL (ref 0–0.12)
BUN SERPL-MCNC: 41 MG/DL (ref 8–22)
CALCIUM SERPL-MCNC: 9.1 MG/DL (ref 8.5–10.5)
CHLORIDE SERPL-SCNC: 109 MMOL/L (ref 96–112)
CO2 SERPL-SCNC: 28 MMOL/L (ref 20–33)
CREAT SERPL-MCNC: 0.88 MG/DL (ref 0.5–1.4)
EOSINOPHIL # BLD AUTO: 0.21 K/UL (ref 0–0.51)
EOSINOPHIL NFR BLD: 2.5 % (ref 0–6.9)
ERYTHROCYTE [DISTWIDTH] IN BLOOD BY AUTOMATED COUNT: 46.5 FL (ref 35.9–50)
GFR SERPL CREATININE-BSD FRML MDRD: >60 ML/MIN/1.73 M 2
GLUCOSE BLD-MCNC: 186 MG/DL (ref 65–99)
GLUCOSE BLD-MCNC: 197 MG/DL (ref 65–99)
GLUCOSE BLD-MCNC: 212 MG/DL (ref 65–99)
GLUCOSE SERPL-MCNC: 208 MG/DL (ref 65–99)
HCT VFR BLD AUTO: 34.1 % (ref 37–47)
HGB BLD-MCNC: 10.5 G/DL (ref 12–16)
IMM GRANULOCYTES # BLD AUTO: 0.08 K/UL (ref 0–0.11)
IMM GRANULOCYTES NFR BLD AUTO: 1 % (ref 0–0.9)
LYMPHOCYTES # BLD AUTO: 1.22 K/UL (ref 1–4.8)
LYMPHOCYTES NFR BLD: 14.6 % (ref 22–41)
MAGNESIUM SERPL-MCNC: 1.7 MG/DL (ref 1.5–2.5)
MCH RBC QN AUTO: 27.4 PG (ref 27–33)
MCHC RBC AUTO-ENTMCNC: 30.8 G/DL (ref 33.6–35)
MCV RBC AUTO: 89 FL (ref 81.4–97.8)
MONOCYTES # BLD AUTO: 0.83 K/UL (ref 0–0.85)
MONOCYTES NFR BLD AUTO: 9.9 % (ref 0–13.4)
NEUTROPHILS # BLD AUTO: 5.94 K/UL (ref 2–7.15)
NEUTROPHILS NFR BLD: 70.9 % (ref 44–72)
NRBC # BLD AUTO: 0 K/UL
NRBC BLD AUTO-RTO: 0 /100 WBC
PHOSPHATE SERPL-MCNC: 2.8 MG/DL (ref 2.5–4.5)
PLATELET # BLD AUTO: 285 K/UL (ref 164–446)
PMV BLD AUTO: 9.2 FL (ref 9–12.9)
POTASSIUM SERPL-SCNC: 4.1 MMOL/L (ref 3.6–5.5)
RBC # BLD AUTO: 3.83 M/UL (ref 4.2–5.4)
SODIUM SERPL-SCNC: 144 MMOL/L (ref 135–145)
TRIGL SERPL-MCNC: 130 MG/DL (ref 0–149)
WBC # BLD AUTO: 8.4 K/UL (ref 4.8–10.8)

## 2017-06-17 PROCEDURE — A9270 NON-COVERED ITEM OR SERVICE: HCPCS

## 2017-06-17 PROCEDURE — 94003 VENT MGMT INPAT SUBQ DAY: CPT

## 2017-06-17 PROCEDURE — 700102 HCHG RX REV CODE 250 W/ 637 OVERRIDE(OP): Performed by: INTERNAL MEDICINE

## 2017-06-17 PROCEDURE — 770022 HCHG ROOM/CARE - ICU (200)

## 2017-06-17 PROCEDURE — 88112 CYTOPATH CELL ENHANCE TECH: CPT

## 2017-06-17 PROCEDURE — 87070 CULTURE OTHR SPECIMN AEROBIC: CPT

## 2017-06-17 PROCEDURE — A9270 NON-COVERED ITEM OR SERVICE: HCPCS | Performed by: INTERNAL MEDICINE

## 2017-06-17 PROCEDURE — 99291 CRITICAL CARE FIRST HOUR: CPT | Mod: 25 | Performed by: INTERNAL MEDICINE

## 2017-06-17 PROCEDURE — 87015 SPECIMEN INFECT AGNT CONCNTJ: CPT

## 2017-06-17 PROCEDURE — 83735 ASSAY OF MAGNESIUM: CPT

## 2017-06-17 PROCEDURE — 700111 HCHG RX REV CODE 636 W/ 250 OVERRIDE (IP): Performed by: INTERNAL MEDICINE

## 2017-06-17 PROCEDURE — 0B9G8ZX DRAINAGE OF LEFT UPPER LUNG LOBE, VIA NATURAL OR ARTIFICIAL OPENING ENDOSCOPIC, DIAGNOSTIC: ICD-10-PCS | Performed by: INTERNAL MEDICINE

## 2017-06-17 PROCEDURE — 0B968ZX DRAINAGE OF RIGHT LOWER LOBE BRONCHUS, VIA NATURAL OR ARTIFICIAL OPENING ENDOSCOPIC, DIAGNOSTIC: ICD-10-PCS | Performed by: INTERNAL MEDICINE

## 2017-06-17 PROCEDURE — 31645 BRNCHSC W/THER ASPIR 1ST: CPT | Performed by: INTERNAL MEDICINE

## 2017-06-17 PROCEDURE — 0B9C8ZX DRAINAGE OF RIGHT UPPER LUNG LOBE, VIA NATURAL OR ARTIFICIAL OPENING ENDOSCOPIC, DIAGNOSTIC: ICD-10-PCS | Performed by: INTERNAL MEDICINE

## 2017-06-17 PROCEDURE — 71010 DX-CHEST-PORTABLE (1 VIEW): CPT

## 2017-06-17 PROCEDURE — 82962 GLUCOSE BLOOD TEST: CPT | Mod: 91

## 2017-06-17 PROCEDURE — 80048 BASIC METABOLIC PNL TOTAL CA: CPT

## 2017-06-17 PROCEDURE — 700102 HCHG RX REV CODE 250 W/ 637 OVERRIDE(OP): Performed by: HOSPITALIST

## 2017-06-17 PROCEDURE — 84100 ASSAY OF PHOSPHORUS: CPT

## 2017-06-17 PROCEDURE — 0B948ZX DRAINAGE OF RIGHT UPPER LOBE BRONCHUS, VIA NATURAL OR ARTIFICIAL OPENING ENDOSCOPIC, DIAGNOSTIC: ICD-10-PCS | Performed by: INTERNAL MEDICINE

## 2017-06-17 PROCEDURE — A9270 NON-COVERED ITEM OR SERVICE: HCPCS | Performed by: HOSPITALIST

## 2017-06-17 PROCEDURE — 87102 FUNGUS ISOLATION CULTURE: CPT

## 2017-06-17 PROCEDURE — 87106 FUNGI IDENTIFICATION YEAST: CPT

## 2017-06-17 PROCEDURE — 0B988ZX DRAINAGE OF LEFT UPPER LOBE BRONCHUS, VIA NATURAL OR ARTIFICIAL OPENING ENDOSCOPIC, DIAGNOSTIC: ICD-10-PCS | Performed by: INTERNAL MEDICINE

## 2017-06-17 PROCEDURE — 87116 MYCOBACTERIA CULTURE: CPT

## 2017-06-17 PROCEDURE — 0B9F8ZX DRAINAGE OF RIGHT LOWER LUNG LOBE, VIA NATURAL OR ARTIFICIAL OPENING ENDOSCOPIC, DIAGNOSTIC: ICD-10-PCS | Performed by: INTERNAL MEDICINE

## 2017-06-17 PROCEDURE — 302977 HCHG BRONCHOSCOPY PROC-THERAPEUTIC

## 2017-06-17 PROCEDURE — 700105 HCHG RX REV CODE 258

## 2017-06-17 PROCEDURE — 85025 COMPLETE CBC W/AUTO DIFF WBC: CPT

## 2017-06-17 PROCEDURE — 700101 HCHG RX REV CODE 250: Performed by: HOSPITALIST

## 2017-06-17 PROCEDURE — 0B9J8ZX DRAINAGE OF LEFT LOWER LUNG LOBE, VIA NATURAL OR ARTIFICIAL OPENING ENDOSCOPIC, DIAGNOSTIC: ICD-10-PCS | Performed by: INTERNAL MEDICINE

## 2017-06-17 PROCEDURE — 88305 TISSUE EXAM BY PATHOLOGIST: CPT

## 2017-06-17 PROCEDURE — 87206 SMEAR FLUORESCENT/ACID STAI: CPT

## 2017-06-17 PROCEDURE — 700102 HCHG RX REV CODE 250 W/ 637 OVERRIDE(OP)

## 2017-06-17 PROCEDURE — 700111 HCHG RX REV CODE 636 W/ 250 OVERRIDE (IP)

## 2017-06-17 PROCEDURE — 99233 SBSQ HOSP IP/OBS HIGH 50: CPT | Performed by: HOSPITALIST

## 2017-06-17 PROCEDURE — 84478 ASSAY OF TRIGLYCERIDES: CPT

## 2017-06-17 PROCEDURE — 0BJ08ZZ INSPECTION OF TRACHEOBRONCHIAL TREE, VIA NATURAL OR ARTIFICIAL OPENING ENDOSCOPIC: ICD-10-PCS | Performed by: INTERNAL MEDICINE

## 2017-06-17 PROCEDURE — 700101 HCHG RX REV CODE 250: Performed by: INTERNAL MEDICINE

## 2017-06-17 PROCEDURE — 94640 AIRWAY INHALATION TREATMENT: CPT

## 2017-06-17 PROCEDURE — 87205 SMEAR GRAM STAIN: CPT

## 2017-06-17 PROCEDURE — 700105 HCHG RX REV CODE 258: Performed by: INTERNAL MEDICINE

## 2017-06-17 PROCEDURE — 36600 WITHDRAWAL OF ARTERIAL BLOOD: CPT

## 2017-06-17 RX ORDER — MAGNESIUM SULFATE HEPTAHYDRATE 40 MG/ML
2 INJECTION, SOLUTION INTRAVENOUS ONCE
Status: COMPLETED | OUTPATIENT
Start: 2017-06-17 | End: 2017-06-17

## 2017-06-17 RX ORDER — PROPOFOL 10 MG/ML
50 INJECTION, EMULSION INTRAVENOUS ONCE
Status: COMPLETED | OUTPATIENT
Start: 2017-06-17 | End: 2017-06-17

## 2017-06-17 RX ADMIN — IPRATROPIUM BROMIDE AND ALBUTEROL SULFATE 3 ML: .5; 3 SOLUTION RESPIRATORY (INHALATION) at 14:18

## 2017-06-17 RX ADMIN — METOPROLOL TARTRATE 25 MG: 25 TABLET, FILM COATED ORAL at 04:55

## 2017-06-17 RX ADMIN — APIXABAN 5 MG: 5 TABLET, FILM COATED ORAL at 21:06

## 2017-06-17 RX ADMIN — IPRATROPIUM BROMIDE AND ALBUTEROL SULFATE 3 ML: .5; 3 SOLUTION RESPIRATORY (INHALATION) at 11:12

## 2017-06-17 RX ADMIN — POTASSIUM CHLORIDE 20 MEQ: 1.5 POWDER, FOR SOLUTION ORAL at 21:03

## 2017-06-17 RX ADMIN — DEXMEDETOMIDINE HYDROCHLORIDE 0.3 MCG/KG/HR: 100 INJECTION, SOLUTION INTRAVENOUS at 13:40

## 2017-06-17 RX ADMIN — CHLORHEXIDINE GLUCONATE 15 ML: 1.2 RINSE ORAL at 07:18

## 2017-06-17 RX ADMIN — ALTEPLASE 2 MG: 2.2 INJECTION, POWDER, LYOPHILIZED, FOR SOLUTION INTRAVENOUS at 07:19

## 2017-06-17 RX ADMIN — METOPROLOL TARTRATE 37.5 MG: 25 TABLET, FILM COATED ORAL at 21:02

## 2017-06-17 RX ADMIN — APIXABAN 5 MG: 5 TABLET, FILM COATED ORAL at 09:40

## 2017-06-17 RX ADMIN — IPRATROPIUM BROMIDE AND ALBUTEROL SULFATE 3 ML: .5; 3 SOLUTION RESPIRATORY (INHALATION) at 17:24

## 2017-06-17 RX ADMIN — IPRATROPIUM BROMIDE AND ALBUTEROL SULFATE 3 ML: .5; 3 SOLUTION RESPIRATORY (INHALATION) at 01:39

## 2017-06-17 RX ADMIN — INSULIN LISPRO 3 UNITS: 100 INJECTION, SOLUTION INTRAVENOUS; SUBCUTANEOUS at 05:02

## 2017-06-17 RX ADMIN — DEXMEDETOMIDINE HYDROCHLORIDE 0.3 MCG/KG/HR: 100 INJECTION, SOLUTION INTRAVENOUS at 18:13

## 2017-06-17 RX ADMIN — DEXMEDETOMIDINE HYDROCHLORIDE 0.5 MCG/KG/HR: 100 INJECTION, SOLUTION INTRAVENOUS at 03:45

## 2017-06-17 RX ADMIN — CHLORHEXIDINE GLUCONATE 15 ML: 1.2 RINSE ORAL at 21:02

## 2017-06-17 RX ADMIN — DEXMEDETOMIDINE HYDROCHLORIDE 0.5 MCG/KG/HR: 100 INJECTION, SOLUTION INTRAVENOUS at 07:54

## 2017-06-17 RX ADMIN — FUROSEMIDE 20 MG: 20 TABLET ORAL at 14:13

## 2017-06-17 RX ADMIN — LEVOTHYROXINE SODIUM 88 MCG: 88 TABLET ORAL at 05:07

## 2017-06-17 RX ADMIN — FAMOTIDINE 20 MG: 20 TABLET, FILM COATED ORAL at 07:18

## 2017-06-17 RX ADMIN — POTASSIUM CHLORIDE 20 MEQ: 1.5 POWDER, FOR SOLUTION ORAL at 07:18

## 2017-06-17 RX ADMIN — INSULIN LISPRO 3 UNITS: 100 INJECTION, SOLUTION INTRAVENOUS; SUBCUTANEOUS at 17:56

## 2017-06-17 RX ADMIN — IPRATROPIUM BROMIDE AND ALBUTEROL SULFATE 3 ML: .5; 3 SOLUTION RESPIRATORY (INHALATION) at 06:45

## 2017-06-17 RX ADMIN — FUROSEMIDE 20 MG: 20 TABLET ORAL at 04:55

## 2017-06-17 RX ADMIN — PROPOFOL 50 MG: 10 INJECTION, EMULSION INTRAVENOUS at 16:30

## 2017-06-17 RX ADMIN — IPRATROPIUM BROMIDE AND ALBUTEROL SULFATE 3 ML: .5; 3 SOLUTION RESPIRATORY (INHALATION) at 22:04

## 2017-06-17 RX ADMIN — METOPROLOL TARTRATE 25 MG: 25 TABLET, FILM COATED ORAL at 14:13

## 2017-06-17 RX ADMIN — INSULIN LISPRO 4 UNITS: 100 INJECTION, SOLUTION INTRAVENOUS; SUBCUTANEOUS at 12:22

## 2017-06-17 RX ADMIN — ATORVASTATIN CALCIUM 40 MG: 40 TABLET, FILM COATED ORAL at 21:02

## 2017-06-17 RX ADMIN — FAMOTIDINE 20 MG: 20 TABLET, FILM COATED ORAL at 21:02

## 2017-06-17 RX ADMIN — SENNOSIDES AND DOCUSATE SODIUM 2 TABLET: 8.6; 5 TABLET ORAL at 07:18

## 2017-06-17 RX ADMIN — AMIODARONE HYDROCHLORIDE 0.5 MG/MIN: 50 INJECTION, SOLUTION INTRAVENOUS at 08:57

## 2017-06-17 RX ADMIN — FUROSEMIDE 20 MG: 20 TABLET ORAL at 21:02

## 2017-06-17 RX ADMIN — SENNOSIDES AND DOCUSATE SODIUM 2 TABLET: 8.6; 5 TABLET ORAL at 21:02

## 2017-06-17 RX ADMIN — MAGNESIUM SULFATE IN WATER 2 G: 40 INJECTION, SOLUTION INTRAVENOUS at 09:40

## 2017-06-17 NOTE — OP REPORT
DATE OF SERVICE:  06/17/2017    DATE OF PROCEDURE:  06/17/2017    TITLE OF PROCEDURE:  Diagnostic and therapeutic flexible fiberoptic   bronchoscopy.    INDICATION FOR THE PROCEDURE:  Atelectasis.    POSTPROCEDURE DIAGNOSES:  1.  Normal endobronchial anatomy.  2.  No endobronchial tumor identified.  3.  Mildly inflamed airways in the left lung.  4.  Moderate amounts of juicy white secretions seen bilaterally, suctioned   until clear.    NARRATIVE:  The patient was sedated, intubated and ventilated at the time of   this procedure.  The flexible fiberoptic bronchoscope was inserted through the   lumen of the endotracheal tube and advanced into the distal trachea without   difficulty.  The airways were examined to the subsegmental bronchus level   bilaterally.  The endobronchial anatomy was normal.  No tumor was identified.    The airways in the left lung were mildly inflamed.  There was a moderate   amount of juicy white secretions seen bilaterally and these were suctioned   until clear.  Bilateral bronchial washings from all lobes of both lungs were   submitted to laboratory for cytology, Gram stain, culture and sensitivity,   acid fast bacilli smear and culture and fungal culture.  The patient tolerated   the procedure quite nicely.  No complications are apparent.  Her heart rate   and rhythm, blood pressure and oxygen saturation were continuously monitored.       ____________________________________     MD OLGA WEI / ELIZABETH    DD:  06/17/2017 15:09:29  DT:  06/17/2017 16:28:41    D#:  [id number]  Job#:  [id number]

## 2017-06-17 NOTE — CARE PLAN
Problem: Ventilation Defect:  Goal: Ability to achieve and maintain unassisted ventilation or tolerate decreased levels of ventilator support  Outcome: PROGRESSING AS EXPECTED  Adult Ventilation Update    Total Vent Days: 8      Patient Lines/Drains/Airways Status    Active Airway      Name: Placement date: Placement time: Site: Days:     Airway Group ET Tube 7.5 06/09/17 1710    7                 In the last 24 hours, the patient tolerated SBT for 2.5 hours 5/8.     #FVC / Vital Capacity (liters) :  (pt unable to follow commands) (06/16/17 1215)  NIF (cm H2O) :  (pt unable to follow commands) (06/16/17 1215)  Rapid Shallow Breathing Index (RR/VT): 77 (06/16/17 1215)  Plateau Pressure (Q Shift):  (pt overbreathing vent) (06/16/17 0621)  Static Compliance (ml / cm H2O): 55 (06/16/17 1600)    Patient failed trials because of Barriers to Wean: Other (Comments) (A-fib, elevated HR at this time) (06/16/17 0621)  Barriers to SBT Weaning Trial Stopped due to:: Pt weaned for 1 hour and returned to rest settings per protocol (06/16/17 1215)  Length of Weaning Trial Length of Weaning Trial (Hours): 1 hour (06/16/17 1215)    Cough: Non Productive (06/16/17 1429)  Sputum Amount: Unable to Evaluate (06/16/17 1429)  Sputum Color: Unable to Evaluate (06/16/17 1429)  Sputum Consistency: Unable to Evaluate (06/16/17 1429)    Mobility Group  Activity Performed: Unable to mobilize (06/16/17 0621)  Time Activity Tolerated: 5 min (06/15/17 1600)  Assistance / Tolerance: Assistance of Two or More (06/15/17 1600)  Pt Calls for Assistance: No (06/16/17 0800)  Staff Present for Mobilization: RN (x3) (06/15/17 1600)  Assistive Devices: Hand held assist (06/13/17 0400)  Reason Not Mobilized: Bed rest (Per MD written order for bedrest. ) (06/14/17 1600)  Mobilization Comments: Tolerated well (06/13/17 1800)    Events/Summary/Plan: SBT ended (06/16/17 1600)

## 2017-06-17 NOTE — CARE PLAN
Problem: Mobility  Goal: Risk for activity intolerance will decrease  Outcome: PROGRESSING SLOWER THAN EXPECTED  Patient sat at edge of bed with 3 assist for 10 min. Patient encouraged to hold self up and kick feet.  Patient tolerated mobilizing, held herself up for 30 seconds.     Problem: Risk for Infection, Impaired Wound Healing  Goal: Remain free from signs and symptoms of infection  Outcome: PROGRESSING AS EXPECTED  Hand hygiene performed, family member at bedside educated on the importance of hand hygiene. Central line dressing changed per policy. Will continue to monitor and signs and symptoms of infection.

## 2017-06-17 NOTE — PROGRESS NOTES
Pulmonary Critical Care Progress Note     Date of service: 6/17/2017    Interval Events:  24 hour interval history reviewed  Reason for visit:  Respiratory failure, acute pulmonary edema  Unable to provide CC or ROS due to intubation and sedation       Arouses easily, nods and follows  PAF  Amiodarone 0.5   1400 cc UOP with Lasix.   Dex 0.3   Moderate thick clear secretions.   SBT at 5/7for one hour this AM with RSBI 81        PFSH:  No change.    Respiratory:  Pulse Oximetry: 97 %  Vent:  CMV, PEEP 8, 30%  Vent waveforms and airway  reviewed in detail  CXR with unchanged LLL opacification  Coarse crackles bilaterally  No wheezing    Recent Labs      06/15/17   0533  06/16/17   0511   ISTATAPH  7.464  7.444   ISTATAPCO2  48.1*  46.5*   ISTATAPO2  84  84   ISTATATCO2  36*  33   [id number]  97  97   ISTATARTHCO3  34.6*  31.9*   ISTATARTBE  10*  7*   ISTATTEMP  98.5 F  98.1 F   ISTATFIO2  35  30   ISTATSPEC  Arterial  Arterial   ISTATAPHTC  7.465  7.448   [id number]  84  83     HemoDynamics:  Pulse: 91, Heart Rate (Monitored): 82  NIBP: 110/61 mmHg     AF  Amiodarone 0.5    Neuro:  Arouses easily and nods  Precedex 0.3    Fluids:  Intake/Output       06/15/17 0700 - 06/16/17 0659 06/16/17 0700 - 06/17/17 0659 06/17/17 0700 - 06/18/17 0659      0700-1859 1900-0659 Total 3758-6678 8037-9564 Total 9233-3489 3686-9994 Total       Intake    I.V.  224.8  162.4 387.2  339.1  296.7 635.8  --  -- --    Precedex Volume -- -- -- 35.9 131.7 167.6 -- -- --    Propofol Volume 124.8 162.4 287.2 39.2 -- 39.2 -- -- --    IV Volume (amiodarone) -- -- -- 264 165 429 -- -- --    IV Piggyback Volume 100 -- 100 -- -- -- -- -- --    Other  120  30 150  --  -- --  --  -- --    Medications (P.O./ Enteral Liquids) 120 30 150 -- -- -- -- -- --    Enteral  770  1030 1800  1000  800 1800  --  -- --    Enteral Volume   -- -- --    Free Water / Tube Flush 210 430 640 400 200 600 -- -- --    Total Intake 1114.8  1222.4 2337.2 1339.1 1096.7 2435.8 -- -- --       Output    Urine  850  850 1700  1125  1400 2525  --  -- --    Indwelling Cathether  1125 1400 2525 -- -- --    Stool  --  -- --  --  -- --  --  -- --    Number of Times Stooled -- -- -- 1 x -- 1 x -- -- --    Total Output  1125 1400 2525 -- -- --       Net I/O     264.8 372.4 637.2 214.1 -303.3 -89.2 -- -- --        Weight: 105.1 kg (231 lb 11.3 oz)  Recent Labs      06/15/17   0450  06/16/17   0503  06/17/17   0443   SODIUM  147*  146*  144   POTASSIUM  4.0  4.0  4.1   CHLORIDE  107  110  109   CO2  33  30  28   BUN  41*  40*  41*   CREATININE  0.96  0.86  0.88   MAGNESIUM  1.9  2.0  1.7   PHOSPHORUS   --   2.7  2.8   CALCIUM  9.2  9.0  9.1     GI/Nutrition:  Abd soft ND/NT  Tolerating enteral TF    Liver Function  Recent Labs      06/15/17   0450  06/16/17   0503  06/17/17   0443   ALTSGPT   --   31   --    ASTSGOT   --   24   --    ALKPHOSPHAT   --   229*   --    TBILIRUBIN   --   0.3   --    GLUCOSE  181*  178*  208*     Heme:  Recent Labs      06/15/17   0450  06/16/17   0503  06/17/17   0443   RBC  3.89*  3.82*  3.83*   HEMOGLOBIN  10.7*  10.7*  10.5*   HEMATOCRIT  35.0*  35.0*  34.1*   PLATELETCT  353  323  285     Infectious Disease:  Temp  Av.8 °C (98.2 °F)  Min: 36.4 °C (97.6 °F)  Max: 37 °C (98.6 °F)     Recent Labs      06/15/17   0450  06/16/17   0503  06/17/17   0443   WBC  9.7  8.4  8.4   NEUTSPOLYS  74.60*  69.70  70.90   LYMPHOCYTES  9.10*  12.10*  14.60*   MONOCYTES  10.50  13.10  9.90   EOSINOPHILS  2.80  3.00  2.50   BASOPHILS  0.80  0.70  1.10   ASTSGOT   --   24   --    ALTSGPT   --   31   --    ALKPHOSPHAT   --   229*   --    TBILIRUBIN   --   0.3   --      Current Facility-Administered Medications   Medication Dose Frequency Provider Last Rate Last Dose   • alteplase (CATHFLO) syringe 2 mg  2 mg Once Anson Manuel M.D.       • MD Alert...Amiodarone Protocol Pharmacist to Implement   PRN Evens Cadena M.D.        • amiodarone (CORDARONE) 450 mg in D5W 250 mL Infusion  0.5-1 mg/min Continuous Mauricio Wilks PHARMD 17 mL/hr at 06/16/17 1409 0.5 mg/min at 06/16/17 1409   • apixaban (ELIQUIS) 2.5mg tablet 2.5 mg  2.5 mg Once Mauricio Wilks PHARMD       • dexmedetomidine (PRECEDEX) 200 mcg in NS 50 mL infusion  0.1-1.5 mcg/kg/hr Continuous Evens Cadena M.D. 13.1 mL/hr at 06/17/17 0345 0.5 mcg/kg/hr at 06/17/17 0345   • metoprolol (LOPRESSOR) tablet 25 mg  25 mg Q8HRS Oni Elder M.D.   25 mg at 06/17/17 0455   • furosemide (LASIX) tablet 20 mg  20 mg Q8HRS Oni Elder M.D.   20 mg at 06/17/17 0455   • potassium chloride (KLOR-CON) 20 MEQ packet 20 mEq  20 mEq BID Evens Cadena M.D.   20 mEq at 06/16/17 2110   • atorvastatin (LIPITOR) tablet 40 mg  40 mg QHS Nishant Sebastian M.D.   40 mg at 06/16/17 2110   • famotidine (PEPCID) tablet 20 mg  20 mg BID Josefa Moreno PHARMD   20 mg at 06/16/17 2110   • apixaban (ELIQUIS) 2.5mg tablet 5 mg  5 mg BID Jaime Bush M.D.   5 mg at 06/16/17 2111   • insulin lispro (HUMALOG) injection 3-14 Units  3-14 Units Q6HRS Antonio Mata M.D.   3 Units at 06/17/17 0502   • propofol (DIPRIVAN) injection  5-80 mcg/kg/min Continuous Sid Patino PHARMD   Stopped at 06/16/17 1145   • Respiratory Care per Protocol   Continuous RT Antonio Mata M.D.       • chlorhexidine (PERIDEX) 0.12 % solution 15 mL  15 mL BID Antonio Mata M.D.   15 mL at 06/16/17 2110   • lidocaine (XYLOCAINE) 1%  injection  1-2 mL Q30 MIN PRN Antonio Mata M.D.   2 mL at 06/16/17 1111   • MD ALERT...Adult ICU Electrolyte Replacement per Pharmacy Protocol   pharmacy to dose Antonio Mata M.D.       • fentaNYL (SUBLIMAZE) injection 25 mcg  25 mcg Q HOUR PRN Antonio Mata M.D.   25 mcg at 06/16/17 1215    Or   • fentaNYL (SUBLIMAZE) injection 50 mcg  50 mcg Q HOUR PRN Antonio Mata M.D.        Or   • fentaNYL (SUBLIMAZE) injection 100 mcg  100 mcg Q HOUR PRN Antonio CULP  ERAN Mata   100 mcg at 06/16/17 0015   • ipratropium-albuterol (DUONEB) nebulizer solution 3 mL  3 mL Q2HRS PRN (RT) Antonio Mata M.D.       • ipratropium-albuterol (DUONEB) nebulizer solution 3 mL  3 mL Q4HRS (RT) Jaime Bush M.D.   3 mL at 06/17/17 0139   • glucose 4 g chewable tablet 16 g  16 g Q15 MIN PRN Anson Manuel M.D.        And   • dextrose 50% (D50W) injection 25 mL  25 mL Q15 MIN PRN Anson Manuel M.D.       • levothyroxine (SYNTHROID) tablet 88 mcg  88 mcg AM ES Anson Manuel M.D.   88 mcg at 06/17/17 0507   • senna-docusate (PERICOLACE or SENOKOT S) 8.6-50 MG per tablet 2 Tab  2 Tab BID Anson Manuel M.D.   2 Tab at 06/16/17 2110    And   • polyethylene glycol/lytes (MIRALAX) PACKET 1 Packet  1 Packet QDAY PRN Anson Manuel M.D.   1 Packet at 06/12/17 0920    And   • magnesium hydroxide (MILK OF MAGNESIA) suspension 30 mL  30 mL QDAY PRN Anson Manuel M.D.        And   • bisacodyl (DULCOLAX) suppository 10 mg  10 mg QDAY PRN Anson Manuel M.D.       • ondansetron (ZOFRAN) syringe/vial injection 4 mg  4 mg Q4HRS PRN Anson Manuel M.D.       • ondansetron (ZOFRAN ODT) dispertab 4 mg  4 mg Q4HRS PRN Anson Manuel M.D.       • acetaminophen (TYLENOL) tablet 650 mg  650 mg Q6HRS PRN Anson Manuel M.D.         Last reviewed on 6/8/2017  9:34 AM by Mindi Benites, Pharmacy Int    Quality  Measures:  Labs reviewed, Medications reviewed and Radiology images reviewed  Tamayo catheter: Critically Ill - Requiring Accurate Measurement of Urinary Output  Central line in place: Need for access      DVT prophylaxis - mechanical: SCDs  Ulcer prophylaxis: Yes            Assessment and Plan:    VDRF - intubated 6/9   - cont vent support   - SBT as tolerated  Acute hypoxemic respiratory failure  Acute pulmonary edema   - force diuresis as tolerated  Query pneumonia   - finished 5 days of Rocephin and Zithromax 6/12 - observe off antibiotics  PAF   - loaded with digoxin   - cont amiodarone   - optimize K and Mg   -  anticoagulated with apixaban  Suspect acute on chronic diastolic heart failure   - force diuresis as tolerated  Pulmonary HTN   - RVSP 50 mm Hg  DM 2 - cont insulin  Hx of HTN  Dyslipidemia  Hypernatremia   - cont free water    Critical Care Time:  33 minutes  98006  No time overlap  Time excludes procedures  Discussed with RN, RT, Team    IAgnes (Scribe), am scribing for, and in the presence of, Evens Riggs M.D.   Electronically signed by: Agnes Carvajal (Toñito), 6/17/2017  IEvens M.D. personally performed the services described in this documentation, as scribed by Agnes Carvajal in my presence, and it is both accurate and complete.

## 2017-06-17 NOTE — PROGRESS NOTES
[facility] Hospitalist Progress Note    Date of Service: 2017    Chief Complaint  61 y.o. female admitted 2017 with syncope.    Interval Problem Update  Ms. Alfaro has a hx of htn and recently diagnosed afib.  CT chest in the ER revealed alveolar hemorrhage. She required rescue bipap and was admitted to the ICU. She required intubation on  with bronchoscopy.   She was in afib that converted to sinus rhythm    Patient seen and examined today. ICU Care  Care and plan discussed in IDT/Hot rounds.  Lines and assistive devices reviewed.    Patient tolerating treatment and therapies.  All Data, Medication data reviewed.  Case discussed with nursing as available.  Plan of Care reviewed with patient and notified of changes.   Pt remains intubated, renal function better, nephro s/o, Bp better, follows commands, CXR no change, SBT's in progress, h/h stable, on AC, getting diuresis   Pt remains vented and sedated, in SR, awakes and follows  Less pulm edema on CXR  6/15 Pt remains intubated, more awake, follow, in SR, Cxr better, less fluid, Pt weak, follows sluggishly, not moving LE much, on Spont curently, family at bedside   Pt with recurrence of AFIB with RVR this am, amio restarted, cards informed, Pt on eliquis, Digoxin given, resp. Status better/ improved   Pt more awake, weak, better slowly, not quite ready for extubation, is following, no pain  remains in rate  AFIB    Consultants/Specialty  Pulmonology  Cardiology  Nephrology    Disposition  ICU        Review of Systems   Unable to perform ROS: intubated      Physical Exam  Laboratory/Imaging   Hemodynamics  Temp (24hrs), Av.6 °C (97.8 °F), Min:36.1 °C (97 °F), Max:36.9 °C (98.4 °F)   Temperature: 36.1 °C (97 °F)  Pulse  Av.9  Min: 62  Max: 121 Heart Rate (Monitored): 100  NIBP: 126/68 mmHg      Respiratory  [place] Vent Mode: Spont, Rate (breaths/min): 16, PEEP/CPAP: 8, PEEP/CPAP: 8, FiO2: 30, P Peak (PIP): 17, P MEAN: 11    Respiration: (!) 29, Pulse Oximetry: 100 %     Work Of Breathing / Effort: Vented  RUL Breath Sounds: Clear, RML Breath Sounds: Diminished, RLL Breath Sounds: Diminished, CAROLANN Breath Sounds: Clear, LLL Breath Sounds: Diminished    Fluids    Intake/Output Summary (Last 24 hours) at 06/17/17 1605  Last data filed at 06/17/17 1400   Gross per 24 hour   Intake 2606.32 ml   Output   2150 ml   Net 456.32 ml       Nutrition  Orders Placed This Encounter   Procedures   • Diet NPO     Standing Status: Standing      Number of Occurrences: 1      Standing Expiration Date:      Order Specific Question:  Type:     Answer:  Now [1]     Order Specific Question:  Restrict to:     Answer:  Strict [1]     Physical Exam   Constitutional: She appears well-nourished. No distress.   HENT:   Poor dentition.   cortrak Nares.    Eyes: No scleral icterus.   Neck:   Right sided central line without bleeding.   Cardiovascular: An irregularly irregular rhythm present. Tachycardia present.    Pulmonary/Chest: She has rales.   Vent, moderate air movement. No wheezing.    Abdominal: Soft. She exhibits no distension.   Genitourinary:   Tamayo cath.   Musculoskeletal: She exhibits edema. She exhibits no tenderness.   Edema hands   Neurological:   Sedated though opens eyes to stimulation.     Skin: She is not diaphoretic. No erythema. There is pallor.   Very long, sharp toenails with onychomycosis.       Recent Labs      06/15/17   0450  06/16/17   0503  06/17/17   0443   WBC  9.7  8.4  8.4   RBC  3.89*  3.82*  3.83*   HEMOGLOBIN  10.7*  10.7*  10.5*   HEMATOCRIT  35.0*  35.0*  34.1*   MCV  90.0  91.6  89.0   MCH  27.5  28.0  27.4   MCHC  30.6*  30.6*  30.8*   RDW  48.1  49.1  46.5   PLATELETCT  353  323  285   MPV  9.0  9.3  9.2     Recent Labs      06/15/17   0450  06/16/17   0503  06/17/17   0443   SODIUM  147*  146*  144   POTASSIUM  4.0  4.0  4.1   CHLORIDE  107  110  109   CO2  33  30  28   GLUCOSE  181*  178*  208*   BUN  41*  40*  41*    CREATININE  0.96  0.86  0.88   CALCIUM  9.2  9.0  9.1         Recent Labs      06/16/17   0503   BNPBTYPENAT  126*     Recent Labs      06/17/17   0443   TRIGLYCERIDE  130          Assessment/Plan     Severe sepsis (CMS-HCC) (present on admission)  Assessment & Plan  Source appears respiratory. IV abx azithromycin and rocephin with stop dates. Query non-infectious SIRS with cardiac source.  The organ system dysfunction associated with this disease process is the respiratory system as is evidenced by respiratory failure requiring intubation.     Resolved/improved    Acute respiratory failure with hypoxia (CMS-HCC) (present on admission)  Assessment & Plan    VDRF, intubated  SBT's slowly better  Pulmonology consulted and following.   Echo showed normal EF at 65%.  Aggressive IV diuresis.   SBT's      KIRT (acute kidney injury) (CMS-HCC) (present on admission)  Assessment & Plan  Cr on admit was 2.95 with baseline 1.1 per review of the records from 2016.   Consistent with ATN. IV fluids have been given.   Nephrology consultation.   Improved, now  Nephro s/o    Atrial fibrillation (CMS-HCC) (present on admission)  Assessment & Plan  Eliquis as an outpatient.   Rate control on coreg as an outpatient.    Recently diagnosed.  C/w Eliquis  Cardiology consulted.  Converted to sinus on 6/12.  EF 65%.  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    Pulmonary hypertension (CMS-HCC) (present on admission)  Assessment & Plan  RVSP 50 mm Hg  She is high risk of volume overload.    - Morbid obesity  - weakness, lethargy  Plan  C/w amiodarone, BBL  for rate control for now, will increase  C/w diuresis   Vent weaning, wean sedation, SBT's  Resp. care  Electrolyte balance  AC to continue  See orders  Am Labs  Pt is critically ill in ICU  Time spent 35 min, no overlap  Labs reviewed and Medications reviewed  Tamayo catheter: Unconscious / Sedated Patient on a Ventilator  Central line in place: Sepsis    DVT Prophylaxis: Heparin

## 2017-06-18 ENCOUNTER — APPOINTMENT (OUTPATIENT)
Dept: RADIOLOGY | Facility: MEDICAL CENTER | Age: 62
DRG: 853 | End: 2017-06-18
Attending: INTERNAL MEDICINE
Payer: COMMERCIAL

## 2017-06-18 LAB
ANION GAP SERPL CALC-SCNC: 6 MMOL/L (ref 0–11.9)
BASE EXCESS BLDA CALC-SCNC: 2 MMOL/L (ref -4–3)
BASOPHILS # BLD AUTO: 1.2 % (ref 0–1.8)
BASOPHILS # BLD: 0.12 K/UL (ref 0–0.12)
BODY TEMPERATURE: ABNORMAL DEGREES
BUN SERPL-MCNC: 40 MG/DL (ref 8–22)
CALCIUM SERPL-MCNC: 9 MG/DL (ref 8.5–10.5)
CHLORIDE SERPL-SCNC: 107 MMOL/L (ref 96–112)
CO2 BLDA-SCNC: 28 MMOL/L (ref 20–33)
CO2 SERPL-SCNC: 27 MMOL/L (ref 20–33)
CREAT SERPL-MCNC: 0.88 MG/DL (ref 0.5–1.4)
EOSINOPHIL # BLD AUTO: 0.22 K/UL (ref 0–0.51)
EOSINOPHIL NFR BLD: 2.3 % (ref 0–6.9)
ERYTHROCYTE [DISTWIDTH] IN BLOOD BY AUTOMATED COUNT: 45.4 FL (ref 35.9–50)
GFR SERPL CREATININE-BSD FRML MDRD: >60 ML/MIN/1.73 M 2
GLUCOSE BLD-MCNC: 128 MG/DL (ref 65–99)
GLUCOSE BLD-MCNC: 153 MG/DL (ref 65–99)
GLUCOSE BLD-MCNC: 156 MG/DL (ref 65–99)
GLUCOSE BLD-MCNC: 174 MG/DL (ref 65–99)
GLUCOSE BLD-MCNC: 182 MG/DL (ref 65–99)
GLUCOSE SERPL-MCNC: 200 MG/DL (ref 65–99)
GRAM STN SPEC: NORMAL
HCO3 BLDA-SCNC: 26.4 MMOL/L (ref 17–25)
HCT VFR BLD AUTO: 34.9 % (ref 37–47)
HGB BLD-MCNC: 10.8 G/DL (ref 12–16)
IMM GRANULOCYTES # BLD AUTO: 0.08 K/UL (ref 0–0.11)
IMM GRANULOCYTES NFR BLD AUTO: 0.8 % (ref 0–0.9)
LYMPHOCYTES # BLD AUTO: 1.19 K/UL (ref 1–4.8)
LYMPHOCYTES NFR BLD: 12.3 % (ref 22–41)
MAGNESIUM SERPL-MCNC: 1.7 MG/DL (ref 1.5–2.5)
MCH RBC QN AUTO: 27.4 PG (ref 27–33)
MCHC RBC AUTO-ENTMCNC: 30.9 G/DL (ref 33.6–35)
MCV RBC AUTO: 88.6 FL (ref 81.4–97.8)
MONOCYTES # BLD AUTO: 0.82 K/UL (ref 0–0.85)
MONOCYTES NFR BLD AUTO: 8.5 % (ref 0–13.4)
NEUTROPHILS # BLD AUTO: 7.25 K/UL (ref 2–7.15)
NEUTROPHILS NFR BLD: 74.9 % (ref 44–72)
NRBC # BLD AUTO: 0 K/UL
NRBC BLD AUTO-RTO: 0 /100 WBC
O2/TOTAL GAS SETTING VFR VENT: 30 %
PCO2 BLDA: 39.1 MMHG (ref 26–37)
PCO2 TEMP ADJ BLDA: 38.1 MMHG (ref 26–37)
PH BLDA: 7.44 [PH] (ref 7.4–7.5)
PH TEMP ADJ BLDA: 7.45 [PH] (ref 7.4–7.5)
PLATELET # BLD AUTO: 314 K/UL (ref 164–446)
PMV BLD AUTO: 9.5 FL (ref 9–12.9)
PO2 BLDA: 83 MMHG (ref 64–87)
PO2 TEMP ADJ BLDA: 80 MMHG (ref 64–87)
POTASSIUM SERPL-SCNC: 4 MMOL/L (ref 3.6–5.5)
RBC # BLD AUTO: 3.94 M/UL (ref 4.2–5.4)
RHODAMINE-AURAMINE STN SPEC: NORMAL
SAO2 % BLDA: 97 % (ref 93–99)
SIGNIFICANT IND 70042: NORMAL
SIGNIFICANT IND 70042: NORMAL
SITE SITE: NORMAL
SITE SITE: NORMAL
SODIUM SERPL-SCNC: 140 MMOL/L (ref 135–145)
SOURCE SOURCE: NORMAL
SOURCE SOURCE: NORMAL
SPECIMEN DRAWN FROM PATIENT: ABNORMAL
WBC # BLD AUTO: 9.7 K/UL (ref 4.8–10.8)

## 2017-06-18 PROCEDURE — 700111 HCHG RX REV CODE 636 W/ 250 OVERRIDE (IP): Performed by: INTERNAL MEDICINE

## 2017-06-18 PROCEDURE — 700105 HCHG RX REV CODE 258

## 2017-06-18 PROCEDURE — A9270 NON-COVERED ITEM OR SERVICE: HCPCS | Performed by: HOSPITALIST

## 2017-06-18 PROCEDURE — 770022 HCHG ROOM/CARE - ICU (200)

## 2017-06-18 PROCEDURE — 700105 HCHG RX REV CODE 258: Performed by: INTERNAL MEDICINE

## 2017-06-18 PROCEDURE — 94003 VENT MGMT INPAT SUBQ DAY: CPT

## 2017-06-18 PROCEDURE — 85025 COMPLETE CBC W/AUTO DIFF WBC: CPT

## 2017-06-18 PROCEDURE — 71010 DX-CHEST-PORTABLE (1 VIEW): CPT

## 2017-06-18 PROCEDURE — 94150 VITAL CAPACITY TEST: CPT

## 2017-06-18 PROCEDURE — A9270 NON-COVERED ITEM OR SERVICE: HCPCS | Performed by: INTERNAL MEDICINE

## 2017-06-18 PROCEDURE — 700102 HCHG RX REV CODE 250 W/ 637 OVERRIDE(OP): Performed by: INTERNAL MEDICINE

## 2017-06-18 PROCEDURE — 94667 MNPJ CHEST WALL 1ST: CPT

## 2017-06-18 PROCEDURE — 700101 HCHG RX REV CODE 250: Performed by: INTERNAL MEDICINE

## 2017-06-18 PROCEDURE — 99233 SBSQ HOSP IP/OBS HIGH 50: CPT | Performed by: HOSPITALIST

## 2017-06-18 PROCEDURE — 94640 AIRWAY INHALATION TREATMENT: CPT

## 2017-06-18 PROCEDURE — 99291 CRITICAL CARE FIRST HOUR: CPT | Performed by: INTERNAL MEDICINE

## 2017-06-18 PROCEDURE — 80048 BASIC METABOLIC PNL TOTAL CA: CPT

## 2017-06-18 PROCEDURE — 700101 HCHG RX REV CODE 250: Performed by: HOSPITALIST

## 2017-06-18 PROCEDURE — 700102 HCHG RX REV CODE 250 W/ 637 OVERRIDE(OP): Performed by: HOSPITALIST

## 2017-06-18 PROCEDURE — 83735 ASSAY OF MAGNESIUM: CPT

## 2017-06-18 PROCEDURE — A9270 NON-COVERED ITEM OR SERVICE: HCPCS

## 2017-06-18 PROCEDURE — 36600 WITHDRAWAL OF ARTERIAL BLOOD: CPT

## 2017-06-18 PROCEDURE — 700111 HCHG RX REV CODE 636 W/ 250 OVERRIDE (IP)

## 2017-06-18 PROCEDURE — 82803 BLOOD GASES ANY COMBINATION: CPT

## 2017-06-18 PROCEDURE — 700102 HCHG RX REV CODE 250 W/ 637 OVERRIDE(OP)

## 2017-06-18 PROCEDURE — 94668 MNPJ CHEST WALL SBSQ: CPT

## 2017-06-18 PROCEDURE — 82962 GLUCOSE BLOOD TEST: CPT | Mod: 91

## 2017-06-18 PROCEDURE — 94669 MECHANICAL CHEST WALL OSCILL: CPT

## 2017-06-18 RX ORDER — MAGNESIUM SULFATE HEPTAHYDRATE 40 MG/ML
4 INJECTION, SOLUTION INTRAVENOUS ONCE
Status: COMPLETED | OUTPATIENT
Start: 2017-06-18 | End: 2017-06-18

## 2017-06-18 RX ORDER — HYDROCODONE BITARTRATE AND ACETAMINOPHEN 5; 325 MG/1; MG/1
1-2 TABLET ORAL EVERY 6 HOURS PRN
Status: DISCONTINUED | OUTPATIENT
Start: 2017-06-18 | End: 2017-06-22 | Stop reason: HOSPADM

## 2017-06-18 RX ORDER — METOPROLOL TARTRATE 50 MG/1
50 TABLET, FILM COATED ORAL EVERY 8 HOURS
Status: DISCONTINUED | OUTPATIENT
Start: 2017-06-18 | End: 2017-06-18

## 2017-06-18 RX ORDER — DIGOXIN 125 MCG
250 TABLET ORAL EVERY 6 HOURS
Status: DISPENSED | OUTPATIENT
Start: 2017-06-18 | End: 2017-06-19

## 2017-06-18 RX ORDER — MAGNESIUM SULFATE HEPTAHYDRATE 40 MG/ML
2 INJECTION, SOLUTION INTRAVENOUS ONCE
Status: DISCONTINUED | OUTPATIENT
Start: 2017-06-18 | End: 2017-06-18

## 2017-06-18 RX ORDER — AMIODARONE HYDROCHLORIDE 200 MG/1
400 TABLET ORAL TWICE DAILY
Status: DISCONTINUED | OUTPATIENT
Start: 2017-06-18 | End: 2017-06-22 | Stop reason: HOSPADM

## 2017-06-18 RX ORDER — AMIODARONE HYDROCHLORIDE 200 MG/1
400 TABLET ORAL
Status: DISCONTINUED | OUTPATIENT
Start: 2017-06-23 | End: 2017-06-22 | Stop reason: HOSPADM

## 2017-06-18 RX ORDER — AMIODARONE HYDROCHLORIDE 200 MG/1
200 TABLET ORAL
Status: DISCONTINUED | OUTPATIENT
Start: 2017-06-28 | End: 2017-06-22 | Stop reason: HOSPADM

## 2017-06-18 RX ORDER — DIGOXIN 125 MCG
125 TABLET ORAL DAILY
Status: DISCONTINUED | OUTPATIENT
Start: 2017-06-19 | End: 2017-06-22 | Stop reason: HOSPADM

## 2017-06-18 RX ADMIN — INSULIN LISPRO 3 UNITS: 100 INJECTION, SOLUTION INTRAVENOUS; SUBCUTANEOUS at 17:32

## 2017-06-18 RX ADMIN — POTASSIUM CHLORIDE 20 MEQ: 1.5 POWDER, FOR SOLUTION ORAL at 09:36

## 2017-06-18 RX ADMIN — AMIODARONE HYDROCHLORIDE 150 MG: 50 INJECTION, SOLUTION INTRAVENOUS at 14:33

## 2017-06-18 RX ADMIN — FUROSEMIDE 20 MG: 20 TABLET ORAL at 22:15

## 2017-06-18 RX ADMIN — FUROSEMIDE 20 MG: 20 TABLET ORAL at 14:20

## 2017-06-18 RX ADMIN — DIGOXIN 250 MCG: 125 TABLET ORAL at 14:21

## 2017-06-18 RX ADMIN — APIXABAN 5 MG: 5 TABLET, FILM COATED ORAL at 22:15

## 2017-06-18 RX ADMIN — FUROSEMIDE 20 MG: 20 TABLET ORAL at 05:55

## 2017-06-18 RX ADMIN — INSULIN LISPRO 3 UNITS: 100 INJECTION, SOLUTION INTRAVENOUS; SUBCUTANEOUS at 05:55

## 2017-06-18 RX ADMIN — METOPROLOL TARTRATE 75 MG: 25 TABLET, FILM COATED ORAL at 14:22

## 2017-06-18 RX ADMIN — LEVOTHYROXINE SODIUM 88 MCG: 88 TABLET ORAL at 09:34

## 2017-06-18 RX ADMIN — AMIODARONE HYDROCHLORIDE 400 MG: 200 TABLET ORAL at 09:36

## 2017-06-18 RX ADMIN — POTASSIUM CHLORIDE 20 MEQ: 1.5 POWDER, FOR SOLUTION ORAL at 22:15

## 2017-06-18 RX ADMIN — IPRATROPIUM BROMIDE AND ALBUTEROL SULFATE 3 ML: .5; 3 SOLUTION RESPIRATORY (INHALATION) at 06:17

## 2017-06-18 RX ADMIN — INSULIN LISPRO 3 UNITS: 100 INJECTION, SOLUTION INTRAVENOUS; SUBCUTANEOUS at 12:13

## 2017-06-18 RX ADMIN — METOPROLOL TARTRATE 37.5 MG: 25 TABLET, FILM COATED ORAL at 05:55

## 2017-06-18 RX ADMIN — ATORVASTATIN CALCIUM 40 MG: 40 TABLET, FILM COATED ORAL at 22:15

## 2017-06-18 RX ADMIN — SENNOSIDES AND DOCUSATE SODIUM 2 TABLET: 8.6; 5 TABLET ORAL at 09:34

## 2017-06-18 RX ADMIN — INSULIN LISPRO 3 UNITS: 100 INJECTION, SOLUTION INTRAVENOUS; SUBCUTANEOUS at 00:01

## 2017-06-18 RX ADMIN — APIXABAN 5 MG: 5 TABLET, FILM COATED ORAL at 09:41

## 2017-06-18 RX ADMIN — METOPROLOL TARTRATE 75 MG: 25 TABLET, FILM COATED ORAL at 22:15

## 2017-06-18 RX ADMIN — DEXMEDETOMIDINE HYDROCHLORIDE 0.3 MCG/KG/HR: 100 INJECTION, SOLUTION INTRAVENOUS at 00:48

## 2017-06-18 RX ADMIN — MAGNESIUM SULFATE HEPTAHYDRATE 4 G: 40 INJECTION, SOLUTION INTRAVENOUS at 09:37

## 2017-06-18 RX ADMIN — FAMOTIDINE 20 MG: 20 TABLET, FILM COATED ORAL at 09:34

## 2017-06-18 RX ADMIN — AMIODARONE HYDROCHLORIDE 0.51 MG/MIN: 50 INJECTION, SOLUTION INTRAVENOUS at 00:41

## 2017-06-18 RX ADMIN — AMIODARONE HYDROCHLORIDE 400 MG: 200 TABLET ORAL at 22:15

## 2017-06-18 RX ADMIN — IPRATROPIUM BROMIDE AND ALBUTEROL SULFATE 3 ML: .5; 3 SOLUTION RESPIRATORY (INHALATION) at 02:41

## 2017-06-18 ASSESSMENT — ENCOUNTER SYMPTOMS
WHEEZING: 0
SPUTUM PRODUCTION: 1
CHILLS: 0
SHORTNESS OF BREATH: 1
ABDOMINAL PAIN: 0
HEADACHES: 0
SHORTNESS OF BREATH: 0
COUGH: 1
MEMORY LOSS: 1
NERVOUS/ANXIOUS: 0
PALPITATIONS: 0
FEVER: 0

## 2017-06-18 ASSESSMENT — PAIN SCALES - GENERAL
PAINLEVEL_OUTOF10: 0

## 2017-06-18 ASSESSMENT — LIFESTYLE VARIABLES: EVER_SMOKED: NEVER

## 2017-06-18 ASSESSMENT — COPD QUESTIONNAIRES
HAVE YOU SMOKED AT LEAST 100 CIGARETTES IN YOUR ENTIRE LIFE: NO/DON'T KNOW
COPD SCREENING SCORE: 5
DO YOU EVER COUGH UP ANY MUCUS OR PHLEGM?: YES, EVERY DAY
DURING THE PAST 4 WEEKS HOW MUCH DID YOU FEEL SHORT OF BREATH: NONE/LITTLE OF THE TIME

## 2017-06-18 ASSESSMENT — PULMONARY FUNCTION TESTS: FVC: .69

## 2017-06-18 NOTE — RESPIRATORY CARE
Ventilator Weaning Update    Patient is on vent day 10.  SBT was tolerated for a minimum of 1 hours on settings of 5 over 8.    Wean parameters for this SBT were:  #FVC / Vital Capacity (liters) : 0.69 (06/18/17 0720)  NIF (cm H2O) : -28 (06/18/17 0720)  Rapid Shallow Breathing Index (RR/VT): 85 (06/18/17 0720)  RR (bpm): 29 (06/18/17 0720)  Spontaneous VE: 9.6 (06/18/17 0720)  Spontaneous VT: 329 (06/18/17 0720)       Events/Summary/Plan: SBT parameters obtained

## 2017-06-18 NOTE — PROGRESS NOTES
Pulmonary Critical Care Progress Note     Date of service: 6/18/2017    Interval Events:  24 hour interval history reviewed  Reason for visit:  Respiratory failure, acute pulmonary edema  Unable to provide CC or ROS due to intubation and sedation       Precedex 0.3   Amiodarone 0.5  AF      PFSH:  No change.    Respiratory:  Pulse Oximetry: 93 %  Vent:  CMV, PEEP 8, 30%  Vent waveforms and airway  reviewed in detail  CXR with improved LLL opacification  Coarse crackles bilaterally    Recent Labs      06/16/17   0511   ISTATAPH  7.444   ISTATAPCO2  46.5*   ISTATAPO2  84   ISTATATCO2  33   TNOZSQZ2VGX  97   ISTATARTHCO3  31.9*   ISTATARTBE  7*   ISTATTEMP  98.1 F   ISTATFIO2  30   ISTATSPEC  Arterial   ISTATAPHTC  7.448   [id number]  83     HemoDynamics:  Pulse: 84, Heart Rate (Monitored): 92  NIBP: 116/64 mmHg     AF  Amiodarone 0.5    Neuro:  Arouses easily, nods and follows  Precedex 0.3    Fluids:  Intake/Output       06/16/17 0700 - 06/17/17 0659 06/17/17 0700 - 06/18/17 0659 06/18/17 0700 - 06/19/17 0659      1307-0139 3870-8066 Total 4009-6914 0034-7994 Total 2972-7487 7313-2505 Total       Intake    I.V.  339.1  296.7 635.8  363.6  244 607.6  --  -- --    Precedex Volume 35.9 131.7 167.6 115.6 79 194.6 -- -- --    Propofol Volume 39.2 -- 39.2 -- -- -- -- -- --    IV Volume (amiodarone) 264 165 429 198 165 363 -- -- --    IV Piggyback Volume -- -- -- 50 -- 50 -- -- --    Other  --  -- --  300  -- 300  --  -- --    Medications (P.O./ Enteral Liquids) -- -- -- 300 -- 300 -- -- --    Enteral  1000  1000 2000  1000  700 1700  --  -- --    Enteral Volume   -- -- --    Free Water / Tube Flush 400 400 800 400 200 600 -- -- --    Total Intake 1339.1 1296.7 2635.8 1663.6 944 2607.6 -- -- --       Output    Urine  1125  1400 2522  85  672 1530  --  -- --    Indwelling Cathether 1125 1400 2525  -- -- --    Stool  --  -- --  --  -- --  --  -- --    Number of Times Stooled 1 x  -- 1 x -- -- -- -- -- --    Total Output 1125 1400 2525  -- -- --       Net I/O     214.1 -103.3 110.8 808.6 269 1077.6 -- -- --           Recent Labs      17   0503  17   0443   SODIUM  146*  144   POTASSIUM  4.0  4.1   CHLORIDE  110  109   CO2  30  28   BUN  40*  41*   CREATININE  0.86  0.88   MAGNESIUM  2.0  1.7   PHOSPHORUS  2.7  2.8   CALCIUM  9.0  9.1     GI/Nutrition:  Abd soft ND/NT  Tolerating enteral TF    Liver Function  Recent Labs      17   0503  17   ALTSGPT  31   --    ASTSGOT  24   --    ALKPHOSPHAT  229*   --    TBILIRUBIN  0.3   --    GLUCOSE  178*  208*     Heme:  Recent Labs      17   0503  17   RBC  3.82*  3.83*   HEMOGLOBIN  10.7*  10.5*   HEMATOCRIT  35.0*  34.1*   PLATELETCT  323  285     Infectious Disease:  Temp  Av.6 °C (97.9 °F)  Min: 36.1 °C (97 °F)  Max: 37.1 °C (98.7 °F)     Recent Labs      17   0503  173   WBC  8.4  8.4   NEUTSPOLYS  69.70  70.90   LYMPHOCYTES  12.10*  14.60*   MONOCYTES  13.10  9.90   EOSINOPHILS  3.00  2.50   BASOPHILS  0.70  1.10   ASTSGOT  24   --    ALTSGPT  31   --    ALKPHOSPHAT  229*   --    TBILIRUBIN  0.3   --      Current Facility-Administered Medications   Medication Dose Frequency Provider Last Rate Last Dose   • metoprolol (LOPRESSOR) tablet 37.5 mg  37.5 mg Q8HRS Oni Elder M.D.   37.5 mg at 17   • MD Alert...Amiodarone Protocol Pharmacist to Implement   PRN Evens Cadena M.D.       • amiodarone (CORDARONE) 450 mg in D5W 250 mL Infusion  0.5-1 mg/min Continuous Mauricio Wilks PHARMD 17 mL/hr at 17 0041 0.51 mg/min at 17 0041   • dexmedetomidine (PRECEDEX) 200 mcg in NS 50 mL infusion  0.1-1.5 mcg/kg/hr Continuous Evens Cadena M.D. 7.9 mL/hr at 17 0048 0.3 mcg/kg/hr at 17 0048   • furosemide (LASIX) tablet 20 mg  20 mg Q8HRS Oni Elder M.D.   20 mg at 17   • potassium chloride (KLOR-CON)  20 MEQ packet 20 mEq  20 mEq BID Evens Cadena M.D.   20 mEq at 06/17/17 2103   • atorvastatin (LIPITOR) tablet 40 mg  40 mg QHS Nishant Sebastian M.D.   40 mg at 06/17/17 2102   • famotidine (PEPCID) tablet 20 mg  20 mg BID Josefa Moreno, TORRI   20 mg at 06/17/17 2102   • apixaban (ELIQUIS) 2.5mg tablet 5 mg  5 mg BID Jaime Bush M.D.   5 mg at 06/17/17 2106   • insulin lispro (HUMALOG) injection 3-14 Units  3-14 Units Q6HRS Antonio Mata M.D.   3 Units at 06/18/17 0001   • propofol (DIPRIVAN) injection  5-80 mcg/kg/min Continuous Sid Patino, PHARMD   Stopped at 06/16/17 1145   • Respiratory Care per Protocol   Continuous RT Antonio Mata M.D.       • chlorhexidine (PERIDEX) 0.12 % solution 15 mL  15 mL BID Antonio Mata M.D.   15 mL at 06/17/17 2102   • lidocaine (XYLOCAINE) 1%  injection  1-2 mL Q30 MIN PRN Antonio Mata M.D.   2 mL at 06/16/17 1111   • MD ALERT...Adult ICU Electrolyte Replacement per Pharmacy Protocol   pharmacy to dose Antonio Mata M.D.       • fentaNYL (SUBLIMAZE) injection 25 mcg  25 mcg Q HOUR PRN Antonio Mata M.D.   25 mcg at 06/16/17 1215    Or   • fentaNYL (SUBLIMAZE) injection 50 mcg  50 mcg Q HOUR PRN Antonio Mata M.D.        Or   • fentaNYL (SUBLIMAZE) injection 100 mcg  100 mcg Q HOUR PRN Antonio Mata M.D.   100 mcg at 06/16/17 0015   • ipratropium-albuterol (DUONEB) nebulizer solution 3 mL  3 mL Q2HRS PRN (RT) Antonio Mata M.D.       • ipratropium-albuterol (DUONEB) nebulizer solution 3 mL  3 mL Q4HRS (RT) Jaime Bush M.D.   3 mL at 06/18/17 0241   • glucose 4 g chewable tablet 16 g  16 g Q15 MIN PRN Anson Manuel M.D.        And   • dextrose 50% (D50W) injection 25 mL  25 mL Q15 MIN PRN Anson Manuel M.D.       • levothyroxine (SYNTHROID) tablet 88 mcg  88 mcg AM ES Anson Manuel M.D.   88 mcg at 06/17/17 0507   • senna-docusate (PERICOLACE or SENOKOT S) 8.6-50 MG per tablet 2 Tab  2 Tab BID Anson Manuel M.D.   2 Tab at 06/17/17 9356     And   • polyethylene glycol/lytes (MIRALAX) PACKET 1 Packet  1 Packet QDAY PRN Anson Manuel M.D.   1 Packet at 06/12/17 0920    And   • magnesium hydroxide (MILK OF MAGNESIA) suspension 30 mL  30 mL QDAY PRN Anson Manuel M.D.        And   • bisacodyl (DULCOLAX) suppository 10 mg  10 mg QDAY PRN Anson Manuel M.D.       • ondansetron (ZOFRAN) syringe/vial injection 4 mg  4 mg Q4HRS PRN Anson Manuel M.D.       • ondansetron (ZOFRAN ODT) dispertab 4 mg  4 mg Q4HRS PRN Anson Manuel M.D.       • acetaminophen (TYLENOL) tablet 650 mg  650 mg Q6HRS PRN Anson Manuel M.D.         Last reviewed on 6/8/2017  9:34 AM by Mindi Benites, Pharmacy Int    Quality  Measures:  Labs reviewed, Medications reviewed and Radiology images reviewed  Tamayo catheter: Critically Ill - Requiring Accurate Measurement of Urinary Output  Central line in place: Need for access      DVT prophylaxis - mechanical: SCDs  Ulcer prophylaxis: Yes            Assessment and Plan:    VDRF - intubated 6/9   - tolerating SBT - liberate  Acute hypoxemic respiratory failure  Acute pulmonary edema   - force diuresis as tolerated  Query pneumonia   - finished 5 days of Rocephin and Zithromax 6/12 - observe off antibiotics  PAF   - cont amiodarone - transition to enteral prep   - optimize K and Mg   - anticoagulated with apixaban  Suspect acute on chronic diastolic heart failure   - force diuresis as tolerated  Pulmonary HTN   - RVSP 50 mm Hg  DM 2 - cont insulin  Hx of HTN  Dyslipidemia  Hypernatremia   - cont free water - decrease amount    Critical Care Time:  35 minutes  14808  No time overlap  Time excludes procedures  Discussed with RN, RT, Team    I, Kay Stevenson (Scribedgardo), am scribing for, and in the presence of, Evens Riggs M.D.   Electronically signed by: Kay Stevenson (Roseibe), 6/18/2017  IEvens M.D. personally performed the services described in this documentation, as scribed by Kay Stevenson in my presence, and it is both  accurate and complete.

## 2017-06-18 NOTE — PROGRESS NOTES
Cardiology Progress Note               Author: Nishant Sebastian Date & Time created: 2017  12:28 PM     Interval History:  Pt admitted with sepsis,KIRT and VDRF. Cardiology consulted for atrial fib and history of HTN. Asked to sing back on because of recurrent atrial fib in spite of amiodarone therapy.     Review of Systems   Respiratory: Negative for shortness of breath.    Cardiovascular: Negative for chest pain and leg swelling.       Physical Exam   Neck: No JVD present.   Cardiovascular: Normal rate, regular rhythm, S1 normal and S2 normal.  Exam reveals no gallop, no S3 and no S4.    No murmur heard.  Pulmonary/Chest: Effort normal. She has no rales.   Abdominal: Soft. There is no tenderness.   Musculoskeletal: She exhibits no edema.       Hemodynamics:  Temp (24hrs), Av.8 °C (98.3 °F), Min:36.6 °C (97.9 °F), Max:37.1 °C (98.7 °F)  Temperature: 36.9 °C (98.4 °F)  Pulse  Av.6  Min: 62  Max: 121Heart Rate (Monitored): 96  NIBP: 125/53 mmHg     Respiratory:  [place] Vent Mode: APVCMV, Rate (breaths/min): 16, PEEP/CPAP: 8, FiO2: 30, P Peak (PIP): 21, P MEAN: 13 Respiration: (!) 23, Pulse Oximetry: 96 %, O2 Daily Delivery Respiratory : OxyMask     PEP/CPT Method: Positive Airway Pressure Device, Work Of Breathing / Effort: Mild  RUL Breath Sounds: Clear, RML Breath Sounds: Diminished, RLL Breath Sounds: Diminished, CAROLANN Breath Sounds: Clear, LLL Breath Sounds: Diminished  Fluids:  Date 17 0700 - 17 0659   Shift 1909-6596 2899-4883 6559-8654 24 Hour Total   I  N  T  A  K  E   I.V. 138   138    Other 100   100    Enteral 210   210    Shift Total 448   448   O  U  T  P  U  T   Urine 1400   1400    Shift Total 1400   1400   Weight (kg) 103.9 103.9 103.9 103.9          GI/Nutrition:  Orders Placed This Encounter   Procedures   • Diet NPO     Standing Status: Standing      Number of Occurrences: 1      Standing Expiration Date:      Order Specific Question:  Type:     Answer:  Now [1]     Order  Specific Question:  Restrict to:     Answer:  Strict [1]     Lab Results:  Recent Labs      06/16/17   0503  06/17/17   0443  06/18/17   0545   WBC  8.4  8.4  9.7   RBC  3.82*  3.83*  3.94*   HEMOGLOBIN  10.7*  10.5*  10.8*   HEMATOCRIT  35.0*  34.1*  34.9*   MCV  91.6  89.0  88.6   MCH  28.0  27.4  27.4   MCHC  30.6*  30.8*  30.9*   RDW  49.1  46.5  45.4   PLATELETCT  323  285  314   MPV  9.3  9.2  9.5     Recent Labs      06/16/17   0503  06/17/17   0443  06/18/17   0545   SODIUM  146*  144  140   POTASSIUM  4.0  4.1  4.0   CHLORIDE  110  109  107   CO2  30  28  27   GLUCOSE  178*  208*  200*   BUN  40*  41*  40*   CREATININE  0.86  0.88  0.88   CALCIUM  9.0  9.1  9.0         Recent Labs      06/16/17   0503   BNPBTYPENAT  126*     Recent Labs      06/16/17   0503   BNPBTYPENAT  126*     Recent Labs      06/17/17   0443   TRIGLYCERIDE  130       CTA Chest:    Extensive bilateral patchy and groundglass airspace opacities may represent pulmonary edema, multifocal pneumonia. Hemorrhage is also a consideration in the correct clinical setting. Follow-up to radiographic resolution is recommended.    Small bilateral pleural effusions, right greater than left.    Cardiomegaly.    Mildly prominent mediastinal lymph nodes are nonspecific.    Coronary atherosclerotic plaque.    Small hiatal hernia.           Reading Provider Reading Date     Ciaran Hoskins M.D. Jun 8, 2017           Echo:  CONCLUSIONS  No prior study is available for comparison.   Left ventricular ejection fraction is visually estimated to be 65%.  Mild concentric left ventricular hypertrophy.  Diastolic function is difficult to assess with atrial fibrillation.    Estimated right ventricular systolic pressure is 50 mmHg.  Small pleural effusion noted.  Moderate tricuspid regurgitation.  Exam Date:         06/08/2017    Medical Decision Making, by Problem:    Patient Active Problem List    Diagnosis Date Noted   • Severe sepsis (CMS-[facility]) 06/09/2017      Priority: High   • Acute respiratory failure with hypoxia (CMS-HCC) 06/09/2017     Priority: Medium   • Coronary artery calcification seen on CT scan 06/13/2017   • Pulmonary hypertension (CMS-HCC) 06/12/2017   • Atrial fibrillation (CMS-HCC) 06/09/2017   • 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-HCC) 06/09/2017   • Hyponatremia 06/09/2017   • Hypothyroid 06/09/2017   • HTN (hypertension) 06/09/2017   • KIRT (acute kidney injury) (CMS-HCC) 06/08/2017         Pt back in atrial fib in spite of amiodarone. Will add dig and increase BB. Renal function improved. Coronary calcium on chest CT noted.    Plan:  Dig  Increase BB  IV amio bolus x 1  MPI  May need EP consult      Core Measures

## 2017-06-18 NOTE — CARE PLAN
Problem: Hyperinflation:  Goal: Prevent or improve atelectasis  Outcome: PROGRESSING AS EXPECTED  PEP and IS QID  Pt unable to perform IS today, PEP completed.  60% of predicted = 1320

## 2017-06-18 NOTE — CARE PLAN
Problem: Risk for injury related to physical restraint use  Goal: Safe and appropriate use of physical restraints. Restraints discontinued at the earliest possibility while ensuring patient safety.  Outcome: PROGRESSING AS EXPECTED  Restraints removed. Patient no longer at risk for removing lines.    Problem: Pain  Goal: Alleviation of Pain or a reduction in pain to the patient’s comfort goal  Outcome: PROGRESSING AS EXPECTED  Patient comfort level gained with rest, repositioning and ambulation.

## 2017-06-18 NOTE — CARE PLAN
Adult Ventilation Update    Total Vent Days: 10    Patient Lines/Drains/Airways Status    Active Airway     Name: Placement date: Placement time: Site: Days:    Airway Group ET Tube 7.5 06/09/17  1710    8                #FVC / Vital Capacity (liters) :  (pt unable to follow commands) (06/17/17 1543)  NIF (cm H2O) :  (pt unable to follow commands) (06/17/17 1543)  Rapid Shallow Breathing Index (RR/VT): 71 (06/17/17 1543)  Plateau Pressure (Q Shift):  (pt overbreathing vent) (06/17/17 0645)  Static Compliance (ml / cm H2O): 77 (06/18/17 0435)    Patient failed trials because of Barriers to Wean: Other (Comments) (A-fib, elevated HR at this time) (06/16/17 0621)  Barriers to SBT Weaning Trial Stopped due to:: Pt weaned for 1 hour and returned to rest settings per protocol (06/17/17 0751)  Length of Weaning Trial Length of Weaning Trial (Hours): 1 hour (06/17/17 1543)      Sputum/Suction   Cough: Non Productive (06/18/17 0400)  Sputum Amount: Moderate (06/18/17 0400)  Sputum Color: Clear;White (06/18/17 0400)  Sputum Consistency: Thick (06/18/17 0400)    Mobility Group  Activity Performed: Unable to mobilize (06/17/17 1800)  Time Activity Tolerated: 10 min (06/17/17 1200)  Assistance / Tolerance: Assistance of Two or More;Tolerates Well (06/17/17 1200)  Pt Calls for Assistance: No (06/17/17 2000)  Staff Present for Mobilization: RN;CNA (06/17/17 1200)  Assistive Devices: Hand held assist (06/13/17 0400)  Reason Not Mobilized: Bed rest (06/17/17 1000)  Mobilization Comments:  (preparing for bronch) (06/17/17 1000)

## 2017-06-19 ENCOUNTER — APPOINTMENT (OUTPATIENT)
Dept: RADIOLOGY | Facility: MEDICAL CENTER | Age: 62
DRG: 853 | End: 2017-06-19
Attending: INTERNAL MEDICINE
Payer: COMMERCIAL

## 2017-06-19 PROBLEM — J18.9 PNEUMONIA: Status: ACTIVE | Noted: 2017-06-19

## 2017-06-19 PROBLEM — J96.01 ACUTE HYPOXEMIC RESPIRATORY FAILURE (HCC): Status: ACTIVE | Noted: 2017-06-19

## 2017-06-19 PROBLEM — I48.91 A-FIB (HCC): Status: ACTIVE | Noted: 2017-06-19

## 2017-06-19 LAB
ALBUMIN SERPL BCP-MCNC: 3 G/DL (ref 3.2–4.9)
ALBUMIN/GLOB SERPL: 1 G/DL
ALP SERPL-CCNC: 194 U/L (ref 30–99)
ALT SERPL-CCNC: 42 U/L (ref 2–50)
ANION GAP SERPL CALC-SCNC: 5 MMOL/L (ref 0–11.9)
APPEARANCE UR: ABNORMAL
AST SERPL-CCNC: 29 U/L (ref 12–45)
BACTERIA #/AREA URNS HPF: ABNORMAL /HPF
BASOPHILS # BLD AUTO: 1.3 % (ref 0–1.8)
BASOPHILS # BLD: 0.11 K/UL (ref 0–0.12)
BILIRUB SERPL-MCNC: 0.6 MG/DL (ref 0.1–1.5)
BILIRUB UR QL STRIP.AUTO: NEGATIVE
BNP SERPL-MCNC: 1017 PG/ML (ref 0–100)
BUN SERPL-MCNC: 32 MG/DL (ref 8–22)
CALCIUM SERPL-MCNC: 8.9 MG/DL (ref 8.5–10.5)
CHLORIDE SERPL-SCNC: 108 MMOL/L (ref 96–112)
CO2 SERPL-SCNC: 27 MMOL/L (ref 20–33)
COLOR UR: ABNORMAL
CREAT SERPL-MCNC: 0.74 MG/DL (ref 0.5–1.4)
CRP SERPL HS-MCNC: 0.33 MG/DL (ref 0–0.75)
DIGOXIN SERPL-MCNC: 1.7 NG/ML (ref 0.8–2)
EOSINOPHIL # BLD AUTO: 0.18 K/UL (ref 0–0.51)
EOSINOPHIL NFR BLD: 2.1 % (ref 0–6.9)
EPI CELLS #/AREA URNS HPF: ABNORMAL /HPF
ERYTHROCYTE [DISTWIDTH] IN BLOOD BY AUTOMATED COUNT: 44.7 FL (ref 35.9–50)
GFR SERPL CREATININE-BSD FRML MDRD: >60 ML/MIN/1.73 M 2
GLOBULIN SER CALC-MCNC: 3.1 G/DL (ref 1.9–3.5)
GLUCOSE BLD-MCNC: 123 MG/DL (ref 65–99)
GLUCOSE BLD-MCNC: 142 MG/DL (ref 65–99)
GLUCOSE BLD-MCNC: 244 MG/DL (ref 65–99)
GLUCOSE SERPL-MCNC: 155 MG/DL (ref 65–99)
GLUCOSE UR STRIP.AUTO-MCNC: NEGATIVE MG/DL
HCT VFR BLD AUTO: 35 % (ref 37–47)
HGB BLD-MCNC: 11.1 G/DL (ref 12–16)
IMM GRANULOCYTES # BLD AUTO: 0.05 K/UL (ref 0–0.11)
IMM GRANULOCYTES NFR BLD AUTO: 0.6 % (ref 0–0.9)
KETONES UR STRIP.AUTO-MCNC: NEGATIVE MG/DL
LEUKOCYTE ESTERASE UR QL STRIP.AUTO: ABNORMAL
LYMPHOCYTES # BLD AUTO: 1.16 K/UL (ref 1–4.8)
LYMPHOCYTES NFR BLD: 13.4 % (ref 22–41)
MAGNESIUM SERPL-MCNC: 1.9 MG/DL (ref 1.5–2.5)
MCH RBC QN AUTO: 27.7 PG (ref 27–33)
MCHC RBC AUTO-ENTMCNC: 31.7 G/DL (ref 33.6–35)
MCV RBC AUTO: 87.3 FL (ref 81.4–97.8)
MICRO URNS: ABNORMAL
MONOCYTES # BLD AUTO: 0.71 K/UL (ref 0–0.85)
MONOCYTES NFR BLD AUTO: 8.2 % (ref 0–13.4)
NEUTROPHILS # BLD AUTO: 6.42 K/UL (ref 2–7.15)
NEUTROPHILS NFR BLD: 74.4 % (ref 44–72)
NITRITE UR QL STRIP.AUTO: NEGATIVE
NRBC # BLD AUTO: 0 K/UL
NRBC BLD AUTO-RTO: 0 /100 WBC
PH UR STRIP.AUTO: 6 [PH]
PHOSPHATE SERPL-MCNC: 2.7 MG/DL (ref 2.5–4.5)
PLATELET # BLD AUTO: 336 K/UL (ref 164–446)
PMV BLD AUTO: 9.2 FL (ref 9–12.9)
POTASSIUM SERPL-SCNC: 3.9 MMOL/L (ref 3.6–5.5)
PREALB SERPL-MCNC: 27 MG/DL (ref 18–38)
PROT SERPL-MCNC: 6.1 G/DL (ref 6–8.2)
PROT UR QL STRIP: >500 MG/DL
RBC # BLD AUTO: 4.01 M/UL (ref 4.2–5.4)
RBC # URNS HPF: >150 /HPF
RBC UR QL AUTO: ABNORMAL
SODIUM SERPL-SCNC: 140 MMOL/L (ref 135–145)
SP GR UR STRIP.AUTO: 1.01
WBC # BLD AUTO: 8.6 K/UL (ref 4.8–10.8)
WBC #/AREA URNS HPF: ABNORMAL /HPF
YEAST #/AREA URNS HPF: ABNORMAL /HPF
YEAST HYPHAE #/AREA URNS HPF: PRESENT /HPF

## 2017-06-19 PROCEDURE — A9502 TC99M TETROFOSMIN: HCPCS

## 2017-06-19 PROCEDURE — 99291 CRITICAL CARE FIRST HOUR: CPT | Performed by: INTERNAL MEDICINE

## 2017-06-19 PROCEDURE — 700111 HCHG RX REV CODE 636 W/ 250 OVERRIDE (IP): Performed by: PHARMACIST

## 2017-06-19 PROCEDURE — A9270 NON-COVERED ITEM OR SERVICE: HCPCS | Performed by: HOSPITALIST

## 2017-06-19 PROCEDURE — 84100 ASSAY OF PHOSPHORUS: CPT

## 2017-06-19 PROCEDURE — 86140 C-REACTIVE PROTEIN: CPT

## 2017-06-19 PROCEDURE — 85025 COMPLETE CBC W/AUTO DIFF WBC: CPT

## 2017-06-19 PROCEDURE — A9270 NON-COVERED ITEM OR SERVICE: HCPCS | Performed by: INTERNAL MEDICINE

## 2017-06-19 PROCEDURE — 81001 URINALYSIS AUTO W/SCOPE: CPT

## 2017-06-19 PROCEDURE — 82962 GLUCOSE BLOOD TEST: CPT | Mod: 91

## 2017-06-19 PROCEDURE — 84134 ASSAY OF PREALBUMIN: CPT

## 2017-06-19 PROCEDURE — 700111 HCHG RX REV CODE 636 W/ 250 OVERRIDE (IP)

## 2017-06-19 PROCEDURE — 83735 ASSAY OF MAGNESIUM: CPT

## 2017-06-19 PROCEDURE — 80053 COMPREHEN METABOLIC PANEL: CPT

## 2017-06-19 PROCEDURE — 700102 HCHG RX REV CODE 250 W/ 637 OVERRIDE(OP): Performed by: INTERNAL MEDICINE

## 2017-06-19 PROCEDURE — 700102 HCHG RX REV CODE 250 W/ 637 OVERRIDE(OP): Performed by: HOSPITALIST

## 2017-06-19 PROCEDURE — 99233 SBSQ HOSP IP/OBS HIGH 50: CPT | Performed by: HOSPITALIST

## 2017-06-19 PROCEDURE — 94668 MNPJ CHEST WALL SBSQ: CPT

## 2017-06-19 PROCEDURE — 770020 HCHG ROOM/CARE - TELE (206)

## 2017-06-19 PROCEDURE — 51798 US URINE CAPACITY MEASURE: CPT

## 2017-06-19 PROCEDURE — 83880 ASSAY OF NATRIURETIC PEPTIDE: CPT

## 2017-06-19 PROCEDURE — 80162 ASSAY OF DIGOXIN TOTAL: CPT

## 2017-06-19 PROCEDURE — 71010 DX-CHEST-PORTABLE (1 VIEW): CPT

## 2017-06-19 RX ORDER — REGADENOSON 0.08 MG/ML
INJECTION, SOLUTION INTRAVENOUS
Status: COMPLETED
Start: 2017-06-19 | End: 2017-06-19

## 2017-06-19 RX ORDER — MAGNESIUM SULFATE HEPTAHYDRATE 40 MG/ML
2 INJECTION, SOLUTION INTRAVENOUS ONCE
Status: COMPLETED | OUTPATIENT
Start: 2017-06-19 | End: 2017-06-19

## 2017-06-19 RX ADMIN — AMIODARONE HYDROCHLORIDE 400 MG: 200 TABLET ORAL at 22:03

## 2017-06-19 RX ADMIN — MAGNESIUM SULFATE IN WATER 2 G: 40 INJECTION, SOLUTION INTRAVENOUS at 08:46

## 2017-06-19 RX ADMIN — FUROSEMIDE 20 MG: 20 TABLET ORAL at 04:53

## 2017-06-19 RX ADMIN — METOPROLOL TARTRATE 75 MG: 25 TABLET, FILM COATED ORAL at 15:36

## 2017-06-19 RX ADMIN — DIGOXIN 250 MCG: 125 TABLET ORAL at 00:07

## 2017-06-19 RX ADMIN — DIGOXIN 125 MCG: 125 TABLET ORAL at 19:36

## 2017-06-19 RX ADMIN — METOPROLOL TARTRATE 75 MG: 25 TABLET, FILM COATED ORAL at 22:04

## 2017-06-19 RX ADMIN — POTASSIUM CHLORIDE 20 MEQ: 1.5 POWDER, FOR SOLUTION ORAL at 22:04

## 2017-06-19 RX ADMIN — POTASSIUM CHLORIDE 20 MEQ: 1.5 POWDER, FOR SOLUTION ORAL at 08:46

## 2017-06-19 RX ADMIN — FUROSEMIDE 20 MG: 20 TABLET ORAL at 22:04

## 2017-06-19 RX ADMIN — REGADENOSON 0.4 MG: 0.08 INJECTION, SOLUTION INTRAVENOUS at 14:58

## 2017-06-19 RX ADMIN — LEVOTHYROXINE SODIUM 88 MCG: 88 TABLET ORAL at 05:16

## 2017-06-19 RX ADMIN — APIXABAN 5 MG: 5 TABLET, FILM COATED ORAL at 09:12

## 2017-06-19 RX ADMIN — METOPROLOL TARTRATE 75 MG: 25 TABLET, FILM COATED ORAL at 04:52

## 2017-06-19 RX ADMIN — ATORVASTATIN CALCIUM 40 MG: 40 TABLET, FILM COATED ORAL at 22:02

## 2017-06-19 RX ADMIN — INSULIN LISPRO 4 UNITS: 100 INJECTION, SOLUTION INTRAVENOUS; SUBCUTANEOUS at 04:53

## 2017-06-19 RX ADMIN — FUROSEMIDE 20 MG: 20 TABLET ORAL at 15:36

## 2017-06-19 RX ADMIN — APIXABAN 5 MG: 5 TABLET, FILM COATED ORAL at 22:03

## 2017-06-19 RX ADMIN — AMIODARONE HYDROCHLORIDE 400 MG: 200 TABLET ORAL at 08:46

## 2017-06-19 ASSESSMENT — ENCOUNTER SYMPTOMS
SHORTNESS OF BREATH: 0
HEADACHES: 0
ABDOMINAL PAIN: 0
COUGH: 1
SPUTUM PRODUCTION: 1
PALPITATIONS: 0
NERVOUS/ANXIOUS: 0
WHEEZING: 0
FEVER: 0
CHILLS: 0
SHORTNESS OF BREATH: 1
MEMORY LOSS: 1
DIZZINESS: 0

## 2017-06-19 ASSESSMENT — PAIN SCALES - GENERAL
PAINLEVEL_OUTOF10: 0

## 2017-06-19 NOTE — CARE PLAN
Problem: Bowel/Gastric:  Goal: Normal bowel function is maintained or improved  Patient remains incontinent of stool. Continues on tube feeds. Order for speech therapy to consult before clearing for oral diet.

## 2017-06-19 NOTE — CARE PLAN
Problem: Hyperinflation:  Goal: Prevent or improve atelectasis  Outcome: PROGRESSING AS EXPECTED  PEP QID  Pt cannot perform IS

## 2017-06-19 NOTE — PROGRESS NOTES
[facility] Hospitalist Progress Note    Date of Service: 6/19/2017    Chief Complaint  61 y.o. female admitted 6/8/2017 with syncope.    Interval Problem Update  Ms. Alfaro has a hx of htn and recently diagnosed afib.  CT chest in the ER revealed alveolar hemorrhage. She required rescue bipap and was admitted to the ICU. She required intubation on 6/9 with bronchoscopy.     Patient seen and examined today. ICU Care  Care and plan discussed in IDT/Hot rounds.  Lines and assistive devices reviewed.    Patient tolerating treatment and therapies.  All Data, Medication data reviewed.  Case discussed with nursing as available.  Plan of Care reviewed with patient and notified of changes.    6/13 Pt remains intubated, renal function better, nephro s/o, Bp better, follows commands, CXR no change, SBT's in progress, h/h stable, on AC, getting diuresis  6/14 Pt remains vented and sedated, in SR, awakes and follows  Less pulm edema on CXR  6/15 Pt remains intubated, more awake, follow, in SR, Cxr better, less fluid, Pt weak, follows sluggishly, not moving LE much, on Spont curently, family at bedside  6/16 Pt with recurrence of AFIB with RVR this am, amio restarted, cards informed, Pt on eliquis, Digoxin given, resp. Status better/ improved  6/17 Pt more awake, weak, better slowly, not quite ready for extubation, is following, no pain  remains in rate  AFIB  6/18 Pt extubated and tolerated, rate controlled AFIB, lethargic, very much edema  6/19 Pt looks and feels better, for stress test, needs speech f/u and Pt/Ot rehab eval  Afib in good rate control, BP stable, diuresis    Consultants/Specialty  Pulmonology  Cardiology  Nephrology    Disposition  ICU        Review of Systems   Constitutional: Negative for fever and chills.   Respiratory: Positive for cough, sputum production and shortness of breath. Negative for wheezing.    Cardiovascular: Negative for chest pain and palpitations.   Gastrointestinal: Negative for abdominal  pain.   Skin: Negative for rash.   Neurological: Negative for headaches.   Psychiatric/Behavioral: Positive for memory loss. The patient is not nervous/anxious.       Physical Exam  Laboratory/Imaging   Hemodynamics  Temp (24hrs), Av.9 °C (98.5 °F), Min:36.5 °C (97.7 °F), Max:37.2 °C (99 °F)   Temperature: 36.9 °C (98.4 °F)  Pulse  Av.4  Min: 62  Max: 121 Heart Rate (Monitored): 72  Blood Pressure: 108/61 mmHg, NIBP: 141/69 mmHg      Respiratory      Respiration: (!) 26, Pulse Oximetry: 94 %, O2 Daily Delivery Respiratory : Room Air with O2 Available     PEP/CPT Method: Positive Airway Pressure Device, Work Of Breathing / Effort: Vented  RUL Breath Sounds: Clear, RML Breath Sounds: Diminished, RLL Breath Sounds: Diminished, CAROLANN Breath Sounds: Clear, LLL Breath Sounds: Diminished    Fluids    Intake/Output Summary (Last 24 hours) at 17 0850  Last data filed at 17 0600   Gross per 24 hour   Intake 1835.8 ml   Output   2040 ml   Net -204.2 ml       Nutrition  Orders Placed This Encounter   Procedures   • Diet NPO     Standing Status: Standing      Number of Occurrences: 1      Standing Expiration Date:      Order Specific Question:  Type:     Answer:  Now [1]     Order Specific Question:  Restrict to:     Answer:  Strict [1]     Physical Exam   Constitutional: She is oriented to person, place, and time. She appears well-developed and well-nourished. She appears lethargic. No distress.   HENT:   Head: Normocephalic and atraumatic.   Nose: Nose normal.   Mouth/Throat: Oropharynx is clear and moist.   Poor dentition.   cortrak Nares.    Eyes: Conjunctivae and EOM are normal. Pupils are equal, round, and reactive to light. No scleral icterus.   Neck: Normal range of motion. Neck supple. No JVD present. No thyromegaly present.   Cardiovascular: Normal heart sounds.  An irregularly irregular rhythm present. Tachycardia present.  Exam reveals no gallop and no friction rub.    Pulmonary/Chest: Effort  normal. She has no wheezes. She has rales.   Abdominal: Soft. Bowel sounds are normal. She exhibits no distension and no mass. There is no tenderness. There is no rebound and no guarding.   Genitourinary:   Tamayo cath.   Musculoskeletal: Normal range of motion. She exhibits edema. She exhibits no tenderness.   Edema hands   Lymphadenopathy:     She has no cervical adenopathy.   Neurological: She is oriented to person, place, and time. She appears lethargic. No cranial nerve deficit.   Sedated though opens eyes to stimulation.     Skin: Skin is warm and dry. She is not diaphoretic. No erythema. There is pallor.   Very long, sharp toenails with onychomycosis.   Psychiatric: She has a normal mood and affect. Her behavior is normal.   Nursing note and vitals reviewed.      Recent Labs      06/17/17 0443 06/18/17   0545  06/19/17   0450   WBC  8.4  9.7  8.6   RBC  3.83*  3.94*  4.01*   HEMOGLOBIN  10.5*  10.8*  11.1*   HEMATOCRIT  34.1*  34.9*  35.0*   MCV  89.0  88.6  87.3   MCH  27.4  27.4  27.7   MCHC  30.8*  30.9*  31.7*   RDW  46.5  45.4  44.7   PLATELETCT  285  314  336   MPV  9.2  9.5  9.2     Recent Labs      06/17/17 0443  06/18/17   0545  06/19/17   0450   SODIUM  144  140  140   POTASSIUM  4.1  4.0  3.9   CHLORIDE  109  107  108   CO2  28  27  27   GLUCOSE  208*  200*  155*   BUN  41*  40*  32*   CREATININE  0.88  0.88  0.74   CALCIUM  9.1  9.0  8.9         Recent Labs      06/19/17   0450   BNPBTYPENAT  1017*     Recent Labs      06/17/17   0443   TRIGLYCERIDE  130          Assessment/Plan     Severe sepsis (CMS-HCC) (present on admission)  Assessment & Plan  Source appears respiratory. IV abx azithromycin and rocephin with stop dates. Query non-infectious SIRS with cardiac source.  The organ system dysfunction associated with this disease process is the respiratory system as is evidenced by respiratory failure requiring intubation.     Resolved/improved    Acute respiratory failure with hypoxia  (CMS-HCC) (present on admission)  Assessment & Plan    VDRF, intubated  SBT's slowly better  Pulmonology consulted and following.   Echo showed normal EF at 65%.  Aggressive IV diuresis.   SBT's      KIRT (acute kidney injury) (CMS-HCC) (present on admission)  Assessment & Plan  Cr on admit was 2.95 with baseline 1.1 per review of the records from 2016.   Consistent with ATN. IV fluids have been given.   Nephrology consultation.   Improved, now  Nephro s/o    Atrial fibrillation (CMS-HCC) (present on admission)  Assessment & Plan  Eliquis as an outpatient.   Rate control on coreg as an outpatient.    Recently diagnosed.  C/w Eliquis  Cardiology consulted.  Converted to sinus on 6/12.  EF 65%.  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    Pulmonary hypertension (CMS-HCC) (present on admission)  Assessment & Plan  RVSP 50 mm Hg  She is high risk of volume overload.    - Morbid obesity, with exacerbated edema  - weakness, lethargy  Need for Pt/Ot  Plan  C/w amiodarone, BBL for rate control   C/w diuresis   Resp. care  Electrolyte balance  AC to continue  Stress test today  See orders  Am Labs  Pt is critically ill in ICU, will place SNF referral    Time spent 35 min, no overlap  Labs reviewed and Medications reviewed  Tamayo catheter: Unconscious / Sedated Patient on a Ventilator  Central line in place: Sepsis    DVT Prophylaxis: Heparin

## 2017-06-19 NOTE — PROGRESS NOTES
[facility] Hospitalist Progress Note    Date of Service: 6/18/2017    Chief Complaint  61 y.o. female admitted 6/8/2017 with syncope.    Interval Problem Update  Ms. Alfaro has a hx of htn and recently diagnosed afib.  CT chest in the ER revealed alveolar hemorrhage. She required rescue bipap and was admitted to the ICU. She required intubation on 6/9 with bronchoscopy.   She was in afib that converted to sinus rhythm    Patient seen and examined today. ICU Care  Care and plan discussed in IDT/Hot rounds.  Lines and assistive devices reviewed.    Patient tolerating treatment and therapies.  All Data, Medication data reviewed.  Case discussed with nursing as available.  Plan of Care reviewed with patient and notified of changes.  6/13 Pt remains intubated, renal function better, nephro s/o, Bp better, follows commands, CXR no change, SBT's in progress, h/h stable, on AC, getting diuresis  6/14 Pt remains vented and sedated, in SR, awakes and follows  Less pulm edema on CXR  6/15 Pt remains intubated, more awake, follow, in SR, Cxr better, less fluid, Pt weak, follows sluggishly, not moving LE much, on Spont curently, family at bedside  6/16 Pt with recurrence of AFIB with RVR this am, amio restarted, cards informed, Pt on eliquis, Digoxin given, resp. Status better/ improved  6/17 Pt more awake, weak, better slowly, not quite ready for extubation, is following, no pain  remains in rate  AFIB  6/18 Pt extubated and tolerated, rate controlled AFIB, lethargic, very much edema    Consultants/Specialty  Pulmonology  Cardiology  Nephrology    Disposition  ICU        Review of Systems   Constitutional: Negative for fever and chills.   Respiratory: Positive for cough, sputum production and shortness of breath. Negative for wheezing.    Cardiovascular: Negative for chest pain and palpitations.   Gastrointestinal: Negative for abdominal pain.   Skin: Negative for rash.   Neurological: Negative for headaches.    Psychiatric/Behavioral: Positive for memory loss. The patient is not nervous/anxious.       Physical Exam  Laboratory/Imaging   Hemodynamics  Temp (24hrs), Av °C (98.6 °F), Min:36.5 °C (97.7 °F), Max:37.2 °C (99 °F)   Temperature: 36.5 °C (97.7 °F)  Pulse  Av.1  Min: 62  Max: 121 Heart Rate (Monitored): 83  NIBP: 138/67 mmHg      Respiratory  [place] Vent Mode: APVCMV, Rate (breaths/min): 16, PEEP/CPAP: 8, PEEP/CPAP: 8, FiO2: 30, P Peak (PIP): 21, P MEAN: 13   Respiration: (!) 27, Pulse Oximetry: 94 %, O2 Daily Delivery Respiratory : OxyMask     PEP/CPT Method: Positive Airway Pressure Device, Work Of Breathing / Effort: Vented  RUL Breath Sounds: Clear, RML Breath Sounds: Diminished, RLL Breath Sounds: Diminished, CAROLANN Breath Sounds: Clear, LLL Breath Sounds: Diminished    Fluids    Intake/Output Summary (Last 24 hours) at 17 1809  Last data filed at 17 1800   Gross per 24 hour   Intake 2457.4 ml   Output   1890 ml   Net  567.4 ml       Nutrition  Orders Placed This Encounter   Procedures   • Diet NPO     Standing Status: Standing      Number of Occurrences: 1      Standing Expiration Date:      Order Specific Question:  Type:     Answer:  Now [1]     Order Specific Question:  Restrict to:     Answer:  Strict [1]     Physical Exam   Constitutional: She appears well-developed and well-nourished. She appears lethargic. No distress.   HENT:   Poor dentition.   cortrak Nares.    Eyes: No scleral icterus.   Neck:   Right sided central line without bleeding.   Cardiovascular: An irregularly irregular rhythm present. Tachycardia present.    Pulmonary/Chest: She has rales.   Abdominal: Soft. She exhibits no distension.   Genitourinary:   Tamayo cath.   Musculoskeletal: She exhibits edema. She exhibits no tenderness.   Edema hands   Neurological: She appears lethargic.   Sedated though opens eyes to stimulation.     Skin: She is not diaphoretic. No erythema. There is pallor.   Very long, sharp toenails  with onychomycosis.       Recent Labs      06/16/17   0503  06/17/17   0443  06/18/17   0545   WBC  8.4  8.4  9.7   RBC  3.82*  3.83*  3.94*   HEMOGLOBIN  10.7*  10.5*  10.8*   HEMATOCRIT  35.0*  34.1*  34.9*   MCV  91.6  89.0  88.6   MCH  28.0  27.4  27.4   MCHC  30.6*  30.8*  30.9*   RDW  49.1  46.5  45.4   PLATELETCT  323  285  314   MPV  9.3  9.2  9.5     Recent Labs      06/16/17   0503  06/17/17   0443  06/18/17   0545   SODIUM  146*  144  140   POTASSIUM  4.0  4.1  4.0   CHLORIDE  110  109  107   CO2  30  28  27   GLUCOSE  178*  208*  200*   BUN  40*  41*  40*   CREATININE  0.86  0.88  0.88   CALCIUM  9.0  9.1  9.0         Recent Labs      06/16/17   0503   BNPBTYPENAT  126*     Recent Labs      06/17/17   0443   TRIGLYCERIDE  130          Assessment/Plan     Severe sepsis (CMS-HCC) (present on admission)  Assessment & Plan  Source appears respiratory. IV abx azithromycin and rocephin with stop dates. Query non-infectious SIRS with cardiac source.  The organ system dysfunction associated with this disease process is the respiratory system as is evidenced by respiratory failure requiring intubation.     Resolved/improved    Acute respiratory failure with hypoxia (CMS-HCC) (present on admission)  Assessment & Plan    VDRF, intubated  SBT's slowly better  Pulmonology consulted and following.   Echo showed normal EF at 65%.  Aggressive IV diuresis.   SBT's      KIRT (acute kidney injury) (CMS-HCC) (present on admission)  Assessment & Plan  Cr on admit was 2.95 with baseline 1.1 per review of the records from 2016.   Consistent with ATN. IV fluids have been given.   Nephrology consultation.   Improved, now  Nephro s/o    Atrial fibrillation (CMS-[facility]) (present on admission)  Assessment & Plan  Eliquis as an outpatient.   Rate control on coreg as an outpatient.    Recently diagnosed.  C/w Eliquis  Cardiology consulted.  Converted to sinus on 6/12.  EF 65%.  Re-occurred on 6/16  Amio restarted    Type 2 diabetes  mellitus with circulatory disorder (CMS-HCC) (present on admission)  Assessment & Plan  Sliding scale    Hyponatremia (present on admission)  Assessment & Plan  resolved    Hypothyroid (present on admission)  Assessment & Plan  replacement    HTN (hypertension) (present on admission)  Assessment & Plan  On cardizem, hyzaar 100/25, clonidine, coreg, norvasc as an outpatient.    Pulmonary hypertension (CMS-[facility]) (present on admission)  Assessment & Plan  RVSP 50 mm Hg  She is high risk of volume overload.    - Morbid obesity  - weakness, lethargy  Plan  C/w amiodarone, BBL for rate control for now, will increase  C/w diuresis   Monitor post extubation  Resp. care  Electrolyte balance  AC to continue  See orders  Am Labs  Pt is critically ill in ICU, will place SNF referral    Time spent 35 min, no overlap  Labs reviewed and Medications reviewed  Tamayo catheter: Unconscious / Sedated Patient on a Ventilator  Central line in place: Sepsis    DVT Prophylaxis: Heparin

## 2017-06-19 NOTE — PROGRESS NOTES
Cardiology Progress Note               Author: Josette Umanzor Date & Time created: 2017  11:44 AM     Interval History:  60 y/o with HTN, DM2, recent diagnosis of A.fib who was admitted with respiratory failure and possible PNA requiring intubation. Pt was initially rate controlled and converted to NSR. Reverted to A.fib yesterday. Extubated yesterday as well.     Feels okay today. Sister at bedside who is providing most of the history.     Review of Systems   Constitutional: Negative for malaise/fatigue.   Respiratory: Negative for shortness of breath.    Cardiovascular: Negative for chest pain and palpitations.   Gastrointestinal: Negative for abdominal pain.   Neurological: Negative for dizziness.       Physical Exam   Constitutional: She is oriented to person, place, and time. No distress.   HENT:   Head: Normocephalic and atraumatic.   Neck: No JVD present.   Cardiovascular: Normal rate and normal heart sounds.  An irregular rhythm present. Exam reveals no gallop and no friction rub.    No murmur heard.  Pulmonary/Chest: No respiratory distress.   Poor respiratory effort. Normal breath sounds on anterior auscultation.    Abdominal: Soft. There is no tenderness.   Musculoskeletal: She exhibits no edema.   Neurological: She is alert and oriented to person, place, and time.   Skin: Skin is warm and dry. She is not diaphoretic.   Psychiatric: She has a normal mood and affect.   Nursing note and vitals reviewed.      Hemodynamics:  Temp (24hrs), Av.9 °C (98.4 °F), Min:36.5 °C (97.7 °F), Max:37.2 °C (99 °F)  Temperature: 36.8 °C (98.2 °F)  Pulse  Av  Min: 62  Max: 121Heart Rate (Monitored): 78  Blood Pressure: 108/61 mmHg, NIBP: 129/69 mmHg     Respiratory:    Respiration: (!) 26, Pulse Oximetry: 98 %, O2 Daily Delivery Respiratory : Silicone Nasal Cannula     PEP/CPT Method: Positive Airway Pressure Device, Work Of Breathing / Effort: Mild  RUL Breath Sounds: Clear, RML Breath Sounds: Diminished, RLL  Breath Sounds: Diminished, CAROLANN Breath Sounds: Clear, LLL Breath Sounds: Diminished  Fluids:  Date 06/19/17 0700 - 06/20/17 0659   Shift 1764-2734 6655-0878 9186-3869 24 Hour Total   I  N  T  A  K  E   I.V. 50   50    Other 75   75    Enteral 50   50    Shift Total 175   175   O  U  T  P  U  T   Urine 325   325    Shift Total 325   325   Weight (kg) 104.5 104.5 104.5 104.5       Weight: 104.5 kg (230 lb 6.1 oz)  GI/Nutrition:  Orders Placed This Encounter   Procedures   • Diet NPO     Standing Status: Standing      Number of Occurrences: 1      Standing Expiration Date:      Order Specific Question:  Type:     Answer:  Now [1]     Order Specific Question:  Restrict to:     Answer:  Strict [1]     Lab Results:  Recent Labs      06/17/17   0443  06/18/17   0545  06/19/17   0450   WBC  8.4  9.7  8.6   RBC  3.83*  3.94*  4.01*   HEMOGLOBIN  10.5*  10.8*  11.1*   HEMATOCRIT  34.1*  34.9*  35.0*   MCV  89.0  88.6  87.3   MCH  27.4  27.4  27.7   MCHC  30.8*  30.9*  31.7*   RDW  46.5  45.4  44.7   PLATELETCT  285  314  336   MPV  9.2  9.5  9.2     Recent Labs      06/17/17   0443  06/18/17   0545  06/19/17   0450   SODIUM  144  140  140   POTASSIUM  4.1  4.0  3.9   CHLORIDE  109  107  108   CO2  28  27  27   GLUCOSE  208*  200*  155*   BUN  41*  40*  32*   CREATININE  0.88  0.88  0.74   CALCIUM  9.1  9.0  8.9         Recent Labs      06/19/17   0450   BNPBTYPENAT  1017*     Recent Labs      06/19/17   0450   BNPBTYPENAT  1017*     Recent Labs      06/17/17   0443   TRIGLYCERIDE  130     Medical Decision Making, by Problem:  Active Hospital Problems    Diagnosis   • Severe sepsis (CMS-HCC) [A41.9, R65.20]   • Acute respiratory failure with hypoxia (CMS-[facility]) [J96.01]   • A-fib (CMS-[facility]) [I48.91]   • Pneumonia [J18.9]   • Acute hypoxemic respiratory failure (CMS-HCC) [J96.01]   • Coronary artery calcification seen on CT scan [I25.10]   • Pulmonary hypertension (CMS-HCC) [I27.2]   • Atrial fibrillation (CMS-HCC) [I48.91]   •  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-[facility]) [E11.59]   • Hyponatremia [E87.1]   • Hypothyroid [E03.9]   • HTN (hypertension) [I10]   • KIRT (acute kidney injury) (CMS-[facility]) [N17.9]       Plan:    Paroxysmal A.fib - rate is better controlled. Currently on amiodarone, metoprolol and digoxin. Continue current therapy. Pt is on apixaban and is tolerating it well. Ideally could consider JUDSON guided cardioversion, however pt is asymptomatic and she may be at risk for re-intubation with this procedure. will hold off at this time. Continue medical management. Will continue to follow with you.     Thank you for allowing me to participate in the care of this patient. Please do not hesitate to contact me with any questions.    Josette Umanzor MD  Cardiologist  [facility]      Core Measures

## 2017-06-19 NOTE — PROGRESS NOTES
Pulmonary Critical Care Progress Note        Date of Service: 6/19/17    Chief Complaint: Weakness    History of Present Illness: 61 y.o. female admitted 6/8 with syncope, hypoxia, KIRT; found to have sig pulmonary edema, ? pna and intubated 6/9; no DAH on bronch     ROS:    Respiratory: negative,   Cardiac: negative,   GI: negative.    All other systems negative.    Interval Events:  24 hour interval history reviewed   Extubated yesterday  Alerted, oriented but slow to respond  Amio for intermittent AF  BP ok  2L NC unable to perform IS  TF 50, tolerated  On free water flushes for hypernatremia   Sitting at side of bed   CXR shows: unchanged mild pulmonary edema left lower lobe atelectasis, improved diffuse bilateral infiltrates       PFSH:  No change.    Physical Exam:  General:  Awake, Alert, Follows commands  HEENT:  PERRL, neck supple, trachea midline, Left nare CT, TF off currently, Left IJ CVC CDI  CVS:  Irregular, rate 70s, distant  Respiratory:  Clear anteriorly  Abdomen:  Soft, NT  Extremities:  2+ edema LE, 1+ DP pulses bilaterally, cap refill intact  Skin:  Cool, dry, intact  Neuro/Psych:  Awake, Alert, Follows commands, Weak 4/5 bilateral upper and lower extremities    Respiratory:     Pulse Oximetry: 94 %  Chest Tube Drains:          ImagingAvailable data reviewed   Recent Labs      06/18/17   0434   ISTATAPH  7.437   ISTATAPCO2  39.1*   ISTATAPO2  83   ISTATATCO2  28   FOUARLU2TTR  97   ISTATARTHCO3  26.4*   ISTATARTBE  2   ISTATTEMP  97.6 F   ISTATFIO2  30   ISTATSPEC  Arterial   ISTATAPHTC  7.445   [id number]  80       HemoDynamics:  Pulse: 72, Heart Rate (Monitored): 72  NIBP: 141/69 mmHg       Imaging: Available data reviewed  Recent Labs      06/19/17   0450   BNPBTYPENAT  1017*       Neuro:  GCS Total [place] Coma Score: 11       Imaging: Available data reviewed    Fluids:  Intake/Output       06/17/17 0700 - 06/18/17 0659 06/18/17 0700 - 06/19/17 0659 06/19/17 0700 - 06/20/17 0659        Total  Total  Total       Intake    I.V.  363.6  292.8 656.4  64.6  -- 64.6  --  -- --    Precedex Volume 115.6 94.8 210.4 31.6 -- 31.6 -- -- --    IV Volume (amiodarone) 198 198 396 33 -- 33 -- -- --    IV Piggyback Volume 50 -- 50 -- -- -- -- -- --    Other  300  -- 300  120  -- 120  --  -- --    Medications (P.O./ Enteral Liquids) 300 -- 300 120 -- 120 -- -- --    Enteral  1000  1000 2000  980  1000 1980  --  -- --    Enteral Volume   -- -- --    Free Water / Tube Flush 400 400 800 380 400 780 -- -- --    Total Intake 1663.6 1292.8 2956.4 1164.6 1000 2164.6 -- -- --       Output    Urine  855  775 1630  1115  1050 2165  --  -- --    Indwelling Cathether  1115 1050 2165 -- -- --    Total Output  1115 1050 2165 -- -- --       Net I/O     808.6 517.8 1326.4 49.6 -50 -0.4 -- -- --        Weight: 104.5 kg (230 lb 6.1 oz)  Recent Labs      173  17   0545  17   0450   SODIUM  144  140  140   POTASSIUM  4.1  4.0  3.9   CHLORIDE  109  107  108   CO2  28  27  27   BUN  41*  40*  32*   CREATININE  0.88  0.88  0.74   MAGNESIUM  1.7  1.7  1.9   PHOSPHORUS  2.8   --   2.7   CALCIUM  9.1  9.0  8.9       GI/Nutrition:  Imaging: Available data reviewed  NPO  Liver Function  Recent Labs      17   0545  17   0450   ALTSGPT   --    --   42   ASTSGOT   --    --   29   ALKPHOSPHAT   --    --   194*   TBILIRUBIN   --    --   0.6   PREALBUMIN   --    --   27.0   GLUCOSE  208*  200*  155*       Heme:  Recent Labs      17   0545  17   0450   RBC  3.83*  3.94*  4.01*   HEMOGLOBIN  10.5*  10.8*  11.1*   HEMATOCRIT  34.1*  34.9*  35.0*   PLATELETCT  285  314  336       Infectious Disease:  Temp  Av.9 °C (98.5 °F)  Min: 36.5 °C (97.7 °F)  Max: 37.2 °C (99 °F)  Micro: reviewed  Recent Labs      1745  17   0450   WBC  8.4   9.7  8.6   NEUTSPOLYS  70.90  74.90*  74.40*   LYMPHOCYTES  14.60*  12.30*  13.40*   MONOCYTES  9.90  8.50  8.20   EOSINOPHILS  2.50  2.30  2.10   BASOPHILS  1.10  1.20  1.30   ASTSGOT   --    --   29   ALTSGPT   --    --   42   ALKPHOSPHAT   --    --   194*   TBILIRUBIN   --    --   0.6     Current Facility-Administered Medications   Medication Dose Frequency Provider Last Rate Last Dose   • magnesium sulfate IVPB premix 2 g  2 g Once John Mclean, PHARMD       • amiodarone (CORDARONE) tablet 400 mg  400 mg TWICE DAILY Evens Cadena M.D.   400 mg at 06/18/17 2215    Followed by   • [START ON 6/23/2017] amiodarone (CORDARONE) tablet 400 mg  400 mg Q DAY Evens Cadena M.D.        Followed by   • [START ON 6/28/2017] amiodarone (CORDARONE) tablet 200 mg  200 mg Q DAY Evens Cadena M.D.       • hydrocodone-acetaminophen (NORCO) 5-325 MG per tablet 1-2 Tab  1-2 Tab Q6HRS PRN Evens Cadena M.D.       • digoxin (LANOXIN) tablet 125 mcg  125 mcg DAILY AT 1800 Nishant Sebastian M.D.       • metoprolol (LOPRESSOR) tablet 75 mg  75 mg Q8HRS Nishant Sebastian M.D.   75 mg at 06/19/17 0452   • furosemide (LASIX) tablet 20 mg  20 mg Q8HRS Oni Elder M.D.   20 mg at 06/19/17 0453   • potassium chloride (KLOR-CON) 20 MEQ packet 20 mEq  20 mEq BID Evens Cadena M.D.   20 mEq at 06/18/17 2215   • atorvastatin (LIPITOR) tablet 40 mg  40 mg QHS Nishant Sebastian M.D.   40 mg at 06/18/17 2215   • apixaban (ELIQUIS) 2.5mg tablet 5 mg  5 mg BID Jaime Bush M.D.   5 mg at 06/18/17 2215   • insulin lispro (HUMALOG) injection 3-14 Units  3-14 Units Q6HRS Antonio Mata M.D.   4 Units at 06/19/17 0453   • Respiratory Care per Protocol   Continuous RT Antonio Mata M.D.       • MD ALERT...Adult ICU Electrolyte Replacement per Pharmacy Protocol   pharmacy to dose Antonio Mata M.D.       • ipratropium-albuterol (DUONEB) nebulizer solution 3 mL  3 mL Q2HRS PRN (RT) Antonio CULP  ERAN Mata       • glucose 4 g chewable tablet 16 g  16 g Q15 MIN PRN Anson Manuel M.D.        And   • dextrose 50% (D50W) injection 25 mL  25 mL Q15 MIN PRN Anson Manuel M.D.       • levothyroxine (SYNTHROID) tablet 88 mcg  88 mcg AM ES Anson Manuel M.D.   88 mcg at 06/19/17 0516   • senna-docusate (PERICOLACE or SENOKOT S) 8.6-50 MG per tablet 2 Tab  2 Tab BID Anson Manuel M.D.   Stopped at 06/18/17 2100    And   • polyethylene glycol/lytes (MIRALAX) PACKET 1 Packet  1 Packet QDAY PRN Anson Manuel M.D.   1 Packet at 06/12/17 0920    And   • magnesium hydroxide (MILK OF MAGNESIA) suspension 30 mL  30 mL QDAY PRN Anson Manuel M.D.        And   • bisacodyl (DULCOLAX) suppository 10 mg  10 mg QDAY PRN Anson Manuel M.D.       • ondansetron (ZOFRAN) syringe/vial injection 4 mg  4 mg Q4HRS PRN Anson Manuel M.D.       • ondansetron (ZOFRAN ODT) dispertab 4 mg  4 mg Q4HRS PRN Anson Manuel M.D.       • acetaminophen (TYLENOL) tablet 650 mg  650 mg Q6HRS PRN Anson Manuel M.D.         Last reviewed on 6/8/2017  9:34 AM by Mindi Benites, Pharmacy Int    Quality  Measures:  Labs reviewed, Medications reviewed and Radiology images reviewed                    Assessment and Plan:  Acute hypoxemic respiratory failure    - intubated 6/9              - liberated 6/18   - oxygenating well after extubation   - continue RT protocols, IS, mobilize  Acute pulmonary edema              - ongoing diuresis as tolerated  Query pneumonia              - completed Rocephin and Zithromax 6/12    - all cx's neg   - observe off antibiotics  PAF              - cont amiodarone - transition to enteral               - optimize K and Mg              - anticoagulated with apixaban  Suspect acute on chronic diastolic heart failure              - force diuresis as tolerated  Pulmonary HTN              - RVSP 50 mm Hg  DM 2 - cont insulin  Hx of HTN  Dyslipidemia  Hypernatremia  PT OT      Discussed patient condition and risk of morbidity and/or  mortality with Family, RN, RT, Pharmacy, Charge nurse / hot rounds and Patient.    The patient remains critically ill.  Critical care time = 31 minutes in directly providing and coordinating critical care and extensive data review.  No time overlap and excludes procedures.     Melody CABRALES (Toñito), am scribing for, and in the presence of, Antonio Mata M.D.    Electronically signed by: Melody Love (Toñito), 6/19/2017    Antonio CABRALES M.D. personally performed the services described in this documentation, as scribed by Melody Love in my presence, and it is both accurate and complete.

## 2017-06-19 NOTE — CARE PLAN
Problem: Fluid Volume:  Goal: Will maintain balanced intake and output  Patient receiving Lasix for fluid overload. Patient negative 12 liters for course of hospitalization. Remains edematous. Extubated to NC yesterday. Continues to require low dose O2 with NC.

## 2017-06-20 ENCOUNTER — APPOINTMENT (OUTPATIENT)
Dept: RADIOLOGY | Facility: MEDICAL CENTER | Age: 62
DRG: 853 | End: 2017-06-20
Attending: INTERNAL MEDICINE
Payer: COMMERCIAL

## 2017-06-20 PROBLEM — R33.9 URINARY RETENTION: Status: ACTIVE | Noted: 2017-06-20

## 2017-06-20 LAB
ANION GAP SERPL CALC-SCNC: 10 MMOL/L (ref 0–11.9)
BACTERIA SPEC RESP CULT: ABNORMAL
BACTERIA SPEC RESP CULT: ABNORMAL
BASOPHILS # BLD AUTO: 1.3 % (ref 0–1.8)
BASOPHILS # BLD: 0.12 K/UL (ref 0–0.12)
BUN SERPL-MCNC: 28 MG/DL (ref 8–22)
CALCIUM SERPL-MCNC: 9.2 MG/DL (ref 8.5–10.5)
CHLORIDE SERPL-SCNC: 108 MMOL/L (ref 96–112)
CO2 SERPL-SCNC: 23 MMOL/L (ref 20–33)
CREAT SERPL-MCNC: 0.76 MG/DL (ref 0.5–1.4)
EOSINOPHIL # BLD AUTO: 0.16 K/UL (ref 0–0.51)
EOSINOPHIL NFR BLD: 1.7 % (ref 0–6.9)
ERYTHROCYTE [DISTWIDTH] IN BLOOD BY AUTOMATED COUNT: 44 FL (ref 35.9–50)
GFR SERPL CREATININE-BSD FRML MDRD: >60 ML/MIN/1.73 M 2
GLUCOSE BLD-MCNC: 125 MG/DL (ref 65–99)
GLUCOSE BLD-MCNC: 144 MG/DL (ref 65–99)
GLUCOSE BLD-MCNC: 150 MG/DL (ref 65–99)
GLUCOSE BLD-MCNC: 152 MG/DL (ref 65–99)
GLUCOSE SERPL-MCNC: 166 MG/DL (ref 65–99)
GRAM STN SPEC: ABNORMAL
HCT VFR BLD AUTO: 37.9 % (ref 37–47)
HGB BLD-MCNC: 12 G/DL (ref 12–16)
IMM GRANULOCYTES # BLD AUTO: 0.07 K/UL (ref 0–0.11)
IMM GRANULOCYTES NFR BLD AUTO: 0.7 % (ref 0–0.9)
LYMPHOCYTES # BLD AUTO: 1.23 K/UL (ref 1–4.8)
LYMPHOCYTES NFR BLD: 13.2 % (ref 22–41)
MAGNESIUM SERPL-MCNC: 1.9 MG/DL (ref 1.5–2.5)
MCH RBC QN AUTO: 27.5 PG (ref 27–33)
MCHC RBC AUTO-ENTMCNC: 31.7 G/DL (ref 33.6–35)
MCV RBC AUTO: 86.7 FL (ref 81.4–97.8)
MONOCYTES # BLD AUTO: 0.75 K/UL (ref 0–0.85)
MONOCYTES NFR BLD AUTO: 8 % (ref 0–13.4)
NEUTROPHILS # BLD AUTO: 7.01 K/UL (ref 2–7.15)
NEUTROPHILS NFR BLD: 75.1 % (ref 44–72)
NRBC # BLD AUTO: 0 K/UL
NRBC BLD AUTO-RTO: 0 /100 WBC
PHOSPHATE SERPL-MCNC: 2.9 MG/DL (ref 2.5–4.5)
PLATELET # BLD AUTO: 406 K/UL (ref 164–446)
PMV BLD AUTO: 9.8 FL (ref 9–12.9)
POTASSIUM SERPL-SCNC: 4 MMOL/L (ref 3.6–5.5)
RBC # BLD AUTO: 4.37 M/UL (ref 4.2–5.4)
SIGNIFICANT IND 70042: ABNORMAL
SITE SITE: ABNORMAL
SODIUM SERPL-SCNC: 141 MMOL/L (ref 135–145)
SOURCE SOURCE: ABNORMAL
WBC # BLD AUTO: 9.3 K/UL (ref 4.8–10.8)

## 2017-06-20 PROCEDURE — A9270 NON-COVERED ITEM OR SERVICE: HCPCS | Performed by: INTERNAL MEDICINE

## 2017-06-20 PROCEDURE — G8987 SELF CARE CURRENT STATUS: HCPCS | Mod: CM

## 2017-06-20 PROCEDURE — 700102 HCHG RX REV CODE 250 W/ 637 OVERRIDE(OP): Performed by: INTERNAL MEDICINE

## 2017-06-20 PROCEDURE — G8997 SWALLOW GOAL STATUS: HCPCS | Mod: CH

## 2017-06-20 PROCEDURE — A9270 NON-COVERED ITEM OR SERVICE: HCPCS | Performed by: HOSPITALIST

## 2017-06-20 PROCEDURE — 99232 SBSQ HOSP IP/OBS MODERATE 35: CPT | Performed by: INTERNAL MEDICINE

## 2017-06-20 PROCEDURE — 80048 BASIC METABOLIC PNL TOTAL CA: CPT

## 2017-06-20 PROCEDURE — 84100 ASSAY OF PHOSPHORUS: CPT

## 2017-06-20 PROCEDURE — 83735 ASSAY OF MAGNESIUM: CPT

## 2017-06-20 PROCEDURE — 71010 DX-CHEST-PORTABLE (1 VIEW): CPT

## 2017-06-20 PROCEDURE — 302146: Performed by: HOSPITALIST

## 2017-06-20 PROCEDURE — 97167 OT EVAL HIGH COMPLEX 60 MIN: CPT

## 2017-06-20 PROCEDURE — 700102 HCHG RX REV CODE 250 W/ 637 OVERRIDE(OP): Performed by: HOSPITALIST

## 2017-06-20 PROCEDURE — 51798 US URINE CAPACITY MEASURE: CPT

## 2017-06-20 PROCEDURE — 82962 GLUCOSE BLOOD TEST: CPT | Mod: 91

## 2017-06-20 PROCEDURE — 92610 EVALUATE SWALLOWING FUNCTION: CPT

## 2017-06-20 PROCEDURE — G8988 SELF CARE GOAL STATUS: HCPCS | Mod: CL

## 2017-06-20 PROCEDURE — 85025 COMPLETE CBC W/AUTO DIFF WBC: CPT

## 2017-06-20 PROCEDURE — G8996 SWALLOW CURRENT STATUS: HCPCS | Mod: CL

## 2017-06-20 PROCEDURE — 36415 COLL VENOUS BLD VENIPUNCTURE: CPT

## 2017-06-20 PROCEDURE — 770020 HCHG ROOM/CARE - TELE (206)

## 2017-06-20 RX ORDER — LOSARTAN POTASSIUM 50 MG/1
50 TABLET ORAL
Status: DISCONTINUED | OUTPATIENT
Start: 2017-06-20 | End: 2017-06-22 | Stop reason: HOSPADM

## 2017-06-20 RX ADMIN — FUROSEMIDE 20 MG: 20 TABLET ORAL at 21:11

## 2017-06-20 RX ADMIN — INSULIN LISPRO 3 UNITS: 100 INJECTION, SOLUTION INTRAVENOUS; SUBCUTANEOUS at 23:34

## 2017-06-20 RX ADMIN — AMIODARONE HYDROCHLORIDE 400 MG: 200 TABLET ORAL at 20:13

## 2017-06-20 RX ADMIN — DIGOXIN 125 MCG: 125 TABLET ORAL at 17:49

## 2017-06-20 RX ADMIN — HYDROCODONE BITARTRATE AND ACETAMINOPHEN 1 TABLET: 5; 325 TABLET ORAL at 14:47

## 2017-06-20 RX ADMIN — FUROSEMIDE 20 MG: 20 TABLET ORAL at 05:33

## 2017-06-20 RX ADMIN — ATORVASTATIN CALCIUM 40 MG: 40 TABLET, FILM COATED ORAL at 20:13

## 2017-06-20 RX ADMIN — POTASSIUM CHLORIDE 20 MEQ: 1.5 POWDER, FOR SOLUTION ORAL at 09:56

## 2017-06-20 RX ADMIN — APIXABAN 5 MG: 5 TABLET, FILM COATED ORAL at 09:57

## 2017-06-20 RX ADMIN — LOSARTAN POTASSIUM 50 MG: 50 TABLET, FILM COATED ORAL at 17:49

## 2017-06-20 RX ADMIN — POTASSIUM CHLORIDE 20 MEQ: 1.5 POWDER, FOR SOLUTION ORAL at 20:14

## 2017-06-20 RX ADMIN — AMIODARONE HYDROCHLORIDE 400 MG: 200 TABLET ORAL at 09:56

## 2017-06-20 RX ADMIN — APIXABAN 5 MG: 5 TABLET, FILM COATED ORAL at 20:13

## 2017-06-20 RX ADMIN — FUROSEMIDE 20 MG: 20 TABLET ORAL at 14:03

## 2017-06-20 RX ADMIN — METOPROLOL TARTRATE 75 MG: 25 TABLET, FILM COATED ORAL at 21:11

## 2017-06-20 RX ADMIN — LEVOTHYROXINE SODIUM 88 MCG: 88 TABLET ORAL at 05:33

## 2017-06-20 RX ADMIN — METOPROLOL TARTRATE 75 MG: 25 TABLET, FILM COATED ORAL at 14:03

## 2017-06-20 RX ADMIN — METOPROLOL TARTRATE 75 MG: 25 TABLET, FILM COATED ORAL at 05:33

## 2017-06-20 ASSESSMENT — ENCOUNTER SYMPTOMS
MEMORY LOSS: 1
WHEEZING: 0
COUGH: 1
SHORTNESS OF BREATH: 0
DIZZINESS: 0
VOMITING: 0
HEADACHES: 0
BLURRED VISION: 0
SPUTUM PRODUCTION: 1
NERVOUS/ANXIOUS: 0
WEAKNESS: 1
NAUSEA: 0
FEVER: 0

## 2017-06-20 ASSESSMENT — COGNITIVE AND FUNCTIONAL STATUS - GENERAL
DAILY ACTIVITIY SCORE: 8
DRESSING REGULAR LOWER BODY CLOTHING: TOTAL
SUGGESTED CMS G CODE MODIFIER DAILY ACTIVITY: CM
EATING MEALS: A LOT
HELP NEEDED FOR BATHING: TOTAL
DRESSING REGULAR UPPER BODY CLOTHING: TOTAL
TOILETING: TOTAL
PERSONAL GROOMING: A LOT

## 2017-06-20 ASSESSMENT — PAIN SCALES - GENERAL
PAINLEVEL_OUTOF10: 0
PAINLEVEL_OUTOF10: ASSUMED PAIN PRESENT
PAINLEVEL_OUTOF10: 0

## 2017-06-20 ASSESSMENT — CHA2DS2 SCORE
VASCULAR DISEASE: YES
AGE 65 TO 74: NO
SEX: FEMALE
PRIOR STROKE OR TIA OR THROMBOEMBOLISM: NO
DIABETES: YES
CHF OR LEFT VENTRICULAR DYSFUNCTION: NO
CHA2DS2 VASC SCORE: 4
HYPERTENSION: YES
AGE 75 OR GREATER: NO

## 2017-06-20 ASSESSMENT — ACTIVITIES OF DAILY LIVING (ADL): TOILETING: INDEPENDENT

## 2017-06-20 NOTE — CARE PLAN
Problem: Mobility  Goal: Risk for activity intolerance will decrease  Outcome: PROGRESSING AS EXPECTED  Up to EOB with therapy today.     Problem: Pain Management  Goal: Pain level will decrease to patient’s comfort goal  Outcome: PROGRESSING AS EXPECTED  Assessing every four hrs for pain per protocol; see doc flowsheets. Given norco per Cortrack after straight cath. Resting in bed, eyes closed resps even @ this time.

## 2017-06-20 NOTE — PROGRESS NOTES
Received care of pt from NOC RN this am. Pt is A+O. Slow to speak, flat affect. Denies need for pain medication.

## 2017-06-20 NOTE — ASSESSMENT & PLAN NOTE
-unclear cause  -maintain uriostgeui catheter at this time, coca cola colored urine, but good uop, watch closely

## 2017-06-20 NOTE — PROGRESS NOTES
Patient arrived on unit at this time. Assumed care of patient. Assessment complete. Patient denies pain at this time. Educated to use call light for assistance. Fall precautions in place. Will continue to monitor with hourly rounding.

## 2017-06-20 NOTE — THERAPY
"Speech Language Therapy Clinical Swallow Evaluation completed.  Functional Status: Patient currently NPO with Cortrak and with sister and friend present at bedside. Patient noted to have diffuse, overall weakness and reduced movement of oral motor structures during examination. Patient also noted to have no upper dentition and poor, minimal lower dentition, reporting that she typically eats a Dys2/thin liquid type diet at home d/t same. Patient consumed PO trials of single ice chips and NTL via tsp. Patient presented with intermittent weak coughing on approximately 50-75% of PO trials of both consistencies. Initiation of swallow trigger was delayed approximately 6-7 seconds and patient consistently triggered 4-5 swallows to clear bolus of NTL via tsp. Laryngeal elevation palpated as weak. No further PO trials were tested. At this time, patient remains at high risk for aspiration. Recommend patient continue NPO with Cortrak with prefeeding trials with SLP only. RN and sister aware. SLP to follow for dysphagia tx. Thank you for the consult.     Recommendations - Diet: Diet / Liquid Recommendation: Continue NPO with Cortrak, Pre-Feeding Trials with SLP Only                          Strategies: To be assessed                           Medication Administration: Medication Administration: Via Gastric Tube  Plan of Care: Will benefit from Speech Therapy 3 times per week  Post-Acute Therapy: Discharge to a transitional care facility for continued skilled therapy services.    See \"Rehab Therapy-Acute\" Patient Summary Report for complete documentation.   "

## 2017-06-20 NOTE — PROGRESS NOTES
Cardiology Progress Note               Author: Maria Isabel Marino Date & Time created: 2017  4:23 PM     Interval History:        Consultation for pulmonary edema , query  diastolic heart failure, recent diagnosis of A. fib    Admitted with acute hypoxemic respiratory failure (pulmonary edema versus pneumonia), requiring intubation KIRT, leukocytosis    History of recent diagnosis of A. fib, on oral anticoagulation, on  beta blocker therapy, hypertension, diabetes, recent sinusitis, obesity, hyperlipidemia    Labs reviewed  Sodium, potassium, creatinine stable  BNP = 1000 on 17          BP = 145/79  HR = 69 AFIB         Echocardiogram 17, LVEF 65%, diastolic function is difficult to assess with atrial fibrillation, RVSP 50, small pleural effusion, no pericardial effusion moderate TR    Review of Systems   Unable to perform ROS     Received pain meds    extubated 17       Physical Exam   HENT:   Head: Normocephalic.   Eyes: Conjunctivae are normal.   Neck: No thyromegaly present.   Cardiovascular: Normal rate.    Pulses:       Carotid pulses are 2+ on the right side, and 2+ on the left side.       Radial pulses are 2+ on the right side, and 2+ on the left side.   Pulmonary/Chest: She has no wheezes.   Abdominal: Soft.   Musculoskeletal: She exhibits edema.   Neurological: She is alert.   Skin: Skin is warm and dry.       Hemodynamics:  Temp (24hrs), Av.3 °C (97.4 °F), Min:36 °C (96.8 °F), Max:36.7 °C (98 °F)  Temperature: 36.6 °C (97.9 °F)  Pulse  Av.6  Min: 62  Max: 121   Blood Pressure: 145/79 mmHg     Respiratory:    Respiration: 16, Pulse Oximetry: 97 %, O2 Daily Delivery Respiratory : Silicone Nasal Cannula     Work Of Breathing / Effort: Mild  RUL Breath Sounds: Clear, RML Breath Sounds: Diminished, RLL Breath Sounds: Diminished, CAROLANN Breath Sounds: Clear, LLL Breath Sounds: Diminished  Fluids:  Date 17 0700 - 17 0659   Shift 5797-5336 3649-1904 0038-5447 24 Hour Total    I  N  T  A  K  E   Shift Total       O  U  T  P  U  T   Urine 500   500    Shift Total 500   500   Weight (kg) 107.6 107.6 107.6 107.6       Weight: 107.6 kg (237 lb 3.4 oz)  GI/Nutrition:  Orders Placed This Encounter   Procedures   • Diet NPO     Standing Status: Standing      Number of Occurrences: 1      Standing Expiration Date:      Order Specific Question:  Type:     Answer:  Now [1]     Order Specific Question:  Restrict to:     Answer:  Strict [1]     Lab Results:  Recent Labs      06/18/17   0545  06/19/17   0450  06/20/17   0429   WBC  9.7  8.6  9.3   RBC  3.94*  4.01*  4.37   HEMOGLOBIN  10.8*  11.1*  12.0   HEMATOCRIT  34.9*  35.0*  37.9   MCV  88.6  87.3  86.7   MCH  27.4  27.7  27.5   MCHC  30.9*  31.7*  31.7*   RDW  45.4  44.7  44.0   PLATELETCT  314  336  406   MPV  9.5  9.2  9.8     Recent Labs      06/18/17   0545  06/19/17   0450  06/20/17   0429   SODIUM  140  140  141   POTASSIUM  4.0  3.9  4.0   CHLORIDE  107  108  108   CO2  27  27  23   GLUCOSE  200*  155*  166*   BUN  40*  32*  28*   CREATININE  0.88  0.74  0.76   CALCIUM  9.0  8.9  9.2         Recent Labs      06/19/17   0450   BNPBTYPENAT  1017*     Recent Labs      06/19/17   0450   BNPBTYPENAT  1017*             Medical Decision Making, by Problem:  Active Hospital Problems    Diagnosis   • Severe sepsis (CMS-[facility]) [A41.9, R65.20]   • Acute respiratory failure with hypoxia (CMS-[facility]) [J96.01]   • Urinary retention [R33.9]   • A-fib (CMS-[facility]) [I48.91]   • Pneumonia [J18.9]   • Acute hypoxemic respiratory failure (CMS-[facility]) [J96.01]   • Coronary artery calcification seen on CT scan [I25.10]   • Pulmonary hypertension (CMS-[facility]) [I27.2]   • Atrial fibrillation (CMS-[facility]) [I48.91]   • 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-[facility]) [E11.59]   • Hyponatremia [E87.1]   • Hypothyroid [E03.9]   • HTN (hypertension) [I10]   • KIRT (acute kidney injury) (CMS-HCC) [N17.9]       Plan:    PAF ( recent diagnosis by PCP ) , currently in AFIB  with controlled ventricular rate, on Amiodarone 400 mg BID, on Metoprolol 75 mg Q 8 H    ON Apixaban 5 mg BID    On Digoxin 0.125 mcg    HFpEF, on Lasix 20 Po Q 8 H, I/O =  -12,000 since admit, TUM=2848 6/19/17, will watch    Hypertension, on losartan 50    Medications reviewed and Labs reviewed

## 2017-06-20 NOTE — PROGRESS NOTES
Two RN skin check. Pt has generalized bruising. Redness underneath pannus and to bilateral groin. Bilateral heels red but blanching. Tear to right inner labia.  All other skin intact.

## 2017-06-20 NOTE — PROGRESS NOTES
Pt noted to have not voided today. Bladder scan done, result 675. Discussed pt's care with Hospitalist, orders received. Pt straight cathed 500 ml of blood urine, hospitalist informed.

## 2017-06-20 NOTE — THERAPY
"Occupational Therapy Evaluation completed.   Functional Status:  Total Assist. Quick fatigue  Plan of Care: Will benefit from Occupational Therapy 3 times per week  Discharge Recommendations:  Equipment: Will Continue to Assess for Equipment Needs. Post-acute therapy Discharge to a transitional care facility for continued skilled therapy services.    Patient presents from CCU s/p KIRT, repirtatory failure, sepsis, sinus infection, suspected pulmonary edema, and recent syncopal episode and YOUNG. Patient reports and sister occassionally confirms / clarifies I with ADLs and Supervision with mobility PTA. Patient, upon eval, presents with increased strength, balance, endurance, tolerance, cognition, and ADL participation necessitating Total Assist ADLs and mobility, including < 10 second D stand. Patient would benefit from skilled OT in this setting followed by rehab prior to DC home with sister and services. x3 week.      See \"Rehab Therapy-Acute\" Patient Summary Report for complete documentation.    "

## 2017-06-20 NOTE — PROGRESS NOTES
[facility] Hospitalist Progress Note    Date of Service: 2017    Chief Complaint  61 y.o. female admitted 2017 with syncope.    Interval Problem Update  Ms. Alfaro has a hx of htn and recently diagnosed afib.  CT chest in the ER revealed alveolar hemorrhage. She required rescue bipap and was admitted to the ICU. She required intubation on  with bronchoscopy.     PNA-appears to have resolved at this point. Most likely pulmonary edema and is retaining urine. No e/o DAH on bronch.    Urinary issues-retaining, very dark urine present in straight cath last night. Sent for culture as well. No new abx's have been started.     She overall feels quite weak still.     Consultants/Specialty  Pulmonology  Cardiology  Nephrology    Disposition  Remain on tele, will need SNF versus LTACH.        Review of Systems   Constitutional: Positive for malaise/fatigue. Negative for fever.   Eyes: Negative for blurred vision.   Respiratory: Positive for cough and sputum production. Negative for shortness of breath and wheezing.         Slowly improving   Cardiovascular: Negative for chest pain.   Gastrointestinal: Negative for nausea and vomiting.   Skin: Negative for rash.   Neurological: Positive for weakness. Negative for dizziness and headaches.   Psychiatric/Behavioral: Positive for memory loss. The patient is not nervous/anxious.    All other systems reviewed and are negative.     Physical Exam  Laboratory/Imaging   Hemodynamics  Temp (24hrs), Av.4 °C (97.5 °F), Min:36 °C (96.8 °F), Max:36.7 °C (98.1 °F)   Temperature: 36.6 °C (97.9 °F)  Pulse  Av.6  Min: 62  Max: 121 Heart Rate (Monitored): 96  Blood Pressure: 145/79 mmHg, NIBP: 136/71 mmHg      Respiratory      Respiration: 16, Pulse Oximetry: 97 %, O2 Daily Delivery Respiratory : Silicone Nasal Cannula     Work Of Breathing / Effort: Mild  RUL Breath Sounds: Clear, RML Breath Sounds: Diminished, RLL Breath Sounds: Diminished, CAROLANN Breath Sounds: Clear, LLL Breath  Sounds: Diminished    Fluids    Intake/Output Summary (Last 24 hours) at 06/20/17 1528  Last data filed at 06/20/17 1442   Gross per 24 hour   Intake    905 ml   Output   1200 ml   Net   -295 ml       Nutrition  Orders Placed This Encounter   Procedures   • Diet NPO     Standing Status: Standing      Number of Occurrences: 1      Standing Expiration Date:      Order Specific Question:  Type:     Answer:  Now [1]     Order Specific Question:  Restrict to:     Answer:  Strict [1]     Physical Exam   Constitutional: She is oriented to person, place, and time. She appears well-developed and well-nourished. She appears lethargic. No distress.   Appears older than stated age   HENT:   Head: Normocephalic and atraumatic.   Nose: Nose normal.   Mouth/Throat: Oropharynx is clear and moist.   Poor dentition, very  cortrak Nares   Eyes: Conjunctivae and EOM are normal. Pupils are equal, round, and reactive to light. No scleral icterus.   Neck: Normal range of motion. Neck supple.   Cardiovascular: Normal heart sounds.  An irregularly irregular rhythm present. Tachycardia present.  Exam reveals no gallop and no friction rub.    Pulmonary/Chest: Effort normal. No stridor. She has no wheezes. She has rales.   No apparent change today   Abdominal: Soft. Bowel sounds are normal. She exhibits no distension. There is no tenderness.   Genitourinary:   Tamayo cath.   Musculoskeletal: Normal range of motion. She exhibits edema. She exhibits no tenderness.   Edema hands, no change today   Neurological: She is oriented to person, place, and time. She appears lethargic. No cranial nerve deficit.   Much less sedated today   Skin: Skin is warm and dry. She is not diaphoretic. No erythema. There is pallor.   Very long, sharp toenails with onychomycosis.   Psychiatric: She has a normal mood and affect. Her behavior is normal.   Nursing note and vitals reviewed.      Recent Labs      06/18/17   0545  06/19/17   0450  06/20/17   0429   WBC  9.7   8.6  9.3   RBC  3.94*  4.01*  4.37   HEMOGLOBIN  10.8*  11.1*  12.0   HEMATOCRIT  34.9*  35.0*  37.9   MCV  88.6  87.3  86.7   MCH  27.4  27.7  27.5   MCHC  30.9*  31.7*  31.7*   RDW  45.4  44.7  44.0   PLATELETCT  314  336  406   MPV  9.5  9.2  9.8     Recent Labs      06/18/17   0545  06/19/17   0450  06/20/17   0429   SODIUM  140  140  141   POTASSIUM  4.0  3.9  4.0   CHLORIDE  107  108  108   CO2  27  27  23   GLUCOSE  200*  155*  166*   BUN  40*  32*  28*   CREATININE  0.88  0.74  0.76   CALCIUM  9.0  8.9  9.2         Recent Labs      06/19/17   0450   BNPBTYPENAT  1017*              Assessment/Plan     Severe sepsis (CMS-HCC) (present on admission)  Assessment & Plan  Source appears respiratory. IV abx azithromycin and rocephin with stop dates, has already finished  -resolved    Acute respiratory failure with hypoxia (CMS-HCC) (present on admission)  Assessment & Plan  -s/p intubation, now extubated, still requiring 4-5 L O2  -likely dealing with pulmonary edema at this point now too  -continue forced diuresis with current dosing of lasix      KIRT (acute kidney injury) (CMS-HCC) (present on admission)  Assessment & Plan  Cr on admit was 2.95 with baseline 1.1 per review of the records from 2016.   Consistent with ATN, resolved with IVF's and other therapy modalities as described elsewhere.    Atrial fibrillation (CMS-HCC) (present on admission)  Assessment & Plan  -continue eliquis  Cardiology consulted.  Converted to sinus on 6/12.  EF 65%.  Re-occurred on 6/16  Amio restarted, remains in a fib, no e/o overt bleeding at this time, monitor closely    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-HCC) (present on admission)  Assessment & Plan  Sliding scale    Hyponatremia (present on admission)  Assessment & Plan  resolved    Hypothyroid (present on admission)  Assessment & Plan  replacement    HTN (hypertension) (present on admission)  Assessment & Plan  -on multiple agents pta  -will slowly  re-start some of these medications  -continue metoprolol  Will add back hyzaar today    Pulmonary hypertension (CMS-HCC) (present on admission)  Assessment & Plan  RVSP 50 mm Hg  She is high risk of volume overload.    Pneumonia  Assessment & Plan  -s/p multiple days of treatment    Urinary retention  Assessment & Plan  -unclear cause  -if remains high with PVR again this PM, will place uriostegui catheter  -UA is dirty, no abx's for now, will F/U and decide on abx's at that time      Labs reviewed and Medications reviewed  Uriostegui catheter: Unconscious / Sedated Patient on a Ventilator  Central line in place: Sepsis    DVT: eliquis.        Assessed for rehab: Patient was assess for and/or received rehabilitation services during this hospitalization

## 2017-06-20 NOTE — RESPIRATORY CARE
COPD EDUCATION by COPD CLINICAL EDUCATOR  6/20/2017 at 6:15 AM by Shelby Mckeon     Patient reviewed by COPD education team. Patient does not qualify for COPD program.

## 2017-06-21 LAB
ANION GAP SERPL CALC-SCNC: 7 MMOL/L (ref 0–11.9)
BASOPHILS # BLD AUTO: 1.4 % (ref 0–1.8)
BASOPHILS # BLD: 0.12 K/UL (ref 0–0.12)
BUN SERPL-MCNC: 31 MG/DL (ref 8–22)
CALCIUM SERPL-MCNC: 9 MG/DL (ref 8.5–10.5)
CHLORIDE SERPL-SCNC: 106 MMOL/L (ref 96–112)
CO2 SERPL-SCNC: 26 MMOL/L (ref 20–33)
CREAT SERPL-MCNC: 0.88 MG/DL (ref 0.5–1.4)
EOSINOPHIL # BLD AUTO: 0.2 K/UL (ref 0–0.51)
EOSINOPHIL NFR BLD: 2.3 % (ref 0–6.9)
ERYTHROCYTE [DISTWIDTH] IN BLOOD BY AUTOMATED COUNT: 43.7 FL (ref 35.9–50)
GFR SERPL CREATININE-BSD FRML MDRD: >60 ML/MIN/1.73 M 2
GLUCOSE BLD-MCNC: 152 MG/DL (ref 65–99)
GLUCOSE BLD-MCNC: 172 MG/DL (ref 65–99)
GLUCOSE BLD-MCNC: 187 MG/DL (ref 65–99)
GLUCOSE BLD-MCNC: 197 MG/DL (ref 65–99)
GLUCOSE SERPL-MCNC: 200 MG/DL (ref 65–99)
HCT VFR BLD AUTO: 38.1 % (ref 37–47)
HGB BLD-MCNC: 12 G/DL (ref 12–16)
IMM GRANULOCYTES # BLD AUTO: 0.06 K/UL (ref 0–0.11)
IMM GRANULOCYTES NFR BLD AUTO: 0.7 % (ref 0–0.9)
LYMPHOCYTES # BLD AUTO: 1.22 K/UL (ref 1–4.8)
LYMPHOCYTES NFR BLD: 14.2 % (ref 22–41)
MAGNESIUM SERPL-MCNC: 1.5 MG/DL (ref 1.5–2.5)
MCH RBC QN AUTO: 27.3 PG (ref 27–33)
MCHC RBC AUTO-ENTMCNC: 31.5 G/DL (ref 33.6–35)
MCV RBC AUTO: 86.8 FL (ref 81.4–97.8)
MONOCYTES # BLD AUTO: 0.78 K/UL (ref 0–0.85)
MONOCYTES NFR BLD AUTO: 9 % (ref 0–13.4)
NEUTROPHILS # BLD AUTO: 6.24 K/UL (ref 2–7.15)
NEUTROPHILS NFR BLD: 72.4 % (ref 44–72)
NRBC # BLD AUTO: 0 K/UL
NRBC BLD AUTO-RTO: 0 /100 WBC
PLATELET # BLD AUTO: 383 K/UL (ref 164–446)
PMV BLD AUTO: 9.7 FL (ref 9–12.9)
POTASSIUM SERPL-SCNC: 4 MMOL/L (ref 3.6–5.5)
RBC # BLD AUTO: 4.39 M/UL (ref 4.2–5.4)
SODIUM SERPL-SCNC: 139 MMOL/L (ref 135–145)
WBC # BLD AUTO: 8.6 K/UL (ref 4.8–10.8)

## 2017-06-21 PROCEDURE — 700102 HCHG RX REV CODE 250 W/ 637 OVERRIDE(OP): Performed by: INTERNAL MEDICINE

## 2017-06-21 PROCEDURE — 700105 HCHG RX REV CODE 258

## 2017-06-21 PROCEDURE — 700102 HCHG RX REV CODE 250 W/ 637 OVERRIDE(OP): Performed by: HOSPITALIST

## 2017-06-21 PROCEDURE — 99232 SBSQ HOSP IP/OBS MODERATE 35: CPT | Performed by: INTERNAL MEDICINE

## 2017-06-21 PROCEDURE — A9270 NON-COVERED ITEM OR SERVICE: HCPCS | Performed by: INTERNAL MEDICINE

## 2017-06-21 PROCEDURE — 700111 HCHG RX REV CODE 636 W/ 250 OVERRIDE (IP): Performed by: INTERNAL MEDICINE

## 2017-06-21 PROCEDURE — 80048 BASIC METABOLIC PNL TOTAL CA: CPT

## 2017-06-21 PROCEDURE — 302146: Performed by: INTERNAL MEDICINE

## 2017-06-21 PROCEDURE — A9270 NON-COVERED ITEM OR SERVICE: HCPCS | Performed by: HOSPITALIST

## 2017-06-21 PROCEDURE — 97163 PT EVAL HIGH COMPLEX 45 MIN: CPT

## 2017-06-21 PROCEDURE — 83735 ASSAY OF MAGNESIUM: CPT

## 2017-06-21 PROCEDURE — 36415 COLL VENOUS BLD VENIPUNCTURE: CPT

## 2017-06-21 PROCEDURE — G8979 MOBILITY GOAL STATUS: HCPCS | Mod: CK

## 2017-06-21 PROCEDURE — G8978 MOBILITY CURRENT STATUS: HCPCS | Mod: CM

## 2017-06-21 PROCEDURE — 85025 COMPLETE CBC W/AUTO DIFF WBC: CPT

## 2017-06-21 PROCEDURE — 770020 HCHG ROOM/CARE - TELE (206)

## 2017-06-21 PROCEDURE — 82962 GLUCOSE BLOOD TEST: CPT

## 2017-06-21 PROCEDURE — 302135 SEQUENTIAL COMPRESSION MACHINE: Performed by: INTERNAL MEDICINE

## 2017-06-21 RX ORDER — MAGNESIUM SULFATE HEPTAHYDRATE 40 MG/ML
4 INJECTION, SOLUTION INTRAVENOUS ONCE
Status: COMPLETED | OUTPATIENT
Start: 2017-06-21 | End: 2017-06-21

## 2017-06-21 RX ORDER — SODIUM CHLORIDE 9 MG/ML
INJECTION, SOLUTION INTRAVENOUS
Status: COMPLETED
Start: 2017-06-21 | End: 2017-06-21

## 2017-06-21 RX ORDER — NYSTATIN 100000 [USP'U]/G
POWDER TOPICAL 2 TIMES DAILY
Status: DISCONTINUED | OUTPATIENT
Start: 2017-06-21 | End: 2017-06-22 | Stop reason: HOSPADM

## 2017-06-21 RX ORDER — FLUCONAZOLE 2 MG/ML
200 INJECTION, SOLUTION INTRAVENOUS EVERY 24 HOURS
Status: DISCONTINUED | OUTPATIENT
Start: 2017-06-21 | End: 2017-06-21

## 2017-06-21 RX ORDER — FLUCONAZOLE 2 MG/ML
200 INJECTION, SOLUTION INTRAVENOUS EVERY 24 HOURS
Status: DISCONTINUED | OUTPATIENT
Start: 2017-06-21 | End: 2017-06-22

## 2017-06-21 RX ADMIN — NYSTATIN 1500000 UNITS: 100000 POWDER TOPICAL at 12:41

## 2017-06-21 RX ADMIN — FLUCONAZOLE 200 MG: 2 INJECTION, SOLUTION INTRAVENOUS at 12:41

## 2017-06-21 RX ADMIN — DIGOXIN 125 MCG: 125 TABLET ORAL at 18:13

## 2017-06-21 RX ADMIN — ATORVASTATIN CALCIUM 40 MG: 40 TABLET, FILM COATED ORAL at 20:49

## 2017-06-21 RX ADMIN — POTASSIUM CHLORIDE 20 MEQ: 1.5 POWDER, FOR SOLUTION ORAL at 10:09

## 2017-06-21 RX ADMIN — FUROSEMIDE 20 MG: 20 TABLET ORAL at 14:42

## 2017-06-21 RX ADMIN — NYSTATIN 1500000 UNITS: 100000 POWDER TOPICAL at 22:50

## 2017-06-21 RX ADMIN — INSULIN LISPRO 3 UNITS: 100 INJECTION, SOLUTION INTRAVENOUS; SUBCUTANEOUS at 05:49

## 2017-06-21 RX ADMIN — SODIUM CHLORIDE 500 ML: 9 INJECTION, SOLUTION INTRAVENOUS at 12:41

## 2017-06-21 RX ADMIN — FUROSEMIDE 20 MG: 20 TABLET ORAL at 22:51

## 2017-06-21 RX ADMIN — AMIODARONE HYDROCHLORIDE 400 MG: 200 TABLET ORAL at 10:05

## 2017-06-21 RX ADMIN — POTASSIUM CHLORIDE 20 MEQ: 1.5 POWDER, FOR SOLUTION ORAL at 20:48

## 2017-06-21 RX ADMIN — METOPROLOL TARTRATE 75 MG: 25 TABLET, FILM COATED ORAL at 14:42

## 2017-06-21 RX ADMIN — FUROSEMIDE 20 MG: 20 TABLET ORAL at 05:42

## 2017-06-21 RX ADMIN — LOSARTAN POTASSIUM 50 MG: 50 TABLET, FILM COATED ORAL at 10:05

## 2017-06-21 RX ADMIN — APIXABAN 5 MG: 5 TABLET, FILM COATED ORAL at 20:49

## 2017-06-21 RX ADMIN — APIXABAN 5 MG: 5 TABLET, FILM COATED ORAL at 10:05

## 2017-06-21 RX ADMIN — INSULIN LISPRO 3 UNITS: 100 INJECTION, SOLUTION INTRAVENOUS; SUBCUTANEOUS at 18:06

## 2017-06-21 RX ADMIN — AMIODARONE HYDROCHLORIDE 400 MG: 200 TABLET ORAL at 20:49

## 2017-06-21 RX ADMIN — METOPROLOL TARTRATE 75 MG: 25 TABLET, FILM COATED ORAL at 05:42

## 2017-06-21 RX ADMIN — INSULIN LISPRO 3 UNITS: 100 INJECTION, SOLUTION INTRAVENOUS; SUBCUTANEOUS at 12:38

## 2017-06-21 RX ADMIN — MAGNESIUM SULFATE IN WATER 4 G: 40 INJECTION, SOLUTION INTRAVENOUS at 14:42

## 2017-06-21 RX ADMIN — INSULIN LISPRO 3 UNITS: 100 INJECTION, SOLUTION INTRAVENOUS; SUBCUTANEOUS at 23:49

## 2017-06-21 RX ADMIN — METOPROLOL TARTRATE 75 MG: 25 TABLET, FILM COATED ORAL at 22:51

## 2017-06-21 RX ADMIN — LEVOTHYROXINE SODIUM 88 MCG: 88 TABLET ORAL at 05:42

## 2017-06-21 ASSESSMENT — COGNITIVE AND FUNCTIONAL STATUS - GENERAL
MOVING TO AND FROM BED TO CHAIR: UNABLE
WALKING IN HOSPITAL ROOM: TOTAL
SUGGESTED CMS G CODE MODIFIER MOBILITY: CM
MOVING FROM LYING ON BACK TO SITTING ON SIDE OF FLAT BED: UNABLE
CLIMB 3 TO 5 STEPS WITH RAILING: TOTAL
MOBILITY SCORE: 7
TURNING FROM BACK TO SIDE WHILE IN FLAT BAD: A LOT
STANDING UP FROM CHAIR USING ARMS: TOTAL

## 2017-06-21 ASSESSMENT — CHA2DS2 SCORE
SEX: FEMALE
PRIOR STROKE OR TIA OR THROMBOEMBOLISM: NO
CHA2DS2 VASC SCORE: 4
HYPERTENSION: YES
CHF OR LEFT VENTRICULAR DYSFUNCTION: NO
DIABETES: YES
AGE 65 TO 74: NO
AGE 75 OR GREATER: NO
VASCULAR DISEASE: YES

## 2017-06-21 ASSESSMENT — ENCOUNTER SYMPTOMS
DOUBLE VISION: 0
NAUSEA: 0
SHORTNESS OF BREATH: 0
VOMITING: 0
TINGLING: 0
WEAKNESS: 1
FEVER: 0
HEADACHES: 0
PHOTOPHOBIA: 0
NERVOUS/ANXIOUS: 0
ABDOMINAL PAIN: 0
COUGH: 1
MEMORY LOSS: 1
SPUTUM PRODUCTION: 1
WHEEZING: 0
TREMORS: 0

## 2017-06-21 ASSESSMENT — PAIN SCALES - GENERAL
PAINLEVEL_OUTOF10: 0

## 2017-06-21 ASSESSMENT — GAIT ASSESSMENTS: GAIT LEVEL OF ASSIST: UNABLE TO PARTICIPATE

## 2017-06-21 NOTE — PROGRESS NOTES
Received care of pt from NOC RN this am. Pt is A+O x 4 with prompts. Could not state date / year. Answers Yes/No questions easily. Denies need for pain medication.

## 2017-06-21 NOTE — PROGRESS NOTES
Pulmonary Critical Care Progress Note        Date of Service: 6/21/17    Chief Complaint: Weakness    History of Present Illness: 61 y.o. female admitted 6/8 with syncope, hypoxia, KIRT; found to have sig pulmonary edema, ? pna and intubated 6/9; no DAH on bronch     ROS:    Respiratory: negative,   Cardiac: negative,   GI: negative.    All other systems negative.    Interval Events:  24 hour interval history reviewed   Alerted, oriented  No adverse events overnight.          PFSH:  No change.    Physical Exam:  General:  Awake, Alert, Follows commands  HEENT:  PERRL, neck supple, trachea midline, Left nare CT, TF off currently, Left IJ CVC CDI  CVS:  Irregular, rate 70s, distant  Respiratory:  Clear anteriorly  Abdomen:  Soft, NT  Extremities:  2+ edema LE, 1+ DP pulses bilaterally, cap refill intact  Skin:  Cool, dry, intact  Neuro/Psych:  Awake, Alert, Follows commands, Weak 4/5 bilateral upper and lower extremities    Respiratory:     Pulse Oximetry: 93 %  Chest Tube Drains:          ImagingAvailable data reviewed         Invalid input(s): [id number]    HemoDynamics:  Pulse: (!) 117  Blood Pressure: 123/67 mmHg       Imaging: Available data reviewed  Recent Labs      06/19/17   0450   BNPBTYPENAT  1017*       Neuro:  GCS         Imaging: Available data reviewed    Fluids:  Intake/Output       06/19/17 0700 - 06/20/17 0659 06/20/17 0700 - 06/21/17 0659 06/21/17 0700 - 06/22/17 0659      8738-0233 9328-2001 Total 7433-9853 8495-9560 Total 9063-6735 8784-3056 Total       Intake    I.V.  50  -- 50  --  -- --  --  -- --    IV Piggyback Volume 50 -- 50 -- -- -- -- -- --    Other  75  -- 75  --  -- --  --  -- --    Medications (P.O./ Enteral Liquids) 75 -- 75 -- -- -- -- -- --    Enteral  250  705 955  725  1625 2350  --  -- --    Enteral Volume 50 225  1550 -- -- --    Free Water / Tube Flush 200 480 680 400 400 800 -- -- --    Total Intake  725 1625 2350 -- -- --       Output    Urine  650   375 1025  500  800 1300  --  -- --    Number of Times Incontinent of Urine -- 1 x 1 x -- -- -- -- -- --    Indwelling Cathether 650 -- 650 -- -- -- -- -- --    Straight Catheter -- 375 375 500 -- 500 -- -- --    Void (ml) -- -- -- -- 800 800 -- -- --    Drains  --  0 0  --  0 0  --  -- --    Residual Amount (ml) (Discarded) -- 0 0 -- 0 0 -- -- --    Stool  --  -- --  --  -- --  --  -- --    Number of Times Stooled 1 x 2 x 3 x 2 x -- 2 x 2 x -- 2 x    Total Output   -- -- --       Net I/O     -275 330 55  -- -- --        Weight: 107.5 kg (236 lb 15.9 oz)  Recent Labs      17   0551   SODIUM  140  141  139   POTASSIUM  3.9  4.0  4.0   CHLORIDE  108  108  106   CO2  27  23  26   BUN  32*  28*  31*   CREATININE  0.74  0.76  0.88   MAGNESIUM  1.9  1.9  1.5   PHOSPHORUS  2.7  2.9   --    CALCIUM  8.9  9.2  9.0       GI/Nutrition:  Imaging: Available data reviewed  NPO  Liver Function  Recent Labs      17   0551   ALTSGPT  42   --    --    ASTSGOT  29   --    --    ALKPHOSPHAT  194*   --    --    TBILIRUBIN  0.6   --    --    PREALBUMIN  27.0   --    --    GLUCOSE  155*  166*  200*       Heme:  Recent Labs      17   0551   RBC  4.01*  4.37  4.39   HEMOGLOBIN  11.1*  12.0  12.0   HEMATOCRIT  35.0*  37.9  38.1   PLATELETCT  336  406  383       Infectious Disease:  Temp  Av.6 °C (97.8 °F)  Min: 36.3 °C (97.4 °F)  Max: 36.7 °C (98.1 °F)  Micro: reviewed  Recent Labs      17   0450  17   0429  17   0551   WBC  8.6  9.3  8.6   NEUTSPOLYS  74.40*  75.10*  72.40*   LYMPHOCYTES  13.40*  13.20*  14.20*   MONOCYTES  8.20  8.00  9.00   EOSINOPHILS  2.10  1.70  2.30   BASOPHILS  1.30  1.30  1.40   ASTSGOT  29   --    --    ALTSGPT  42   --    --    ALKPHOSPHAT  194*   --    --    TBILIRUBIN  0.6   --    --      Current Facility-Administered Medications    Medication Dose Frequency Provider Last Rate Last Dose   • nystatin (MYCOSTATIN) powder   BID Aaron Jarvis M.D.       • fluconazole (DIFLUCAN) in NS IVPB 200 mg  200 mg Q24HRS Aaron Jarvis M.D.       • losartan (COZAAR) tablet 50 mg  50 mg Q DAY Aaron Jarvis M.D.   50 mg at 06/21/17 1005   • pneumococcal 13-Aisha Conj Vacc (PREVNAR 13) syringe 0.5 mL  0.5 mL Once PRN Anson Manuel M.D.       • amiodarone (CORDARONE) tablet 400 mg  400 mg TWICE DAILY Evens Cadena M.D.   400 mg at 06/21/17 1005    Followed by   • [START ON 6/23/2017] amiodarone (CORDARONE) tablet 400 mg  400 mg Q DAY Evens Cadena M.D.        Followed by   • [START ON 6/28/2017] amiodarone (CORDARONE) tablet 200 mg  200 mg Q DAY Evens Cadena M.D.       • hydrocodone-acetaminophen (NORCO) 5-325 MG per tablet 1-2 Tab  1-2 Tab Q6HRS PRN Evens Cadena M.D.   1 Tab at 06/20/17 1447   • digoxin (LANOXIN) tablet 125 mcg  125 mcg DAILY AT 1800 Nishant Sebastian M.D.   125 mcg at 06/20/17 1749   • metoprolol (LOPRESSOR) tablet 75 mg  75 mg Q8HRS Nishant Sebastian M.D.   75 mg at 06/21/17 0542   • furosemide (LASIX) tablet 20 mg  20 mg Q8HRS Oni Elder M.D.   20 mg at 06/21/17 0542   • potassium chloride (KLOR-CON) 20 MEQ packet 20 mEq  20 mEq BID Evens Cadena M.D.   20 mEq at 06/21/17 1009   • atorvastatin (LIPITOR) tablet 40 mg  40 mg QHS Nishant Sebastian M.D.   40 mg at 06/20/17 2013   • apixaban (ELIQUIS) 2.5mg tablet 5 mg  5 mg BID Jaime Bush M.D.   5 mg at 06/21/17 1005   • insulin lispro (HUMALOG) injection 3-14 Units  3-14 Units Q6HRS Antonio Mata M.D.   3 Units at 06/21/17 0549   • Respiratory Care per Protocol   Continuous RT Antonio Mata M.D.       • ipratropium-albuterol (DUONEB) nebulizer solution 3 mL  3 mL Q2HRS PRN (RT) Antonio Mata M.D.       • glucose 4 g chewable tablet 16 g  16 g Q15 MIN PRN Anson Manuel M.D.        And   • dextrose 50% (D50W) injection 25  mL  25 mL Q15 MIN PRN Anson Manuel M.D.       • levothyroxine (SYNTHROID) tablet 88 mcg  88 mcg AM ES Anson Manuel M.D.   88 mcg at 06/21/17 0542   • senna-docusate (PERICOLACE or SENOKOT S) 8.6-50 MG per tablet 2 Tab  2 Tab BID Anson Manuel M.D.   Stopped at 06/18/17 2100    And   • polyethylene glycol/lytes (MIRALAX) PACKET 1 Packet  1 Packet QDAY PRN Anson Manuel M.D.   1 Packet at 06/12/17 0920    And   • magnesium hydroxide (MILK OF MAGNESIA) suspension 30 mL  30 mL QDAY PRN Anson Manuel M.D.        And   • bisacodyl (DULCOLAX) suppository 10 mg  10 mg QDAY PRN Anson Manuel M.D.       • ondansetron (ZOFRAN) syringe/vial injection 4 mg  4 mg Q4HRS PRN Anson Manuel M.D.       • ondansetron (ZOFRAN ODT) dispertab 4 mg  4 mg Q4HRS PRN Anson Manuel M.D.       • acetaminophen (TYLENOL) tablet 650 mg  650 mg Q6HRS PRN Anson Manuel M.D.         Last reviewed on 6/8/2017  9:34 AM by Mindi Benites, Pharmacy Int    Quality  Measures:  Labs reviewed, Medications reviewed and Radiology images reviewed                    Assessment and Plan:  Acute hypoxemic respiratory failure    - intubated 6/9              - liberated 6/18   - oxygenating well after extubation   - continue RT protocols, IS, mobilize  Acute pulmonary edema              - ongoing diuresis as tolerated  Query pneumonia              - completed Rocephin and Zithromax 6/12    - all cx's neg   - observe off antibiotics  PAF              - cont amiodarone - transition to enteral               - optimize K and Mg              - anticoagulated with apixaban  Suspect acute on chronic diastolic heart failure              - force diuresis as tolerated  Pulmonary HTN              - RVSP 50 mm Hg              - could be in part due to acute illness. Fluid overload.               - she will need repeat echo when more stable and euvolemic    Pulmonary will sign off.   Please call for any questions

## 2017-06-21 NOTE — PROGRESS NOTES
Rounds with Hospitalist and Hospitalist RN. Orders received. Discussed pt's care / uriostegui cath with Hospitalist.

## 2017-06-21 NOTE — PROGRESS NOTES
[facility] Hospitalist Progress Note    Date of Service: 2017    Chief Complaint  61 y.o. female admitted 2017 with syncope.    Interval Problem Update  Ms. Alfaro has a hx of htn and recently diagnosed afib.  CT chest in the ER revealed alveolar hemorrhage. She required rescue bipap and was admitted to the ICU. She required intubation on  with bronchoscopy.     PNA-no recurrence. Cough is ok. Remains afebrile off abx's at this time.    Urinary issues-retaining, placed uriostegui last night. Very dark urine in the catchment bag, but good UOP.     She overall feels quite weak still, but feels like she is making daily progress.    Consultants/Specialty  Pulmonology  Cardiology  Nephrology    Disposition  Remain on tele, will need SNF versus LTACH.        Review of Systems   Constitutional: Positive for malaise/fatigue. Negative for fever.        Slowly improving     Eyes: Negative for double vision and photophobia.   Respiratory: Positive for cough and sputum production. Negative for shortness of breath and wheezing.         Mild improvement today   Cardiovascular: Negative for chest pain.   Gastrointestinal: Negative for nausea, vomiting and abdominal pain.   Skin: Negative for rash.   Neurological: Positive for weakness. Negative for tingling, tremors and headaches.   Psychiatric/Behavioral: Positive for memory loss. The patient is not nervous/anxious.    All other systems reviewed and are negative.     Physical Exam  Laboratory/Imaging   Hemodynamics  Temp (24hrs), Av.6 °C (97.8 °F), Min:36.3 °C (97.4 °F), Max:36.7 °C (98.1 °F)   Temperature: 36.4 °C (97.5 °F)  Pulse  Av.5  Min: 62  Max: 121    Blood Pressure: 150/88 mmHg (RN Notified)      Respiratory      Respiration: 17, Pulse Oximetry: 96 %     Work Of Breathing / Effort: Mild  RUL Breath Sounds: Clear, RML Breath Sounds: Diminished, RLL Breath Sounds: Diminished, CAROLANN Breath Sounds: Clear, LLL Breath Sounds: Diminished    Fluids    Intake/Output  Summary (Last 24 hours) at 06/21/17 1633  Last data filed at 06/21/17 0500   Gross per 24 hour   Intake   1725 ml   Output    800 ml   Net    925 ml       Nutrition  Orders Placed This Encounter   Procedures   • Diet NPO     Standing Status: Standing      Number of Occurrences: 1      Standing Expiration Date:      Order Specific Question:  Type:     Answer:  Now [1]     Order Specific Question:  Restrict to:     Answer:  Strict [1]     Physical Exam   Constitutional: She is oriented to person, place, and time. She appears well-developed and well-nourished. She appears lethargic. No distress.   Appears older than stated age   HENT:   Head: Normocephalic and atraumatic.   Nose: Nose normal.   Mouth/Throat: Oropharynx is clear and moist.   Poor dentition, very  cortrak Nares   Eyes: Conjunctivae and EOM are normal. Pupils are equal, round, and reactive to light. Right eye exhibits no discharge. Left eye exhibits no discharge.   Neck: Normal range of motion. Neck supple. No JVD present.   Cardiovascular: Normal heart sounds.  An irregularly irregular rhythm present. Tachycardia present.  Exam reveals no gallop and no friction rub.    Pulmonary/Chest: Effort normal. She has no wheezes. She has rales.   Slightly improved aeration today   Abdominal: Soft. Bowel sounds are normal. She exhibits no distension. There is no tenderness.   Genitourinary:   Tamayo cath.   Musculoskeletal: Normal range of motion. She exhibits edema. She exhibits no tenderness.   Edema hands, no change today   Neurological: She is oriented to person, place, and time. She appears lethargic. No cranial nerve deficit.   Much less sedated today   Skin: Skin is warm and dry. She is not diaphoretic. No erythema. There is pallor.   Very long, sharp toenails with onychomycosis.   Psychiatric: She has a normal mood and affect. Her behavior is normal.   Nursing note and vitals reviewed.      Recent Labs      06/19/17   0450  06/20/17   0429  06/21/17   4194    WBC  8.6  9.3  8.6   RBC  4.01*  4.37  4.39   HEMOGLOBIN  11.1*  12.0  12.0   HEMATOCRIT  35.0*  37.9  38.1   MCV  87.3  86.7  86.8   MCH  27.7  27.5  27.3   MCHC  31.7*  31.7*  31.5*   RDW  44.7  44.0  43.7   PLATELETCT  336  406  383   MPV  9.2  9.8  9.7     Recent Labs      06/19/17   0450  06/20/17   0429  06/21/17   0551   SODIUM  140  141  139   POTASSIUM  3.9  4.0  4.0   CHLORIDE  108  108  106   CO2  27  23  26   GLUCOSE  155*  166*  200*   BUN  32*  28*  31*   CREATININE  0.74  0.76  0.88   CALCIUM  8.9  9.2  9.0         Recent Labs      06/19/17   0450   BNPBTYPENAT  1017*              Assessment/Plan     Severe sepsis (CMS-HCC) (present on admission)  Assessment & Plan  Source appears respiratory. Already finished abx's  -resolved    Acute respiratory failure with hypoxia (CMS-HCC) (present on admission)  Assessment & Plan  -s/p intubation, now extubated, still requiring 4-5 L O2  -likely dealing with pulmonary edema at this point now too  -continue forced diuresis with current dosing of lasix, no change in dosing planned today      KIRT (acute kidney injury) (CMS-HCC) (present on admission)  Assessment & Plan  Cr on admit was 2.95 with baseline 1.1 per review of the records from 2016.   Consistent with ATN, resolved with IVF's and other therapy modalities as described elsewhere.    Atrial fibrillation (CMS-HCC) (present on admission)  Assessment & Plan  -continue eliquis  Cardiology consulted.  Converted to sinus on 6/12.  EF 65%.  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    Pneumonia  Assessment & Plan  -s/p multiple days of treatment    Urinary retention  Assessment & Plan  -unclear cause  -maintain uriostegui catheter at this time, coca cola colored urine, but good uop, watch closely      Labs reviewed and Medications reviewed  Uriostegui catheter: Unconscious / Sedated Patient on a Ventilator  Central line in place: Sepsis    DVT: eliquis.        Assessed for rehab: Patient was assess for and/or received rehabilitation services during this hospitalization

## 2017-06-21 NOTE — DISCHARGE PLANNING
Transitional Care Navigator:    Met with pt and sister to discuss transitional care and d/c plan. Pt is agreeable to SNF placement and states would like to have referral placed to [facility]. [facility], [facility], and [facility]. Choice form completed and faxed to [facility]. Pt and sister would like to discuss final choice with other sister Enma. TCN to follow as needed.

## 2017-06-21 NOTE — PROGRESS NOTES
Bedside report received from day RN. Assumed pt care. Pt resting comfortably in bed. A&ox4. POC discussed. Pt denies any needs at this time. LDA's assessed. Bed alarm on. Bed locked and in lowest position. Call light within reach. Hourly rounding in place.

## 2017-06-21 NOTE — PROGRESS NOTES
Pulmonary Critical Care Progress Note        Date of Service: 6/20/17    Chief Complaint: Weakness    History of Present Illness: 61 y.o. female admitted 6/8 with syncope, hypoxia, KIRT; found to have sig pulmonary edema, ? pna and intubated 6/9; no DAH on bronch     ROS:    Respiratory: negative,   Cardiac: negative,   GI: negative.    All other systems negative.    Interval Events:  24 hour interval history reviewed   Extubated two days ago. Transferred from ICU yesterday   Alerted, oriented but still slow in response         PFSH:  No change.    Physical Exam:  General:  Awake, Alert, Follows commands  HEENT:  PERRL, neck supple, trachea midline, Left nare CT, TF off currently, Left IJ CVC CDI  CVS:  Irregular, rate 70s, distant  Respiratory:  Clear anteriorly  Abdomen:  Soft, NT  Extremities:  2+ edema LE, 1+ DP pulses bilaterally, cap refill intact  Skin:  Cool, dry, intact  Neuro/Psych:  Awake, Alert, Follows commands, Weak 4/5 bilateral upper and lower extremities    Respiratory:     Pulse Oximetry: 95 %  Chest Tube Drains:          ImagingAvailable data reviewed   Recent Labs      06/18/17   0434   ISTATAPH  7.437   ISTATAPCO2  39.1*   ISTATAPO2  83   ISTATATCO2  28   TYAAGRF7LFD  97   ISTATARTHCO3  26.4*   ISTATARTBE  2   ISTATTEMP  97.6 F   ISTATFIO2  30   ISTATSPEC  Arterial   ISTATAPHTC  7.445   [id number]  80       HemoDynamics:  Pulse: 84  Blood Pressure: 143/85 mmHg       Imaging: Available data reviewed  Recent Labs      06/19/17   0450   BNPBTYPENAT  1017*       Neuro:  GCS         Imaging: Available data reviewed    Fluids:  Intake/Output       06/18/17 0700 - 06/19/17 0659 06/19/17 0700 - 06/20/17 0659 06/20/17 0700 - 06/21/17 0659      8615-1941 3864-2648 Total 0700-1859 1900-0659 Total [id number] 1900-0659 Total       Intake    I.V.  64.6  -- 64.6  50  -- 50  --  -- --    Precedex Volume 31.6 -- 31.6 -- -- -- -- -- --    IV Volume (amiodarone) 33 -- 33 -- -- -- -- -- --    IV Piggyback Volume --  -- -- 50 -- 50 -- -- --    Other  120  -- 120  75  -- 75  --  -- --    Medications (P.O./ Enteral Liquids) 120 -- 120 75 -- 75 -- -- --    Enteral  980  1000 1980  250  705 955  725  685 1410    Enteral Volume  50 225 275 325 385 710    Free Water / Tube Flush 380 400 780 200 480 680 400 300 700    Total Intake 1164.6 1000 2164.6         Output    Urine  1115  1050 2165  650  375 1025  500  -- 500    Number of Times Incontinent of Urine -- -- -- -- 1 x 1 x -- -- --    Indwelling Cathether 1115 1050 2165 650 -- 650 -- -- --    Straight Catheter -- -- -- -- 375 375 500 -- 500    Drains  --  -- --  --  0 0  --  0 0    Residual Amount (ml) (Discarded) -- -- -- -- 0 0 -- 0 0    Stool  --  -- --  --  -- --  --  -- --    Number of Times Stooled -- -- -- 1 x 2 x 3 x 2 x -- 2 x    Total Output 1115 1050 2165  500 0 500       Net I/O     49.6 -50 -0.4 -275 330 55 225 685 910        Weight: 107.5 kg (236 lb 15.9 oz)  Recent Labs      17   0545  17   SODIUM  140  140  141   POTASSIUM  4.0  3.9  4.0   CHLORIDE  107  108  108   CO2  27  27  23   BUN  40*  32*  28*   CREATININE  0.88  0.74  0.76   MAGNESIUM  1.7  1.9  1.9   PHOSPHORUS   --   2.7  2.9   CALCIUM  9.0  8.9  9.2       GI/Nutrition:  Imaging: Available data reviewed  NPO  Liver Function  Recent Labs      17   0545  17   04517   042   ALTSGPT   --   42   --    ASTSGOT   --   29   --    ALKPHOSPHAT   --   194*   --    TBILIRUBIN   --   0.6   --    PREALBUMIN   --   27.0   --    GLUCOSE  200*  155*  166*       Heme:  Recent Labs      17   0545  17   0450  17   0429   RBC  3.94*  4.01*  4.37   HEMOGLOBIN  10.8*  11.1*  12.0   HEMATOCRIT  34.9*  35.0*  37.9   PLATELETCT  314  336  406       Infectious Disease:  Temp  Av.4 °C (97.5 °F)  Min: 36.1 °C (96.9 °F)  Max: 36.7 °C (98 °F)  Micro: reviewed  Recent Labs      17   0545  17   0450   06/20/17   0429   WBC  9.7  8.6  9.3   NEUTSPOLYS  74.90*  74.40*  75.10*   LYMPHOCYTES  12.30*  13.40*  13.20*   MONOCYTES  8.50  8.20  8.00   EOSINOPHILS  2.30  2.10  1.70   BASOPHILS  1.20  1.30  1.30   ASTSGOT   --   29   --    ALTSGPT   --   42   --    ALKPHOSPHAT   --   194*   --    TBILIRUBIN   --   0.6   --      Current Facility-Administered Medications   Medication Dose Frequency Provider Last Rate Last Dose   • losartan (COZAAR) tablet 50 mg  50 mg Q DAY Aaron Jarvis M.D.   50 mg at 06/20/17 1749   • pneumococcal 13-Aisha Conj Vacc (PREVNAR 13) syringe 0.5 mL  0.5 mL Once PRN Anson Manuel M.D.       • amiodarone (CORDARONE) tablet 400 mg  400 mg TWICE DAILY Evens Cadena M.D.   400 mg at 06/20/17 2013    Followed by   • [START ON 6/23/2017] amiodarone (CORDARONE) tablet 400 mg  400 mg Q DAY Evens Cadena M.D.        Followed by   • [START ON 6/28/2017] amiodarone (CORDARONE) tablet 200 mg  200 mg Q DAY Evens Cadena M.D.       • hydrocodone-acetaminophen (NORCO) 5-325 MG per tablet 1-2 Tab  1-2 Tab Q6HRS PRN Evens Cadena M.D.   1 Tab at 06/20/17 1447   • digoxin (LANOXIN) tablet 125 mcg  125 mcg DAILY AT 1800 Nishant Sebastian M.D.   125 mcg at 06/20/17 1749   • metoprolol (LOPRESSOR) tablet 75 mg  75 mg Q8HRS Nishant Sebastian M.D.   75 mg at 06/20/17 2111   • furosemide (LASIX) tablet 20 mg  20 mg Q8HRS Oni Elder M.D.   20 mg at 06/20/17 2111   • potassium chloride (KLOR-CON) 20 MEQ packet 20 mEq  20 mEq BID Evens Cadena M.D.   20 mEq at 06/20/17 2014   • atorvastatin (LIPITOR) tablet 40 mg  40 mg QHS Nishant Sebastian M.D.   40 mg at 06/20/17 2013   • apixaban (ELIQUIS) 2.5mg tablet 5 mg  5 mg BID Jaime Bush M.D.   5 mg at 06/20/17 2013   • insulin lispro (HUMALOG) injection 3-14 Units  3-14 Units Q6HRS Antonio Mata M.D.   3 Units at 06/20/17 2911   • Respiratory Care per Protocol   Continuous RT Antonio Mata M.D.       •  ipratropium-albuterol (DUONEB) nebulizer solution 3 mL  3 mL Q2HRS PRN (RT) Antonio Mata M.D.       • glucose 4 g chewable tablet 16 g  16 g Q15 MIN PRN Anson Manuel M.D.        And   • dextrose 50% (D50W) injection 25 mL  25 mL Q15 MIN PRN Anson Manuel M.D.       • levothyroxine (SYNTHROID) tablet 88 mcg  88 mcg AM ES Anson Manuel M.D.   88 mcg at 06/20/17 0533   • senna-docusate (PERICOLACE or SENOKOT S) 8.6-50 MG per tablet 2 Tab  2 Tab BID Anson Manuel M.D.   Stopped at 06/18/17 2100    And   • polyethylene glycol/lytes (MIRALAX) PACKET 1 Packet  1 Packet QDAY PRN Anson Manuel M.D.   1 Packet at 06/12/17 0920    And   • magnesium hydroxide (MILK OF MAGNESIA) suspension 30 mL  30 mL QDAY PRN Anson Manuel M.D.        And   • bisacodyl (DULCOLAX) suppository 10 mg  10 mg QDAY PRN Anson Manuel M.D.       • ondansetron (ZOFRAN) syringe/vial injection 4 mg  4 mg Q4HRS PRN Anson Manuel M.D.       • ondansetron (ZOFRAN ODT) dispertab 4 mg  4 mg Q4HRS PRN Anson Manuel M.D.       • acetaminophen (TYLENOL) tablet 650 mg  650 mg Q6HRS PRN Anson Manuel M.D.         Last reviewed on 6/8/2017  9:34 AM by Mindi Benites, Pharmacy Int    Quality  Measures:  Labs reviewed, Medications reviewed and Radiology images reviewed                    Assessment and Plan:  Acute hypoxemic respiratory failure    - intubated 6/9              - liberated 6/18   - oxygenating well after extubation   - continue RT protocols, IS, mobilize  Acute pulmonary edema              - ongoing diuresis as tolerated  Query pneumonia              - completed Rocephin and Zithromax 6/12    - all cx's neg   - observe off antibiotics  PAF              - cont amiodarone - transition to enteral               - optimize K and Mg              - anticoagulated with apixaban  Suspect acute on chronic diastolic heart failure              - force diuresis as tolerated  Pulmonary HTN              - RVSP 50 mm Hg              - could be in part due to acute  illness. Fluid overload.               - she will need repeat echo when more stable and euvolemic

## 2017-06-21 NOTE — PROGRESS NOTES
Pt resting comfortably in bed. No signs of distress. Bed alarm on. Bed locked and in lowest position. Call light within reach. Hourly rounding in place.

## 2017-06-21 NOTE — CARE PLAN
Problem: Bowel/Gastric:  Goal: Normal bowel function is maintained or improved  Outcome: PROGRESSING AS EXPECTED  2 loose BM's this am. Pt on TF. Senna not given this am.     Problem: Skin Integrity  Goal: Risk for impaired skin integrity will decrease  Outcome: PROGRESSING AS EXPECTED  ON A Q 2 HR TURN SCHEDULE WHILE IN BED TODAY.  No evidence of skin breakdown.

## 2017-06-21 NOTE — DISCHARGE PLANNING
[facility] received a SNF choice form. Per the choice form the referral has been sent to [facility], [facility] and [facility]

## 2017-06-21 NOTE — THERAPY
"Pt is a 60 y/o female admitted for sepsis, ARF and PNA. Pt presents to physical therapy with impairments noted below, along with poor command following, delayed initiation and poor safety awareness. Pt reports she was fully independant PTA, no family present during session to confirm hx. Pt currently requiring Max A for most mobility, required Mod A x2 for safe standing at EOB. Impoved sit <> stands with repetition of task. PT to follow while in house. Will benefit from post acute therapy prior to D/C home,     Physical Therapy Evaluation completed.   Bed Mobility:  Supine to Sit: Maximal Assist  Transfers: Sit to Stand: Moderate Assist (x2 people for safety), x3 trials all lasting < 30\"   Gait: Level Of Assist: Unable to Participate        Plan of Care: Will benefit from Physical Therapy 3 times per week  Discharge Recommendations: Equipment: Will Continue to Assess for Equipment Needs. Post-acute therapy Discharge to a transitional care facility for continued skilled therapy services.  would recommend SNF placement with current level of function    See \"Rehab Therapy-Acute\" Patient Summary Report for complete documentation.     "

## 2017-06-22 ENCOUNTER — PATIENT OUTREACH (OUTPATIENT)
Dept: HEALTH INFORMATION MANAGEMENT | Facility: OTHER | Age: 62
End: 2017-06-22

## 2017-06-22 VITALS
WEIGHT: 236.33 LBS | HEART RATE: 62 BPM | OXYGEN SATURATION: 96 % | SYSTOLIC BLOOD PRESSURE: 143 MMHG | HEIGHT: 65 IN | DIASTOLIC BLOOD PRESSURE: 83 MMHG | RESPIRATION RATE: 15 BRPM | TEMPERATURE: 97.2 F | BODY MASS INDEX: 39.38 KG/M2

## 2017-06-22 PROBLEM — N17.9 AKI (ACUTE KIDNEY INJURY) (HCC): Status: RESOLVED | Noted: 2017-06-08 | Resolved: 2017-06-22

## 2017-06-22 PROBLEM — I48.91 A-FIB (HCC): Status: RESOLVED | Noted: 2017-06-19 | Resolved: 2017-06-22

## 2017-06-22 PROBLEM — J96.01 ACUTE HYPOXEMIC RESPIRATORY FAILURE (HCC): Status: RESOLVED | Noted: 2017-06-19 | Resolved: 2017-06-22

## 2017-06-22 PROBLEM — J18.9 PNEUMONIA: Status: RESOLVED | Noted: 2017-06-19 | Resolved: 2017-06-22

## 2017-06-22 PROBLEM — J96.01 ACUTE RESPIRATORY FAILURE WITH HYPOXIA (HCC): Status: RESOLVED | Noted: 2017-06-09 | Resolved: 2017-06-22

## 2017-06-22 PROBLEM — R65.20 SEVERE SEPSIS(995.92): Status: RESOLVED | Noted: 2017-06-09 | Resolved: 2017-06-22

## 2017-06-22 PROBLEM — E87.1 HYPONATREMIA: Status: RESOLVED | Noted: 2017-06-09 | Resolved: 2017-06-22

## 2017-06-22 PROBLEM — A41.9 SEVERE SEPSIS(995.92): Status: RESOLVED | Noted: 2017-06-09 | Resolved: 2017-06-22

## 2017-06-22 LAB
ANION GAP SERPL CALC-SCNC: 8 MMOL/L (ref 0–11.9)
BASOPHILS # BLD AUTO: 1.1 % (ref 0–1.8)
BASOPHILS # BLD: 0.1 K/UL (ref 0–0.12)
BUN SERPL-MCNC: 27 MG/DL (ref 8–22)
CALCIUM SERPL-MCNC: 9 MG/DL (ref 8.5–10.5)
CHLORIDE SERPL-SCNC: 104 MMOL/L (ref 96–112)
CO2 SERPL-SCNC: 25 MMOL/L (ref 20–33)
CREAT SERPL-MCNC: 0.83 MG/DL (ref 0.5–1.4)
EOSINOPHIL # BLD AUTO: 0.21 K/UL (ref 0–0.51)
EOSINOPHIL NFR BLD: 2.3 % (ref 0–6.9)
ERYTHROCYTE [DISTWIDTH] IN BLOOD BY AUTOMATED COUNT: 44.1 FL (ref 35.9–50)
GFR SERPL CREATININE-BSD FRML MDRD: >60 ML/MIN/1.73 M 2
GLUCOSE BLD-MCNC: 160 MG/DL (ref 65–99)
GLUCOSE BLD-MCNC: 176 MG/DL (ref 65–99)
GLUCOSE BLD-MCNC: 183 MG/DL (ref 65–99)
GLUCOSE SERPL-MCNC: 210 MG/DL (ref 65–99)
HCT VFR BLD AUTO: 38.2 % (ref 37–47)
HGB BLD-MCNC: 12.1 G/DL (ref 12–16)
IMM GRANULOCYTES # BLD AUTO: 0.05 K/UL (ref 0–0.11)
IMM GRANULOCYTES NFR BLD AUTO: 0.5 % (ref 0–0.9)
LYMPHOCYTES # BLD AUTO: 1.26 K/UL (ref 1–4.8)
LYMPHOCYTES NFR BLD: 13.6 % (ref 22–41)
MAGNESIUM SERPL-MCNC: 2 MG/DL (ref 1.5–2.5)
MCH RBC QN AUTO: 27.4 PG (ref 27–33)
MCHC RBC AUTO-ENTMCNC: 31.7 G/DL (ref 33.6–35)
MCV RBC AUTO: 86.6 FL (ref 81.4–97.8)
MONOCYTES # BLD AUTO: 0.74 K/UL (ref 0–0.85)
MONOCYTES NFR BLD AUTO: 8 % (ref 0–13.4)
NEUTROPHILS # BLD AUTO: 6.93 K/UL (ref 2–7.15)
NEUTROPHILS NFR BLD: 74.5 % (ref 44–72)
NRBC # BLD AUTO: 0 K/UL
NRBC BLD AUTO-RTO: 0 /100 WBC
PLATELET # BLD AUTO: 412 K/UL (ref 164–446)
PMV BLD AUTO: 9.9 FL (ref 9–12.9)
POTASSIUM SERPL-SCNC: 4.4 MMOL/L (ref 3.6–5.5)
RBC # BLD AUTO: 4.41 M/UL (ref 4.2–5.4)
SODIUM SERPL-SCNC: 137 MMOL/L (ref 135–145)
WBC # BLD AUTO: 9.3 K/UL (ref 4.8–10.8)

## 2017-06-22 PROCEDURE — 700102 HCHG RX REV CODE 250 W/ 637 OVERRIDE(OP): Performed by: INTERNAL MEDICINE

## 2017-06-22 PROCEDURE — A9270 NON-COVERED ITEM OR SERVICE: HCPCS | Performed by: INTERNAL MEDICINE

## 2017-06-22 PROCEDURE — 36415 COLL VENOUS BLD VENIPUNCTURE: CPT

## 2017-06-22 PROCEDURE — 82962 GLUCOSE BLOOD TEST: CPT | Mod: 91

## 2017-06-22 PROCEDURE — 83735 ASSAY OF MAGNESIUM: CPT

## 2017-06-22 PROCEDURE — 99239 HOSP IP/OBS DSCHRG MGMT >30: CPT | Performed by: INTERNAL MEDICINE

## 2017-06-22 PROCEDURE — A9270 NON-COVERED ITEM OR SERVICE: HCPCS | Performed by: HOSPITALIST

## 2017-06-22 PROCEDURE — 80048 BASIC METABOLIC PNL TOTAL CA: CPT

## 2017-06-22 PROCEDURE — 700102 HCHG RX REV CODE 250 W/ 637 OVERRIDE(OP): Performed by: HOSPITALIST

## 2017-06-22 PROCEDURE — 85025 COMPLETE CBC W/AUTO DIFF WBC: CPT

## 2017-06-22 PROCEDURE — 700111 HCHG RX REV CODE 636 W/ 250 OVERRIDE (IP): Performed by: INTERNAL MEDICINE

## 2017-06-22 RX ORDER — METOPROLOL TARTRATE 75 MG/1
75 TABLET, FILM COATED ORAL EVERY 8 HOURS
Qty: 60 TAB
Start: 2017-06-22 | End: 2017-07-08

## 2017-06-22 RX ORDER — FLUCONAZOLE 2 MG/ML
200 INJECTION, SOLUTION INTRAVENOUS EVERY 24 HOURS
Status: DISCONTINUED | OUTPATIENT
Start: 2017-06-23 | End: 2017-06-22

## 2017-06-22 RX ORDER — AMIODARONE HYDROCHLORIDE 400 MG/1
400 TABLET ORAL DAILY
Qty: 5 TAB | Refills: 0
Start: 2017-06-23 | End: 2017-06-24

## 2017-06-22 RX ORDER — FLUCONAZOLE 10 MG/ML
200 POWDER, FOR SUSPENSION ORAL DAILY
Status: DISCONTINUED | OUTPATIENT
Start: 2017-06-23 | End: 2017-06-22 | Stop reason: HOSPADM

## 2017-06-22 RX ORDER — LOSARTAN POTASSIUM 50 MG/1
50 TABLET ORAL DAILY
Qty: 30 TAB
Start: 2017-06-22 | End: 2017-07-08

## 2017-06-22 RX ORDER — POTASSIUM CHLORIDE 1.5 G/1.58G
20 POWDER, FOR SOLUTION ORAL 2 TIMES DAILY
Qty: 30 PACKET | Refills: 3
Start: 2017-06-22 | End: 2017-07-08

## 2017-06-22 RX ORDER — FLUCONAZOLE 200 MG/1
200 TABLET ORAL DAILY
Qty: 3 TAB | Refills: 0
Start: 2017-06-22 | End: 2017-06-25

## 2017-06-22 RX ORDER — AMIODARONE HYDROCHLORIDE 200 MG/1
200 TABLET ORAL DAILY
Qty: 30 TAB | Refills: 1
Start: 2017-06-28 | End: 2017-06-24

## 2017-06-22 RX ORDER — ONDANSETRON 4 MG/1
4 TABLET, ORALLY DISINTEGRATING ORAL EVERY 4 HOURS PRN
Qty: 10 TAB | Refills: 0
Start: 2017-06-22 | End: 2017-06-24

## 2017-06-22 RX ORDER — FLUCONAZOLE 100 MG/1
200 TABLET ORAL DAILY
Status: DISCONTINUED | OUTPATIENT
Start: 2017-06-22 | End: 2017-06-22

## 2017-06-22 RX ORDER — FUROSEMIDE 20 MG/1
20 TABLET ORAL EVERY 8 HOURS
Qty: 60 TAB | Refills: 1
Start: 2017-06-22 | End: 2017-07-08

## 2017-06-22 RX ORDER — NYSTATIN 100000 [USP'U]/G
POWDER TOPICAL
Qty: 15 G
Start: 2017-06-22 | End: 2017-07-11

## 2017-06-22 RX ORDER — DIGOXIN 125 MCG
125 TABLET ORAL DAILY
Qty: 30 TAB | Refills: 1
Start: 2017-06-22 | End: 2017-07-08

## 2017-06-22 RX ADMIN — FUROSEMIDE 20 MG: 20 TABLET ORAL at 05:30

## 2017-06-22 RX ADMIN — METOPROLOL TARTRATE 75 MG: 25 TABLET, FILM COATED ORAL at 13:27

## 2017-06-22 RX ADMIN — FUROSEMIDE 20 MG: 20 TABLET ORAL at 13:27

## 2017-06-22 RX ADMIN — INSULIN LISPRO 3 UNITS: 100 INJECTION, SOLUTION INTRAVENOUS; SUBCUTANEOUS at 13:17

## 2017-06-22 RX ADMIN — LOSARTAN POTASSIUM 50 MG: 50 TABLET, FILM COATED ORAL at 09:22

## 2017-06-22 RX ADMIN — POTASSIUM CHLORIDE 20 MEQ: 1.5 POWDER, FOR SOLUTION ORAL at 09:22

## 2017-06-22 RX ADMIN — INSULIN LISPRO 3 UNITS: 100 INJECTION, SOLUTION INTRAVENOUS; SUBCUTANEOUS at 05:30

## 2017-06-22 RX ADMIN — METOPROLOL TARTRATE 75 MG: 25 TABLET, FILM COATED ORAL at 05:30

## 2017-06-22 RX ADMIN — AMIODARONE HYDROCHLORIDE 400 MG: 200 TABLET ORAL at 09:23

## 2017-06-22 RX ADMIN — LEVOTHYROXINE SODIUM 88 MCG: 88 TABLET ORAL at 05:30

## 2017-06-22 RX ADMIN — APIXABAN 5 MG: 5 TABLET, FILM COATED ORAL at 09:22

## 2017-06-22 RX ADMIN — FLUCONAZOLE 200 MG: 2 INJECTION, SOLUTION INTRAVENOUS at 09:22

## 2017-06-22 RX ADMIN — NYSTATIN 1500000 UNITS: 100000 POWDER TOPICAL at 09:23

## 2017-06-22 ASSESSMENT — PAIN SCALES - GENERAL
PAINLEVEL_OUTOF10: 0

## 2017-06-22 ASSESSMENT — LIFESTYLE VARIABLES: DO YOU DRINK ALCOHOL: NO

## 2017-06-22 NOTE — DISCHARGE SUMMARY
CHIEF COMPLAINT ON ADMISSION  Chief Complaint   Patient presents with   • Weakness   • Other     O2 saturation 58% on room air       CODE STATUS  Full Code    HPI & HOSPITAL COURSE  This is a 61 y.o. year old female here with weakness and profound respiratory failure. She was diagnosed with severe sepsis and acute hypoxemic respiratory failure. She was admitted to the ICU and initially treated for severe community acquired pneumonia. She was placed on BIPAP transiently and failed. She was emergently intubated in the ICU and placed with a central line. CT chest was done initially concerning for diffuse alveolar hemorrhage. Patient underwent 2 bronchoscopies with negative pathology for DAH, and cultures that grew moderate yeast and pathology showed no malignant cells. Patient received 5 days of empiric IV azithromycin and ceftriaxone. She was eventually extubated successfully. She did have an ECHO that showed EF 65% and grade I diastolic dysfunction. Patient's eliquis was re-started once DAH had been ruled out. She was placed on an amiodarone gtt which was transitioned to PO amiodarone, which has held her HR down, but remains in a fib. She is still on TF's with Cortrak, but slowly improving. Her HR medications have been adjusted as outlined below.  Her KIRT resolved and she needs to retain uriostegui for now, but have a voiding trial at the SNF. Her urine is quite dark, but slowly improving and she has excellent uop. She is still requiring 3-4 L NC, but is being forced diuresis at this point and will be maintained on current PO lasix regimen. PT/OT evaluated the patient and recommended SNF, for which she is medically stable now.  Of note, repeat UA showed yeast, but no speciation. She will receive 3 more days of PO diflucan for treatment of this.     Therefore, she is discharged in fair and stable condition for further post-acute management.     SPECIFIC OUTPATIENT FOLLOW-UP  -F/U with Cards  -F/U with PCP  -F/U for voiding  trial at SNF    DISCHARGE PROBLEM LIST  Active Problems:    Atrial fibrillation (CMS-Former[facility]) POA: Yes    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-Formerly [facility]) POA: Yes    Hypothyroid POA: Yes    HTN (hypertension) POA: Yes    Pulmonary hypertension (CMS-Former[facility]) POA: Yes    Coronary artery calcification seen on CT scan POA: Yes    Urinary retention POA: No  Resolved Problems:    Severe sepsis (CMS-Former[facility]) POA: Yes    Acute respiratory failure with hypoxia (CMS-Former[facility]) POA: Yes    KIRT (acute kidney injury) (CMS-Formerly [facility]) POA: Yes    Acute hypoxemic respiratory failure (CMS-Former[facility]) POA: Yes    Hyponatremia POA: Yes    A-fib (CMS-Former[facility]) POA: Yes    Pneumonia POA: Yes    Acute hypoxemic respiratory failure (CMS-Former[facility]) POA: Yes      FOLLOW UP  No future appointments.  Jovan Zelaya M.D.  [address]  [address]  [place] [address]  [phone]          Refugio Rosario M.D.  [address]  [address]  [place] [address]  [phone]    In 2 weeks        MEDICATIONS ON DISCHARGE   Heidi Alfaro   Home Medication Instructions BABITA:[id number]    Printed on:06/22/17 0936   Medication Information                      amiodarone (CORDARONE) 200 MG Tab  Take 1 Tab by mouth every day. Start this medication on 6/28/17.             amiodarone (PACERONE) 400 MG tablet  Take 1 Tab by mouth every day for 5 days.             amlodipine (NORVASC) 10 MG Tab  Take 10 mg by mouth every day.             apixaban (ELIQUIS) 5mg Tab  Take 5 mg by mouth 2 Times a Day.             digoxin (LANOXIN) 125 MCG Tab  Take 1 Tab by mouth every day at 6 PM.             ezetimibe-simvastatin (VYTORIN) 10-20 MG per tablet  Take 0.5 Tabs by mouth every evening.             fluconazole (DIFLUCAN) 200 MG Tab  Take 1 Tab by mouth every day for 3 days.             furosemide (LASIX) 20 MG Tab  Take 1 Tab by mouth every 8 hours.             levothyroxine (SYNTHROID) 88 MCG Tab  Take 88 mcg by mouth Every morning on an empty stomach.             losartan (COZAAR) 50 MG Tab  Take 1  Tab by mouth every day.             metformin (GLUCOPHAGE) 500 MG Tab  Take 500 mg by mouth 2 times a day, with meals.             metoprolol 75 MG Tab  Take 75 mg by mouth every 8 hours.             nystatin (MYCOSTATIN) powder  Apply to groin.             ondansetron (ZOFRAN ODT) 4 MG TABLET DISPERSIBLE  Take 1 Tab by mouth every four hours as needed for Nausea/Vomiting (give PO if no IV route available).             potassium chloride (KLOR-CON) 20 MEQ Pack  Take 1 Packet by mouth 2 Times a Day.                 DIET  Orders Placed This Encounter   Procedures   • Diet NPO     Standing Status: Standing      Number of Occurrences: 1      Standing Expiration Date:      Order Specific Question:  Type:     Answer:  Now [1]     Order Specific Question:  Restrict to:     Answer:  Strict [1]       ACTIVITY  As tolerated and directed by skilled nursing.  Class 1 - no symptoms of any kind, and for whom ordinary physical activity does not cause fatigue, palpitations, dyspnea, or anginal pain.    LINES, DRAINS, AND WOUNDS  This is an automated list. Peripheral IVs will be removed prior to discharge.  PIV Group Right Antecubital 18g Flexible Catheter (Active)   Line Secured Taped;Transparent 6/21/2017  8:00 PM   Site Condition / Description Assessed;Patent;Clean;Dry;Intact 6/21/2017  8:00 PM   Dressing Type / Description Transparent;Clean;Dry;Intact 6/21/2017  8:00 PM   Dressing Status Observed 6/21/2017  8:00 PM   Saline Locked Yes 6/21/2017  8:00 PM   Infiltration Grading Used by [facility] 0 6/21/2017  8:00 PM   Phlebitis Scale (Used by [facility]) 0 6/21/2017  8:00 PM   Date Primary Tubing Changed 06/21/17 6/21/2017  8:00 PM     Enteral Tube Group Left Nare Cortrak Small Bowel Feeding Tube (Active)   Placement Confirmation X-Ray 6/16/2017  8:00 PM   Method of Securement Tape 6/21/2017  8:00 PM   Tube Secured at (cm) 85 6/21/2017  8:00 PM   Position Left 6/21/2017  8:00 PM   Tube Care Completed 6/21/2017  8:00 PM   Next  Closed Access Valve Change Due 06/17/17 6/17/2017  8:00 AM   Residual Characteristics None 6/13/2017  8:00 PM   Head of Bed up 30º While Tube Feeding Yes 6/21/2017  8:00 PM   Site Condition / Description Assessed;Clean;Dry ;Intact 6/21/2017  8:00 PM   Dressing Type / Description Open to Air 6/21/2017  8:00 PM   Dressing Status Observed 6/21/2017  8:00 PM       Urinary Catheter Indwelling Catheter (Active)   Catheter [place] 6/21/2017  8:00 PM   Skin Integrity Intact;Red 6/21/2017  8:00 PM   Catheter Secured Yes 6/21/2017  8:00 PM   Collection Device Changed No 6/21/2017  8:00 PM                Urinary Catheter Indwelling Catheter (Active)   Catheter [place] 6/21/2017  8:00 PM   Skin Integrity Intact;Red 6/21/2017  8:00 PM   Catheter Secured Yes 6/21/2017  8:00 PM   Collection Device Changed No 6/21/2017  8:00 PM        MENTAL STATUS ON TRANSFER  Level of Consciousness: Alert  Orientation : Oriented x 4  Speech: Speech Clear    CONSULTATIONS  -Renal  -Pulmonary  -Cardiology    PROCEDURES  Intubation, central line placement, bronchoscopy X2    NM Cardiac Stress test-   No reversible defects that would indicate ischemia.      CT Chest-    Extensive bilateral patchy and groundglass airspace opacities may represent pulmonary edema, multifocal pneumonia. Hemorrhage is also a consideration in the correct clinical setting. Follow-up to radiographic resolution is recommended.    Small bilateral pleural effusions, right greater than left.    Cardiomegaly.    Mildly prominent mediastinal lymph nodes are nonspecific.    Coronary atherosclerotic plaque.    Small hiatal hernia.    CT renal-      1.  No evidence of urolithiasis or hydronephrosis.    2.  Cholelithiasis.    3.  Anasarca with pulmonary edema, pleural effusions and trace ascites.    LE venous duplex-   No evidence of DVT or SVT in the right or left lower extremity.    ECHO-  No prior study is available for comparison.   Left ventricular ejection fraction  is visually estimated to be 65%.  Mild concentric left ventricular hypertrophy.  Diastolic function is difficult to assess with atrial fibrillation.    Estimated right ventricular systolic pressure is 50 mmHg.  Small pleural effusion noted.  Moderate tricuspid regurgitation.      LABORATORY  Lab Results   Component Value Date/Time    SODIUM 137 06/22/2017 05:21 AM    POTASSIUM 4.4 06/22/2017 05:21 AM    CHLORIDE 104 06/22/2017 05:21 AM    CO2 25 06/22/2017 05:21 AM    GLUCOSE 210* 06/22/2017 05:21 AM    BUN 27* 06/22/2017 05:21 AM    CREATININE 0.83 06/22/2017 05:21 AM        Lab Results   Component Value Date/Time    WBC 9.3 06/22/2017 06:09 AM    HEMOGLOBIN 12.1 06/22/2017 06:09 AM    HEMATOCRIT 38.2 06/22/2017 06:09 AM    PLATELET COUNT 412 06/22/2017 06:09 AM        Total time of the discharge process exceeds 40 minutes.

## 2017-06-22 NOTE — DISCHARGE PLANNING
Received phone call from pt's sister, Enma, who states that they have changed their mind after touring facilities and would like pt to go to [facility].

## 2017-06-22 NOTE — PROGRESS NOTES
Assumed care of pt, bedside report received.  Pt denies chest pain or SOB.  VSS.  Pt resting comfortably.  Cortrak in place.  All needs met.  Bed low and locked, call light in reach.  Discussed POC.

## 2017-06-22 NOTE — DISCHARGE PLANNING
[facility] and [facility] have accepted. [facility] and [facility] are pending.

## 2017-06-22 NOTE — DISCHARGE PLANNING
Transitional Care Update:    Met with pt and sister at bedside to follow up on pt's SNF choice. Choice is [facility]. Unit SW updated.

## 2017-06-22 NOTE — DISCHARGE INSTRUCTIONS
Discharge Instructions    Discharged to other by ambulance with escort. Discharged via ambulance, hospital escort: Yes.  Special equipment needed: Not Applicable    Be sure to schedule a follow-up appointment with your primary care doctor or any specialists as instructed.     Discharge Plan:   Diet Plan: Discussed  Activity Level: Discussed  Confirmed Follow up Appointment: Appointment Scheduled  Confirmed Symptoms Management: Discussed  Medication Reconciliation Updated: Yes  Influenza Vaccine Indication: Not indicated: Previously immunized this influenza season and > 8 years of age    I understand that a diet low in cholesterol, fat, and sodium is recommended for good health. Unless I have been given specific instructions below for another diet, I accept this instruction as my diet prescription.   Other diet: Regular    Special Instructions: None    · Is patient discharged on Warfarin / Coumadin?   No     · Is patient Post Blood Transfusion?  No      Diet tube feeds, will be re-evaluated at SNF   Activities as tolerated   Follow ups with PCP in 7-10 days, call for appointment   Meds per med rec sheet   No smoking, no alcohol, no caffeine   Wear seat belt in motorized vehicle   Take medications as perscribed   Keep appointments   If symptoms worsen call PCP, 911 or urgent care.      Respiratory failure is when your lungs are not working well and your breathing (respiratory) system fails. When respiratory failure occurs, it is difficult for your lungs to get enough oxygen, get rid of carbon dioxide, or both. Respiratory failure can be life threatening.   Respiratory failure can be acute or chronic. Acute respiratory failure is sudden, severe, and requires emergency medical treatment. Chronic respiratory failure is less severe, happens over time, and requires ongoing treatment.   WHAT ARE THE CAUSES OF ACUTE RESPIRATORY FAILURE?   Any problem affecting the heart or lungs can cause acute respiratory failure. Some of  these causes include the following:  · Chronic bronchitis and emphysema (COPD).    · Blood clot going to a lung (pulmonary embolism).    · Having water in the lungs caused by heart failure, lung injury, or infection (pulmonary edema).    · Collapsed lung (pneumothorax).    · Pneumonia.    · Pulmonary fibrosis.    · Obesity.    · Asthma.    · Heart failure.    · Any type of trauma to the chest that can make breathing difficult.    · Nerve or muscle diseases making chest movements difficult.  WHAT SYMPTOMS SHOULD YOU WATCH FOR?   If you have any of these signs or symptoms, you should seek immediate medical care:   · You have shortness of breath (dyspnea) with or without activity.    · You have rapid, fast breathing (tachypnea).    · You are wheezing.  · You are unable to say more than a few words without having to catch your breath.  · You find it very difficult to function normally.  · You have a fast heart rate.    · You have a bluish color to your finger or toe nail beds.    · You have confusion or drowsiness or both.    HOW WILL MY ACUTE RESPIRATORY FAILURE BE TREATED?   Treatment of acute respiratory failure depends on the cause of the respiratory failure. Usually, you will stay in the intensive care unit so your breathing can be watched closely. Treatment can include the following:  · Oxygen. Oxygen can be delivered through the following:  ¨ Nasal cannula. This is small tubing that goes in your nose to give you oxygen.  ¨ Face mask. A face mask covers your nose and mouth to give you oxygen.  · Medicine. Different medicines can be given to help with breathing. These can include:  ¨ Nebulizers. Nebulizers deliver medicines to open the air passages (bronchodilators). These medicines help to open or relax the airways in the lungs so you can breathe better. They can also help loosen mucus from your lungs.  ¨ Diuretics. Diuretic medicines can help you breathe better by getting rid of extra water in your  body.  ¨ Steroids. Steroid medicines can help decrease swelling (inflammation) in your lungs.  ¨ Antibiotics.  · Chest tube. If you have a collapsed lung (pneumothorax), a chest tube is placed to help reinflate the lung.  · Non-invasive positive pressure ventilation (NPPV). This is a tight-fitting mask that goes over your nose and mouth. The mask has tubing that is attached to a machine. The machine blows air into the tubing, which helps to keep the tiny air sacs (alveoli) in your lungs open. This machine allows you to breathe on your own.  · Ventilator. A ventilator is a breathing machine. When on a ventilator, a breathing tube is put into the lungs. A ventilator is used when you can no longer breathe well enough on your own. You may have low oxygen levels or high carbon dioxide (CO2) levels in your blood. When you are on a ventilator, sedation and pain medicines are given to make you sleep so your lungs can heal.     This information is not intended to replace advice given to you by your health care provider. Make sure you discuss any questions you have with your health care provider.     Document Released: 12/23/2014 Document Revised: 01/08/2016 Document Reviewed: 12/23/2014  [employer] Interactive Patient Education ©2016 [employer]    Acute Kidney Injury  Acute kidney injury is any condition in which there is sudden (acute) damage to the kidneys. Acute kidney injury was previously known as acute kidney failure or acute renal failure. The kidneys are two organs that lie on either side of the spine between the middle of the back and the front of the abdomen. The kidneys:  · Remove wastes and extra water from the blood.    · Produce important hormones. These help keep bones strong, regulate blood pressure, and help create red blood cells.    · Balance the fluids and chemicals in the blood and tissues.  A small amount of kidney damage may not cause problems, but a large amount of damage may make it difficult or  impossible for the kidneys to work the way they should. Acute kidney injury may develop into long-lasting (chronic) kidney disease. It may also develop into a life-threatening disease called end-stage kidney disease. Acute kidney injury can get worse very quickly, so it should be treated right away. Early treatment may prevent other kidney diseases from developing.  CAUSES   · A problem with blood flow to the kidneys. This may be caused by:    ¨ Blood loss.    ¨ Heart disease.    ¨ Severe burns.    ¨ Liver disease.  · Direct damage to the kidneys. This may be caused by:  ¨ Some medicines.    ¨ A kidney infection.    ¨ Poisoning or consuming toxic substances.    ¨ A surgical wound.    ¨ A blow to the kidney area.    · A problem with urine flow. This may be caused by:    ¨ Cancer.    ¨ Kidney stones.    ¨ An enlarged prostate.  SIGNS AND SYMPTOMS   · Swelling (edema) of the legs, ankles, or feet.    · Tiredness (lethargy).    · Nausea or vomiting.    · Confusion.    · Problems with urination, such as:    ¨ Painful or burning feeling during urination.    ¨ Decreased urine production.    ¨ Frequent accidents in children who are potty trained.    ¨ Bloody urine.    · Muscle twitches and cramps.    · Shortness of breath.    · Seizures.    · Chest pain or pressure.  Sometimes, no symptoms are present.   DIAGNOSIS  Acute kidney injury may be detected and diagnosed by tests, including blood, urine, imaging, or kidney biopsy tests.   TREATMENT  Treatment of acute kidney injury varies depending on the cause and severity of the kidney damage. In mild cases, no treatment may be needed. The kidneys may heal on their own. If acute kidney injury is more severe, your health care provider will treat the cause of the kidney damage, help the kidneys heal, and prevent complications from occurring. Severe cases may require a procedure to remove toxic wastes from the body (dialysis) or surgery to repair kidney damage. Surgery may involve:    · Repair of a torn kidney.    · Removal of an obstruction.  HOME CARE INSTRUCTIONS  · Follow your prescribed diet.  · Take medicines only as directed by your health care provider.   · Do not take any new medicines (prescription, over-the-counter, or nutritional supplements) unless approved by your health care provider. Many medicines can worsen your kidney damage or may need to have the dose adjusted.    · Keep all follow-up visits as directed by your health care provider. This is important.  · Observe your condition to make sure you are healing as expected.  SEEK IMMEDIATE MEDICAL CARE IF:  · You are feeling ill or have severe pain in the back or side.    · Your symptoms return or you have new symptoms.  · You have any symptoms of end-stage kidney disease. These include:    ¨ Persistent itchiness.    ¨ Loss of appetite.    ¨ Headaches.    ¨ Abnormally dark or light skin.  ¨ Numbness in the hands or feet.    ¨ Easy bruising.    ¨ Frequent hiccups.    ¨ Menstruation stops.    · You have a fever.  · You have increased urine production.  · You have pain or bleeding when urinating.  MAKE SURE YOU:   · Understand these instructions.  · Will watch your condition.  · Will get help right away if you are not doing well or get worse.     This information is not intended to replace advice given to you by your health care provider. Make sure you discuss any questions you have with your health care provider.     Document Released: 07/02/2012 Document Revised: 01/08/2016 Document Reviewed: 08/16/2013  [employer] Interactive Patient Education ©2016 [employer]        Depression / Suicide Risk    As you are discharged from this [facility] facility, it is important to learn how to keep safe from harming yourself.    Recognize the warning signs:  · Abrupt changes in personality, positive or negative- including increase in energy   · Giving away possessions  · Change in eating patterns- significant weight changes-  positive or  negative  · Change in sleeping patterns- unable to sleep or sleeping all the time   · Unwillingness or inability to communicate  · Depression  · Unusual sadness, discouragement and loneliness  · Talk of wanting to die  · Neglect of personal appearance   · Rebelliousness- reckless behavior  · Withdrawal from people/activities they love  · Confusion- inability to concentrate     If you or a loved one observes any of these behaviors or has concerns about self-harm, here's what you can do:  · Talk about it- your feelings and reasons for harming yourself  · Remove any means that you might use to hurt yourself (examples: pills, rope, extension cords, firearm)  · Get professional help from the community (Mental Health, Substance Abuse, psychological counseling)  · Do not be alone:Call your Safe Contact- someone whom you trust who will be there for you.  · Call your local CRISIS HOTLINE 294-9804 or 890-331-7111  · Call your local Children's Mobile Crisis Response Team [place] (483) 986-3597 or www.Enteye  · Call the toll free National Suicide Prevention Hotlines   · National Suicide Prevention Lifeline 940-805-PLLL (6361)  · National Hope Line Network 800-SUICIDE (751-2473)

## 2017-06-22 NOTE — PROGRESS NOTES
Bedside report received from day RN. Assumed pt care. Pt resting comfortably in bed. Alert and oriented x4. POC discussed. Pt denies any needs at this time. LDA's assessed. Bed alarm on. Bed locked and in lowest position. Call light within reach. Hourly rounding in place.

## 2017-06-22 NOTE — DISCHARGE PLANNING
Transportation has been arranged to [facility] the patient today [facility] [id number]. SW on floor Lucille has been notified via voicemail.

## 2017-06-22 NOTE — PROGRESS NOTES
Pt given discharge instructions.  Discussed diet, activity, follow up appointments, symptoms and management, and prescriptions provided.  Packet sent with patient.  IV d/c'd, tele box off, and all questions answered.  Awaiting transport by [facility].

## 2017-06-22 NOTE — PROGRESS NOTES
Pt resting comfortably in bed. Denies any needs at this time. Bed alarm on. Bed locked and in lowest position. Call light within reach. Hourly rounding in place.

## 2017-06-22 NOTE — CARE PLAN
Problem: Safety  Goal: Will remain free from injury  Outcome: PROGRESSING AS EXPECTED  Fall precautions in place. Bed wheels locked, bed is in the lowest position. Call light in reach. Bed alarm on and in place. Non-skid socks provided. Patient provides successful verbalization of fall and safety precautions and demonstration of use of call bell. Upper side rails are up. Hourly rounding in progress.         Problem: Skin Integrity  Goal: Risk for impaired skin integrity will decrease  Outcome: PROGRESSING AS EXPECTED  Pts skin assessed for breakdown. Excoriated areas medicated per mar. Pt q2 turned. Silicone nasal cannula in place.

## 2017-06-24 ENCOUNTER — HOSPITAL ENCOUNTER (EMERGENCY)
Facility: MEDICAL CENTER | Age: 62
End: 2017-06-24
Attending: EMERGENCY MEDICINE
Payer: COMMERCIAL

## 2017-06-24 ENCOUNTER — APPOINTMENT (OUTPATIENT)
Dept: RADIOLOGY | Facility: MEDICAL CENTER | Age: 62
End: 2017-06-24
Attending: EMERGENCY MEDICINE
Payer: COMMERCIAL

## 2017-06-24 VITALS
DIASTOLIC BLOOD PRESSURE: 65 MMHG | OXYGEN SATURATION: 92 % | BODY MASS INDEX: 39.4 KG/M2 | WEIGHT: 236.5 LBS | HEIGHT: 65 IN | HEART RATE: 81 BPM | RESPIRATION RATE: 16 BRPM | SYSTOLIC BLOOD PRESSURE: 140 MMHG | TEMPERATURE: 99.6 F

## 2017-06-24 DIAGNOSIS — Z46.59 VISIT FOR FEEDING TUBE PLACEMENT: ICD-10-CM

## 2017-06-24 PROCEDURE — 43752 NASAL/OROGASTRIC W/TUBE PLMT: CPT

## 2017-06-24 PROCEDURE — 304538 HCHG NG TUBE

## 2017-06-24 PROCEDURE — 99284 EMERGENCY DEPT VISIT MOD MDM: CPT

## 2017-06-24 PROCEDURE — 74000 DX-ABDOMEN-1 VIEW: CPT

## 2017-06-24 RX ORDER — AMIODARONE HYDROCHLORIDE 400 MG/1
400 TABLET ORAL DAILY
Status: SHIPPED | COMMUNITY
End: 2017-07-11

## 2017-06-24 RX ORDER — ATORVASTATIN CALCIUM 40 MG/1
40 TABLET, FILM COATED ORAL NIGHTLY
Status: SHIPPED | COMMUNITY
End: 2017-07-08

## 2017-06-24 ASSESSMENT — PAIN SCALES - GENERAL: PAINLEVEL_OUTOF10: 0

## 2017-06-24 NOTE — ED NOTES
Cortrak device 10 F has been placed at 70 cm through the left nostril, and securde with appropriate bridle.  Proper placement ascertained with X Ray.  Pt tolerated procedure without apparent complications.

## 2017-06-24 NOTE — ED PROVIDER NOTES
ED Provider Note    CHIEF COMPLAINT  Chief Complaint   Patient presents with   • Feeding Tube Problem     Pulled out cortrac today       HPI  Heidi Alfaro is a 61 y.o. female who presents to the emergency department because she pulled out her Cortrac feeding tube today. The patient was sent from [facility] for replacement of the feeding tube.    REVIEW OF SYSTEMS no other acute complaint    PAST MEDICAL HISTORY  Past Medical History   Diagnosis Date   • Hypertension    • Diabetes (CMS-[facility])      pre diabetic   • Pulmonary HTN (CMS-[facility])    • CAD (coronary artery disease)    • A-fib (CMS-[facility])    • Pneumonia    • Dysphagia        FAMILY HISTORY  History reviewed. No pertinent family history.    SOCIAL HISTORY  Social History     Social History   • Marital Status: Single     Spouse Name: N/A   • Number of Children: N/A   • Years of Education: N/A     Social History Main Topics   • Smoking status: Never Smoker    • Smokeless tobacco: None   • Alcohol Use: No   • Drug Use: No   • Sexual Activity: Not Asked     Other Topics Concern   • None     Social History Narrative       SURGICAL HISTORY  Past Surgical History   Procedure Laterality Date   • Other       tonsils cataract       CURRENT MEDICATIONS  Home Medications     Reviewed by Jalyn Su (Pharmacy Tech) on 06/24/17 at 1559  Med List Status: Complete    Medication Last Dose Status    amiodarone (PACERONE) 400 MG tablet 6/24/2017 Active    amlodipine (NORVASC) 10 MG Tab 6/24/2017 Active    apixaban (ELIQUIS) 5mg Tab 6/24/2017 Active    atorvastatin (LIPITOR) 40 MG Tab 6/23/2017 Active    digoxin (LANOXIN) 125 MCG Tab 6/23/2017 Active    ezetimibe-simvastatin (VYTORIN) 10-20 MG per tablet 6/23/2017 Active    fluconazole (DIFLUCAN) 200 MG Tab 6/24/2017 Active    furosemide (LASIX) 20 MG Tab 6/24/2017 Active    insulin lispro (HUMALOG) 100 UNIT/ML Solution 6/24/2017 Active    levothyroxine (SYNTHROID) 88 MCG Tab 6/24/2017 Active    losartan (COZAAR) 50 MG  "Tab 6/24/2017 Active    metformin (GLUCOPHAGE) 500 MG Tab 6/24/2017 Active    metoprolol 75 MG Tab 6/24/2017 Active    nystatin (MYCOSTATIN) powder 6/24/2017 Active    potassium chloride (KLOR-CON) 20 MEQ Pack 6/24/2017 Active                ALLERGIES  No Known Allergies    PHYSICAL EXAM  VITAL SIGNS: /73 mmHg  Pulse 79  Temp(Src) 37.6 °C (99.6 °F)  Resp 18  Ht 1.651 m (5' 5\")  Wt 107.276 kg (236 lb 8 oz)  BMI 39.36 kg/m2  SpO2 93%   Oxygen saturation is interpreted as adequate  Constitutional: Awake and in no acute distress  HENT: Mucous membranes moist  Cardiovascular: Normal heart rate  Lungs: No respiratory distress  Abdomen/Back: Morbidly obese  Skin: Warm and dry    Radiology  [id number] VIEW   Final Result      Cortrak feeding tube tip projects in the region of the distal stomach or duodenal bulb.          MEDICAL DECISION MAKING and DISPOSITION  Feeding tube was replaced easily by nursing staff and x-ray confirms placement and at this point in time and so will be safe for the patient to continue her care at [facility] and she will be transferred back to that facility. The patient may be returned here if she requires emergency medical care    IMPRESSION  1. Replacement of Cortrac feeding tube         Electronically signed by: Leonides Randolph, 6/24/2017 4:39 PM        "

## 2017-06-24 NOTE — ED NOTES
Pt NAD AA Disoriented x 3 Doesn't know why she is her Apparently she pulled out her cortrac and it didn't have a briddle

## 2017-07-05 LAB
FUNGUS SPEC CULT: NORMAL
SIGNIFICANT IND 70042: NORMAL
SITE SITE: NORMAL
SOURCE SOURCE: NORMAL

## 2017-07-08 ENCOUNTER — HOSPITAL ENCOUNTER (EMERGENCY)
Facility: MEDICAL CENTER | Age: 62
End: 2017-07-08
Attending: EMERGENCY MEDICINE
Payer: COMMERCIAL

## 2017-07-08 VITALS — BODY MASS INDEX: 37.61 KG/M2 | OXYGEN SATURATION: 98 % | WEIGHT: 226 LBS | TEMPERATURE: 97.9 F | HEART RATE: 92 BPM

## 2017-07-08 DIAGNOSIS — T85.598A FEEDING TUBE BLOCKED, INITIAL ENCOUNTER: ICD-10-CM

## 2017-07-08 PROCEDURE — 99283 EMERGENCY DEPT VISIT LOW MDM: CPT

## 2017-07-08 RX ORDER — POTASSIUM CHLORIDE 1.5 G/1.58G
20 POWDER, FOR SOLUTION ORAL 2 TIMES DAILY
Status: SHIPPED | COMMUNITY
End: 2017-11-30

## 2017-07-08 RX ORDER — LOSARTAN POTASSIUM 50 MG/1
50 TABLET ORAL DAILY
COMMUNITY
End: 2017-08-23 | Stop reason: SDUPTHER

## 2017-07-08 RX ORDER — DIGOXIN 125 MCG
125 TABLET ORAL EVERY EVENING
COMMUNITY
End: 2017-08-23 | Stop reason: SDUPTHER

## 2017-07-08 RX ORDER — CLARITHROMYCIN 250 MG/5ML
500 FOR SUSPENSION ORAL 2 TIMES DAILY
Status: SHIPPED | COMMUNITY
Start: 2017-06-29 | End: 2017-11-30

## 2017-07-08 RX ORDER — METOPROLOL TARTRATE 75 MG/1
75 TABLET, FILM COATED ORAL 3 TIMES DAILY
Status: SHIPPED | COMMUNITY
End: 2017-08-23

## 2017-07-08 RX ORDER — FUROSEMIDE 20 MG/1
20 TABLET ORAL EVERY 8 HOURS
COMMUNITY
End: 2017-08-23 | Stop reason: SDUPTHER

## 2017-07-08 ASSESSMENT — PAIN SCALES - WONG BAKER: WONGBAKER_NUMERICALRESPONSE: DOESN'T HURT AT ALL

## 2017-07-08 NOTE — ED PROVIDER NOTES
ED Provider Note    CHIEF COMPLAINT  Chief Complaint   Patient presents with   • Feeding Tube Problem        HPI  Heidi Alfaro is a 62 y.o. female who presents to the ED with a feeding tube that is clogged. The patient currently is at [facility], has a feeding tube. She has significant debility, apparently because of a pneumonia. The patient has no other complaints. Is otherwise appropriate.    REVIEW OF SYSTEMS  See HPI for further details. All other systems are negative.     PAST MEDICAL HISTORY  Past Medical History   Diagnosis Date   • Hypertension    • Diabetes (CMS-HCC)      pre diabetic   • Pulmonary HTN (CMS-HCC)    • CAD (coronary artery disease)    • A-fib (CMS-HCC)    • Pneumonia    • Dysphagia        FAMILY HISTORY  No family history on file.  Patient's family history has been discussed and is been found to be noncontributory to his present illness  SOCIAL HISTORY  Social History     Social History   • Marital Status: Single     Spouse Name: N/A   • Number of Children: N/A   • Years of Education: N/A     Social History Main Topics   • Smoking status: Never Smoker    • Smokeless tobacco: Not on file   • Alcohol Use: No   • Drug Use: No   • Sexual Activity: Not on file     Other Topics Concern   • Not on file     Social History Narrative      Jovan Zelaya M.D.      SURGICAL HISTORY  Past Surgical History   Procedure Laterality Date   • Other       tonsils cataract       CURRENT MEDICATIONS  Home Medications     Reviewed by Jalyn Ambriz (Pharmacy Tech) on 07/08/17 at 1318  Med List Status: Complete    Medication Last Dose Status    amiodarone (PACERONE) 400 MG tablet 7/7/2017 Active    amlodipine (NORVASC) 10 MG Tab 7/8/2017 Active    apixaban (ELIQUIS) 5mg Tab 7/8/2017 Active    clarithromycin (BIAXIN) 250 MG/5ML Recon Susp 7/8/2017 Active    digoxin (LANOXIN) 125 MCG Tab 7/7/2017 Active    ezetimibe-simvastatin (VYTORIN) 10-20 MG per tablet 7/7/2017 Active    furosemide (LASIX) 20 MG Tab  7/8/2017 Active    insulin lispro (HUMALOG) 100 UNIT/ML Solution >week Active    levothyroxine (SYNTHROID) 88 MCG Tab 7/8/2017 Active    losartan (COZAAR) 50 MG Tab 7/8/2017 Active    metformin (GLUCOPHAGE) 500 MG Tab 7/8/2017 Active    Metoprolol Tartrate 75 MG Tab 7/8/2017 Active    nystatin (MYCOSTATIN) powder 7/8/2017 Active    potassium chloride (KLOR-CON) 20 MEQ Pack 7/8/2017 Active                ALLERGIES  No Known Allergies    PHYSICAL EXAM  VITAL SIGNS: Pulse 92  Temp(Src) 36.6 °C (97.9 °F)  Wt 102.513 kg (226 lb)  SpO2 98%  Pulse Oximetry was obtained. It showed a reading of Pulse Oximetry: 98 %.  I interpreted this as not hypoxic.   Constitutional :  Well developed, Well nourished, No acute distress, Non-toxic appearance.   HENT: Head is normal. The patient has a feeding tube in the left naris.  Eyes: Conjunctiva are normal      Cardiovascular: Normal heart rate, Normal rhythm, No murmurs, No rubs, No gallops.   Thorax & Lungs: Diminished clear to auscultation bilaterally  Skin: Warm, Dry, No erythema, No rash.   Extremities: Intact distal pulses, the patient has mild pitting edema in her upper arms and legs      RADIOLOGY/PROCEDURES      COURSE & MEDICAL DECISION MAKING  Pertinent Labs & Imaging studies reviewed. (See chart for details)  Patient presents for feeding tube that was clogged. Nurse used medication and unclog the feeding tube and was able to pass fluids without any problems. This point, the patient be transferred back to [facility].    FINAL IMPRESSION  1. Feeding tube blocked, initial encounter            Electronically signed by: Aniket Mcbride, 7/8/2017

## 2017-07-08 NOTE — ED NOTES
[facility] arrived for return transport to [facility]. [facility] called re: pt en-route to facility

## 2017-07-08 NOTE — ED NOTES
Pt aby dover from [facility]; c/o clogged coretrak/NG feeding tube.

## 2017-07-08 NOTE — ED NOTES
The Medication Reconciliation process has been completed via the MAR from [facility]    Allergies have been reviewed  Antibiotic use in 30 days - On Clarithromycin since 6/29/17

## 2017-07-08 NOTE — DISCHARGE INSTRUCTIONS
Feeding Tube Use  Some people who have trouble swallowing or cannot take food or medicine by mouth may need a feeding tube. A feeding tube can go into the nose and down to the stomach or small bowel or through the skin in the abdomen and into the stomach or small bowel. Liquid food (formula), breast milk, or medicines may be given through the tube.   SUPPLIES NEEDED FOR A TUBE FEEDING   · Clean gloves.  · Prescribed formula or breast milk.  · Appropriate feeding bag set, gravity drip tubing set, or 30-60-mL syringe with feeding extension tubing.  · Feeding tube pump or syringe pump as needed.  · Pole as needed.  · 20-60-mL syringe to check tube placement.  · A syringe to flush the feeding tube.  · Container.  · Water.  GIVING A FEEDING THROUGH A FEEDING TUBE PUMP  1. Have all supplies ready and available.  2. Wash hands well.  3. Put on clean gloves.  4. Check the placement of the feeding tube as directed.  5. Elevate the head of the person 30-45 degrees or as directed.  6. Pour up to 4 hours of room-temperature feeding into the feeding bag set.  7. Hang the feeding bag set from a pole.  8. Flush the entire feeding bag set with the feeding.  9. Load the feeding bag set into the feeding tube pump.  10. Cap the feeding bag set.  11. Uncap the feeding tube.  12. Using a syringe, flush the feeding tube with water as directed.  13. Uncap the feeding bag set.  14. Connect the feeding bag set to the feeding tube.  15. Remove gloves.  16. Wash hands well.  17. Set the prescribed feeding rate.  18. Start the feeding tube pump.  GIVING A FEEDING THROUGH A SYRINGE PUMP   1. Have all supplies ready and available.  2. Wash hands well.  3. Put on clean gloves.  4. Check the placement of the feeding tube as directed.  5. Elevate the head of the person 30-45 degrees or as directed.  6. Draw up to 4 hours of room-temperature formula into the correctly sized syringe.  7. Attach the syringe to the feeding extension tubing.  8. Flush  the entire feeding extension tubing with the feeding.  9. Load the syringe and feeding extension tubing into the syringe pump.  10. Cap the feeding extension tubing.  11. Uncap the feeding tube.  12. Using a syringe, flush the feeding tube with water as directed.  13. Uncap the feeding extension tubing.  14. Connect the feeding extension tubing to the feeding tube.  15. Remove gloves.  16. Wash hands well.  17. Set the prescribed feeding rate.  18. Start the syringe pump.  GIVING A FEEDING BY GRAVITY   1. Have all supplies ready and available.  2. Wash hands well.  3. Put on clean gloves.  4. Check the placement of the feeding tube as directed.  5. Elevate the head of the person 30-45 degrees or as directed.  6. Close the clamp on the gravity drip tubing set.  7. Pour up to 4 hours of room-temperature feeding into the bag of a gravity drip tubing set. Or, if using a ready-to-hang formula container, connect the gravity drip tubing set to the ready-to-hang container.  8. Hang the feeding with the attached gravity drip tubing set from a pole.  9. Open the roller clamp on the gravity drip tubing to fill the entire tubing with the feeding.  10. Close the roller clamp.  11. Cap the gravity drip tubing.  12. Uncap the feeding tube.  13. Using a syringe, flush the feeding tube with water as directed.  14. Uncap the gravity drip tubing.  15. Connect the gravity drip tubing to the feeding tube.  16. Using the roller clamp, adjust the feeding rate to deliver the feeding at the prescribed rate.  17. Remove gloves.  18. Wash hands well.  GIVING A BOLUS FEEDING THROUGH A FEEDING TUBE  Remove the plunger from the syringe. Attach the syringe to the feeding tube. Pour the feeding into the syringe and administer at the prescribed rate by means of gravity. A feeding bag may also be used for bolus feedings, depending on the volume of feeding given.   SUPPLIES NEEDED FOR GIVING MEDICINES THROUGH A FEEDING TUBE  · 60-mL  syringe.  · Container.  · Water.  · Medicine.  · Pill  if medicine is in tablet form.  · Clean gloves.  GIVING MEDICINES THROUGH A FEEDING TUBE   1. Have all supplies ready and available.  2. Wash hands well.  3. If the medicine cannot be given with the feeding, stop the feeding 60 minutes before giving the medicine.  4. If the person needs to take medicine on an empty stomach, consider not giving a feeding for up to 2 hours (hold time) before giving medicine. Talk to the caregiver about appropriate hold times.  5. Fill a container with  mL of warm water.  6. Prepare medicine that will go into the feeding tube.  ¨ Liquid--Measure the prescribed medicine dose.  ¨ Tablet--Crush the tablet using a pill-crushing device. Grind the pill to a fine powder. Dissolve the powder in at least 30 mL of warm water or as directed. If there is more than 1 tablet, crush and dilute each one individually.  ¨ Capsule--Open a capsule containing dry pellets or woods a capsule containing liquid (gelcap). Empty the contents in 30 mL of warm water or as directed. Gelcaps can also be dissolved in warm water.  7. Elevate the head of the person 30-45 degrees or as directed.  8. Wash hands well.  9. Put on clean gloves.  10. If a continuous tube feeding is infusing, stop the tube feeding.  11. If applicable, close the tubing clamp.  12. Disconnect the feeding bag set or gravity drip tubing set from the feeding tube.  13. Cap the feeding bag set or gravity drip tubing set.  14. Check the placement of the feeding tube as directed.  15. Draw up 30 mL of water into a syringe or as directed.  16. Insert the tip of the syringe into the feeding tube.  17. Using the syringe, flush the feeding tube with water.  18. Remove the plunger of the syringe or replace the syringe with a syringe that contains medicine.  19. Give the medicine as directed.  ¨ If giving only 1 dose of medicine, flush the feeding tube with water after giving the  medicine.  ¨ If giving more than 1 dose of medicine, give each separately. Flush the feeding tube between medicines and after the last dose of medicine.  20. If a tube feeding will not be continued after the medicine, cap the feeding tube. Position the person to a comfortable position, but keep his or her head elevated for 1 hour after giving the medicine.  21. If a tube feeding will be continued after the medicine, but the medicine cannot be given with the feeding, continue to hold the feeding for 30-60 minutes after the medicine is given. When appropriate, reconnect the feeding bag set or gravity drip tubing set to the feeding tube.  22. Throw away or clean used supplies as directed.  23. Remove gloves.  24. Wash hands well.     This information is not intended to replace advice given to you by your health care provider. Make sure you discuss any questions you have with your health care provider.     Document Released: 12/04/2013 Document Reviewed: 12/04/2013  Elsevier Interactive Patient Education ©2016 [employer]

## 2017-07-11 ENCOUNTER — HOSPITAL ENCOUNTER (EMERGENCY)
Facility: MEDICAL CENTER | Age: 62
End: 2017-07-11
Attending: EMERGENCY MEDICINE
Payer: COMMERCIAL

## 2017-07-11 ENCOUNTER — APPOINTMENT (OUTPATIENT)
Dept: RADIOLOGY | Facility: MEDICAL CENTER | Age: 62
End: 2017-07-11
Attending: EMERGENCY MEDICINE
Payer: COMMERCIAL

## 2017-07-11 VITALS
HEIGHT: 65 IN | TEMPERATURE: 98.6 F | HEART RATE: 92 BPM | BODY MASS INDEX: 37.49 KG/M2 | SYSTOLIC BLOOD PRESSURE: 144 MMHG | RESPIRATION RATE: 16 BRPM | OXYGEN SATURATION: 97 % | DIASTOLIC BLOOD PRESSURE: 75 MMHG | WEIGHT: 225 LBS

## 2017-07-11 DIAGNOSIS — T85.598A FEEDING TUBE BLOCKED, INITIAL ENCOUNTER: ICD-10-CM

## 2017-07-11 PROCEDURE — 700101 HCHG RX REV CODE 250

## 2017-07-11 PROCEDURE — 304561 HCHG STAT O2

## 2017-07-11 PROCEDURE — 99284 EMERGENCY DEPT VISIT MOD MDM: CPT

## 2017-07-11 PROCEDURE — 304538 HCHG NG TUBE

## 2017-07-11 RX ORDER — NYSTATIN 100000 [USP'U]/G
1 POWDER TOPICAL DAILY
Status: SHIPPED | COMMUNITY
End: 2017-11-30

## 2017-07-11 RX ORDER — AMIODARONE HYDROCHLORIDE 200 MG/1
200 TABLET ORAL DAILY
Status: SHIPPED | COMMUNITY
End: 2018-11-21

## 2017-07-11 RX ORDER — ATORVASTATIN CALCIUM 40 MG/1
40 TABLET, FILM COATED ORAL NIGHTLY
COMMUNITY
End: 2017-08-23 | Stop reason: SDUPTHER

## 2017-07-11 RX ADMIN — LIDOCAINE HYDROCHLORIDE 1 TUBE: 20 JELLY TOPICAL at 12:30

## 2017-07-11 NOTE — ED PROVIDER NOTES
ED Provider Note    CHIEF COMPLAINT  Chief Complaint   Patient presents with   • Feeding Tube Problem     'clogged' coretrak       HPI  Heidi Alfaro is a 62 y.o. female who presents to the ER for a clogged feeding tube.  The patient denies any other medical problems or complaints.  She states she feels normal.  She was just sent back here because her feeding tube is nonfunctional.  Denies he complains of pain and fevers.  She was here recently for a similar problem.  They were able to open it, but they did not replace it.    REVIEW OF SYSTEMS  See HPI for further details.  Constitutional: No fevers or chills.  GI: No nausea or vomiting.    PAST MEDICAL HISTORY  Past Medical History   Diagnosis Date   • Hypertension    • Diabetes (CMS-HCC)      pre diabetic   • Pulmonary HTN (CMS-HCC)    • CAD (coronary artery disease)    • A-fib (CMS-HCC)    • Pneumonia    • Dysphagia        CURRENT MEDICATIONS  Home Medications     Reviewed by Jalyn Bone (Pharmacy Tech) on 07/11/17 at 1333  Med List Status: Complete    Medication Last Dose Status    amiodarone (CORDARONE) 200 MG Tab 7/10/2017 Active    amlodipine (NORVASC) 10 MG Tab 7/10/2017 Active    apixaban (ELIQUIS) 5mg Tab 7/10/2017 Active    atorvastatin (LIPITOR) 40 MG Tab 7/10/2017 Active    clarithromycin (BIAXIN) 250 MG/5ML Recon Susp 7/9/2017 Active    digoxin (LANOXIN) 125 MCG Tab 7/10/2017 Active    ezetimibe-simvastatin (VYTORIN) 10-20 MG per tablet 7/8/2017 Active    furosemide (LASIX) 20 MG Tab 7/11/2017 Active    insulin lispro (HUMALOG) 100 UNIT/ML Solution 7/8/2017 Active    levothyroxine (SYNTHROID) 88 MCG Tab 7/11/2017 Active    losartan (COZAAR) 50 MG Tab 7/10/2017 Active    metformin (GLUCOPHAGE) 500 MG Tab 7/10/2017 Active    Metoprolol Tartrate 75 MG Tab 7/11/2017 Active    nystatin (MYCOSTATIN) powder 7/10/2017 Active    potassium chloride (KLOR-CON) 20 MEQ Pack 7/10/2017 Active                ALLERGIES  No Known Allergies    PHYSICAL  "EXAM  VITAL SIGNS: /75 mmHg  Pulse 95  Temp(Src) 37 °C (98.6 °F)  Resp 18  Ht 1.651 m (5' 5\")  Wt 102.059 kg (225 lb)  BMI 37.44 kg/m2   Constitutional: Awake, alert, at baseline, no acute distress.  Chronically ill appearing  HENT: Normocephalic, Atraumatic, Bilateral external ears normal, Oropharynx dry, No oral exudates,   Eyes: PERRL, EOMI, Conjunctiva normal, No discharge.   Cardiovascular: Normal heart rate, Normal rhythm, No murmurs, No rubs, No gallops.   Thorax & Lungs: Normal breath sounds, No respiratory distress, No wheezing,   Abdomen: Bowel sounds normal, Soft, No tenderness,  Musculoskeletal: Edema and hands and feet  Neurologic: Awake, alert, at baseline      RADIOLOGY/PROCEDURES  Core tract tube replaced, by nursing staff.    DX-ABDOMEN FOR TUBE PLACEMENT   Final Result         Feeding tube with tip in the first portion duodenum.            COURSE & MEDICAL DECISION MAKING  Pertinent Labs & Imaging studies reviewed. (See chart for details)  Patient is sent here for feeding tube problem.  This is removed and replaced with the nursing staff.  X-ray shows good position.  The patient has no other complaints.  No other concerns were raised by the nursing home.  She is sent back in good condition.  She had no other complaints.      She is return to the nursing home.  She is agreeable to plan.    FINAL IMPRESSION  1. Feeding tube blocked, initial encounter        2.   3.         Electronically signed by: Archie Muse, 7/11/2017 2:00 PM        "

## 2017-07-11 NOTE — ED AVS SNAPSHOT
7/11/2017    Heidi Alfaro  [address] [place] [id number]    Dear Heidi:    [facility] wants to ensure your discharge home is safe and you or your loved ones have had all of your questions answered regarding your care after you leave the hospital.    Below is a list of resources and contact information should you have any questions regarding your hospital stay, follow-up instructions, or active medical symptoms.    Questions or Concerns Regarding… Contact   Medical Questions Related to Your Discharge  (7 days a week, 8am-5pm) Contact a Nurse Care Coordinator   [phone]   Medical Questions Not Related to Your Discharge  (24 hours a day / 7 days a week)  Contact the Nurse Health Line   [phone]    Medications or Discharge Instructions Refer to your discharge packet   or contact your [facility] Primary Care Provider   [phone]   Follow-up Appointment(s) Schedule your appointment via "[employer]   or contact Scheduling [phone]   Billing Review your statement via "[employer]  or contact Billing [phone]   Medical Records Review your records via "[employer]   or contact Medical Records [phone]     You may receive a telephone call within two days of discharge. This call is to make certain you understand your discharge instructions and have the opportunity to have any questions answered. You can also easily access your medical information, test results and upcoming appointments via the "[employer] free online health management tool. You can learn more and sign up at [employer]. For assistance setting up your "[employer] account, please call [phone].    Once again, we want to ensure your discharge home is safe and that you have a clear understanding of any next steps in your care. If you have any questions or concerns, please do not hesitate to contact us, we are here for you. Thank you for choosing [facility] for your healthcare needs.    Sincerely,    Your [facility] Healthcare Team

## 2017-07-11 NOTE — ED AVS SNAPSHOT
Home Care Instructions                                                                                                                Heidi Alfaro   MRN: [id number]    Department:  [facility], Emergency Dept   Date of Visit:  7/11/2017            [facility], Emergency Dept    [address]    [place] [address]    Phone:  [phone]      You were seen by     Archie Muse M.D.      Your Diagnosis Was     Feeding tube blocked, initial encounter     T85.157Z       Medication Information     Review all of your home medications and newly ordered medications with your primary doctor and/or pharmacist as soon as possible. Follow medication instructions as directed by your doctor and/or pharmacist.     Please keep your complete medication list with you and share with your physician. Update the information when medications are discontinued, doses are changed, or new medications (including over-the-counter products) are added; and carry medication information at all times in the event of emergency situations.               Medication List      ASK your doctor about these medications        Instructions    Morning Afternoon Evening Bedtime    amiodarone 200 MG Tabs   Commonly known as:  CORDARONE        200 mg by Per NG Tube route every day.   Dose:  200 mg                        amlodipine 10 MG Tabs   Commonly known as:  NORVASC        10 mg by Per NG Tube route every day.   Dose:  10 mg                        atorvastatin 40 MG Tabs   Commonly known as:  LIPITOR        40 mg by Per NG Tube route every evening.   Dose:  40 mg                        clarithromycin 250 MG/5ML Susr   Commonly known as:  BIAXIN        500 mg by Nasogastric route 2 times a day. 10 day course started 6/29/17 for PNA   Dose:  500 mg                        digoxin 125 MCG Tabs   Commonly known as:  LANOXIN        125 mcg by Nasogastric route every evening.   Dose:  125 mcg                        ELIQUIS 5mg Tabs   Generic drug:  apixaban        5 mg by Per NG Tube route 2 Times a Day.   Dose:  5 mg                        ezetimibe-simvastatin 10-20 MG per tablet   Commonly known as:  VYTORIN        1 Tab by Nasogastric route every evening.   Dose:  1 Tab                        furosemide 20 MG Tabs   Commonly known as:  LASIX        20 mg by Nasogastric route every 8 hours.   Dose:  20 mg                        HUMALOG 100 UNIT/ML Soln   Generic drug:  insulin lispro        Inject 2-8 Units as instructed 4 Times a Day,Before Meals and at Bedtime. 0-199=0 200-250=2 251-300=4 301-350=6 351-400=8   Dose:  2-8 Units                        levothyroxine 88 MCG Tabs   Commonly known as:  SYNTHROID        88 mcg by Nasogastric route Every morning on an empty stomach.   Dose:  88 mcg                        losartan 50 MG Tabs   Commonly known as:  COZAAR        50 mg by Nasogastric route every day.   Dose:  50 mg                        metformin 500 MG Tabs   Commonly known as:  GLUCOPHAGE        500 mg by Per NG Tube route 2 times a day, with meals.   Dose:  500 mg                        Metoprolol Tartrate 75 MG Tabs        75 mg by Nasogastric route 3 times a day.   Dose:  75 mg                        nystatin powder   Commonly known as:  MYCOSTATIN        Apply 1 g to affected area(s) every day at 6 PM. Apply to groin   Dose:  1 g                        potassium chloride 20 MEQ Pack   Commonly known as:  KLOR-CON        20 mEq by Nasogastric route 2 times a day.   Dose:  20 mEq                                Procedures and tests performed during your visit     DX-ABDOMEN FOR TUBE PLACEMENT            Patient Information     Patient Information    Following emergency treatment: all patient requiring follow-up care must return either to a private physician or a clinic if your condition worsens before you are able to obtain further medical attention, please return to the emergency room.     Billing  Information    At [facility], we work to make the billing process streamlined for our patients.  Our Representatives are here to answer any questions you may have regarding your hospital bill.  If you have insurance coverage and have supplied your insurance information to us, we will submit a claim to your insurer on your behalf.  Should you have any questions regarding your bill, we can be reached online or by phone as follows:  Online: You are able pay your bills online or live chat with our representatives about any billing questions you may have. We are here to help Monday - Friday from 8:00am to 7:30pm and 9:00am - 12:00pm on Saturdays.  Please visit [email or web address]  for more information.   Phone:  [phone] or [phone]    Please note that your emergency physician, surgeon, pathologist, radiologist, anesthesiologist, and other specialists are not employed by [facility] and will therefore bill separately for their services.  Please contact them directly for any questions concerning their bills at the numbers below:     Emergency Physician Services:  [phone]  [facility]:  [phone]  [facility]:  [phone]  [facility]:  [phone]    1. Your final bill may vary from the amount quoted upon discharge if all procedures are not complete at that time, or if your doctor has additional procedures of which we are not aware. You will receive an additional bill if you return to the Emergency Department at [facility] for suture removal regardless of the facility of which the sutures were placed.     2. Please arrange for settlement of this account at the emergency registration.    3. All self-pay accounts are due in full at the time of treatment.  If you are unable to meet this obligation then payment is expected within 4-5 days.     4. If you have had radiology studies (CT, X-ray, Ultrasound, MRI), you have  received a preliminary result during your emergency department visit. Please contact the radiology department [phone] to receive a copy of your final result. Please discuss the Final result with your primary physician or with the follow up physician provided.     Crisis Hotline:  [place] Crisis Hotline:  8-831-SVEQFSD or 1-582.109.9893  [place] Crisis Hotline:    1-144.796.7249 or 493-752-0377         ED Discharge Follow Up Questions    1. In order to provide you with very good care, we would like to follow up with a phone call in the next few days.  May we have your permission to contact you?     YES /  NO    2. What is the best phone number to call you? (       )_____-__________    3. What is the best time to call you?      Morning  /  Afternoon  /  Evening                   Patient Signature:  ____________________________________________________________    Date:  ____________________________________________________________      Your appointments     Jul 18, 2017  1:40 PM   HOSPITAL FOLLOW UP with PAULY Chen   [facility] (--)    [address] [place] [id number] [phone]

## 2017-07-11 NOTE — ED NOTES
Med rec updated and complete  Allergies reviewed  Pt dose not know her medications.  Received MAR from [facility].

## 2017-07-11 NOTE — ED AVS SNAPSHOT
getbetter! Access Code: [email or web address]  Expires: 8/10/2017  2:16 PM    Your email address is not on file at [facility].  Email Addresses are required for you to sign up for getbetter!, please contact [phone] to verify your personal information and to provide your email address prior to attempting to register for getbetter!.    Heidi Alfaro  [address] [place] [address], [place] [address]    getbetter!  A secure, online tool to manage your health information     [facility]’s getbetter!® is a secure, online tool that connects you to your personalized health information from the privacy of your home -- day or night - making it very easy for you to manage your healthcare. Once the activation process is completed, you can even access your medical information using the getbetter! mikey, which is available for free in the Apple Mikey store or Google Play store.     To learn more about getbetter!, visit [email or web address]    There are two levels of access available (as shown below):   My Chart Features  [facility] Primary Care Doctor [facility]  Specialists [facility]  Urgent  Care Non-[facility] Primary Care Doctor   Email your healthcare team securely and privately 24/7 X X X    Manage appointments: schedule your next appointment; view details of past/upcoming appointments X      Request prescription refills. X      View recent personal medical records, including lab and immunizations X X X X   View health record, including health history, allergies, medications X X X X   Read reports about your outpatient visits, procedures, consult and ER notes X X X X   See your discharge summary, which is a recap of your hospital and/or ER visit that includes your diagnosis, lab results, and care plan X X  X     How to register for [employer]:  Once your e-mail address has been verified, follow the following steps to sign up for getbetter!.     1. Go to  [email or web address]  2. Click on the Sign Up Now box, which takes you to the New Member Sign Up page. You will  need to provide the following information:  a. Enter your [employer] Access Code exactly as it appears at the top of this page. (You will not need to use this code after you’ve completed the sign-up process. If you do not sign up before the expiration date, you must request a new code.)   b. Enter your date of birth.   c. Enter your home email address.   d. Click Submit, and follow the next screen’s instructions.  3. Create a [employer] ID. This will be your [employer] login ID and cannot be changed, so think of one that is secure and easy to remember.  4. Create a [employer] password. You can change your password at any time.  5. Enter your Password Reset Question and Answer. This can be used at a later time if you forget your password.   6. Enter your e-mail address. This allows you to receive e-mail notifications when new information is available in [employer].  7. Click Sign Up. You can now view your health information.    For assistance activating your [employer] account, call [phone]

## 2017-07-18 ENCOUNTER — OFFICE VISIT (OUTPATIENT)
Dept: CARDIOLOGY | Facility: MEDICAL CENTER | Age: 62
End: 2017-07-18
Payer: COMMERCIAL

## 2017-07-18 VITALS — OXYGEN SATURATION: 98 % | HEART RATE: 104 BPM | SYSTOLIC BLOOD PRESSURE: 122 MMHG | DIASTOLIC BLOOD PRESSURE: 70 MMHG

## 2017-07-18 DIAGNOSIS — I48.0 PAROXYSMAL ATRIAL FIBRILLATION (HCC): ICD-10-CM

## 2017-07-18 DIAGNOSIS — I25.10 CORONARY ARTERY CALCIFICATION SEEN ON CT SCAN: ICD-10-CM

## 2017-07-18 DIAGNOSIS — E11.9 TYPE 2 DIABETES MELLITUS WITHOUT COMPLICATION, WITHOUT LONG-TERM CURRENT USE OF INSULIN (HCC): ICD-10-CM

## 2017-07-18 DIAGNOSIS — I27.20 PULMONARY HYPERTENSION (HCC): ICD-10-CM

## 2017-07-18 DIAGNOSIS — I10 ESSENTIAL HYPERTENSION: ICD-10-CM

## 2017-07-18 DIAGNOSIS — I48.19 PERSISTENT ATRIAL FIBRILLATION (HCC): ICD-10-CM

## 2017-07-18 PROBLEM — E11.59 TYPE 2 DIABETES MELLITUS WITH CIRCULATORY DISORDER (HCC): Status: RESOLVED | Noted: 2017-06-09 | Resolved: 2017-07-18

## 2017-07-18 LAB — EKG IMPRESSION: NORMAL

## 2017-07-18 PROCEDURE — 99214 OFFICE O/P EST MOD 30 MIN: CPT | Performed by: NURSE PRACTITIONER

## 2017-07-18 ASSESSMENT — ENCOUNTER SYMPTOMS
FEVER: 0
ROS GI COMMENTS: DYSPHAGIA
COUGH: 0
ABDOMINAL PAIN: 0
CLAUDICATION: 0
PALPITATIONS: 0
ORTHOPNEA: 0
PND: 0
SHORTNESS OF BREATH: 1
DIZZINESS: 0
WEAKNESS: 1
MYALGIAS: 0

## 2017-07-18 NOTE — MR AVS SNAPSHOT
Heidi Alfaro   2017 1:40 PM   Office Visit   MRN: [id number]    Department:  [facility]   Dept Phone:  [phone]    Description:  Female : 1955   Provider:  PAULY Chen           Reason for Visit     Hospital Follow-up           Allergies as of 2017     No Known Allergies      You were diagnosed with     Persistent atrial fibrillation (CMS-HCC)   [799506]       Coronary artery calcification seen on CT scan   [1773510]       Essential hypertension   [0109378]       Pulmonary hypertension (CMS-HCC)   [230963]       Type 2 diabetes mellitus without complication, without long-term current use of insulin (CMS-HCC)   [5038819]       Paroxysmal atrial fibrillation (CMS-HCC)   [214375]         Vital Signs     Blood Pressure Pulse Oxygen Saturation Smoking Status          122/70 mmHg 104 98% Never Smoker         Basic Information     Date Of Birth Sex Race Ethnicity Preferred Language    1955 Female White Non- English      Problem List              ICD-10-CM Priority Class Noted - Resolved    Atrial fibrillation (CMS-HCC) I48.91 Medium  2017 - Present    Hypothyroid E03.9 Low  2017 - Present    HTN (hypertension) I10 Medium  2017 - Present    Pulmonary hypertension (CMS-HCC) I27.2 Medium  2017 - Present    Coronary artery calcification seen on CT scan I25.10 Medium  2017 - Present    Urinary retention R33.9 Low  2017 - Present    Type 2 diabetes mellitus without complication, without long-term current use of insulin (CMS-HCC) E11.9 Medium  2017 - Present      Health Maintenance        Date Due Completion Dates    DIABETES MONOFILAMENT / LE EXAM 1955 ---    RETINAL SCREENING 1972 ---    IMM DTaP/Tdap/Td Vaccine (1 - Tdap) 1973 ---    IMM PNEUMOCOCCAL 19-64 (ADULT) MEDIUM RISK SERIES (1 of 1 - PPSV23) 1973 ---    PAP SMEAR 1975 ---    COLONOSCOPY 2004 ---    IMM ZOSTER VACCINE 2014 ---    URINE  ACR / MICROALBUMIN 7/7/2016 7/7/2015, 10/6/2014, 1/3/2014, 3/30/2012    IMM INFLUENZA (1) 9/1/2017 10/1/2016    A1C SCREENING 12/8/2017 6/8/2017, 9/30/2016, 4/1/2016, 7/7/2015, 10/6/2014, 1/3/2014, 3/7/2013, 1/4/2013, 3/30/2012, 6/17/2011    MAMMOGRAM 3/30/2018 3/30/2017, 10/28/2013, 7/30/2010, 7/26/2010, 7/26/2010, 7/21/2008, 7/21/2008    FASTING LIPID PROFILE 6/9/2018 6/9/2017, 9/30/2016, 4/1/2016, 7/7/2015, 10/6/2014, 1/3/2014, 1/4/2013, 3/30/2012, 6/17/2011    SERUM CREATININE 6/22/2018 6/22/2017, 6/21/2017, 6/20/2017, 6/19/2017, 6/18/2017, 6/17/2017, 6/16/2017, 6/15/2017, 6/14/2017, 6/13/2017, 6/12/2017, 6/11/2017, 6/10/2017, 6/9/2017, 6/8/2017, 6/8/2017, 9/30/2016, 4/1/2016, 7/7/2015, 10/6/2014, 1/3/2014, 3/7/2013, 1/4/2013, 3/30/2012, 6/17/2011            Results       Current Immunizations     Influenza Vaccine Adult HD 10/1/2016      Below and/or attached are the medications your provider expects you to take. Review all of your home medications and newly ordered medications with your provider and/or pharmacist. Follow medication instructions as directed by your provider and/or pharmacist. Please keep your medication list with you and share with your provider. Update the information when medications are discontinued, doses are changed, or new medications (including over-the-counter products) are added; and carry medication information at all times in the event of emergency situations     Allergies:  No Known Allergies          Medications  Valid as of: July 18, 2017 -  2:26 PM    Generic Name Brand Name Tablet Size Instructions for use    Amiodarone HCl (Tab) CORDARONE 200  mg by Per NG Tube route every day.        AmLODIPine Besylate (Tab) NORVASC 10 MG 10 mg by Per NG Tube route every day.        Apixaban (Tab) ELIQUIS 5mg 5 mg by Per NG Tube route 2 Times a Day.        Atorvastatin Calcium (Tab) LIPITOR 40 MG 40 mg by Per NG Tube route every evening.        Clarithromycin (Recon Susp) BIAXIN 250  MG/5ML 500 mg by Nasogastric route 2 times a day. 10 day course started 6/29/17 for PNA        Digoxin (Tab) LANOXIN 125  mcg by Nasogastric route every evening.        Ezetimibe-Simvastatin (Tab) VYTORIN 10-20 MG 1 Tab by Nasogastric route every evening.        Furosemide (Tab) LASIX 20 MG 20 mg by Nasogastric route every 8 hours.        Insulin Lispro (Solution) HUMALOG 100 UNIT/ML Inject 2-8 Units as instructed 4 Times a Day,Before Meals and at Bedtime. 0-199=0  200-250=2  251-300=4  301-350=6  351-400=8        Levothyroxine Sodium (Tab) SYNTHROID 88 MCG 88 mcg by Nasogastric route Every morning on an empty stomach.        Losartan Potassium (Tab) COZAAR 50 MG 50 mg by Nasogastric route every day.        MetFORMIN HCl (Tab) GLUCOPHAGE 500  mg by Per NG Tube route 2 times a day, with meals.        Metoprolol Tartrate (Tab) Metoprolol Tartrate 75 MG 75 mg by Nasogastric route 3 times a day.        Nystatin (Powder) MYCOSTATIN 875778 UNIT/GM Apply 1 g to affected area(s) every day at 6 PM. Apply to groin        Potassium Chloride (Pack) KLOR-CON 20 MEQ 20 mEq by Nasogastric route 2 times a day.        .                 Medicines prescribed today were sent to:     None      Medication refill instructions:       If your prescription bottle indicates you have medication refills left, it is not necessary to call your provider’s office. Please contact your pharmacy and they will refill your medication.    If your prescription bottle indicates you do not have any refills left, you may request refills at any time through one of the following ways: The online [employer] system (except Urgent Care), by calling your provider’s office, or by asking your pharmacy to contact your provider’s office with a refill request. Medication refills are processed only during regular business hours and may not be available until the next business day. Your provider may request additional information or to have a follow-up visit  with you prior to refilling your medication.   *Please Note: Medication refills are assigned a new Rx number when refilled electronically. Your pharmacy may indicate that no refills were authorized even though a new prescription for the same medication is available at the pharmacy. Please request the medicine by name with the pharmacy before contacting your provider for a refill.           IsoP[employer]is Access Code: [email or web address]  Expires: 8/10/2017  2:16 PM    IsoPlexis  A secure, online tool to manage your health information     [employer]’s IsoPlexis® is a secure, online tool that connects you to your personalized health information from the privacy of your home -- day or night - making it very easy for you to manage your healthcare. Once the activation process is completed, you can even access your medical information using the IsoPlexis mikey, which is available for free in the Apple Mikey store or Google Play store.     IsoPlexis provides the following levels of access (as shown below):   My Chart Features   [facility] Primary Care Doctor [facility]  Specialists [facility]  Urgent  Care Non-[facility]  Primary Care  Doctor   Email your healthcare team securely and privately 24/7 X X X    Manage appointments: schedule your next appointment; view details of past/upcoming appointments X      Request prescription refills. X      View recent personal medical records, including lab and immunizations X X X X   View health record, including health history, allergies, medications X X X X   Read reports about your outpatient visits, procedures, consult and ER notes X X X X   See your discharge summary, which is a recap of your hospital and/or ER visit that includes your diagnosis, lab results, and care plan. X X       How to register for IsoPlexis:  1. Go to  [email or web address].  2. Click on the Sign Up Now box, which takes you to the New Member Sign Up page. You will need to provide the following information:  a. Enter your [employer] Access  Code exactly as it appears at the top of this page. (You will not need to use this code after you’ve completed the sign-up process. If you do not sign up before the expiration date, you must request a new code.)   b. Enter your date of birth.   c. Enter your home email address.   d. Click Submit, and follow the next screen’s instructions.  3. Create a [employer] ID. This will be your [employer] login ID and cannot be changed, so think of one that is secure and easy to remember.  4. Create a [employer] password. You can change your password at any time.  5. Enter your Password Reset Question and Answer. This can be used at a later time if you forget your password.   6. Enter your e-mail address. This allows you to receive e-mail notifications when new information is available in [employer].  7. Click Sign Up. You can now view your health information.    For assistance activating your [employer] account, call [phone]

## 2017-07-18 NOTE — PROGRESS NOTES
Subjective:   Heidi Alfaro is a 62 y.o. female who presents today for hospital follow up for new onset afib in the setting of acute respiratory failure with septic shock from pneumonia.    She is a new patient to our office, she will be set up with Dr. Rosario in our office. Hx of obesity, HTN, CAD with + CT scoring, DM, and debility.    She lives with her sister and they take care of each other. Right now, she is residing in [facility] for recovery and therapies. She remains in a wheelchair with a feeding tube for dysphagia. She has continued edema in her legs but it is non-pitting.    She is overweight. She is hoping to be back home soon.     She feels much better than the hospital. She hasn't felt any symptoms other than fatigue with exertion and mild shortness of breath.     She seems a little slow to respond to questions. She is shaking during the exam, but is not cold in temperature. She remains on oxygen at 2L NC daily.    She has had no episodes of chest pain, palpitations, or dizziness/lightheadedness.    Past Medical History   Diagnosis Date   • Hypertension    • Diabetes (CMS-[facility])      pre diabetic   • Pulmonary HTN (CMS-[facility])    • CAD (coronary artery disease)    • A-fib (CMS-[facility])    • Pneumonia    • Dysphagia      Past Surgical History   Procedure Laterality Date   • Other       tonsils cataract     History reviewed. No pertinent family history.  History   Smoking status   • Never Smoker    Smokeless tobacco   • Never Used     No Known Allergies  Outpatient Encounter Prescriptions as of 7/18/2017   Medication Sig Dispense Refill   • atorvastatin (LIPITOR) 40 MG Tab 40 mg by Per NG Tube route every evening.     • amiodarone (CORDARONE) 200 MG Tab 200 mg by Per NG Tube route every day.     • nystatin (MYCOSTATIN) powder Apply 1 g to affected area(s) every day at 6 PM. Apply to groin     • losartan (COZAAR) 50 MG Tab 50 mg by Nasogastric route every day.     • Metoprolol Tartrate 75 MG Tab 75 mg by  Nasogastric route 3 times a day.     • furosemide (LASIX) 20 MG Tab 20 mg by Nasogastric route every 8 hours.     • potassium chloride (KLOR-CON) 20 MEQ Pack 20 mEq by Nasogastric route 2 times a day.     • insulin lispro (HUMALOG) 100 UNIT/ML Solution Inject 2-8 Units as instructed 4 Times a Day,Before Meals and at Bedtime. 0-199=0  200-250=2  251-300=4  301-350=6  351-400=8     • apixaban (ELIQUIS) 5mg Tab 5 mg by Per NG Tube route 2 Times a Day.     • amlodipine (NORVASC) 10 MG Tab 10 mg by Per NG Tube route every day.     • ezetimibe-simvastatin (VYTORIN) 10-20 MG per tablet 1 Tab by Nasogastric route every evening.     • levothyroxine (SYNTHROID) 88 MCG Tab 88 mcg by Nasogastric route Every morning on an empty stomach.     • metformin (GLUCOPHAGE) 500 MG Tab 500 mg by Per NG Tube route 2 times a day, with meals.     • digoxin (LANOXIN) 125 MCG Tab 125 mcg by Nasogastric route every evening.     • clarithromycin (BIAXIN) 250 MG/5ML Recon Susp 500 mg by Nasogastric route 2 times a day. 10 day course started 6/29/17 for PNA       No facility-administered encounter medications on file as of 7/18/2017.     Review of Systems   Constitutional: Positive for malaise/fatigue. Negative for fever.   Respiratory: Positive for shortness of breath. Negative for cough.    Cardiovascular: Positive for leg swelling. Negative for chest pain, palpitations, orthopnea, claudication and PND.   Gastrointestinal: Negative for abdominal pain.        Dysphagia   Musculoskeletal: Negative for myalgias.   Neurological: Positive for weakness. Negative for dizziness.   All other systems reviewed and are negative.       Objective:   /70 mmHg  Pulse 104  SpO2 98%    Physical Exam   Constitutional: She is oriented to person, place, and time. She appears well-developed. No distress.   obese   HENT:   Head: Normocephalic and atraumatic.   Eyes: EOM are normal.   Neck: Normal range of motion. No JVD present.   Cardiovascular: Regular  rhythm, normal heart sounds, intact distal pulses and normal pulses.  Tachycardia present.    No murmur heard.  Pulmonary/Chest: Effort normal and breath sounds normal. No respiratory distress.   Abdominal: Soft. Bowel sounds are normal.   Musculoskeletal: She exhibits edema.   Bilateral LE edema stable and chronic-non pitting  Wheelchair for mobility    Neurological: She is alert and oriented to person, place, and time.   Skin: Skin is warm and dry.   Psychiatric: She has a normal mood and affect.   Nursing note and vitals reviewed.    2017 ECHO CONCLUSIONS  No prior study is available for comparison.   Left ventricular ejection fraction is visually estimated to be 65%.  Mild concentric left ventricular hypertrophy.  Diastolic function is difficult to assess with atrial fibrillation.    Estimated right ventricular systolic pressure is 50 mmHg.  Small pleural effusion noted.  Moderate tricuspid regurgitation.    Lab Results   Component Value Date/Time    CHOLESTEROL,TOT 82* 06/09/2017 03:55 AM    LDL 39 06/09/2017 03:55 AM    HDL 28* 06/09/2017 03:55 AM    TRIGLYCERIDES 130 06/17/2017 04:43 AM       Lab Results   Component Value Date/Time    SODIUM 137 06/22/2017 05:21 AM    POTASSIUM 4.4 06/22/2017 05:21 AM    CHLORIDE 104 06/22/2017 05:21 AM    CO2 25 06/22/2017 05:21 AM    GLUCOSE 210* 06/22/2017 05:21 AM    BUN 27* 06/22/2017 05:21 AM    CREATININE 0.83 06/22/2017 05:21 AM     Lab Results   Component Value Date/Time    ALKALINE PHOSPHATASE 194* 06/19/2017 04:50 AM    AST(SGOT) 29 06/19/2017 04:50 AM    ALT(SGPT) 42 06/19/2017 04:50 AM    TOTAL BILIRUBIN 0.6 06/19/2017 04:50 AM      Lab Results   Component Value Date/Time    B NATRIURETIC PEPTIDE 1017* 06/19/2017 04:50 AM      Assessment:     1. Persistent atrial fibrillation (CMS-HCC)  EKG   2. Coronary artery calcification seen on CT scan     3. Essential hypertension     4. Pulmonary hypertension (CMS-HCC)     5. Type 2 diabetes mellitus without complication,  without long-term current use of insulin (CMS-[facility])     6. Paroxysmal atrial fibrillation (CMS-[facility])       Medical Decision Making:  Today's Assessment / Status / Plan:     1. Paroxysmal afib, now ST on EKG rate of 108. Asymptomatic. Continue amio 200 mg QD, metoprolol 75 mg Q8 (change to BID once at home), and dig 125 mcg. Eliquis continue-no bleeding. Consider backing off on medication once HR continues to trend down and now signs of recurrence of afib. This can be addressed upon discharge from [facility] and at her next apt.    2. CAD, no angina or YOUNG. Continue lipitor and metoprolol. Follow clinically.    3. HTN, great control on losartan, metoprolol, lasix, and amlodipine. Follow clinically.    4. Pulm HTN, repeat echo next year.  Check for sleep apnea when home.    5. DM, managed by PCP.    FU in clinic in 1-2 months with RO; sooner if warranted.    Patient verbalizes understanding and agrees with the plan of care.     Collaborating MD: Samra SALDANA

## 2017-07-20 LAB
FUNGUS SPEC CULT: ABNORMAL
FUNGUS SPEC CULT: ABNORMAL
SIGNIFICANT IND 70042: ABNORMAL
SITE SITE: ABNORMAL
SOURCE SOURCE: ABNORMAL

## 2017-07-24 ENCOUNTER — HOSPITAL ENCOUNTER (OUTPATIENT)
Dept: RADIOLOGY | Facility: MEDICAL CENTER | Age: 62
End: 2017-07-24
Attending: FAMILY MEDICINE
Payer: COMMERCIAL

## 2017-07-24 DIAGNOSIS — R13.10 DYSPHAGIA, UNSPECIFIED TYPE: ICD-10-CM

## 2017-07-24 PROCEDURE — 92611 MOTION FLUOROSCOPY/SWALLOW: CPT

## 2017-07-24 PROCEDURE — 74230 X-RAY XM SWLNG FUNCJ C+: CPT

## 2017-08-05 LAB
MYCOBACTERIUM SPEC CULT: NORMAL
RHODAMINE-AURAMINE STN SPEC: NORMAL
SIGNIFICANT IND 70042: NORMAL
SITE SITE: NORMAL
SOURCE SOURCE: NORMAL

## 2017-08-23 ENCOUNTER — OFFICE VISIT (OUTPATIENT)
Dept: CARDIOLOGY | Facility: MEDICAL CENTER | Age: 62
End: 2017-08-23
Payer: COMMERCIAL

## 2017-08-23 VITALS
HEART RATE: 68 BPM | HEIGHT: 65 IN | OXYGEN SATURATION: 94 % | BODY MASS INDEX: 37.65 KG/M2 | DIASTOLIC BLOOD PRESSURE: 70 MMHG | SYSTOLIC BLOOD PRESSURE: 118 MMHG | WEIGHT: 226 LBS

## 2017-08-23 DIAGNOSIS — E11.9 TYPE 2 DIABETES MELLITUS WITHOUT COMPLICATION, WITHOUT LONG-TERM CURRENT USE OF INSULIN (HCC): ICD-10-CM

## 2017-08-23 DIAGNOSIS — I48.0 PAROXYSMAL ATRIAL FIBRILLATION (HCC): ICD-10-CM

## 2017-08-23 DIAGNOSIS — I27.20 PULMONARY HYPERTENSION (HCC): ICD-10-CM

## 2017-08-23 DIAGNOSIS — I25.10 CORONARY ARTERY CALCIFICATION SEEN ON CT SCAN: ICD-10-CM

## 2017-08-23 DIAGNOSIS — I10 ESSENTIAL HYPERTENSION: ICD-10-CM

## 2017-08-23 PROCEDURE — 99214 OFFICE O/P EST MOD 30 MIN: CPT | Performed by: INTERNAL MEDICINE

## 2017-08-23 RX ORDER — FUROSEMIDE 20 MG/1
20 TABLET ORAL 2 TIMES DAILY
Qty: 60 TAB | Refills: 11 | Status: SHIPPED | OUTPATIENT
Start: 2017-08-23 | End: 2018-05-01

## 2017-08-23 RX ORDER — AMLODIPINE BESYLATE 10 MG/1
10 TABLET ORAL DAILY
Qty: 30 TAB | Refills: 11 | Status: SHIPPED | OUTPATIENT
Start: 2017-08-23 | End: 2018-08-17 | Stop reason: SDUPTHER

## 2017-08-23 RX ORDER — METOPROLOL TARTRATE 100 MG/1
100 TABLET ORAL 2 TIMES DAILY
Qty: 60 TAB | Refills: 11 | Status: SHIPPED | OUTPATIENT
Start: 2017-08-23 | End: 2018-08-17 | Stop reason: SDUPTHER

## 2017-08-23 RX ORDER — EZETIMIBE AND SIMVASTATIN 10; 20 MG/1; MG/1
1 TABLET ORAL DAILY
Qty: 30 TAB | Refills: 11 | Status: SHIPPED | OUTPATIENT
Start: 2017-08-23 | End: 2018-05-03

## 2017-08-23 RX ORDER — LOSARTAN POTASSIUM 50 MG/1
50 TABLET ORAL DAILY
Qty: 30 TAB | Refills: 11 | Status: SHIPPED | OUTPATIENT
Start: 2017-08-23 | End: 2018-08-17 | Stop reason: SDUPTHER

## 2017-08-23 RX ORDER — POTASSIUM CHLORIDE 20 MEQ/1
20 TABLET, EXTENDED RELEASE ORAL 2 TIMES DAILY
Qty: 60 TAB | Refills: 11 | Status: SHIPPED | OUTPATIENT
Start: 2017-08-23 | End: 2018-08-17 | Stop reason: SDUPTHER

## 2017-08-23 RX ORDER — ATORVASTATIN CALCIUM 40 MG/1
40 TABLET, FILM COATED ORAL EVERY EVENING
Qty: 30 TAB | Refills: 11 | Status: SHIPPED | OUTPATIENT
Start: 2017-08-23 | End: 2018-05-01

## 2017-08-23 RX ORDER — DIGOXIN 125 MCG
125 TABLET ORAL EVERY EVENING
Qty: 30 TAB | Refills: 11 | Status: SHIPPED | OUTPATIENT
Start: 2017-08-23 | End: 2018-08-17 | Stop reason: SDUPTHER

## 2017-08-23 ASSESSMENT — ENCOUNTER SYMPTOMS
RESPIRATORY NEGATIVE: 1
SPUTUM PRODUCTION: 0
WHEEZING: 0
STRIDOR: 0
GASTROINTESTINAL NEGATIVE: 1
CARDIOVASCULAR NEGATIVE: 1
CONSTITUTIONAL NEGATIVE: 1
PALPITATIONS: 0
CLAUDICATION: 0
TREMORS: 1
CHILLS: 0
PND: 0
FEVER: 0
HEMOPTYSIS: 0
EYES NEGATIVE: 1
BRUISES/BLEEDS EASILY: 0
COUGH: 0
WEAKNESS: 0
MUSCULOSKELETAL NEGATIVE: 1
SHORTNESS OF BREATH: 0
DIZZINESS: 0
ORTHOPNEA: 0
SORE THROAT: 0
LOSS OF CONSCIOUSNESS: 0

## 2017-08-23 NOTE — MR AVS SNAPSHOT
"        Heidi Alfaro   2017 3:15 PM   Office Visit   MRN: [id number]    Department:  [facility]   Dept Phone:  [phone]    Description:  Female : 1955   Provider:  Refugio Rosario M.D.           Reason for Visit     Follow-Up           Allergies as of 2017     No Known Allergies      You were diagnosed with     Type 2 diabetes mellitus without complication, without long-term current use of insulin (CMS-[facility])   [3927291]       Pulmonary hypertension (CMS-[facility])   [756262]       Paroxysmal atrial fibrillation (CMS-[facility])   [986280]       Coronary artery calcification seen on CT scan   [4864139]       Essential hypertension   [8389771]         Vital Signs     Blood Pressure Pulse Height Weight Body Mass Index Oxygen Saturation    118/70 mmHg 68 1.651 m (5' 5\") 102.513 kg (226 lb) 37.61 kg/m2 94%    Smoking Status                   Never Smoker            Basic Information     Date Of Birth Sex Race Ethnicity Preferred Language    1955 Female White Non- English      Your appointments     Aug 24, 2017  7:15 AM   Adult Draw/Collection with [facility]   [facility] (--)    [address] [facility] [address]   [phone]            2017  2:30 PM   FOLLOW UP with Refugio Rosario M.D.   [facility] (--)    [address], [facility] [address]  [facility] [address] [phone]              Problem List              ICD-10-CM Priority Class Noted - Resolved    Atrial fibrillation (CMS-HCC) I48.91 Medium  2017 - Present    Hypothyroid E03.9 Low  2017 - Present    HTN (hypertension) I10 Medium  2017 - Present    Pulmonary hypertension (CMS-[facility]) I27.2 Medium  2017 - Present    Coronary artery calcification seen on CT scan I25.10 Medium  2017 - Present    Urinary retention R33.9 Low  2017 - Present    Type 2 diabetes mellitus without complication, without long-term current use of insulin (CMS-[facility]) E11.9 Medium  2017 - " Present      Health Maintenance        Date Due Completion Dates    DIABETES MONOFILAMENT / LE EXAM 1955 ---    RETINAL SCREENING 6/28/1972 ---    IMM DTaP/Tdap/Td Vaccine (1 - Tdap) 6/28/1973 ---    IMM PNEUMOCOCCAL 19-64 (ADULT) MEDIUM RISK SERIES (1 of 1 - PPSV23) 6/28/1973 ---    PAP SMEAR 6/28/1975 ---    COLONOSCOPY 6/28/2004 ---    IMM ZOSTER VACCINE 6/28/2014 ---    URINE ACR / MICROALBUMIN 7/7/2016 7/7/2015, 10/6/2014, 1/3/2014, 3/30/2012    IMM INFLUENZA (1) 9/1/2017 10/1/2016    A1C SCREENING 12/8/2017 6/8/2017, 9/30/2016, 4/1/2016, 7/7/2015, 10/6/2014, 1/3/2014, 3/7/2013, 1/4/2013, 3/30/2012, 6/17/2011    MAMMOGRAM 3/30/2018 3/30/2017, 10/28/2013, 7/30/2010, 7/26/2010, 7/26/2010, 7/21/2008, 7/21/2008    FASTING LIPID PROFILE 6/9/2018 6/9/2017, 9/30/2016, 4/1/2016, 7/7/2015, 10/6/2014, 1/3/2014, 1/4/2013, 3/30/2012, 6/17/2011    SERUM CREATININE 6/22/2018 6/22/2017, 6/21/2017, 6/20/2017, 6/19/2017, 6/18/2017, 6/17/2017, 6/16/2017, 6/15/2017, 6/14/2017, 6/13/2017, 6/12/2017, 6/11/2017, 6/10/2017, 6/9/2017, 6/8/2017, 6/8/2017, 9/30/2016, 4/1/2016, 7/7/2015, 10/6/2014, 1/3/2014, 3/7/2013, 1/4/2013, 3/30/2012, 6/17/2011            Current Immunizations     Influenza Vaccine Adult HD 10/1/2016      Below and/or attached are the medications your provider expects you to take. Review all of your home medications and newly ordered medications with your provider and/or pharmacist. Follow medication instructions as directed by your provider and/or pharmacist. Please keep your medication list with you and share with your provider. Update the information when medications are discontinued, doses are changed, or new medications (including over-the-counter products) are added; and carry medication information at all times in the event of emergency situations     Allergies:  No Known Allergies          Medications  Valid as of: August 23, 2017 -  3:47 PM    Generic Name Brand Name Tablet Size Instructions for use     Amiodarone HCl (Tab) CORDARONE 200  mg by Per NG Tube route every day.        AmLODIPine Besylate (Tab) NORVASC 10 MG Take 1 Tab by mouth every day.        Apixaban (Tab) ELIQUIS 5mg Take 1 Tab by mouth 2 Times a Day.        Atorvastatin Calcium (Tab) LIPITOR 40 MG Take 1 Tab by mouth every evening.        Clarithromycin (Recon Susp) BIAXIN 250 MG/5ML 500 mg by Nasogastric route 2 times a day. 10 day course started 6/29/17 for PNA        Digoxin (Tab) LANOXIN 125 MCG Take 1 Tab by mouth every evening.        Ezetimibe-Simvastatin (Tab) VYTORIN 10-20 MG Take 1 Tab by mouth every day.        Furosemide (Tab) LASIX 20 MG Take 1 Tab by mouth 2 times a day.        Insulin Lispro (Solution) HUMALOG 100 UNIT/ML Inject 2-8 Units as instructed 4 Times a Day,Before Meals and at Bedtime. 0-199=0  200-250=2  251-300=4  301-350=6  351-400=8        Levothyroxine Sodium (Tab) SYNTHROID 88 MCG 88 mcg by Nasogastric route Every morning on an empty stomach.        Losartan Potassium (Tab) COZAAR 50 MG Take 1 Tab by mouth every day.        MetFORMIN HCl (Tab) GLUCOPHAGE 500  mg by Per NG Tube route 2 times a day, with meals.        Metoprolol Tartrate (Tab) LOPRESSOR 100 MG Take 1 Tab by mouth 2 times a day.        Nystatin (Powder) MYCOSTATIN 845854 UNIT/GM Apply 1 g to affected area(s) every day at 6 PM. Apply to groin        Potassium Chloride (Pack) KLOR-CON 20 MEQ 20 mEq by Nasogastric route 2 times a day.        Potassium Chloride Jazzy CR (Tab CR) Kdur 20 MEQ Take 1 Tab by mouth 2 times a day.        .                 Medicines prescribed today were sent to:     [facility] [address] [place] [address] [address] [address] [facility] [address]    Phone: [phone] Fax: [phone]    Open 24 Hours?: No      Medication refill instructions:       If your prescription bottle indicates you have medication refills left, it is not necessary to call your provider’s office. Please contact your pharmacy  and they will refill your medication.    If your prescription bottle indicates you do not have any refills left, you may request refills at any time through one of the following ways: The online [email or web address] system (except Urgent Care), by calling your provider’s office, or by asking your pharmacy to contact your provider’s office with a refill request. Medication refills are processed only during regular business hours and may not be available until the next business day. Your provider may request additional information or to have a follow-up visit with you prior to refilling your medication.   *Please Note: Medication refills are assigned a new Rx number when refilled electronically. Your pharmacy may indicate that no refills were authorized even though a new prescription for the same medication is available at the pharmacy. Please request the medicine by name with the pharmacy before contacting your provider for a refill.        Your To Do List     Future Labs/Procedures Complete By Expires    TSH WITH REFLEX TO FT4  As directed 8/23/2018         [email or web address] Access Code: [email or web address]  Expires: 9/22/2017  3:47 PM    [email or web address]  A secure, online tool to manage your health information     [employer]’s [employer]® is a secure, online tool that connects you to your personalized health information from the privacy of your home -- day or night - making it very easy for you to manage your healthcare. Once the activation process is completed, you can even access your medical information using the [employer] mikey, which is available for free in the [employer] Mikey store or Google Play store.     [email or web address] provides the following levels of access (as shown below):   My Chart Features   [facility] Doctor [facility]  Urgent  Care Non-[facility]  Primary Care  Doctor   Email your healthcare team securely and privately 24/7 X X X    Manage appointments: schedule your next appointment; view details of past/upcoming appointments  X      Request prescription refills. X      View recent personal medical records, including lab and immunizations X X X X   View health record, including health history, allergies, medications X X X X   Read reports about your outpatient visits, procedures, consult and ER notes X X X X   See your discharge summary, which is a recap of your hospital and/or ER visit that includes your diagnosis, lab results, and care plan. X X       How to register for [employer]:  1. Go to  [email or web address].  2. Click on the Sign Up Now box, which takes you to the New Member Sign Up page. You will need to provide the following information:  a. Enter your [employer] Access Code exactly as it appears at the top of this page. (You will not need to use this code after you’ve completed the sign-up process. If you do not sign up before the expiration date, you must request a new code.)   b. Enter your date of birth.   c. Enter your home email address.   d. Click Submit, and follow the next screen’s instructions.  3. Create a [employer] ID. This will be your [employer] login ID and cannot be changed, so think of one that is secure and easy to remember.  4. Create a [employer] password. You can change your password at any time.  5. Enter your Password Reset Question and Answer. This can be used at a later time if you forget your password.   6. Enter your e-mail address. This allows you to receive e-mail notifications when new information is available in [employer].  7. Click Sign Up. You can now view your health information.    For assistance activating your [employer] account, call [phone]

## 2017-08-23 NOTE — Clinical Note
[facility]   [address], [address]  [place] [address]  Phone: [phone]  Fax: [phone]              Heidi Alfaro  1955    Encounter Date: 8/23/2017    Refugio Rosario M.D.          PROGRESS NOTE:  Subjective:   Heidi Alfaro is a 62 y.o. female who presents today as a follow-up for her hospitalization. During hospitalization she developed atrial fibrillation with RVR in the setting of pneumonia. Her pneumonia was idiopathic in etiology but she recovered. She did develop A. fib with RVR and was started on amiodarone and self converted. She was started on oral anticoagulation. Since her hospital discharge she's been jittery and nervous. She has not had her thyroid checked. She was told she should stop her medications but has not yet done so.    Past Medical History   Diagnosis Date   • Hypertension    • Diabetes (CMS-[facility])      pre diabetic   • Pulmonary HTN (CMS-HCC)    • CAD (coronary artery disease)    • A-fib (CMS-[facility])    • Pneumonia    • Dysphagia      Past Surgical History   Procedure Laterality Date   • Other       tonsils cataract     History reviewed. No pertinent family history.  History   Smoking status   • Never Smoker    Smokeless tobacco   • Never Used     No Known Allergies  Outpatient Encounter Prescriptions as of 8/23/2017   Medication Sig Dispense Refill   • losartan (COZAAR) 50 MG Tab Take 1 Tab by mouth every day. 30 Tab 11   • metoprolol (LOPRESSOR) 100 MG Tab Take 1 Tab by mouth 2 times a day. 60 Tab 11   • amlodipine (NORVASC) 10 MG Tab Take 1 Tab by mouth every day. 30 Tab 11   • apixaban (ELIQUIS) 5mg Tab Take 1 Tab by mouth 2 Times a Day. 60 Tab 11   • atorvastatin (LIPITOR) 40 MG Tab Take 1 Tab by mouth every evening. 30 Tab 11   • digoxin (LANOXIN) 125 MCG Tab Take 1 Tab by mouth every evening. 30 Tab 11   • ezetimibe-simvastatin (VYTORIN) 10-20 MG per tablet Take 1 Tab by mouth every day. 30 Tab 11   • furosemide (LASIX)  20 MG Tab Take 1 Tab by mouth 2 times a day. 60 Tab 11   • potassium chloride SA (KDUR) 20 MEQ Tab CR Take 1 Tab by mouth 2 times a day. 60 Tab 11   • amiodarone (CORDARONE) 200 MG Tab 200 mg by Per NG Tube route every day.     • nystatin (MYCOSTATIN) powder Apply 1 g to affected area(s) every day at 6 PM. Apply to groin     • potassium chloride (KLOR-CON) 20 MEQ Pack 20 mEq by Nasogastric route 2 times a day.     • levothyroxine (SYNTHROID) 88 MCG Tab 88 mcg by Nasogastric route Every morning on an empty stomach.     • metformin (GLUCOPHAGE) 500 MG Tab 500 mg by Per NG Tube route 2 times a day, with meals.     • [DISCONTINUED] atorvastatin (LIPITOR) 40 MG Tab 40 mg by Per NG Tube route every evening.     • clarithromycin (BIAXIN) 250 MG/5ML Recon Susp 500 mg by Nasogastric route 2 times a day. 10 day course started 6/29/17 for PNA     • [DISCONTINUED] digoxin (LANOXIN) 125 MCG Tab 125 mcg by Nasogastric route every evening.     • [DISCONTINUED] losartan (COZAAR) 50 MG Tab 50 mg by Nasogastric route every day.     • [DISCONTINUED] Metoprolol Tartrate 75 MG Tab 75 mg by Nasogastric route 3 times a day.     • [DISCONTINUED] furosemide (LASIX) 20 MG Tab 20 mg by Nasogastric route every 8 hours.     • insulin lispro (HUMALOG) 100 UNIT/ML Solution Inject 2-8 Units as instructed 4 Times a Day,Before Meals and at Bedtime. 0-199=0  200-250=2  251-300=4  301-350=6  351-400=8     • [DISCONTINUED] apixaban (ELIQUIS) 5mg Tab 5 mg by Per NG Tube route 2 Times a Day.     • [DISCONTINUED] amlodipine (NORVASC) 10 MG Tab 10 mg by Per NG Tube route every day.     • [DISCONTINUED] ezetimibe-simvastatin (VYTORIN) 10-20 MG per tablet 1 Tab by Nasogastric route every evening.       No facility-administered encounter medications on file as of 8/23/2017.     Review of Systems   Constitutional: Negative.  Negative for fever, chills and malaise/fatigue.   HENT: Negative.  Negative for sore throat.    Eyes: Negative.    Respiratory: Negative.  " Negative for cough, hemoptysis, sputum production, shortness of breath, wheezing and stridor.    Cardiovascular: Negative.  Negative for chest pain, palpitations, orthopnea, claudication, leg swelling and PND.   Gastrointestinal: Negative.    Genitourinary: Negative.    Musculoskeletal: Negative.    Skin: Negative.    Neurological: Positive for tremors. Negative for dizziness, loss of consciousness and weakness.   Endo/Heme/Allergies: Negative.  Does not bruise/bleed easily.   All other systems reviewed and are negative.       Objective:   /70 mmHg  Pulse 68  Ht 1.651 m (5' 5\")  Wt 102.513 kg (226 lb)  BMI 37.61 kg/m2  SpO2 94%    Physical Exam   Constitutional: She is oriented to person, place, and time. She appears well-developed and well-nourished. No distress.   HENT:   Head: Normocephalic.   Mouth/Throat: Oropharynx is clear and moist.   Eyes: EOM are normal. Pupils are equal, round, and reactive to light. Right eye exhibits no discharge. Left eye exhibits no discharge. No scleral icterus.   Neck: Normal range of motion. Neck supple. No JVD present. No tracheal deviation present.   Cardiovascular: Normal rate, regular rhythm, S1 normal, S2 normal, normal heart sounds, intact distal pulses and normal pulses.  Exam reveals no gallop, no S3, no S4 and no friction rub.    No murmur heard.   No systolic murmur is present    No diastolic murmur is present   Pulses:       Carotid pulses are 2+ on the right side, and 2+ on the left side.       Radial pulses are 2+ on the right side, and 2+ on the left side.        Dorsalis pedis pulses are 2+ on the right side, and 2+ on the left side.        Posterior tibial pulses are 2+ on the right side, and 2+ on the left side.   Pulmonary/Chest: Effort normal and breath sounds normal. No respiratory distress. She has no wheezes. She has no rales.   Abdominal: Soft. Bowel sounds are normal. She exhibits no distension and no mass. There is no tenderness. There is no " rebound and no guarding.   Musculoskeletal: She exhibits no edema.   Neurological: She is alert and oriented to person, place, and time. No cranial nerve deficit.   Skin: Skin is warm and dry. She is not diaphoretic. No pallor.   Psychiatric: She has a normal mood and affect. Her behavior is normal. Judgment and thought content normal.   Nursing note and vitals reviewed.      Assessment:     1. Type 2 diabetes mellitus without complication, without long-term current use of insulin (CMS-[facility])  TSH WITH REFLEX TO FT4    losartan (COZAAR) 50 MG Tab    metoprolol (LOPRESSOR) 100 MG Tab    atorvastatin (LIPITOR) 40 MG Tab   2. Pulmonary hypertension (CMS-[facility])  TSH WITH REFLEX TO FT4    losartan (COZAAR) 50 MG Tab    metoprolol (LOPRESSOR) 100 MG Tab    amlodipine (NORVASC) 10 MG Tab   3. Paroxysmal atrial fibrillation (CMS-[facility])  TSH WITH REFLEX TO FT4    losartan (COZAAR) 50 MG Tab    apixaban (ELIQUIS) 5mg Tab    digoxin (LANOXIN) 125 MCG Tab    furosemide (LASIX) 20 MG Tab    potassium chloride SA (KDUR) 20 MEQ Tab CR   4. Coronary artery calcification seen on CT scan  losartan (COZAAR) 50 MG Tab    amlodipine (NORVASC) 10 MG Tab    apixaban (ELIQUIS) 5mg Tab    atorvastatin (LIPITOR) 40 MG Tab    digoxin (LANOXIN) 125 MCG Tab    ezetimibe-simvastatin (VYTORIN) 10-20 MG per tablet    furosemide (LASIX) 20 MG Tab    potassium chloride SA (KDUR) 20 MEQ Tab CR   5. Essential hypertension  losartan (COZAAR) 50 MG Tab    amlodipine (NORVASC) 10 MG Tab    apixaban (ELIQUIS) 5mg Tab    atorvastatin (LIPITOR) 40 MG Tab    digoxin (LANOXIN) 125 MCG Tab    ezetimibe-simvastatin (VYTORIN) 10-20 MG per tablet    furosemide (LASIX) 20 MG Tab    potassium chloride SA (KDUR) 20 MEQ Tab CR       Medical Decision Making:  Today's Assessment / Status / Plan:     62-year-old female with atrial fibrillation now converted to sinus rhythm on amiodarone. We will check her TSH to see if she is developing amiodarone-induced thyrotoxicosis. We  will call her with the results. We will likely have her stop her amiodarone and follow her clinically. We will keep her on anticoagulation for now. For her dense three-vessel coronary disease seen on her CT scan we will keep her on statins.    Thank for you allowing me to take part in your patient's care, please call should you have any questions or would like to discuss this patient.      Jovan Zelaya M.D.  [address] [place] [id number]  VIA Facsimile: [phone]

## 2017-08-24 ENCOUNTER — HOSPITAL ENCOUNTER (OUTPATIENT)
Dept: LAB | Facility: MEDICAL CENTER | Age: 62
End: 2017-08-24
Attending: INTERNAL MEDICINE
Payer: COMMERCIAL

## 2017-08-24 DIAGNOSIS — I48.0 PAROXYSMAL ATRIAL FIBRILLATION (HCC): ICD-10-CM

## 2017-08-24 DIAGNOSIS — E11.9 TYPE 2 DIABETES MELLITUS WITHOUT COMPLICATION, WITHOUT LONG-TERM CURRENT USE OF INSULIN (HCC): ICD-10-CM

## 2017-08-24 DIAGNOSIS — I27.20 PULMONARY HYPERTENSION (HCC): ICD-10-CM

## 2017-08-24 LAB
ALBUMIN SERPL BCP-MCNC: 3.6 G/DL (ref 3.2–4.9)
ALBUMIN/GLOB SERPL: 1.2 G/DL
ALP SERPL-CCNC: 58 U/L (ref 30–99)
ALT SERPL-CCNC: 26 U/L (ref 2–50)
ANION GAP SERPL CALC-SCNC: 13 MMOL/L (ref 0–11.9)
APPEARANCE UR: ABNORMAL
AST SERPL-CCNC: 20 U/L (ref 12–45)
BACTERIA #/AREA URNS HPF: ABNORMAL /HPF
BASOPHILS # BLD AUTO: 1.1 % (ref 0–1.8)
BASOPHILS # BLD: 0.06 K/UL (ref 0–0.12)
BILIRUB SERPL-MCNC: 0.9 MG/DL (ref 0.1–1.5)
BILIRUB UR QL STRIP.AUTO: NEGATIVE
BUN SERPL-MCNC: 8 MG/DL (ref 8–22)
CALCIUM SERPL-MCNC: 8.9 MG/DL (ref 8.5–10.5)
CHLORIDE SERPL-SCNC: 107 MMOL/L (ref 96–112)
CHOLEST SERPL-MCNC: 156 MG/DL (ref 100–199)
CO2 SERPL-SCNC: 20 MMOL/L (ref 20–33)
COLOR UR: YELLOW
CREAT SERPL-MCNC: 0.93 MG/DL (ref 0.5–1.4)
DIGOXIN SERPL-MCNC: 1 NG/ML (ref 0.8–2)
EOSINOPHIL # BLD AUTO: 0.1 K/UL (ref 0–0.51)
EOSINOPHIL NFR BLD: 1.8 % (ref 0–6.9)
EPI CELLS #/AREA URNS HPF: ABNORMAL /HPF
ERYTHROCYTE [DISTWIDTH] IN BLOOD BY AUTOMATED COUNT: 51.4 FL (ref 35.9–50)
EST. AVERAGE GLUCOSE BLD GHB EST-MCNC: 140 MG/DL
FASTING STATUS PATIENT QL REPORTED: NORMAL
GFR SERPL CREATININE-BSD FRML MDRD: >60 ML/MIN/1.73 M 2
GLOBULIN SER CALC-MCNC: 2.9 G/DL (ref 1.9–3.5)
GLUCOSE SERPL-MCNC: 136 MG/DL (ref 65–99)
GLUCOSE UR STRIP.AUTO-MCNC: NEGATIVE MG/DL
HBA1C MFR BLD: 6.5 % (ref 0–5.6)
HCT VFR BLD AUTO: 35.6 % (ref 37–47)
HDLC SERPL-MCNC: 45 MG/DL
HGB BLD-MCNC: 11.6 G/DL (ref 12–16)
IMM GRANULOCYTES # BLD AUTO: 0.04 K/UL (ref 0–0.11)
IMM GRANULOCYTES NFR BLD AUTO: 0.7 % (ref 0–0.9)
KETONES UR STRIP.AUTO-MCNC: NEGATIVE MG/DL
LDLC SERPL CALC-MCNC: 80 MG/DL
LEUKOCYTE ESTERASE UR QL STRIP.AUTO: ABNORMAL
LYMPHOCYTES # BLD AUTO: 0.86 K/UL (ref 1–4.8)
LYMPHOCYTES NFR BLD: 15.8 % (ref 22–41)
MCH RBC QN AUTO: 28.2 PG (ref 27–33)
MCHC RBC AUTO-ENTMCNC: 32.6 G/DL (ref 33.6–35)
MCV RBC AUTO: 86.6 FL (ref 81.4–97.8)
MICRO URNS: ABNORMAL
MONOCYTES # BLD AUTO: 0.59 K/UL (ref 0–0.85)
MONOCYTES NFR BLD AUTO: 10.9 % (ref 0–13.4)
NEUTROPHILS # BLD AUTO: 3.78 K/UL (ref 2–7.15)
NEUTROPHILS NFR BLD: 69.7 % (ref 44–72)
NITRITE UR QL STRIP.AUTO: NEGATIVE
NRBC # BLD AUTO: 0 K/UL
NRBC BLD AUTO-RTO: 0 /100 WBC
PH UR STRIP.AUTO: 6.5 [PH]
PLATELET # BLD AUTO: 271 K/UL (ref 164–446)
PMV BLD AUTO: 10.1 FL (ref 9–12.9)
POTASSIUM SERPL-SCNC: 3.5 MMOL/L (ref 3.6–5.5)
PROT SERPL-MCNC: 6.5 G/DL (ref 6–8.2)
PROT UR QL STRIP: 100 MG/DL
RBC # BLD AUTO: 4.11 M/UL (ref 4.2–5.4)
RBC # URNS HPF: ABNORMAL /HPF
RBC UR QL AUTO: ABNORMAL
SODIUM SERPL-SCNC: 140 MMOL/L (ref 135–145)
SP GR UR STRIP.AUTO: 1.02
T4 SERPL-MCNC: 9.9 UG/DL (ref 4–12)
TRIGL SERPL-MCNC: 156 MG/DL (ref 0–149)
TSH SERPL DL<=0.005 MIU/L-ACNC: 1.5 UIU/ML (ref 0.3–3.7)
TSH SERPL DL<=0.005 MIU/L-ACNC: 1.54 UIU/ML (ref 0.3–3.7)
UROBILINOGEN UR STRIP.AUTO-MCNC: 1 MG/DL
WBC # BLD AUTO: 5.4 K/UL (ref 4.8–10.8)
WBC #/AREA URNS HPF: ABNORMAL /HPF

## 2017-08-24 PROCEDURE — 83036 HEMOGLOBIN GLYCOSYLATED A1C: CPT

## 2017-08-24 PROCEDURE — 80061 LIPID PANEL: CPT

## 2017-08-24 PROCEDURE — 81001 URINALYSIS AUTO W/SCOPE: CPT

## 2017-08-24 PROCEDURE — 84436 ASSAY OF TOTAL THYROXINE: CPT

## 2017-08-24 PROCEDURE — 80162 ASSAY OF DIGOXIN TOTAL: CPT

## 2017-08-24 PROCEDURE — 85025 COMPLETE CBC W/AUTO DIFF WBC: CPT

## 2017-08-24 PROCEDURE — 80053 COMPREHEN METABOLIC PANEL: CPT

## 2017-08-24 PROCEDURE — 84443 ASSAY THYROID STIM HORMONE: CPT

## 2017-08-24 PROCEDURE — 84443 ASSAY THYROID STIM HORMONE: CPT | Mod: 91

## 2017-08-24 PROCEDURE — 36415 COLL VENOUS BLD VENIPUNCTURE: CPT

## 2017-08-24 NOTE — PROGRESS NOTES
Subjective:   Heidi Alfaro is a 62 y.o. female who presents today as a follow-up for her hospitalization. During hospitalization she developed atrial fibrillation with RVR in the setting of pneumonia. Her pneumonia was idiopathic in etiology but she recovered. She did develop A. fib with RVR and was started on amiodarone and self converted. She was started on oral anticoagulation. Since her hospital discharge she's been jittery and nervous. She has not had her thyroid checked. She was told she should stop her medications but has not yet done so.    Past Medical History   Diagnosis Date   • Hypertension    • Diabetes (CMS-[facility])      pre diabetic   • Pulmonary HTN (CMS-[facility])    • CAD (coronary artery disease)    • A-fib (CMS-[facility])    • Pneumonia    • Dysphagia      Past Surgical History   Procedure Laterality Date   • Other       tonsils cataract     History reviewed. No pertinent family history.  History   Smoking status   • Never Smoker    Smokeless tobacco   • Never Used     No Known Allergies  Outpatient Encounter Prescriptions as of 8/23/2017   Medication Sig Dispense Refill   • losartan (COZAAR) 50 MG Tab Take 1 Tab by mouth every day. 30 Tab 11   • metoprolol (LOPRESSOR) 100 MG Tab Take 1 Tab by mouth 2 times a day. 60 Tab 11   • amlodipine (NORVASC) 10 MG Tab Take 1 Tab by mouth every day. 30 Tab 11   • apixaban (ELIQUIS) 5mg Tab Take 1 Tab by mouth 2 Times a Day. 60 Tab 11   • atorvastatin (LIPITOR) 40 MG Tab Take 1 Tab by mouth every evening. 30 Tab 11   • digoxin (LANOXIN) 125 MCG Tab Take 1 Tab by mouth every evening. 30 Tab 11   • ezetimibe-simvastatin (VYTORIN) 10-20 MG per tablet Take 1 Tab by mouth every day. 30 Tab 11   • furosemide (LASIX) 20 MG Tab Take 1 Tab by mouth 2 times a day. 60 Tab 11   • potassium chloride SA (KDUR) 20 MEQ Tab CR Take 1 Tab by mouth 2 times a day. 60 Tab 11   • amiodarone (CORDARONE) 200 MG Tab 200 mg by Per NG Tube route every day.     • nystatin (MYCOSTATIN) powder Apply 1  g to affected area(s) every day at 6 PM. Apply to groin     • potassium chloride (KLOR-CON) 20 MEQ Pack 20 mEq by Nasogastric route 2 times a day.     • levothyroxine (SYNTHROID) 88 MCG Tab 88 mcg by Nasogastric route Every morning on an empty stomach.     • metformin (GLUCOPHAGE) 500 MG Tab 500 mg by Per NG Tube route 2 times a day, with meals.     • [DISCONTINUED] atorvastatin (LIPITOR) 40 MG Tab 40 mg by Per NG Tube route every evening.     • clarithromycin (BIAXIN) 250 MG/5ML Recon Susp 500 mg by Nasogastric route 2 times a day. 10 day course started 6/29/17 for PNA     • [DISCONTINUED] digoxin (LANOXIN) 125 MCG Tab 125 mcg by Nasogastric route every evening.     • [DISCONTINUED] losartan (COZAAR) 50 MG Tab 50 mg by Nasogastric route every day.     • [DISCONTINUED] Metoprolol Tartrate 75 MG Tab 75 mg by Nasogastric route 3 times a day.     • [DISCONTINUED] furosemide (LASIX) 20 MG Tab 20 mg by Nasogastric route every 8 hours.     • insulin lispro (HUMALOG) 100 UNIT/ML Solution Inject 2-8 Units as instructed 4 Times a Day,Before Meals and at Bedtime. 0-199=0  200-250=2  251-300=4  301-350=6  351-400=8     • [DISCONTINUED] apixaban (ELIQUIS) 5mg Tab 5 mg by Per NG Tube route 2 Times a Day.     • [DISCONTINUED] amlodipine (NORVASC) 10 MG Tab 10 mg by Per NG Tube route every day.     • [DISCONTINUED] ezetimibe-simvastatin (VYTORIN) 10-20 MG per tablet 1 Tab by Nasogastric route every evening.       No facility-administered encounter medications on file as of 8/23/2017.     Review of Systems   Constitutional: Negative.  Negative for fever, chills and malaise/fatigue.   HENT: Negative.  Negative for sore throat.    Eyes: Negative.    Respiratory: Negative.  Negative for cough, hemoptysis, sputum production, shortness of breath, wheezing and stridor.    Cardiovascular: Negative.  Negative for chest pain, palpitations, orthopnea, claudication, leg swelling and PND.   Gastrointestinal: Negative.    Genitourinary:  "Negative.    Musculoskeletal: Negative.    Skin: Negative.    Neurological: Positive for tremors. Negative for dizziness, loss of consciousness and weakness.   Endo/Heme/Allergies: Negative.  Does not bruise/bleed easily.   All other systems reviewed and are negative.       Objective:   /70 mmHg  Pulse 68  Ht 1.651 m (5' 5\")  Wt 102.513 kg (226 lb)  BMI 37.61 kg/m2  SpO2 94%    Physical Exam   Constitutional: She is oriented to person, place, and time. She appears well-developed and well-nourished. No distress.   HENT:   Head: Normocephalic.   Mouth/Throat: Oropharynx is clear and moist.   Eyes: EOM are normal. Pupils are equal, round, and reactive to light. Right eye exhibits no discharge. Left eye exhibits no discharge. No scleral icterus.   Neck: Normal range of motion. Neck supple. No JVD present. No tracheal deviation present.   Cardiovascular: Normal rate, regular rhythm, S1 normal, S2 normal, normal heart sounds, intact distal pulses and normal pulses.  Exam reveals no gallop, no S3, no S4 and no friction rub.    No murmur heard.   No systolic murmur is present    No diastolic murmur is present   Pulses:       Carotid pulses are 2+ on the right side, and 2+ on the left side.       Radial pulses are 2+ on the right side, and 2+ on the left side.        Dorsalis pedis pulses are 2+ on the right side, and 2+ on the left side.        Posterior tibial pulses are 2+ on the right side, and 2+ on the left side.   Pulmonary/Chest: Effort normal and breath sounds normal. No respiratory distress. She has no wheezes. She has no rales.   Abdominal: Soft. Bowel sounds are normal. She exhibits no distension and no mass. There is no tenderness. There is no rebound and no guarding.   Musculoskeletal: She exhibits no edema.   Neurological: She is alert and oriented to person, place, and time. No cranial nerve deficit.   Skin: Skin is warm and dry. She is not diaphoretic. No pallor.   Psychiatric: She has a normal " mood and affect. Her behavior is normal. Judgment and thought content normal.   Nursing note and vitals reviewed.      Assessment:     1. Type 2 diabetes mellitus without complication, without long-term current use of insulin (CMS-[facility])  TSH WITH REFLEX TO FT4    losartan (COZAAR) 50 MG Tab    metoprolol (LOPRESSOR) 100 MG Tab    atorvastatin (LIPITOR) 40 MG Tab   2. Pulmonary hypertension (CMS-[facility])  TSH WITH REFLEX TO FT4    losartan (COZAAR) 50 MG Tab    metoprolol (LOPRESSOR) 100 MG Tab    amlodipine (NORVASC) 10 MG Tab   3. Paroxysmal atrial fibrillation (CMS-[facility])  TSH WITH REFLEX TO FT4    losartan (COZAAR) 50 MG Tab    apixaban (ELIQUIS) 5mg Tab    digoxin (LANOXIN) 125 MCG Tab    furosemide (LASIX) 20 MG Tab    potassium chloride SA (KDUR) 20 MEQ Tab CR   4. Coronary artery calcification seen on CT scan  losartan (COZAAR) 50 MG Tab    amlodipine (NORVASC) 10 MG Tab    apixaban (ELIQUIS) 5mg Tab    atorvastatin (LIPITOR) 40 MG Tab    digoxin (LANOXIN) 125 MCG Tab    ezetimibe-simvastatin (VYTORIN) 10-20 MG per tablet    furosemide (LASIX) 20 MG Tab    potassium chloride SA (KDUR) 20 MEQ Tab CR   5. Essential hypertension  losartan (COZAAR) 50 MG Tab    amlodipine (NORVASC) 10 MG Tab    apixaban (ELIQUIS) 5mg Tab    atorvastatin (LIPITOR) 40 MG Tab    digoxin (LANOXIN) 125 MCG Tab    ezetimibe-simvastatin (VYTORIN) 10-20 MG per tablet    furosemide (LASIX) 20 MG Tab    potassium chloride SA (KDUR) 20 MEQ Tab CR       Medical Decision Making:  Today's Assessment / Status / Plan:     62-year-old female with atrial fibrillation now converted to sinus rhythm on amiodarone. We will check her TSH to see if she is developing amiodarone-induced thyrotoxicosis. We will call her with the results. We will likely have her stop her amiodarone and follow her clinically. We will keep her on anticoagulation for now. For her dense three-vessel coronary disease seen on her CT scan we will keep her on statins.    Thank for you  allowing me to take part in your patient's care, please call should you have any questions or would like to discuss this patient.

## 2017-11-30 ENCOUNTER — OFFICE VISIT (OUTPATIENT)
Dept: CARDIOLOGY | Facility: MEDICAL CENTER | Age: 62
End: 2017-11-30
Payer: COMMERCIAL

## 2017-11-30 VITALS
WEIGHT: 228 LBS | HEART RATE: 68 BPM | OXYGEN SATURATION: 95 % | SYSTOLIC BLOOD PRESSURE: 140 MMHG | BODY MASS INDEX: 36.64 KG/M2 | DIASTOLIC BLOOD PRESSURE: 68 MMHG | HEIGHT: 66 IN

## 2017-11-30 DIAGNOSIS — I10 ESSENTIAL HYPERTENSION: ICD-10-CM

## 2017-11-30 DIAGNOSIS — I27.20 PULMONARY HYPERTENSION (HCC): ICD-10-CM

## 2017-11-30 DIAGNOSIS — E11.9 TYPE 2 DIABETES MELLITUS WITHOUT COMPLICATION, WITHOUT LONG-TERM CURRENT USE OF INSULIN (HCC): ICD-10-CM

## 2017-11-30 DIAGNOSIS — E06.3 HYPOTHYROIDISM DUE TO HASHIMOTO'S THYROIDITIS: ICD-10-CM

## 2017-11-30 DIAGNOSIS — I25.10 CORONARY ARTERY CALCIFICATION SEEN ON CT SCAN: ICD-10-CM

## 2017-11-30 DIAGNOSIS — I48.0 PAROXYSMAL ATRIAL FIBRILLATION (HCC): ICD-10-CM

## 2017-11-30 DIAGNOSIS — E03.8 HYPOTHYROIDISM DUE TO HASHIMOTO'S THYROIDITIS: ICD-10-CM

## 2017-11-30 PROCEDURE — 99214 OFFICE O/P EST MOD 30 MIN: CPT | Performed by: INTERNAL MEDICINE

## 2017-11-30 RX ORDER — LEVOTHYROXINE SODIUM 88 UG/1
88 TABLET ORAL
Qty: 30 TAB | Refills: 3 | Status: SHIPPED | OUTPATIENT
Start: 2017-11-30

## 2017-11-30 ASSESSMENT — ENCOUNTER SYMPTOMS
SHORTNESS OF BREATH: 0
PND: 0
SORE THROAT: 0
CHILLS: 0
NEUROLOGICAL NEGATIVE: 1
WHEEZING: 0
SPUTUM PRODUCTION: 0
CLAUDICATION: 0
WEAKNESS: 0
MUSCULOSKELETAL NEGATIVE: 1
PALPITATIONS: 0
CONSTITUTIONAL NEGATIVE: 1
BRUISES/BLEEDS EASILY: 0
FEVER: 0
LOSS OF CONSCIOUSNESS: 0
STRIDOR: 0
CARDIOVASCULAR NEGATIVE: 1
EYES NEGATIVE: 1
ORTHOPNEA: 0
RESPIRATORY NEGATIVE: 1
HEMOPTYSIS: 0
GASTROINTESTINAL NEGATIVE: 1
COUGH: 0
DIZZINESS: 0

## 2017-11-30 NOTE — PROGRESS NOTES
Subjective:   Heidi Alfaro is a 62 y.o. female who presents today As a follow-up for her atrial fibrillation. She is upset because she lost her primary care physician and he will refill either her metformin or her levothyroxine without her seeing him. They're not satisfied with how he's doing and would like to get a new primary care physician. In the meantime she is out of levothyroxine and metformin and is asking for refills for this today.  As for her atrial fibrillation she reports no palpitations PND orthopnea or lower extremity edema or shortness of breath. She did have her labs checked by her primary care physician but does not the results of them and they're not within our system. Otherwise she is doing well.    Past Medical History:   Diagnosis Date   • A-fib (CMS-HCC)    • CAD (coronary artery disease)    • Diabetes (CMS-HCC)     pre diabetic   • Dysphagia    • Hypertension    • Pneumonia    • Pulmonary HTN      Past Surgical History:   Procedure Laterality Date   • OTHER      tonsils cataract     History reviewed. No pertinent family history.  History   Smoking Status   • Never Smoker   Smokeless Tobacco   • Never Used     No Known Allergies  Outpatient Encounter Prescriptions as of 11/30/2017   Medication Sig Dispense Refill   • metformin (GLUCOPHAGE) 500 MG Tab 1 Tab by Per NG Tube route 2 times a day, with meals. 60 Tab 3   • levothyroxine (SYNTHROID) 88 MCG Tab Take 1 Tab by mouth Every morning on an empty stomach. 30 Tab 3   • losartan (COZAAR) 50 MG Tab Take 1 Tab by mouth every day. 30 Tab 11   • metoprolol (LOPRESSOR) 100 MG Tab Take 1 Tab by mouth 2 times a day. 60 Tab 11   • amlodipine (NORVASC) 10 MG Tab Take 1 Tab by mouth every day. 30 Tab 11   • apixaban (ELIQUIS) 5mg Tab Take 1 Tab by mouth 2 Times a Day. 60 Tab 11   • atorvastatin (LIPITOR) 40 MG Tab Take 1 Tab by mouth every evening. 30 Tab 11   • digoxin (LANOXIN) 125 MCG Tab Take 1 Tab by mouth every evening. 30 Tab 11   •  ezetimibe-simvastatin (VYTORIN) 10-20 MG per tablet Take 1 Tab by mouth every day. 30 Tab 11   • furosemide (LASIX) 20 MG Tab Take 1 Tab by mouth 2 times a day. 60 Tab 11   • potassium chloride SA (KDUR) 20 MEQ Tab CR Take 1 Tab by mouth 2 times a day. 60 Tab 11   • amiodarone (CORDARONE) 200 MG Tab 200 mg by Per NG Tube route every day.     • [DISCONTINUED] nystatin (MYCOSTATIN) powder Apply 1 g to affected area(s) every day at 6 PM. Apply to groin     • [DISCONTINUED] clarithromycin (BIAXIN) 250 MG/5ML Recon Susp 500 mg by Nasogastric route 2 times a day. 10 day course started 6/29/17 for PNA     • [DISCONTINUED] potassium chloride (KLOR-CON) 20 MEQ Pack 20 mEq by Nasogastric route 2 times a day.     • [DISCONTINUED] insulin lispro (HUMALOG) 100 UNIT/ML Solution Inject 2-8 Units as instructed 4 Times a Day,Before Meals and at Bedtime. 0-199=0  200-250=2  251-300=4  301-350=6  351-400=8     • [DISCONTINUED] levothyroxine (SYNTHROID) 88 MCG Tab 88 mcg by Nasogastric route Every morning on an empty stomach.     • [DISCONTINUED] metformin (GLUCOPHAGE) 500 MG Tab 500 mg by Per NG Tube route 2 times a day, with meals.       No facility-administered encounter medications on file as of 11/30/2017.      Review of Systems   Constitutional: Negative.  Negative for chills, fever and malaise/fatigue.   HENT: Negative.  Negative for sore throat.    Eyes: Negative.    Respiratory: Negative.  Negative for cough, hemoptysis, sputum production, shortness of breath, wheezing and stridor.    Cardiovascular: Negative.  Negative for chest pain, palpitations, orthopnea, claudication, leg swelling and PND.   Gastrointestinal: Negative.    Genitourinary: Negative.    Musculoskeletal: Negative.    Skin: Negative.    Neurological: Negative.  Negative for dizziness, loss of consciousness and weakness.   Endo/Heme/Allergies: Negative.  Does not bruise/bleed easily.   All other systems reviewed and are negative.       Objective:   /68    "Pulse 68   Ht 1.676 m (5' 6\")   Wt 103.4 kg (228 lb)   SpO2 95%   BMI 36.80 kg/m²     Physical Exam   Constitutional: She is oriented to person, place, and time. She appears well-developed and well-nourished. No distress.   HENT:   Head: Normocephalic.   Mouth/Throat: Oropharynx is clear and moist.   Eyes: EOM are normal. Pupils are equal, round, and reactive to light. Right eye exhibits no discharge. Left eye exhibits no discharge. No scleral icterus.   Neck: Normal range of motion. Neck supple. No JVD present. No tracheal deviation present.   Cardiovascular: Normal rate, regular rhythm, S1 normal, S2 normal, normal heart sounds, intact distal pulses and normal pulses.  Exam reveals no gallop, no S3, no S4 and no friction rub.    No murmur heard.   No systolic murmur is present    No diastolic murmur is present   Pulses:       Carotid pulses are 2+ on the right side, and 2+ on the left side.       Radial pulses are 2+ on the right side, and 2+ on the left side.        Dorsalis pedis pulses are 2+ on the right side, and 2+ on the left side.        Posterior tibial pulses are 2+ on the right side, and 2+ on the left side.   Pulmonary/Chest: Effort normal and breath sounds normal. No respiratory distress. She has no wheezes. She has no rales.   Abdominal: Soft. Bowel sounds are normal. She exhibits no distension and no mass. There is no tenderness. There is no rebound and no guarding.   Musculoskeletal: She exhibits no edema.   Neurological: She is alert and oriented to person, place, and time. No cranial nerve deficit.   Skin: Skin is warm and dry. She is not diaphoretic. No pallor.   Psychiatric: She has a normal mood and affect. Her behavior is normal. Judgment and thought content normal.   Nursing note and vitals reviewed.      Assessment:     1. Paroxysmal atrial fibrillation (CMS-HCC)     2. Coronary artery calcification seen on CT scan     3. Essential hypertension  metformin (GLUCOPHAGE) 500 MG Tab   4. " Pulmonary hypertension  levothyroxine (SYNTHROID) 88 MCG Tab   5. Type 2 diabetes mellitus without complication, without long-term current use of insulin (CMS-[facility])  metformin (GLUCOPHAGE) 500 MG Tab    levothyroxine (SYNTHROID) 88 MCG Tab   6. Hypothyroidism due to Hashimoto's thyroiditis  levothyroxine (SYNTHROID) 88 MCG Tab       Medical Decision Making:  Today's Assessment / Status / Plan:     62-year-old female with atrial fibrillation back in sinus rhythm. No changes or medical status today. I did refill her levothyroxine and her  Metformin. I will see her back in 6 months. Asked her to get a primary care physician in the meantime.      Thank for you allowing me to take part in your patient's care, please call should you have any questions or would like to discuss this patient.

## 2017-11-30 NOTE — LETTER
[facility]   [address], [facility] [address]  [place] [address]  Phone: [phone]  Fax: [phone]              Heidi Alfaro  1955    Encounter Date: 11/30/2017    Refugio Rosario M.D.          PROGRESS NOTE:  Subjective:   Heidi Alfaro is a 62 y.o. female who presents today As a follow-up for her atrial fibrillation. She is upset because she lost her primary care physician and he will refill either her metformin or her levothyroxine without her seeing him. They're not satisfied with how he's doing and would like to get a new primary care physician. In the meantime she is out of levothyroxine and metformin and is asking for refills for this today.  As for her atrial fibrillation she reports no palpitations PND orthopnea or lower extremity edema or shortness of breath. She did have her labs checked by her primary care physician but does not the results of them and they're not within our system. Otherwise she is doing well.    Past Medical History:   Diagnosis Date   • A-fib (CMS-[facility])    • CAD (coronary artery disease)    • Diabetes (CMS-[facility])     pre diabetic   • Dysphagia    • Hypertension    • Pneumonia    • Pulmonary HTN      Past Surgical History:   Procedure Laterality Date   • OTHER      tonsils cataract     History reviewed. No pertinent family history.  History   Smoking Status   • Never Smoker   Smokeless Tobacco   • Never Used     No Known Allergies  Outpatient Encounter Prescriptions as of 11/30/2017   Medication Sig Dispense Refill   • metformin (GLUCOPHAGE) 500 MG Tab 1 Tab by Per NG Tube route 2 times a day, with meals. 60 Tab 3   • levothyroxine (SYNTHROID) 88 MCG Tab Take 1 Tab by mouth Every morning on an empty stomach. 30 Tab 3   • losartan (COZAAR) 50 MG Tab Take 1 Tab by mouth every day. 30 Tab 11   • metoprolol (LOPRESSOR) 100 MG Tab Take 1 Tab by mouth 2 times a day. 60 Tab 11   • amlodipine (NORVASC) 10 MG Tab Take 1 Tab by mouth every  day. 30 Tab 11   • apixaban (ELIQUIS) 5mg Tab Take 1 Tab by mouth 2 Times a Day. 60 Tab 11   • atorvastatin (LIPITOR) 40 MG Tab Take 1 Tab by mouth every evening. 30 Tab 11   • digoxin (LANOXIN) 125 MCG Tab Take 1 Tab by mouth every evening. 30 Tab 11   • ezetimibe-simvastatin (VYTORIN) 10-20 MG per tablet Take 1 Tab by mouth every day. 30 Tab 11   • furosemide (LASIX) 20 MG Tab Take 1 Tab by mouth 2 times a day. 60 Tab 11   • potassium chloride SA (KDUR) 20 MEQ Tab CR Take 1 Tab by mouth 2 times a day. 60 Tab 11   • amiodarone (CORDARONE) 200 MG Tab 200 mg by Per NG Tube route every day.     • [DISCONTINUED] nystatin (MYCOSTATIN) powder Apply 1 g to affected area(s) every day at 6 PM. Apply to groin     • [DISCONTINUED] clarithromycin (BIAXIN) 250 MG/5ML Recon Susp 500 mg by Nasogastric route 2 times a day. 10 day course started 6/29/17 for PNA     • [DISCONTINUED] potassium chloride (KLOR-CON) 20 MEQ Pack 20 mEq by Nasogastric route 2 times a day.     • [DISCONTINUED] insulin lispro (HUMALOG) 100 UNIT/ML Solution Inject 2-8 Units as instructed 4 Times a Day,Before Meals and at Bedtime. 0-199=0  200-250=2  251-300=4  301-350=6  351-400=8     • [DISCONTINUED] levothyroxine (SYNTHROID) 88 MCG Tab 88 mcg by Nasogastric route Every morning on an empty stomach.     • [DISCONTINUED] metformin (GLUCOPHAGE) 500 MG Tab 500 mg by Per NG Tube route 2 times a day, with meals.       No facility-administered encounter medications on file as of 11/30/2017.      Review of Systems   Constitutional: Negative.  Negative for chills, fever and malaise/fatigue.   HENT: Negative.  Negative for sore throat.    Eyes: Negative.    Respiratory: Negative.  Negative for cough, hemoptysis, sputum production, shortness of breath, wheezing and stridor.    Cardiovascular: Negative.  Negative for chest pain, palpitations, orthopnea, claudication, leg swelling and PND.   Gastrointestinal: Negative.    Genitourinary: Negative.    Musculoskeletal:  "Negative.    Skin: Negative.    Neurological: Negative.  Negative for dizziness, loss of consciousness and weakness.   Endo/Heme/Allergies: Negative.  Does not bruise/bleed easily.   All other systems reviewed and are negative.       Objective:   /68   Pulse 68   Ht 1.676 m (5' 6\")   Wt 103.4 kg (228 lb)   SpO2 95%   BMI 36.80 kg/m²      Physical Exam   Constitutional: She is oriented to person, place, and time. She appears well-developed and well-nourished. No distress.   HENT:   Head: Normocephalic.   Mouth/Throat: Oropharynx is clear and moist.   Eyes: EOM are normal. Pupils are equal, round, and reactive to light. Right eye exhibits no discharge. Left eye exhibits no discharge. No scleral icterus.   Neck: Normal range of motion. Neck supple. No JVD present. No tracheal deviation present.   Cardiovascular: Normal rate, regular rhythm, S1 normal, S2 normal, normal heart sounds, intact distal pulses and normal pulses.  Exam reveals no gallop, no S3, no S4 and no friction rub.    No murmur heard.   No systolic murmur is present    No diastolic murmur is present   Pulses:       Carotid pulses are 2+ on the right side, and 2+ on the left side.       Radial pulses are 2+ on the right side, and 2+ on the left side.        Dorsalis pedis pulses are 2+ on the right side, and 2+ on the left side.        Posterior tibial pulses are 2+ on the right side, and 2+ on the left side.   Pulmonary/Chest: Effort normal and breath sounds normal. No respiratory distress. She has no wheezes. She has no rales.   Abdominal: Soft. Bowel sounds are normal. She exhibits no distension and no mass. There is no tenderness. There is no rebound and no guarding.   Musculoskeletal: She exhibits no edema.   Neurological: She is alert and oriented to person, place, and time. No cranial nerve deficit.   Skin: Skin is warm and dry. She is not diaphoretic. No pallor.   Psychiatric: She has a normal mood and affect. Her behavior is normal. " Judgment and thought content normal.   Nursing note and vitals reviewed.      Assessment:     1. Paroxysmal atrial fibrillation (CMS-HCC)     2. Coronary artery calcification seen on CT scan     3. Essential hypertension  metformin (GLUCOPHAGE) 500 MG Tab   4. Pulmonary hypertension  levothyroxine (SYNTHROID) 88 MCG Tab   5. Type 2 diabetes mellitus without complication, without long-term current use of insulin (CMS-HCC)  metformin (GLUCOPHAGE) 500 MG Tab    levothyroxine (SYNTHROID) 88 MCG Tab   6. Hypothyroidism due to Hashimoto's thyroiditis  levothyroxine (SYNTHROID) 88 MCG Tab       Medical Decision Making:  Today's Assessment / Status / Plan:     62-year-old female with atrial fibrillation back in sinus rhythm. No changes or medical status today. I did refill her levothyroxine and her  Metformin. I will see her back in 6 months. Asked her to get a primary care physician in the meantime.      Thank for you allowing me to take part in your patient's care, please call should you have any questions or would like to discuss this patient.      Jovan Zelaya M.D.  [address] [place] [id number]  VIA Facsimile: [phone]

## 2018-03-07 ENCOUNTER — HOSPITAL ENCOUNTER (OUTPATIENT)
Dept: LAB | Facility: MEDICAL CENTER | Age: 63
End: 2018-03-07
Attending: FAMILY MEDICINE
Payer: COMMERCIAL

## 2018-03-07 LAB
25(OH)D3 SERPL-MCNC: 33 NG/ML (ref 30–100)
ALBUMIN SERPL BCP-MCNC: 4.4 G/DL (ref 3.2–4.9)
ALBUMIN/GLOB SERPL: 1.5 G/DL
ALP SERPL-CCNC: 81 U/L (ref 30–99)
ALT SERPL-CCNC: 20 U/L (ref 2–50)
ANION GAP SERPL CALC-SCNC: 12 MMOL/L (ref 0–11.9)
AST SERPL-CCNC: 19 U/L (ref 12–45)
BASOPHILS # BLD AUTO: 0.7 % (ref 0–1.8)
BASOPHILS # BLD: 0.05 K/UL (ref 0–0.12)
BILIRUB SERPL-MCNC: 0.6 MG/DL (ref 0.1–1.5)
BUN SERPL-MCNC: 22 MG/DL (ref 8–22)
CALCIUM SERPL-MCNC: 10.1 MG/DL (ref 8.5–10.5)
CHLORIDE SERPL-SCNC: 102 MMOL/L (ref 96–112)
CHOLEST SERPL-MCNC: 127 MG/DL (ref 100–199)
CO2 SERPL-SCNC: 25 MMOL/L (ref 20–33)
CREAT SERPL-MCNC: 1.19 MG/DL (ref 0.5–1.4)
EOSINOPHIL # BLD AUTO: 0.2 K/UL (ref 0–0.51)
EOSINOPHIL NFR BLD: 2.6 % (ref 0–6.9)
ERYTHROCYTE [DISTWIDTH] IN BLOOD BY AUTOMATED COUNT: 45.1 FL (ref 35.9–50)
FOLATE SERPL-MCNC: >23.5 NG/ML
GLOBULIN SER CALC-MCNC: 2.9 G/DL (ref 1.9–3.5)
GLUCOSE SERPL-MCNC: 116 MG/DL (ref 65–99)
HCT VFR BLD AUTO: 40.3 % (ref 37–47)
HDLC SERPL-MCNC: 43 MG/DL
HGB BLD-MCNC: 12.8 G/DL (ref 12–16)
IMM GRANULOCYTES # BLD AUTO: 0.03 K/UL (ref 0–0.11)
IMM GRANULOCYTES NFR BLD AUTO: 0.4 % (ref 0–0.9)
LDLC SERPL CALC-MCNC: 51 MG/DL
LYMPHOCYTES # BLD AUTO: 1.47 K/UL (ref 1–4.8)
LYMPHOCYTES NFR BLD: 19.2 % (ref 22–41)
MCH RBC QN AUTO: 27.2 PG (ref 27–33)
MCHC RBC AUTO-ENTMCNC: 31.8 G/DL (ref 33.6–35)
MCV RBC AUTO: 85.7 FL (ref 81.4–97.8)
MONOCYTES # BLD AUTO: 0.71 K/UL (ref 0–0.85)
MONOCYTES NFR BLD AUTO: 9.3 % (ref 0–13.4)
NEUTROPHILS # BLD AUTO: 5.19 K/UL (ref 2–7.15)
NEUTROPHILS NFR BLD: 67.8 % (ref 44–72)
NRBC # BLD AUTO: 0 K/UL
NRBC BLD-RTO: 0 /100 WBC
PLATELET # BLD AUTO: 320 K/UL (ref 164–446)
PMV BLD AUTO: 10.7 FL (ref 9–12.9)
POTASSIUM SERPL-SCNC: 4 MMOL/L (ref 3.6–5.5)
PROT SERPL-MCNC: 7.3 G/DL (ref 6–8.2)
RBC # BLD AUTO: 4.7 M/UL (ref 4.2–5.4)
SODIUM SERPL-SCNC: 139 MMOL/L (ref 135–145)
T3FREE SERPL-MCNC: 2.47 PG/ML (ref 2.4–4.2)
T4 FREE SERPL-MCNC: 1.1 NG/DL (ref 0.53–1.43)
TRIGL SERPL-MCNC: 164 MG/DL (ref 0–149)
TSH SERPL DL<=0.005 MIU/L-ACNC: 0.72 UIU/ML (ref 0.38–5.33)
VIT B12 SERPL-MCNC: 390 PG/ML (ref 211–911)
WBC # BLD AUTO: 7.7 K/UL (ref 4.8–10.8)

## 2018-03-07 PROCEDURE — 80061 LIPID PANEL: CPT

## 2018-03-07 PROCEDURE — 80053 COMPREHEN METABOLIC PANEL: CPT

## 2018-03-07 PROCEDURE — 84439 ASSAY OF FREE THYROXINE: CPT

## 2018-03-07 PROCEDURE — 84443 ASSAY THYROID STIM HORMONE: CPT

## 2018-03-07 PROCEDURE — 84481 FREE ASSAY (FT-3): CPT

## 2018-03-07 PROCEDURE — 82746 ASSAY OF FOLIC ACID SERUM: CPT

## 2018-03-07 PROCEDURE — 85025 COMPLETE CBC W/AUTO DIFF WBC: CPT

## 2018-03-07 PROCEDURE — 82306 VITAMIN D 25 HYDROXY: CPT

## 2018-03-07 PROCEDURE — 36415 COLL VENOUS BLD VENIPUNCTURE: CPT

## 2018-03-07 PROCEDURE — 82607 VITAMIN B-12: CPT

## 2018-03-07 PROCEDURE — 86800 THYROGLOBULIN ANTIBODY: CPT

## 2018-03-08 LAB — THYROGLOB AB SERPL-ACNC: <0.9 IU/ML (ref 0–4)

## 2018-04-25 ENCOUNTER — TELEPHONE (OUTPATIENT)
Dept: CARDIOLOGY | Facility: MEDICAL CENTER | Age: 63
End: 2018-04-25

## 2018-04-25 NOTE — LETTER
PROCEDURE CLEARANCE FORM      Encounter Date: 4/25/2018    Patient: Heidi Alfaro  YOB: 1955    CARDIOLOGIST:  Refugio Rosario M.D.    REFERRING DOCTOR:  [facility]    Patient does not have a PPM or AICD.  No stents or implants.      The above patient may hold Eliquis for 48 hours prior to teeth extractions .    Please call with any questions, [phone].                                                         MD Radha Rosario M.D.

## 2018-04-25 NOTE — TELEPHONE ENCOUNTER
dental office calling for instructions   Received: Today   Message Contents   TRI Langston/Rosi Lopez @ [facility] is scheduling patient for future treatments and needs to know what heart medications the patient is taking. She also needs to know if pre-meds are required and if there are any special instructions. She can be reached at [phone]      ________________________________________________________________________    Attempted to contact office, office is closed, LVM to call back.

## 2018-04-26 NOTE — TELEPHONE ENCOUNTER
returning your call   Received: Today   Message Contents   Usha Alexis R.N.   Phone Number: [phone]             RO/rosi Pak, [facility], returning your call from last evening, Jessica is asking you to call her on her cell this morning .      _______________________________    Attempted to contact Jessica at [facility], [facility] to call back.     ________________________________    Dentist office returning call   Received: Today   Message Contents   TRI Yusuf/Rosi Lopez at [facility] at [phone] or [phone] is returning your call.      ______________________________________________    Contacted Jessica KWONG.  Discussed patient's chart. Jessica KWONG is requesting a list of medication.  Office is inquiring if patient needs pre-meds.  Patient has no implants/stents. Explained no pre-meds required.  Jessica KWONG is also inquiring if patient has any restrictions to anesthesia with Epi    Med list to be faxed to [phone]    Dr. Rosario to confirm/advise

## 2018-05-01 ENCOUNTER — OFFICE VISIT (OUTPATIENT)
Dept: CARDIOLOGY | Facility: MEDICAL CENTER | Age: 63
End: 2018-05-01
Payer: COMMERCIAL

## 2018-05-01 VITALS
WEIGHT: 206 LBS | HEIGHT: 65 IN | SYSTOLIC BLOOD PRESSURE: 140 MMHG | HEART RATE: 60 BPM | BODY MASS INDEX: 34.32 KG/M2 | OXYGEN SATURATION: 92 % | DIASTOLIC BLOOD PRESSURE: 78 MMHG

## 2018-05-01 DIAGNOSIS — E11.9 TYPE 2 DIABETES MELLITUS WITHOUT COMPLICATION, WITHOUT LONG-TERM CURRENT USE OF INSULIN (HCC): ICD-10-CM

## 2018-05-01 DIAGNOSIS — I48.0 PAROXYSMAL ATRIAL FIBRILLATION (HCC): ICD-10-CM

## 2018-05-01 DIAGNOSIS — I25.10 CORONARY ARTERY CALCIFICATION SEEN ON CT SCAN: ICD-10-CM

## 2018-05-01 DIAGNOSIS — I27.20 PULMONARY HYPERTENSION (HCC): ICD-10-CM

## 2018-05-01 DIAGNOSIS — I10 ESSENTIAL HYPERTENSION: ICD-10-CM

## 2018-05-01 PROCEDURE — 99214 OFFICE O/P EST MOD 30 MIN: CPT | Performed by: INTERNAL MEDICINE

## 2018-05-01 ASSESSMENT — ENCOUNTER SYMPTOMS
CHILLS: 0
HEMOPTYSIS: 0
COUGH: 0
STRIDOR: 0
PALPITATIONS: 0
RESPIRATORY NEGATIVE: 1
SPUTUM PRODUCTION: 0
EYES NEGATIVE: 1
MUSCULOSKELETAL NEGATIVE: 1
BRUISES/BLEEDS EASILY: 0
WEAKNESS: 0
CARDIOVASCULAR NEGATIVE: 1
GASTROINTESTINAL NEGATIVE: 1
CONSTITUTIONAL NEGATIVE: 1
SHORTNESS OF BREATH: 0
ORTHOPNEA: 0
DIZZINESS: 0
PND: 0
CLAUDICATION: 0
LOSS OF CONSCIOUSNESS: 0
NEUROLOGICAL NEGATIVE: 1
SORE THROAT: 0
FEVER: 0
WHEEZING: 0

## 2018-05-01 NOTE — PROGRESS NOTES
Chief Complaint   Patient presents with   • Atrial Fibrillation   • HTN (Controlled)       Subjective:   Heidi Alfaro is a 62 y.o. female who presents today as follow-up for her atrial fibrillation hypertension and CAD. She continues to be asymptomatic from her A. fib. She is not having chest pain palpitations or PND. Her lipids are at goal. Having her teeth pulled in a couple of weeks. She is asking for directions and would do with oral anticoagulation.    Past Medical History:   Diagnosis Date   • A-fib (HCC)    • CAD (coronary artery disease)    • Diabetes (HCC)     pre diabetic   • Dysphagia    • Hypertension    • Pneumonia    • Pulmonary HTN (HCC)      Past Surgical History:   Procedure Laterality Date   • OTHER      tonsils cataract     History reviewed. No pertinent family history.  Social History     Social History   • Marital status: Single     Spouse name: N/A   • Number of children: N/A   • Years of education: N/A     Occupational History   • Not on file.     Social History Main Topics   • Smoking status: Never Smoker   • Smokeless tobacco: Never Used   • Alcohol use No   • Drug use: No   • Sexual activity: Not on file     Other Topics Concern   • Not on file     Social History Narrative   • No narrative on file     No Known Allergies  Outpatient Encounter Prescriptions as of 5/1/2018   Medication Sig Dispense Refill   • metformin (GLUCOPHAGE) 500 MG Tab 1 Tab by Per NG Tube route 2 times a day, with meals. 60 Tab 3   • levothyroxine (SYNTHROID) 88 MCG Tab Take 1 Tab by mouth Every morning on an empty stomach. 30 Tab 3   • losartan (COZAAR) 50 MG Tab Take 1 Tab by mouth every day. 30 Tab 11   • metoprolol (LOPRESSOR) 100 MG Tab Take 1 Tab by mouth 2 times a day. (Patient taking differently: Take 75 mg by mouth 3 times a day.) 60 Tab 11   • amlodipine (NORVASC) 10 MG Tab Take 1 Tab by mouth every day. 30 Tab 11   • apixaban (ELIQUIS) 5mg Tab Take 1 Tab by mouth 2 Times a Day. 60 Tab 11   • digoxin  "(LANOXIN) 125 MCG Tab Take 1 Tab by mouth every evening. 30 Tab 11   • potassium chloride SA (KDUR) 20 MEQ Tab CR Take 1 Tab by mouth 2 times a day. 60 Tab 11   • amiodarone (CORDARONE) 200 MG Tab 200 mg by Per NG Tube route every day.     • [DISCONTINUED] atorvastatin (LIPITOR) 40 MG Tab Take 1 Tab by mouth every evening. (Patient not taking: Reported on 5/1/2018) 30 Tab 11   • [DISCONTINUED] ezetimibe-simvastatin (VYTORIN) 10-20 MG per tablet Take 1 Tab by mouth every day. (Patient not taking: Reported on 5/1/2018) 30 Tab 11   • [DISCONTINUED] furosemide (LASIX) 20 MG Tab Take 1 Tab by mouth 2 times a day. (Patient taking differently: Take 20 mg by mouth every day.) 60 Tab 11     No facility-administered encounter medications on file as of 5/1/2018.      Review of Systems   Constitutional: Negative.  Negative for chills, fever and malaise/fatigue.   HENT: Negative.  Negative for sore throat.    Eyes: Negative.    Respiratory: Negative.  Negative for cough, hemoptysis, sputum production, shortness of breath, wheezing and stridor.    Cardiovascular: Negative.  Negative for chest pain, palpitations, orthopnea, claudication, leg swelling and PND.   Gastrointestinal: Negative.    Genitourinary: Negative.    Musculoskeletal: Negative.    Skin: Negative.    Neurological: Negative.  Negative for dizziness, loss of consciousness and weakness.   Endo/Heme/Allergies: Negative.  Does not bruise/bleed easily.   All other systems reviewed and are negative.       Objective:   /78   Pulse 60   Ht 1.651 m (5' 5\")   Wt 93.4 kg (206 lb)   SpO2 92%   BMI 34.28 kg/m²     Physical Exam   Constitutional: She appears well-developed and well-nourished. No distress.   HENT:   Head: Normocephalic and atraumatic.   Right Ear: External ear normal.   Left Ear: External ear normal.   Nose: Nose normal.   Mouth/Throat: No oropharyngeal exudate.   Eyes: Conjunctivae and EOM are normal. Pupils are equal, round, and reactive to light. " Right eye exhibits no discharge. Left eye exhibits no discharge. No scleral icterus.   Neck: Neck supple. No JVD present.   Cardiovascular: Normal rate, regular rhythm and intact distal pulses.  Exam reveals no gallop and no friction rub.    No murmur heard.  Pulmonary/Chest: Effort normal. No stridor. No respiratory distress. She has no wheezes. She has no rales. She exhibits no tenderness.   Abdominal: Soft. She exhibits no distension. There is no guarding.   Musculoskeletal: Normal range of motion. She exhibits no edema, tenderness or deformity.   Neurological: She is alert. She has normal reflexes. She displays normal reflexes. No cranial nerve deficit. She exhibits normal muscle tone. Coordination normal.   Skin: Skin is warm and dry. No rash noted. She is not diaphoretic. No erythema. No pallor.   Psychiatric: She has a normal mood and affect. Her behavior is normal. Judgment and thought content normal.   Nursing note and vitals reviewed.    Lab Results   Component Value Date/Time    CHOLSTRLTOT 127 03/07/2018 06:08 AM    LDL 51 03/07/2018 06:08 AM    HDL 43 03/07/2018 06:08 AM    TRIGLYCERIDE 164 (H) 03/07/2018 06:08 AM       Lab Results   Component Value Date/Time    SODIUM 139 03/07/2018 06:08 AM    POTASSIUM 4.0 03/07/2018 06:08 AM    CHLORIDE 102 03/07/2018 06:08 AM    CO2 25 03/07/2018 06:08 AM    GLUCOSE 116 (H) 03/07/2018 06:08 AM    BUN 22 03/07/2018 06:08 AM    CREATININE 1.19 03/07/2018 06:08 AM     Lab Results   Component Value Date/Time    ALKPHOSPHAT 81 03/07/2018 06:08 AM    ASTSGOT 19 03/07/2018 06:08 AM    ALTSGPT 20 03/07/2018 06:08 AM    TBILIRUBIN 0.6 03/07/2018 06:08 AM        Assessment:     1. Pulmonary hypertension (HCC)     2. Type 2 diabetes mellitus without complication, without long-term current use of insulin (HCC)     3. Essential hypertension     4. Coronary artery calcification seen on CT scan     5. Paroxysmal atrial fibrillation (HCC)         Medical Decision Making:  Today's  Assessment / Status / Plan:     62-year-old female with A. fib in sinus rhythm today. I gave her directions on what to do with her oral anticoagulation prior to her teeth removal. Otherwise we will keep her on the same medication she's been on. She is clinically stable. We will see her back in 6 months.    Thank for you allowing me to take part in your patient's care, please call should you have any questions or would like to discuss this patient.

## 2018-05-01 NOTE — LETTER
[facility]   [address]  [place] [address]  Phone: [phone]  Fax: [phone]              Heidi Alfaro  1955    Encounter Date: 5/1/2018    Refugio Rosario M.D.          PROGRESS NOTE:  Chief Complaint   Patient presents with   • Atrial Fibrillation   • HTN (Controlled)       Subjective:   Heidi Alfaro is a 62 y.o. female who presents today as follow-up for her atrial fibrillation hypertension and CAD. She continues to be asymptomatic from her A. fib. She is not having chest pain palpitations or PND. Her lipids are at goal. Having her teeth pulled in a couple of weeks. She is asking for directions and would do with oral anticoagulation.    Past Medical History:   Diagnosis Date   • A-fib (HCC)    • CAD (coronary artery disease)    • Diabetes (HCC)     pre diabetic   • Dysphagia    • Hypertension    • Pneumonia    • Pulmonary HTN (HCC)      Past Surgical History:   Procedure Laterality Date   • OTHER      tonsils cataract     History reviewed. No pertinent family history.  Social History     Social History   • Marital status: Single     Spouse name: N/A   • Number of children: N/A   • Years of education: N/A     Occupational History   • Not on file.     Social History Main Topics   • Smoking status: Never Smoker   • Smokeless tobacco: Never Used   • Alcohol use No   • Drug use: No   • Sexual activity: Not on file     Other Topics Concern   • Not on file     Social History Narrative   • No narrative on file     No Known Allergies  Outpatient Encounter Prescriptions as of 5/1/2018   Medication Sig Dispense Refill   • metformin (GLUCOPHAGE) 500 MG Tab 1 Tab by Per NG Tube route 2 times a day, with meals. 60 Tab 3   • levothyroxine (SYNTHROID) 88 MCG Tab Take 1 Tab by mouth Every morning on an empty stomach. 30 Tab 3   • losartan (COZAAR) 50 MG Tab Take 1 Tab by mouth every day. 30 Tab 11   • metoprolol (LOPRESSOR) 100 MG Tab Take 1 Tab by  "mouth 2 times a day. (Patient taking differently: Take 75 mg by mouth 3 times a day.) 60 Tab 11   • amlodipine (NORVASC) 10 MG Tab Take 1 Tab by mouth every day. 30 Tab 11   • apixaban (ELIQUIS) 5mg Tab Take 1 Tab by mouth 2 Times a Day. 60 Tab 11   • digoxin (LANOXIN) 125 MCG Tab Take 1 Tab by mouth every evening. 30 Tab 11   • potassium chloride SA (KDUR) 20 MEQ Tab CR Take 1 Tab by mouth 2 times a day. 60 Tab 11   • amiodarone (CORDARONE) 200 MG Tab 200 mg by Per NG Tube route every day.     • [DISCONTINUED] atorvastatin (LIPITOR) 40 MG Tab Take 1 Tab by mouth every evening. (Patient not taking: Reported on 5/1/2018) 30 Tab 11   • [DISCONTINUED] ezetimibe-simvastatin (VYTORIN) 10-20 MG per tablet Take 1 Tab by mouth every day. (Patient not taking: Reported on 5/1/2018) 30 Tab 11   • [DISCONTINUED] furosemide (LASIX) 20 MG Tab Take 1 Tab by mouth 2 times a day. (Patient taking differently: Take 20 mg by mouth every day.) 60 Tab 11     No facility-administered encounter medications on file as of 5/1/2018.      Review of Systems   Constitutional: Negative.  Negative for chills, fever and malaise/fatigue.   HENT: Negative.  Negative for sore throat.    Eyes: Negative.    Respiratory: Negative.  Negative for cough, hemoptysis, sputum production, shortness of breath, wheezing and stridor.    Cardiovascular: Negative.  Negative for chest pain, palpitations, orthopnea, claudication, leg swelling and PND.   Gastrointestinal: Negative.    Genitourinary: Negative.    Musculoskeletal: Negative.    Skin: Negative.    Neurological: Negative.  Negative for dizziness, loss of consciousness and weakness.   Endo/Heme/Allergies: Negative.  Does not bruise/bleed easily.   All other systems reviewed and are negative.       Objective:   /78   Pulse 60   Ht 1.651 m (5' 5\")   Wt 93.4 kg (206 lb)   SpO2 92%   BMI 34.28 kg/m²      Physical Exam   Constitutional: She appears well-developed and well-nourished. No distress.   "   HENT:   Head: Normocephalic and atraumatic.   Right Ear: External ear normal.   Left Ear: External ear normal.   Nose: Nose normal.   Mouth/Throat: No oropharyngeal exudate.   Eyes: Conjunctivae and EOM are normal. Pupils are equal, round, and reactive to light. Right eye exhibits no discharge. Left eye exhibits no discharge. No scleral icterus.   Neck: Neck supple. No JVD present.   Cardiovascular: Normal rate, regular rhythm and intact distal pulses.  Exam reveals no gallop and no friction rub.    No murmur heard.  Pulmonary/Chest: Effort normal. No stridor. No respiratory distress. She has no wheezes. She has no rales. She exhibits no tenderness.   Abdominal: Soft. She exhibits no distension. There is no guarding.   Musculoskeletal: Normal range of motion. She exhibits no edema, tenderness or deformity.   Neurological: She is alert. She has normal reflexes. She displays normal reflexes. No cranial nerve deficit. She exhibits normal muscle tone. Coordination normal.   Skin: Skin is warm and dry. No rash noted. She is not diaphoretic. No erythema. No pallor.   Psychiatric: She has a normal mood and affect. Her behavior is normal. Judgment and thought content normal.   Nursing note and vitals reviewed.    Lab Results   Component Value Date/Time    CHOLSTRLTOT 127 03/07/2018 06:08 AM    LDL 51 03/07/2018 06:08 AM    HDL 43 03/07/2018 06:08 AM    TRIGLYCERIDE 164 (H) 03/07/2018 06:08 AM       Lab Results   Component Value Date/Time    SODIUM 139 03/07/2018 06:08 AM    POTASSIUM 4.0 03/07/2018 06:08 AM    CHLORIDE 102 03/07/2018 06:08 AM    CO2 25 03/07/2018 06:08 AM    GLUCOSE 116 (H) 03/07/2018 06:08 AM    BUN 22 03/07/2018 06:08 AM    CREATININE 1.19 03/07/2018 06:08 AM     Lab Results   Component Value Date/Time    ALKPHOSPHAT 81 03/07/2018 06:08 AM    ASTSGOT 19 03/07/2018 06:08 AM    ALTSGPT 20 03/07/2018 06:08 AM    TBILIRUBIN 0.6 03/07/2018 06:08 AM        Assessment:     1. Pulmonary hypertension (HCC)      2. Type 2 diabetes mellitus without complication, without long-term current use of insulin (HCC)     3. Essential hypertension     4. Coronary artery calcification seen on CT scan     5. Paroxysmal atrial fibrillation (HCC)         Medical Decision Making:  Today's Assessment / Status / Plan:     62-year-old female with A. fib in sinus rhythm today. I gave her directions on what to do with her oral anticoagulation prior to her teeth removal. Otherwise we will keep her on the same medication she's been on. She is clinically stable. We will see her back in 6 months.    Thank for you allowing me to take part in your patient's care, please call should you have any questions or would like to discuss this patient.      Suhas Patel M.D.  [address] [address]  VIA Facsimile: [phone]

## 2018-05-02 NOTE — TELEPHONE ENCOUNTER
Refugio Rosario M.D.   You 6 days ago     Why are they calling?  She's fine to clear her teeth. (Routing comment)      _________________________________________    Patient now needs teeth extractions/upper dentures.    Patient is on Eliquis.      To Dr. Rosario to advise  _________________________________________    Message   Received: Today   Message Contents   ERAN Real, R.N.   Caller: Unspecified (1 week ago,  3:47 PM)             I agree probably dentures        Letter generated and faxed to #[phone]

## 2018-05-03 ENCOUNTER — TELEPHONE (OUTPATIENT)
Dept: CARDIOLOGY | Facility: MEDICAL CENTER | Age: 63
End: 2018-05-03

## 2018-05-03 NOTE — TELEPHONE ENCOUNTER
needs to correct her medication list   Received: Today   Message Contents   TRI Langston/Rosi       Patient would like to discuss her medications with you. She had an appt on 05/01 and told the MA wrong information on her meds. She can be reached at [phone]      Contacted patient, she states she told MA that she was no longer taking Vytorin, however she made a mistake.  She is taking it.    MAR updated.

## 2018-08-17 DIAGNOSIS — Z79.899 HIGH RISK MEDICATION USE: ICD-10-CM

## 2018-08-17 DIAGNOSIS — I48.0 PAROXYSMAL ATRIAL FIBRILLATION (HCC): ICD-10-CM

## 2018-08-17 DIAGNOSIS — I27.20 PULMONARY HYPERTENSION (HCC): ICD-10-CM

## 2018-08-17 DIAGNOSIS — I25.10 CORONARY ARTERY CALCIFICATION SEEN ON CT SCAN: ICD-10-CM

## 2018-08-17 DIAGNOSIS — E11.9 TYPE 2 DIABETES MELLITUS WITHOUT COMPLICATION, WITHOUT LONG-TERM CURRENT USE OF INSULIN (HCC): ICD-10-CM

## 2018-08-17 DIAGNOSIS — I10 ESSENTIAL HYPERTENSION: ICD-10-CM

## 2018-08-17 RX ORDER — POTASSIUM CHLORIDE 20 MEQ/1
20 TABLET, EXTENDED RELEASE ORAL 2 TIMES DAILY
Qty: 180 TAB | Refills: 3 | Status: CANCELLED | OUTPATIENT
Start: 2018-08-17

## 2018-08-17 RX ORDER — LOSARTAN POTASSIUM 50 MG/1
50 TABLET ORAL DAILY
Qty: 30 TAB | Refills: 11 | Status: SHIPPED | OUTPATIENT
Start: 2018-08-17 | End: 2018-11-21 | Stop reason: SDUPTHER

## 2018-08-17 RX ORDER — DIGOXIN 125 MCG
125 TABLET ORAL EVERY EVENING
Qty: 90 TAB | Refills: 3 | Status: SHIPPED | OUTPATIENT
Start: 2018-08-17 | End: 2019-05-22 | Stop reason: SDUPTHER

## 2018-08-17 RX ORDER — AMLODIPINE BESYLATE 10 MG/1
10 TABLET ORAL DAILY
Qty: 90 TAB | Refills: 3 | Status: CANCELLED | OUTPATIENT
Start: 2018-08-17

## 2018-08-17 RX ORDER — DIGOXIN 125 MCG
125 TABLET ORAL EVERY EVENING
Qty: 90 TAB | Refills: 3 | Status: CANCELLED | OUTPATIENT
Start: 2018-08-17

## 2018-08-17 RX ORDER — LOSARTAN POTASSIUM 50 MG/1
50 TABLET ORAL DAILY
Qty: 90 TAB | Refills: 3 | Status: CANCELLED | OUTPATIENT
Start: 2018-08-17

## 2018-08-17 RX ORDER — AMLODIPINE BESYLATE 10 MG/1
10 TABLET ORAL DAILY
Qty: 90 TAB | Refills: 3 | Status: SHIPPED | OUTPATIENT
Start: 2018-08-17 | End: 2019-05-22 | Stop reason: SDUPTHER

## 2018-08-17 RX ORDER — METOPROLOL TARTRATE 100 MG/1
100 TABLET ORAL 2 TIMES DAILY
Qty: 180 TAB | Refills: 3 | Status: SHIPPED | OUTPATIENT
Start: 2018-08-17 | End: 2019-05-22 | Stop reason: SDUPTHER

## 2018-08-17 RX ORDER — POTASSIUM CHLORIDE 20 MEQ/1
20 TABLET, EXTENDED RELEASE ORAL 2 TIMES DAILY
Qty: 180 TAB | Refills: 3 | Status: SHIPPED | OUTPATIENT
Start: 2018-08-17 | End: 2019-05-22 | Stop reason: SDUPTHER

## 2018-08-17 RX ORDER — METOPROLOL TARTRATE 100 MG/1
100 TABLET ORAL 2 TIMES DAILY
Qty: 180 TAB | Refills: 3 | Status: CANCELLED | OUTPATIENT
Start: 2018-08-17

## 2018-08-18 DIAGNOSIS — I25.10 CORONARY ARTERY CALCIFICATION SEEN ON CT SCAN: ICD-10-CM

## 2018-08-18 DIAGNOSIS — I48.0 PAROXYSMAL ATRIAL FIBRILLATION (HCC): ICD-10-CM

## 2018-08-18 DIAGNOSIS — I10 ESSENTIAL HYPERTENSION: ICD-10-CM

## 2018-08-20 ENCOUNTER — TELEPHONE (OUTPATIENT)
Dept: CARDIOLOGY | Facility: MEDICAL CENTER | Age: 63
End: 2018-08-20

## 2018-08-20 NOTE — TELEPHONE ENCOUNTER
patient calling to discuss her medications   Received: Today   Message Contents   TRI Langston       Patient is calling to discuss her medications. SHe said pharmacist told her he couldn't refill her Vytorin and Lasix medications because she is no longer taking the medication, but she said she is still taking it. She can be reached at [phone]      wants to clarify which medication's she's to be taking   Received: 3 days ago   Message Contents   Rhoda Alexis R.N.   Phone Number: [phone]             Ale     Pt wants to clarify which medication's she's to be taking as she needs several refills. Please call pt at [phone]        Per patient she still takes these medications:    Lasix 20 mg twice a day  Vitorin 10/20 mg Q vale    Originally she had told MA at last OV she wasn't taking these medications, but she was wrong.     Last OV 5/1/18      To RO - OK to reorder the above meds?

## 2018-08-22 RX ORDER — FUROSEMIDE 20 MG/1
TABLET ORAL
Qty: 180 TAB | Refills: 3 | Status: SHIPPED | OUTPATIENT
Start: 2018-08-22 | End: 2019-05-22 | Stop reason: SDUPTHER

## 2018-08-22 RX ORDER — EZETIMIBE AND SIMVASTATIN 10; 20 MG/1; MG/1
TABLET ORAL
Qty: 90 TAB | Refills: 3 | Status: SHIPPED | OUTPATIENT
Start: 2018-08-22 | End: 2019-05-22 | Stop reason: SDUPTHER

## 2018-10-10 ENCOUNTER — HOSPITAL ENCOUNTER (OUTPATIENT)
Dept: LAB | Facility: MEDICAL CENTER | Age: 63
End: 2018-10-10
Attending: FAMILY MEDICINE
Payer: COMMERCIAL

## 2018-10-10 ENCOUNTER — HOSPITAL ENCOUNTER (OUTPATIENT)
Dept: LAB | Facility: MEDICAL CENTER | Age: 63
End: 2018-10-10
Attending: INTERNAL MEDICINE
Payer: COMMERCIAL

## 2018-10-10 DIAGNOSIS — I10 ESSENTIAL HYPERTENSION: ICD-10-CM

## 2018-10-10 DIAGNOSIS — E11.9 TYPE 2 DIABETES MELLITUS WITHOUT COMPLICATION, WITHOUT LONG-TERM CURRENT USE OF INSULIN (HCC): ICD-10-CM

## 2018-10-10 DIAGNOSIS — I25.10 CORONARY ARTERY CALCIFICATION SEEN ON CT SCAN: ICD-10-CM

## 2018-10-10 DIAGNOSIS — Z79.899 HIGH RISK MEDICATION USE: ICD-10-CM

## 2018-10-10 DIAGNOSIS — I48.0 PAROXYSMAL ATRIAL FIBRILLATION (HCC): ICD-10-CM

## 2018-10-10 LAB
25(OH)D3 SERPL-MCNC: 71 NG/ML (ref 30–100)
ALBUMIN SERPL BCP-MCNC: 4.6 G/DL (ref 3.2–4.9)
ALBUMIN/GLOB SERPL: 1.7 G/DL
ALP SERPL-CCNC: 77 U/L (ref 30–99)
ALT SERPL-CCNC: 21 U/L (ref 2–50)
ANION GAP SERPL CALC-SCNC: 11 MMOL/L (ref 0–11.9)
AST SERPL-CCNC: 17 U/L (ref 12–45)
BASOPHILS # BLD AUTO: 1 % (ref 0–1.8)
BASOPHILS # BLD: 0.06 K/UL (ref 0–0.12)
BILIRUB SERPL-MCNC: 0.6 MG/DL (ref 0.1–1.5)
BUN SERPL-MCNC: 20 MG/DL (ref 8–22)
CALCIUM SERPL-MCNC: 10.1 MG/DL (ref 8.5–10.5)
CHLORIDE SERPL-SCNC: 102 MMOL/L (ref 96–112)
CHOLEST SERPL-MCNC: 120 MG/DL (ref 100–199)
CO2 SERPL-SCNC: 25 MMOL/L (ref 20–33)
CREAT SERPL-MCNC: 1.28 MG/DL (ref 0.5–1.4)
DIGOXIN SERPL-MCNC: 0.7 NG/ML (ref 0.8–2)
EOSINOPHIL # BLD AUTO: 0.09 K/UL (ref 0–0.51)
EOSINOPHIL NFR BLD: 1.5 % (ref 0–6.9)
ERYTHROCYTE [DISTWIDTH] IN BLOOD BY AUTOMATED COUNT: 41.8 FL (ref 35.9–50)
ERYTHROCYTE [DISTWIDTH] IN BLOOD BY AUTOMATED COUNT: 42.6 FL (ref 35.9–50)
EST. AVERAGE GLUCOSE BLD GHB EST-MCNC: 140 MG/DL
FASTING STATUS PATIENT QL REPORTED: NORMAL
GLOBULIN SER CALC-MCNC: 2.7 G/DL (ref 1.9–3.5)
GLUCOSE SERPL-MCNC: 127 MG/DL (ref 65–99)
HBA1C MFR BLD: 6.5 % (ref 0–5.6)
HCT VFR BLD AUTO: 38.6 % (ref 37–47)
HCT VFR BLD AUTO: 39 % (ref 37–47)
HDLC SERPL-MCNC: 43 MG/DL
HGB BLD-MCNC: 12.3 G/DL (ref 12–16)
HGB BLD-MCNC: 12.5 G/DL (ref 12–16)
IMM GRANULOCYTES # BLD AUTO: 0.03 K/UL (ref 0–0.11)
IMM GRANULOCYTES NFR BLD AUTO: 0.5 % (ref 0–0.9)
LDLC SERPL CALC-MCNC: 54 MG/DL
LYMPHOCYTES # BLD AUTO: 1.32 K/UL (ref 1–4.8)
LYMPHOCYTES NFR BLD: 22.4 % (ref 22–41)
MCH RBC QN AUTO: 27.2 PG (ref 27–33)
MCH RBC QN AUTO: 28.2 PG (ref 27–33)
MCHC RBC AUTO-ENTMCNC: 31.5 G/DL (ref 33.6–35)
MCHC RBC AUTO-ENTMCNC: 32.4 G/DL (ref 33.6–35)
MCV RBC AUTO: 86.3 FL (ref 81.4–97.8)
MCV RBC AUTO: 87.1 FL (ref 81.4–97.8)
MONOCYTES # BLD AUTO: 0.5 K/UL (ref 0–0.85)
MONOCYTES NFR BLD AUTO: 8.5 % (ref 0–13.4)
NEUTROPHILS # BLD AUTO: 3.88 K/UL (ref 2–7.15)
NEUTROPHILS NFR BLD: 66.1 % (ref 44–72)
NRBC # BLD AUTO: 0 K/UL
NRBC BLD-RTO: 0 /100 WBC
PLATELET # BLD AUTO: 278 K/UL (ref 164–446)
PLATELET # BLD AUTO: 285 K/UL (ref 164–446)
PMV BLD AUTO: 10.6 FL (ref 9–12.9)
PMV BLD AUTO: 10.7 FL (ref 9–12.9)
POTASSIUM SERPL-SCNC: 4 MMOL/L (ref 3.6–5.5)
PROT SERPL-MCNC: 7.3 G/DL (ref 6–8.2)
RBC # BLD AUTO: 4.43 M/UL (ref 4.2–5.4)
RBC # BLD AUTO: 4.52 M/UL (ref 4.2–5.4)
SODIUM SERPL-SCNC: 138 MMOL/L (ref 135–145)
T3FREE SERPL-MCNC: 2.95 PG/ML (ref 2.4–4.2)
T4 FREE SERPL-MCNC: 1.2 NG/DL (ref 0.53–1.43)
TRIGL SERPL-MCNC: 117 MG/DL (ref 0–149)
TSH SERPL DL<=0.005 MIU/L-ACNC: 0.12 UIU/ML (ref 0.38–5.33)
WBC # BLD AUTO: 5.9 K/UL (ref 4.8–10.8)
WBC # BLD AUTO: 6.3 K/UL (ref 4.8–10.8)

## 2018-10-10 PROCEDURE — 84443 ASSAY THYROID STIM HORMONE: CPT

## 2018-10-10 PROCEDURE — 83036 HEMOGLOBIN GLYCOSYLATED A1C: CPT

## 2018-10-10 PROCEDURE — 85025 COMPLETE CBC W/AUTO DIFF WBC: CPT

## 2018-10-10 PROCEDURE — 82306 VITAMIN D 25 HYDROXY: CPT

## 2018-10-10 PROCEDURE — 80162 ASSAY OF DIGOXIN TOTAL: CPT

## 2018-10-10 PROCEDURE — 84481 FREE ASSAY (FT-3): CPT

## 2018-10-10 PROCEDURE — 85027 COMPLETE CBC AUTOMATED: CPT

## 2018-10-10 PROCEDURE — 80053 COMPREHEN METABOLIC PANEL: CPT

## 2018-10-10 PROCEDURE — 86800 THYROGLOBULIN ANTIBODY: CPT

## 2018-10-10 PROCEDURE — 36415 COLL VENOUS BLD VENIPUNCTURE: CPT

## 2018-10-10 PROCEDURE — 84439 ASSAY OF FREE THYROXINE: CPT

## 2018-10-10 PROCEDURE — 80061 LIPID PANEL: CPT

## 2018-10-12 LAB — THYROGLOB AB SERPL-ACNC: <0.9 IU/ML (ref 0–4)

## 2018-10-23 ENCOUNTER — TELEPHONE (OUTPATIENT)
Dept: CARDIOLOGY | Facility: MEDICAL CENTER | Age: 63
End: 2018-10-23

## 2018-10-23 DIAGNOSIS — E03.8 HYPOTHYROIDISM DUE TO HASHIMOTO'S THYROIDITIS: ICD-10-CM

## 2018-10-23 DIAGNOSIS — I10 ESSENTIAL HYPERTENSION: ICD-10-CM

## 2018-10-23 DIAGNOSIS — E06.3 HYPOTHYROIDISM DUE TO HASHIMOTO'S THYROIDITIS: ICD-10-CM

## 2018-10-23 DIAGNOSIS — I27.20 PULMONARY HYPERTENSION (HCC): ICD-10-CM

## 2018-10-23 NOTE — TELEPHONE ENCOUNTER
----- Message from Refugio Rosario M.D. sent at 10/23/2018  7:58 AM PDT -----  Thyroid is a little overactive.  We need to recheck in 2 weeks.  Otherwise labs look good.

## 2018-11-21 ENCOUNTER — OFFICE VISIT (OUTPATIENT)
Dept: CARDIOLOGY | Facility: MEDICAL CENTER | Age: 63
End: 2018-11-21
Payer: COMMERCIAL

## 2018-11-21 VITALS
DIASTOLIC BLOOD PRESSURE: 66 MMHG | SYSTOLIC BLOOD PRESSURE: 124 MMHG | WEIGHT: 194 LBS | HEIGHT: 65 IN | OXYGEN SATURATION: 95 % | RESPIRATION RATE: 16 BRPM | BODY MASS INDEX: 32.32 KG/M2 | HEART RATE: 60 BPM

## 2018-11-21 DIAGNOSIS — I10 ESSENTIAL HYPERTENSION: ICD-10-CM

## 2018-11-21 DIAGNOSIS — E11.9 TYPE 2 DIABETES MELLITUS WITHOUT COMPLICATION, WITHOUT LONG-TERM CURRENT USE OF INSULIN (HCC): ICD-10-CM

## 2018-11-21 DIAGNOSIS — I48.0 PAROXYSMAL ATRIAL FIBRILLATION (HCC): ICD-10-CM

## 2018-11-21 PROCEDURE — 99214 OFFICE O/P EST MOD 30 MIN: CPT | Performed by: NURSE PRACTITIONER

## 2018-11-21 RX ORDER — LOSARTAN POTASSIUM 50 MG/1
50 TABLET ORAL DAILY
Qty: 90 TAB | Refills: 3 | Status: SHIPPED | OUTPATIENT
Start: 2018-11-21 | End: 2020-02-21

## 2018-11-21 ASSESSMENT — ENCOUNTER SYMPTOMS
WEAKNESS: 0
SHORTNESS OF BREATH: 0
PND: 0
ABDOMINAL PAIN: 0
MYALGIAS: 0
CLAUDICATION: 0
ORTHOPNEA: 0
COUGH: 0
WEIGHT LOSS: 1
PALPITATIONS: 0
DIZZINESS: 0

## 2018-11-21 NOTE — LETTER
[facility]   [address], [facility] [address]  [place] [address]  Phone: [phone]  Fax: [phone]              Heidi Alfaro  1955    Encounter Date: 11/21/2018    GETACHEW Arndt          PROGRESS NOTE:  Chief Complaint   Patient presents with   • Atrial Fibrillation       Subjective:   Heidi Alfaro is a 63 y.o. female who presents today with her sister, Crystal, with whom she lives.  She is here to follow-up on atrial fibrillation and hypertension.  She was last seen by Dr Refugio Rosario on May 1, 2018.  Since that time she has been doing well.    She states she is working in the yard as well as doing some walking.  She tolerates this without any chest discomfort or significant shortness of breath.  She is working on losing some weight.    She has had her upper teeth pulled and dentures made.  She does need her bottom teeth pulled but needs to wait until after the first of the year due to insurance.    Past Medical History:   Diagnosis Date   • A-fib (HCC)    • CAD (coronary artery disease)    • Diabetes (HCC)     pre diabetic   • Dysphagia    • Hyperlipidemia    • Hypertension    • Pneumonia    • Pulmonary HTN (HCC)      Past Surgical History:   Procedure Laterality Date   • OTHER      tonsils cataract     History reviewed. No pertinent family history.  Social History     Social History   • Marital status: Single     Spouse name: N/A   • Number of children: N/A   • Years of education: N/A     Occupational History   • Not on file.     Social History Main Topics   • Smoking status: Never Smoker   • Smokeless tobacco: Never Used   • Alcohol use No   • Drug use: No   • Sexual activity: Not on file     Other Topics Concern   • Not on file     Social History Narrative   • No narrative on file     No Known Allergies  Outpatient Encounter Prescriptions as of 11/21/2018   Medication Sig Dispense Refill   • losartan (COZAAR) 50 MG Tab Take 1 Tab by mouth  "every day. 90 Tab 3   • furosemide (LASIX) 20 MG Tab TAKE ONE TABLET BY MOUTH TWICE DAILY 180 Tab 3   • ezetimibe-simvastatin (VYTORIN) 10-20 MG per tablet TAKE ONE TABLET BY MOUTH ONCE DAILY 90 Tab 3   • amLODIPine (NORVASC) 10 MG Tab Take 1 Tab by mouth every day. 90 Tab 3   • apixaban (ELIQUIS) 5mg Tab Take 1 Tab by mouth 2 Times a Day. 180 Tab 3   • digoxin (LANOXIN) 125 MCG Tab Take 1 Tab by mouth every evening. 90 Tab 3   • potassium chloride SA (KDUR) 20 MEQ Tab CR Take 1 Tab by mouth 2 times a day. 180 Tab 3   • metoprolol (LOPRESSOR) 100 MG Tab Take 1 Tab by mouth 2 times a day. 180 Tab 3   • metformin (GLUCOPHAGE) 500 MG Tab 1 Tab by Per NG Tube route 2 times a day, with meals. 60 Tab 3   • levothyroxine (SYNTHROID) 88 MCG Tab Take 1 Tab by mouth Every morning on an empty stomach. 30 Tab 3   • [DISCONTINUED] losartan (COZAAR) 50 MG Tab Take 1 Tab by mouth every day. 30 Tab 11   • [DISCONTINUED] amiodarone (CORDARONE) 200 MG Tab 200 mg by Per NG Tube route every day.       No facility-administered encounter medications on file as of 11/21/2018.      Review of Systems   Constitutional: Positive for weight loss (Purposeful.). Negative for malaise/fatigue.   Respiratory: Negative for cough and shortness of breath.    Cardiovascular: Negative for chest pain, palpitations, orthopnea, claudication, leg swelling and PND.   Gastrointestinal: Negative for abdominal pain.   Musculoskeletal: Negative for myalgias.   Neurological: Negative for dizziness and weakness.        Objective:   /66 (BP Location: Left arm, Patient Position: Sitting, BP Cuff Size: Large adult)   Pulse 60   Resp 16   Ht 1.651 m (5' 5\")   Wt 88 kg (194 lb)   SpO2 95%   BMI 32.28 kg/m²      Physical Exam   Constitutional: She is oriented to person, place, and time. She appears well-developed and well-nourished.   HENT:   Head: Normocephalic.   Eyes: EOM are normal.   Neck: No JVD present.   Cardiovascular: Normal rate, regular rhythm " and normal heart sounds.    Pulmonary/Chest: Effort normal and breath sounds normal.   Abdominal: Soft. Bowel sounds are normal.   Musculoskeletal: She exhibits edema (Very fatty ankles with trace edema.).   Neurological: She is alert and oriented to person, place, and time.   Skin: Skin is warm and dry.   Psychiatric: She has a normal mood and affect.     Results for NIDA LARES (MRN [id number])    Ref. Range 10/10/2018 07:09 10/10/2018 07:12   WBC Latest Ref Range: 4.8 - 10.8 K/uL 6.3 5.9   RBC Latest Ref Range: 4.20 - 5.40 M/uL 4.52 4.43   Hemoglobin Latest Ref Range: 12.0 - 16.0 g/dL 12.3 12.5   Hematocrit Latest Ref Range: 37.0 - 47.0 % 39.0 38.6   MCV Latest Ref Range: 81.4 - 97.8 fL 86.3 87.1   MCH Latest Ref Range: 27.0 - 33.0 pg 27.2 28.2   MCHC Latest Ref Range: 33.6 - 35.0 g/dL 31.5 (L) 32.4 (L)   RDW Latest Ref Range: 35.9 - 50.0 fL 41.8 42.6   Platelet Count Latest Ref Range: 164 - 446 K/uL 278 285   MPV Latest Ref Range: 9.0 - 12.9 fL 10.6 10.7   Neutrophils-Polys Latest Ref Range: 44.00 - 72.00 %  66.10   Neutrophils (Absolute) Latest Ref Range: 2.00 - 7.15 K/uL  3.88   Lymphocytes Latest Ref Range: 22.00 - 41.00 %  22.40   Lymphs (Absolute) Latest Ref Range: 1.00 - 4.80 K/uL  1.32   Monocytes Latest Ref Range: 0.00 - 13.40 %  8.50   Monos (Absolute) Latest Ref Range: 0.00 - 0.85 K/uL  0.50   Eosinophils Latest Ref Range: 0.00 - 6.90 %  1.50   Eos (Absolute) Latest Ref Range: 0.00 - 0.51 K/uL  0.09   Basophils Latest Ref Range: 0.00 - 1.80 %  1.00   Baso (Absolute) Latest Ref Range: 0.00 - 0.12 K/uL  0.06   Immature Granulocytes Latest Ref Range: 0.00 - 0.90 %  0.50   Immature Granulocytes (abs) Latest Ref Range: 0.00 - 0.11 K/uL  0.03   Nucleated RBC Latest Units: /100 WBC  0.00   NRBC (Absolute) Latest Units: K/uL  0.00   Sodium Latest Ref Range: 135 - 145 mmol/L 138    Potassium Latest Ref Range: 3.6 - 5.5 mmol/L 4.0    Chloride Latest Ref Range: 96 - 112 mmol/L 102    Co2 Latest Ref Range: 20 -  33 mmol/L 25    Anion Gap Latest Ref Range: 0.0 - 11.9  11.0    Glucose Latest Ref Range: 65 - 99 mg/dL 127 (H)    Bun Latest Ref Range: 8 - 22 mg/dL 20    Creatinine Latest Ref Range: 0.50 - 1.40 mg/dL 1.28    GFR If  Latest Ref Range: >60 mL/min/1.73 m 2 51 (A)    GFR If Non  Latest Ref Range: >60 mL/min/1.73 m 2 42 (A)    Calcium Latest Ref Range: 8.5 - 10.5 mg/dL 10.1    AST(SGOT) Latest Ref Range: 12 - 45 U/L 17    ALT(SGPT) Latest Ref Range: 2 - 50 U/L 21    Alkaline Phosphatase Latest Ref Range: 30 - 99 U/L 77    Total Bilirubin Latest Ref Range: 0.1 - 1.5 mg/dL 0.6    Albumin Latest Ref Range: 3.2 - 4.9 g/dL 4.6    Total Protein Latest Ref Range: 6.0 - 8.2 g/dL 7.3    Globulin Latest Ref Range: 1.9 - 3.5 g/dL 2.7    A-G Ratio Latest Units: g/dL 1.7    Glycohemoglobin Latest Ref Range: 0.0 - 5.6 % 6.5 (H)    Estim. Avg Glu Latest Units: mg/dL 140    Fasting Status Unknown Fasting    Cholesterol,Tot Latest Ref Range: 100 - 199 mg/dL 120    Triglycerides Latest Ref Range: 0 - 149 mg/dL 117    HDL Latest Ref Range: >=40 mg/dL 43    LDL Latest Ref Range: <100 mg/dL 54    Digoxin Latest Ref Range: 0.8 - 2.0 ng/mL 0.7 (L)      Assessment:     1. Paroxysmal atrial fibrillation (HCC)     2. Essential hypertension  losartan (COZAAR) 50 MG Tab   3. Type 2 diabetes mellitus without complication, without long-term current use of insulin (HCC)     4. Pulmonary hypertension (HCC)     5. Coronary artery calcification seen on CT scan         Medical Decision Making:  Today's Assessment / Status / Plan:   Paroxysmal atrial fibrillation: She is in a regular rhythm today.  She will continue with Eliquis and her other medications.  She has no excessive bruising or any bleeding.    Hypertension: Blood pressure is excellent in the office today.  Continue current regimen.    Poor dentition: She has her upper dentures and will get her bottom teeth pulled and dentures made in January.    She is stable  enough to follow-up in 6 months with Dr Refugio Rosario.  She will follow-up sooner if problems.    Collaborating Provider: Dr Pugh.        No Recipients

## 2018-11-21 NOTE — PROGRESS NOTES
Chief Complaint   Patient presents with   • Atrial Fibrillation       Subjective:   Heidi Alfaro is a 63 y.o. female who presents today with her sister, Crystal, with whom she lives.  She is here to follow-up on atrial fibrillation and hypertension.  She was last seen by Dr Refugio Rosario on May 1, 2018.  Since that time she has been doing well.    She states she is working in the yard as well as doing some walking.  She tolerates this without any chest discomfort or significant shortness of breath.  She is working on losing some weight.    She has had her upper teeth pulled and dentures made.  She does need her bottom teeth pulled but needs to wait until after the first of the year due to insurance.    Past Medical History:   Diagnosis Date   • A-fib (HCC)    • CAD (coronary artery disease)    • Diabetes (HCC)     pre diabetic   • Dysphagia    • Hyperlipidemia    • Hypertension    • Pneumonia    • Pulmonary HTN (HCC)      Past Surgical History:   Procedure Laterality Date   • OTHER      tonsils cataract     History reviewed. No pertinent family history.  Social History     Social History   • Marital status: Single     Spouse name: N/A   • Number of children: N/A   • Years of education: N/A     Occupational History   • Not on file.     Social History Main Topics   • Smoking status: Never Smoker   • Smokeless tobacco: Never Used   • Alcohol use No   • Drug use: No   • Sexual activity: Not on file     Other Topics Concern   • Not on file     Social History Narrative   • No narrative on file     No Known Allergies  Outpatient Encounter Prescriptions as of 11/21/2018   Medication Sig Dispense Refill   • losartan (COZAAR) 50 MG Tab Take 1 Tab by mouth every day. 90 Tab 3   • furosemide (LASIX) 20 MG Tab TAKE ONE TABLET BY MOUTH TWICE DAILY 180 Tab 3   • ezetimibe-simvastatin (VYTORIN) 10-20 MG per tablet TAKE ONE TABLET BY MOUTH ONCE DAILY 90 Tab 3   • amLODIPine (NORVASC) 10 MG Tab Take 1 Tab by mouth every day.  "90 Tab 3   • apixaban (ELIQUIS) 5mg Tab Take 1 Tab by mouth 2 Times a Day. 180 Tab 3   • digoxin (LANOXIN) 125 MCG Tab Take 1 Tab by mouth every evening. 90 Tab 3   • potassium chloride SA (KDUR) 20 MEQ Tab CR Take 1 Tab by mouth 2 times a day. 180 Tab 3   • metoprolol (LOPRESSOR) 100 MG Tab Take 1 Tab by mouth 2 times a day. 180 Tab 3   • metformin (GLUCOPHAGE) 500 MG Tab 1 Tab by Per NG Tube route 2 times a day, with meals. 60 Tab 3   • levothyroxine (SYNTHROID) 88 MCG Tab Take 1 Tab by mouth Every morning on an empty stomach. 30 Tab 3   • [DISCONTINUED] losartan (COZAAR) 50 MG Tab Take 1 Tab by mouth every day. 30 Tab 11   • [DISCONTINUED] amiodarone (CORDARONE) 200 MG Tab 200 mg by Per NG Tube route every day.       No facility-administered encounter medications on file as of 11/21/2018.      Review of Systems   Constitutional: Positive for weight loss (Purposeful.). Negative for malaise/fatigue.   Respiratory: Negative for cough and shortness of breath.    Cardiovascular: Negative for chest pain, palpitations, orthopnea, claudication, leg swelling and PND.   Gastrointestinal: Negative for abdominal pain.   Musculoskeletal: Negative for myalgias.   Neurological: Negative for dizziness and weakness.        Objective:   /66 (BP Location: Left arm, Patient Position: Sitting, BP Cuff Size: Large adult)   Pulse 60   Resp 16   Ht 1.651 m (5' 5\")   Wt 88 kg (194 lb)   SpO2 95%   BMI 32.28 kg/m²     Physical Exam   Constitutional: She is oriented to person, place, and time. She appears well-developed and well-nourished.   HENT:   Head: Normocephalic.   Eyes: EOM are normal.   Neck: No JVD present.   Cardiovascular: Normal rate, regular rhythm and normal heart sounds.    Pulmonary/Chest: Effort normal and breath sounds normal.   Abdominal: Soft. Bowel sounds are normal.   Musculoskeletal: She exhibits edema (Very fatty ankles with trace edema.).   Neurological: She is alert and oriented to person, place, and " time.   Skin: Skin is warm and dry.   Psychiatric: She has a normal mood and affect.     Results for NIDA LARES (MRN [id number])    Ref. Range 10/10/2018 07:09 10/10/2018 07:12   WBC Latest Ref Range: 4.8 - 10.8 K/uL 6.3 5.9   RBC Latest Ref Range: 4.20 - 5.40 M/uL 4.52 4.43   Hemoglobin Latest Ref Range: 12.0 - 16.0 g/dL 12.3 12.5   Hematocrit Latest Ref Range: 37.0 - 47.0 % 39.0 38.6   MCV Latest Ref Range: 81.4 - 97.8 fL 86.3 87.1   MCH Latest Ref Range: 27.0 - 33.0 pg 27.2 28.2   MCHC Latest Ref Range: 33.6 - 35.0 g/dL 31.5 (L) 32.4 (L)   RDW Latest Ref Range: 35.9 - 50.0 fL 41.8 42.6   Platelet Count Latest Ref Range: 164 - 446 K/uL 278 285   MPV Latest Ref Range: 9.0 - 12.9 fL 10.6 10.7   Neutrophils-Polys Latest Ref Range: 44.00 - 72.00 %  66.10   Neutrophils (Absolute) Latest Ref Range: 2.00 - 7.15 K/uL  3.88   Lymphocytes Latest Ref Range: 22.00 - 41.00 %  22.40   Lymphs (Absolute) Latest Ref Range: 1.00 - 4.80 K/uL  1.32   Monocytes Latest Ref Range: 0.00 - 13.40 %  8.50   Monos (Absolute) Latest Ref Range: 0.00 - 0.85 K/uL  0.50   Eosinophils Latest Ref Range: 0.00 - 6.90 %  1.50   Eos (Absolute) Latest Ref Range: 0.00 - 0.51 K/uL  0.09   Basophils Latest Ref Range: 0.00 - 1.80 %  1.00   Baso (Absolute) Latest Ref Range: 0.00 - 0.12 K/uL  0.06   Immature Granulocytes Latest Ref Range: 0.00 - 0.90 %  0.50   Immature Granulocytes (abs) Latest Ref Range: 0.00 - 0.11 K/uL  0.03   Nucleated RBC Latest Units: /100 WBC  0.00   NRBC (Absolute) Latest Units: K/uL  0.00   Sodium Latest Ref Range: 135 - 145 mmol/L 138    Potassium Latest Ref Range: 3.6 - 5.5 mmol/L 4.0    Chloride Latest Ref Range: 96 - 112 mmol/L 102    Co2 Latest Ref Range: 20 - 33 mmol/L 25    Anion Gap Latest Ref Range: 0.0 - 11.9  11.0    Glucose Latest Ref Range: 65 - 99 mg/dL 127 (H)    Bun Latest Ref Range: 8 - 22 mg/dL 20    Creatinine Latest Ref Range: 0.50 - 1.40 mg/dL 1.28    GFR If  Latest Ref Range: >60 mL/min/1.73 m  2 51 (A)    GFR If Non  Latest Ref Range: >60 mL/min/1.73 m 2 42 (A)    Calcium Latest Ref Range: 8.5 - 10.5 mg/dL 10.1    AST(SGOT) Latest Ref Range: 12 - 45 U/L 17    ALT(SGPT) Latest Ref Range: 2 - 50 U/L 21    Alkaline Phosphatase Latest Ref Range: 30 - 99 U/L 77    Total Bilirubin Latest Ref Range: 0.1 - 1.5 mg/dL 0.6    Albumin Latest Ref Range: 3.2 - 4.9 g/dL 4.6    Total Protein Latest Ref Range: 6.0 - 8.2 g/dL 7.3    Globulin Latest Ref Range: 1.9 - 3.5 g/dL 2.7    A-G Ratio Latest Units: g/dL 1.7    Glycohemoglobin Latest Ref Range: 0.0 - 5.6 % 6.5 (H)    Estim. Avg Glu Latest Units: mg/dL 140    Fasting Status Unknown Fasting    Cholesterol,Tot Latest Ref Range: 100 - 199 mg/dL 120    Triglycerides Latest Ref Range: 0 - 149 mg/dL 117    HDL Latest Ref Range: >=40 mg/dL 43    LDL Latest Ref Range: <100 mg/dL 54    Digoxin Latest Ref Range: 0.8 - 2.0 ng/mL 0.7 (L)      Assessment:     1. Paroxysmal atrial fibrillation (HCC)     2. Essential hypertension  losartan (COZAAR) 50 MG Tab   3. Type 2 diabetes mellitus without complication, without long-term current use of insulin (HCC)     4. Pulmonary hypertension (HCC)     5. Coronary artery calcification seen on CT scan         Medical Decision Making:  Today's Assessment / Status / Plan:   Paroxysmal atrial fibrillation: She is in a regular rhythm today.  She will continue with Eliquis and her other medications.  She has no excessive bruising or any bleeding.    Hypertension: Blood pressure is excellent in the office today.  Continue current regimen.    Poor dentition: She has her upper dentures and will get her bottom teeth pulled and dentures made in January.    She is stable enough to follow-up in 6 months with Dr Refugio Rosario.  She will follow-up sooner if problems.    Collaborating Provider: Dr Pugh.

## 2019-03-04 NOTE — ED NOTES
10 [address] at the [facility]   V-Y Flap Text: The defect edges were debeveled with a #15 scalpel blade.  Given the location of the defect, shape of the defect and the proximity to free margins a V-Y flap was deemed most appropriate.  Using a sterile surgical marker, an appropriate advancement flap was drawn incorporating the defect and placing the expected incisions within the relaxed skin tension lines where possible.    The area thus outlined was incised deep to adipose tissue with a #15 scalpel blade.  The skin margins were undermined to an appropriate distance in all directions utilizing iris scissors.

## 2019-03-25 NOTE — PROGRESS NOTES
Pt straight cathed. Tolerated well. Pt had dark brown cloudy urine and purulent discharge. MD Diaz paged and new orders placed.    bleeding wound from nose x2 weeks. seen in md office this morning and had it cauterized but it continues to ooze, ems put quick clot on wound, which has now come off.

## 2019-04-30 ENCOUNTER — HOSPITAL ENCOUNTER (OUTPATIENT)
Dept: LAB | Facility: MEDICAL CENTER | Age: 64
End: 2019-04-30
Attending: FAMILY MEDICINE
Payer: COMMERCIAL

## 2019-04-30 LAB
25(OH)D3 SERPL-MCNC: 104 NG/ML (ref 30–100)
ALBUMIN SERPL BCP-MCNC: 4.5 G/DL (ref 3.2–4.9)
ALBUMIN/GLOB SERPL: 1.9 G/DL
ALP SERPL-CCNC: 75 U/L (ref 30–99)
ALT SERPL-CCNC: 30 U/L (ref 2–50)
ANION GAP SERPL CALC-SCNC: 11 MMOL/L (ref 0–11.9)
AST SERPL-CCNC: 25 U/L (ref 12–45)
BASOPHILS # BLD AUTO: 0.7 % (ref 0–1.8)
BASOPHILS # BLD: 0.05 K/UL (ref 0–0.12)
BILIRUB SERPL-MCNC: 0.7 MG/DL (ref 0.1–1.5)
BUN SERPL-MCNC: 24 MG/DL (ref 8–22)
CALCIUM SERPL-MCNC: 9.9 MG/DL (ref 8.5–10.5)
CHLORIDE SERPL-SCNC: 100 MMOL/L (ref 96–112)
CHOLEST SERPL-MCNC: 129 MG/DL (ref 100–199)
CO2 SERPL-SCNC: 26 MMOL/L (ref 20–33)
CREAT SERPL-MCNC: 1.23 MG/DL (ref 0.5–1.4)
EOSINOPHIL # BLD AUTO: 0.1 K/UL (ref 0–0.51)
EOSINOPHIL NFR BLD: 1.5 % (ref 0–6.9)
ERYTHROCYTE [DISTWIDTH] IN BLOOD BY AUTOMATED COUNT: 43.3 FL (ref 35.9–50)
FASTING STATUS PATIENT QL REPORTED: NORMAL
GLOBULIN SER CALC-MCNC: 2.4 G/DL (ref 1.9–3.5)
GLUCOSE SERPL-MCNC: 112 MG/DL (ref 65–99)
HCT VFR BLD AUTO: 39.5 % (ref 37–47)
HDLC SERPL-MCNC: 44 MG/DL
HGB BLD-MCNC: 12.8 G/DL (ref 12–16)
IMM GRANULOCYTES # BLD AUTO: 0.03 K/UL (ref 0–0.11)
IMM GRANULOCYTES NFR BLD AUTO: 0.4 % (ref 0–0.9)
LDLC SERPL CALC-MCNC: 58 MG/DL
LYMPHOCYTES # BLD AUTO: 1.27 K/UL (ref 1–4.8)
LYMPHOCYTES NFR BLD: 19 % (ref 22–41)
MCH RBC QN AUTO: 28.6 PG (ref 27–33)
MCHC RBC AUTO-ENTMCNC: 32.4 G/DL (ref 33.6–35)
MCV RBC AUTO: 88.2 FL (ref 81.4–97.8)
MONOCYTES # BLD AUTO: 0.57 K/UL (ref 0–0.85)
MONOCYTES NFR BLD AUTO: 8.5 % (ref 0–13.4)
NEUTROPHILS # BLD AUTO: 4.67 K/UL (ref 2–7.15)
NEUTROPHILS NFR BLD: 69.9 % (ref 44–72)
NRBC # BLD AUTO: 0 K/UL
NRBC BLD-RTO: 0 /100 WBC
PLATELET # BLD AUTO: 285 K/UL (ref 164–446)
PMV BLD AUTO: 10.6 FL (ref 9–12.9)
POTASSIUM SERPL-SCNC: 4.2 MMOL/L (ref 3.6–5.5)
PROT SERPL-MCNC: 6.9 G/DL (ref 6–8.2)
RBC # BLD AUTO: 4.48 M/UL (ref 4.2–5.4)
SODIUM SERPL-SCNC: 137 MMOL/L (ref 135–145)
T3FREE SERPL-MCNC: 2.86 PG/ML (ref 2.4–4.2)
T4 FREE SERPL-MCNC: 1.1 NG/DL (ref 0.53–1.43)
TRIGL SERPL-MCNC: 135 MG/DL (ref 0–149)
TSH SERPL DL<=0.005 MIU/L-ACNC: 0.23 UIU/ML (ref 0.38–5.33)
WBC # BLD AUTO: 6.7 K/UL (ref 4.8–10.8)

## 2019-04-30 PROCEDURE — 84481 FREE ASSAY (FT-3): CPT

## 2019-04-30 PROCEDURE — 80061 LIPID PANEL: CPT

## 2019-04-30 PROCEDURE — 80053 COMPREHEN METABOLIC PANEL: CPT

## 2019-04-30 PROCEDURE — 86800 THYROGLOBULIN ANTIBODY: CPT

## 2019-04-30 PROCEDURE — 85025 COMPLETE CBC W/AUTO DIFF WBC: CPT

## 2019-04-30 PROCEDURE — 84443 ASSAY THYROID STIM HORMONE: CPT

## 2019-04-30 PROCEDURE — 84439 ASSAY OF FREE THYROXINE: CPT

## 2019-04-30 PROCEDURE — 82306 VITAMIN D 25 HYDROXY: CPT

## 2019-04-30 PROCEDURE — 36415 COLL VENOUS BLD VENIPUNCTURE: CPT

## 2019-05-02 LAB — THYROGLOB AB SERPL-ACNC: <0.9 IU/ML (ref 0–4)

## 2019-05-22 ENCOUNTER — OFFICE VISIT (OUTPATIENT)
Dept: CARDIOLOGY | Facility: MEDICAL CENTER | Age: 64
End: 2019-05-22
Payer: COMMERCIAL

## 2019-05-22 VITALS
BODY MASS INDEX: 31.65 KG/M2 | WEIGHT: 190 LBS | OXYGEN SATURATION: 97 % | DIASTOLIC BLOOD PRESSURE: 64 MMHG | HEIGHT: 65 IN | HEART RATE: 62 BPM | SYSTOLIC BLOOD PRESSURE: 134 MMHG | RESPIRATION RATE: 16 BRPM

## 2019-05-22 DIAGNOSIS — I25.10 CORONARY ARTERY CALCIFICATION SEEN ON CT SCAN: ICD-10-CM

## 2019-05-22 DIAGNOSIS — I48.0 PAROXYSMAL ATRIAL FIBRILLATION (HCC): ICD-10-CM

## 2019-05-22 DIAGNOSIS — I10 ESSENTIAL HYPERTENSION: ICD-10-CM

## 2019-05-22 DIAGNOSIS — E78.2 HYPERLIPEMIA, MIXED: ICD-10-CM

## 2019-05-22 PROBLEM — I48.19 PERSISTENT ATRIAL FIBRILLATION (HCC): Status: ACTIVE | Noted: 2017-06-09

## 2019-05-22 PROCEDURE — 99214 OFFICE O/P EST MOD 30 MIN: CPT | Performed by: NURSE PRACTITIONER

## 2019-05-22 RX ORDER — AMLODIPINE BESYLATE 10 MG/1
10 TABLET ORAL DAILY
Qty: 90 TAB | Refills: 3 | Status: SHIPPED | OUTPATIENT
Start: 2019-05-22

## 2019-05-22 RX ORDER — EZETIMIBE AND SIMVASTATIN 10; 20 MG/1; MG/1
1 TABLET ORAL EVERY EVENING
Qty: 90 TAB | Refills: 3 | Status: SHIPPED | OUTPATIENT
Start: 2019-05-22

## 2019-05-22 RX ORDER — POTASSIUM CHLORIDE 20 MEQ/1
20 TABLET, EXTENDED RELEASE ORAL 2 TIMES DAILY
Qty: 180 TAB | Refills: 3 | Status: SHIPPED | OUTPATIENT
Start: 2019-05-22

## 2019-05-22 RX ORDER — FUROSEMIDE 20 MG/1
20 TABLET ORAL 2 TIMES DAILY
Qty: 180 TAB | Refills: 3 | Status: SHIPPED | OUTPATIENT
Start: 2019-05-22

## 2019-05-22 RX ORDER — METOPROLOL TARTRATE 100 MG/1
100 TABLET ORAL 2 TIMES DAILY
Qty: 180 TAB | Refills: 3 | Status: SHIPPED | OUTPATIENT
Start: 2019-05-22 | End: 2020-08-10

## 2019-05-22 RX ORDER — DIGOXIN 125 MCG
125 TABLET ORAL EVERY EVENING
Qty: 90 TAB | Refills: 3 | Status: SHIPPED | OUTPATIENT
Start: 2019-05-22

## 2019-05-22 ASSESSMENT — ENCOUNTER SYMPTOMS
DIZZINESS: 0
PND: 0
ABDOMINAL PAIN: 0
ORTHOPNEA: 0
SHORTNESS OF BREATH: 0
WEAKNESS: 0
MYALGIAS: 0
CLAUDICATION: 0
PALPITATIONS: 0
COUGH: 0

## 2019-05-22 NOTE — PROGRESS NOTES
Chief Complaint   Patient presents with   • Atrial Fibrillation       Subjective:   Heidi Alfaro is a 63 y.o. female who presents today with her sister, Crystal with whom she lives.  She is here to follow-up on paroxysmal atrial fibrillation and hypertension.    She was recently seen by her primary care and told that she was doing very well.  She denies complaints today with the exception of some pain in her.  She is seeing a chiropractor with some relief.    She denies any palpitations, chest pain, shortness of breath or ankle edema.    Past Medical History:   Diagnosis Date   • A-fib (HCC)    • CAD (coronary artery disease)    • Diabetes (HCC)     pre diabetic   • Dysphagia    • Hyperlipidemia    • Hypertension    • Pneumonia    • Pulmonary HTN (HCC)      Past Surgical History:   Procedure Laterality Date   • OTHER      tonsils cataract     History reviewed. No pertinent family history.  Social History     Social History   • Marital status: Single     Spouse name: N/A   • Number of children: N/A   • Years of education: N/A     Occupational History   • Not on file.     Social History Main Topics   • Smoking status: Never Smoker   • Smokeless tobacco: Never Used   • Alcohol use No   • Drug use: No   • Sexual activity: Not on file     Other Topics Concern   • Not on file     Social History Narrative   • No narrative on file     No Known Allergies  Outpatient Encounter Prescriptions as of 5/22/2019   Medication Sig Dispense Refill   • furosemide (LASIX) 20 MG Tab Take 1 Tab by mouth 2 times a day. 180 Tab 3   • ezetimibe-simvastatin (VYTORIN) 10-20 MG per tablet Take 1 Tab by mouth every evening. 90 Tab 3   • amLODIPine (NORVASC) 10 MG Tab Take 1 Tab by mouth every day. 90 Tab 3   • apixaban (ELIQUIS) 5mg Tab Take 1 Tab by mouth 2 Times a Day. 180 Tab 3   • digoxin (LANOXIN) 125 MCG Tab Take 1 Tab by mouth every evening. 90 Tab 3   • potassium chloride SA (KDUR) 20 MEQ Tab CR Take 1 Tab by mouth 2 times a day. 180  "Tab 3   • metoprolol (LOPRESSOR) 100 MG Tab Take 1 Tab by mouth 2 times a day. 180 Tab 3   • losartan (COZAAR) 50 MG Tab Take 1 Tab by mouth every day. 90 Tab 3   • metformin (GLUCOPHAGE) 500 MG Tab 1 Tab by Per NG Tube route 2 times a day, with meals. 60 Tab 3   • levothyroxine (SYNTHROID) 88 MCG Tab Take 1 Tab by mouth Every morning on an empty stomach. 30 Tab 3   • [DISCONTINUED] furosemide (LASIX) 20 MG Tab TAKE ONE TABLET BY MOUTH TWICE DAILY 180 Tab 3   • [DISCONTINUED] ezetimibe-simvastatin (VYTORIN) 10-20 MG per tablet TAKE ONE TABLET BY MOUTH ONCE DAILY 90 Tab 3   • [DISCONTINUED] amLODIPine (NORVASC) 10 MG Tab Take 1 Tab by mouth every day. 90 Tab 3   • [DISCONTINUED] apixaban (ELIQUIS) 5mg Tab Take 1 Tab by mouth 2 Times a Day. 180 Tab 3   • [DISCONTINUED] digoxin (LANOXIN) 125 MCG Tab Take 1 Tab by mouth every evening. 90 Tab 3   • [DISCONTINUED] potassium chloride SA (KDUR) 20 MEQ Tab CR Take 1 Tab by mouth 2 times a day. 180 Tab 3   • [DISCONTINUED] metoprolol (LOPRESSOR) 100 MG Tab Take 1 Tab by mouth 2 times a day. 180 Tab 3     No facility-administered encounter medications on file as of 5/22/2019.      Review of Systems   Constitutional: Negative for malaise/fatigue.   Respiratory: Negative for cough and shortness of breath.    Cardiovascular: Negative for chest pain, palpitations, orthopnea, claudication, leg swelling and PND.   Gastrointestinal: Negative for abdominal pain.   Musculoskeletal: Positive for joint pain (right hip pain.). Negative for myalgias.   Neurological: Negative for dizziness and weakness.        Objective:   /64 (BP Location: Left arm, Patient Position: Sitting, BP Cuff Size: Large adult)   Pulse 62   Resp 16   Ht 1.651 m (5' 5\")   Wt 86.2 kg (190 lb)   SpO2 97%   BMI 31.62 kg/m²     Physical Exam   Constitutional: She is oriented to person, place, and time. She appears well-developed and well-nourished.   HENT:   Head: Normocephalic.   Eyes: EOM are normal. "   Neck: No JVD present.   Cardiovascular: Normal rate, regular rhythm and normal heart sounds.    Pulmonary/Chest: Effort normal and breath sounds normal.   Abdominal: Soft. Bowel sounds are normal.   Musculoskeletal: She exhibits edema (Fatty ankles but no edema.  Spine).   Neurological: She is alert and oriented to person, place, and time.   Skin: Skin is warm and dry.   Psychiatric: She has a normal mood and affect.     Results for NIDA LARES (MRN [id number])    Ref. Range 4/30/2019 06:20   WBC Latest Ref Range: 4.8 - 10.8 K/uL 6.7   RBC Latest Ref Range: 4.20 - 5.40 M/uL 4.48   Hemoglobin Latest Ref Range: 12.0 - 16.0 g/dL 12.8   Hematocrit Latest Ref Range: 37.0 - 47.0 % 39.5   MCV Latest Ref Range: 81.4 - 97.8 fL 88.2   MCH Latest Ref Range: 27.0 - 33.0 pg 28.6   MCHC Latest Ref Range: 33.6 - 35.0 g/dL 32.4 (L)   RDW Latest Ref Range: 35.9 - 50.0 fL 43.3   Platelet Count Latest Ref Range: 164 - 446 K/uL 285   MPV Latest Ref Range: 9.0 - 12.9 fL 10.6   Neutrophils-Polys Latest Ref Range: 44.00 - 72.00 % 69.90   Neutrophils (Absolute) Latest Ref Range: 2.00 - 7.15 K/uL 4.67   Lymphocytes Latest Ref Range: 22.00 - 41.00 % 19.00 (L)   Lymphs (Absolute) Latest Ref Range: 1.00 - 4.80 K/uL 1.27   Monocytes Latest Ref Range: 0.00 - 13.40 % 8.50   Monos (Absolute) Latest Ref Range: 0.00 - 0.85 K/uL 0.57   Eosinophils Latest Ref Range: 0.00 - 6.90 % 1.50   Eos (Absolute) Latest Ref Range: 0.00 - 0.51 K/uL 0.10   Basophils Latest Ref Range: 0.00 - 1.80 % 0.70   Baso (Absolute) Latest Ref Range: 0.00 - 0.12 K/uL 0.05   Immature Granulocytes Latest Ref Range: 0.00 - 0.90 % 0.40   Immature Granulocytes (abs) Latest Ref Range: 0.00 - 0.11 K/uL 0.03   Nucleated RBC Latest Units: /100 WBC 0.00   NRBC (Absolute) Latest Units: K/uL 0.00   Sodium Latest Ref Range: 135 - 145 mmol/L 137   Potassium Latest Ref Range: 3.6 - 5.5 mmol/L 4.2   Chloride Latest Ref Range: 96 - 112 mmol/L 100   Co2 Latest Ref Range: 20 - 33 mmol/L 26    Anion Gap Latest Ref Range: 0.0 - 11.9  11.0   Glucose Latest Ref Range: 65 - 99 mg/dL 112 (H)   Bun Latest Ref Range: 8 - 22 mg/dL 24 (H)   Creatinine Latest Ref Range: 0.50 - 1.40 mg/dL 1.23   GFR If  Latest Ref Range: >60 mL/min/1.73 m 2 53 (A)   GFR If Non  Latest Ref Range: >60 mL/min/1.73 m 2 44 (A)   Calcium Latest Ref Range: 8.5 - 10.5 mg/dL 9.9   AST(SGOT) Latest Ref Range: 12 - 45 U/L 25   ALT(SGPT) Latest Ref Range: 2 - 50 U/L 30   Alkaline Phosphatase Latest Ref Range: 30 - 99 U/L 75   Total Bilirubin Latest Ref Range: 0.1 - 1.5 mg/dL 0.7   Albumin Latest Ref Range: 3.2 - 4.9 g/dL 4.5   Total Protein Latest Ref Range: 6.0 - 8.2 g/dL 6.9   Globulin Latest Ref Range: 1.9 - 3.5 g/dL 2.4   A-G Ratio Latest Units: g/dL 1.9     Assessment:     1. Paroxysmal atrial fibrillation (HCC)  apixaban (ELIQUIS) 5mg Tab    digoxin (LANOXIN) 125 MCG Tab    metoprolol (LOPRESSOR) 100 MG Tab   2. Essential hypertension  furosemide (LASIX) 20 MG Tab    amLODIPine (NORVASC) 10 MG Tab    potassium chloride SA (KDUR) 20 MEQ Tab CR   3. Coronary artery calcification seen on CT scan     4. Hyperlipemia, mixed  ezetimibe-simvastatin (VYTORIN) 10-20 MG per tablet       Medical Decision Making:  Today's Assessment / Status / Plan:   Paroxysmal atrial fibrillation: She is in a regular rhythm today.  She will continue on metoprolol and Eliquis.  No bruising or bleeding.    Hypertension: Her blood pressure is good in the office today.  Continue current regimen.    Coronary calcification: Asymptomatic.    Hyperlipidemia: Her LDL goal is 70 or less.  Her LDL is 58 which is excellent.  Continue Vytorin.    She is stable enough to follow-up in 1 year with Dr Refugio Rosario.  She will follow-up sooner if problems.    Collaborating Provider: Dr. Mynor Vogt.    Please note that this dictation was created using voice recognition software. I have made every reasonable attempt to correct obvious errors,  but it is possible there are errors of grammar and possibly content that I did not discover before finalizing the note.

## 2019-05-22 NOTE — LETTER
[facility]   [address] [place] [address]  [place] Pham [id number]  Phone: [phone]  Fax: [phone]              Heidi Alfaro  1955    Encounter Date: 5/22/2019    GETACHEW Arndt          PROGRESS NOTE:  Chief Complaint   Patient presents with   • Atrial Fibrillation       Subjective:   Heidi Alfaro is a 63 y.o. female who presents today with her sister, Crystal with whom she lives.  She is here to follow-up on paroxysmal atrial fibrillation and hypertension.    She was recently seen by her primary care and told that she was doing very well.  She denies complaints today with the exception of some pain in her.  She is seeing a chiropractor with some relief.    She denies any palpitations, chest pain, shortness of breath or ankle edema.    Past Medical History:   Diagnosis Date   • A-fib (HCC)    • CAD (coronary artery disease)    • Diabetes (HCC)     pre diabetic   • Dysphagia    • Hyperlipidemia    • Hypertension    • Pneumonia    • Pulmonary HTN (HCC)      Past Surgical History:   Procedure Laterality Date   • OTHER      tonsils cataract     History reviewed. No pertinent family history.  Social History     Social History   • Marital status: Single     Spouse name: N/A   • Number of children: N/A   • Years of education: N/A     Occupational History   • Not on file.     Social History Main Topics   • Smoking status: Never Smoker   • Smokeless tobacco: Never Used   • Alcohol use No   • Drug use: No   • Sexual activity: Not on file     Other Topics Concern   • Not on file     Social History Narrative   • No narrative on file     No Known Allergies  Outpatient Encounter Prescriptions as of 5/22/2019   Medication Sig Dispense Refill   • furosemide (LASIX) 20 MG Tab Take 1 Tab by mouth 2 times a day. 180 Tab 3   • ezetimibe-simvastatin (VYTORIN) 10-20 MG per tablet Take 1 Tab by mouth every evening. 90 Tab 3   • amLODIPine (NORVASC) 10 MG Tab Take 1 Tab by  mouth every day. 90 Tab 3   • apixaban (ELIQUIS) 5mg Tab Take 1 Tab by mouth 2 Times a Day. 180 Tab 3   • digoxin (LANOXIN) 125 MCG Tab Take 1 Tab by mouth every evening. 90 Tab 3   • potassium chloride SA (KDUR) 20 MEQ Tab CR Take 1 Tab by mouth 2 times a day. 180 Tab 3   • metoprolol (LOPRESSOR) 100 MG Tab Take 1 Tab by mouth 2 times a day. 180 Tab 3   • losartan (COZAAR) 50 MG Tab Take 1 Tab by mouth every day. 90 Tab 3   • metformin (GLUCOPHAGE) 500 MG Tab 1 Tab by Per NG Tube route 2 times a day, with meals. 60 Tab 3   • levothyroxine (SYNTHROID) 88 MCG Tab Take 1 Tab by mouth Every morning on an empty stomach. 30 Tab 3   • [DISCONTINUED] furosemide (LASIX) 20 MG Tab TAKE ONE TABLET BY MOUTH TWICE DAILY 180 Tab 3   • [DISCONTINUED] ezetimibe-simvastatin (VYTORIN) 10-20 MG per tablet TAKE ONE TABLET BY MOUTH ONCE DAILY 90 Tab 3   • [DISCONTINUED] amLODIPine (NORVASC) 10 MG Tab Take 1 Tab by mouth every day. 90 Tab 3   • [DISCONTINUED] apixaban (ELIQUIS) 5mg Tab Take 1 Tab by mouth 2 Times a Day. 180 Tab 3   • [DISCONTINUED] digoxin (LANOXIN) 125 MCG Tab Take 1 Tab by mouth every evening. 90 Tab 3   • [DISCONTINUED] potassium chloride SA (KDUR) 20 MEQ Tab CR Take 1 Tab by mouth 2 times a day. 180 Tab 3   • [DISCONTINUED] metoprolol (LOPRESSOR) 100 MG Tab Take 1 Tab by mouth 2 times a day. 180 Tab 3     No facility-administered encounter medications on file as of 5/22/2019.      Review of Systems   Constitutional: Negative for malaise/fatigue.   Respiratory: Negative for cough and shortness of breath.    Cardiovascular: Negative for chest pain, palpitations, orthopnea, claudication, leg swelling and PND.   Gastrointestinal: Negative for abdominal pain.   Musculoskeletal: Positive for joint pain (right hip pain.). Negative for myalgias.   Neurological: Negative for dizziness and weakness.        Objective:   /64 (BP Location: Left arm, Patient Position: Sitting, BP Cuff Size: Large adult)   Pulse 62   Resp  "16   Ht 1.651 m (5' 5\")   Wt 86.2 kg (190 lb)   SpO2 97%   BMI 31.62 kg/m²      Physical Exam   Constitutional: She is oriented to person, place, and time. She appears well-developed and well-nourished.   HENT:   Head: Normocephalic.   Eyes: EOM are normal.   Neck: No JVD present.   Cardiovascular: Normal rate, regular rhythm and normal heart sounds.    Pulmonary/Chest: Effort normal and breath sounds normal.   Abdominal: Soft. Bowel sounds are normal.   Musculoskeletal: She exhibits edema (Fatty ankles but no edema.  Spine).   Neurological: She is alert and oriented to person, place, and time.   Skin: Skin is warm and dry.   Psychiatric: She has a normal mood and affect.     Results for NIDA LARES (MRN [id number])    Ref. Range 4/30/2019 06:20   WBC Latest Ref Range: 4.8 - 10.8 K/uL 6.7   RBC Latest Ref Range: 4.20 - 5.40 M/uL 4.48   Hemoglobin Latest Ref Range: 12.0 - 16.0 g/dL 12.8   Hematocrit Latest Ref Range: 37.0 - 47.0 % 39.5   MCV Latest Ref Range: 81.4 - 97.8 fL 88.2   MCH Latest Ref Range: 27.0 - 33.0 pg 28.6   MCHC Latest Ref Range: 33.6 - 35.0 g/dL 32.4 (L)   RDW Latest Ref Range: 35.9 - 50.0 fL 43.3   Platelet Count Latest Ref Range: 164 - 446 K/uL 285   MPV Latest Ref Range: 9.0 - 12.9 fL 10.6   Neutrophils-Polys Latest Ref Range: 44.00 - 72.00 % 69.90   Neutrophils (Absolute) Latest Ref Range: 2.00 - 7.15 K/uL 4.67   Lymphocytes Latest Ref Range: 22.00 - 41.00 % 19.00 (L)   Lymphs (Absolute) Latest Ref Range: 1.00 - 4.80 K/uL 1.27   Monocytes Latest Ref Range: 0.00 - 13.40 % 8.50   Monos (Absolute) Latest Ref Range: 0.00 - 0.85 K/uL 0.57   Eosinophils Latest Ref Range: 0.00 - 6.90 % 1.50   Eos (Absolute) Latest Ref Range: 0.00 - 0.51 K/uL 0.10   Basophils Latest Ref Range: 0.00 - 1.80 % 0.70   Baso (Absolute) Latest Ref Range: 0.00 - 0.12 K/uL 0.05   Immature Granulocytes Latest Ref Range: 0.00 - 0.90 % 0.40   Immature Granulocytes (abs) Latest Ref Range: 0.00 - 0.11 K/uL 0.03   Nucleated RBC " Latest Units: /100 WBC 0.00   NRBC (Absolute) Latest Units: K/uL 0.00   Sodium Latest Ref Range: 135 - 145 mmol/L 137   Potassium Latest Ref Range: 3.6 - 5.5 mmol/L 4.2   Chloride Latest Ref Range: 96 - 112 mmol/L 100   Co2 Latest Ref Range: 20 - 33 mmol/L 26   Anion Gap Latest Ref Range: 0.0 - 11.9  11.0   Glucose Latest Ref Range: 65 - 99 mg/dL 112 (H)   Bun Latest Ref Range: 8 - 22 mg/dL 24 (H)   Creatinine Latest Ref Range: 0.50 - 1.40 mg/dL 1.23   GFR If  Latest Ref Range: >60 mL/min/1.73 m 2 53 (A)   GFR If Non  Latest Ref Range: >60 mL/min/1.73 m 2 44 (A)   Calcium Latest Ref Range: 8.5 - 10.5 mg/dL 9.9   AST(SGOT) Latest Ref Range: 12 - 45 U/L 25   ALT(SGPT) Latest Ref Range: 2 - 50 U/L 30   Alkaline Phosphatase Latest Ref Range: 30 - 99 U/L 75   Total Bilirubin Latest Ref Range: 0.1 - 1.5 mg/dL 0.7   Albumin Latest Ref Range: 3.2 - 4.9 g/dL 4.5   Total Protein Latest Ref Range: 6.0 - 8.2 g/dL 6.9   Globulin Latest Ref Range: 1.9 - 3.5 g/dL 2.4   A-G Ratio Latest Units: g/dL 1.9     Assessment:     1. Paroxysmal atrial fibrillation (HCC)  apixaban (ELIQUIS) 5mg Tab    digoxin (LANOXIN) 125 MCG Tab    metoprolol (LOPRESSOR) 100 MG Tab   2. Essential hypertension  furosemide (LASIX) 20 MG Tab    amLODIPine (NORVASC) 10 MG Tab    potassium chloride SA (KDUR) 20 MEQ Tab CR   3. Coronary artery calcification seen on CT scan     4. Hyperlipemia, mixed  ezetimibe-simvastatin (VYTORIN) 10-20 MG per tablet       Medical Decision Making:  Today's Assessment / Status / Plan:   Paroxysmal atrial fibrillation: She is in a regular rhythm today.  She will continue on metoprolol and Eliquis.  No bruising or bleeding.    Hypertension: Her blood pressure is good in the office today.  Continue current regimen.    Coronary calcification: Asymptomatic.    Hyperlipidemia: Her LDL goal is 70 or less.  Her LDL is 58 which is excellent.  Continue Vytorin.    She is stable enough to follow-up in 1 year  with Dr Refugio Rosario.  She will follow-up sooner if problems.    Collaborating Provider: Dr. Mynor Vogt.    Please note that this dictation was created using voice recognition software. I have made every reasonable attempt to correct obvious errors, but it is possible there are errors of grammar and possibly content that I did not discover before finalizing the note.          No Recipients

## 2019-08-19 ENCOUNTER — HOSPITAL ENCOUNTER (OUTPATIENT)
Dept: LAB | Facility: MEDICAL CENTER | Age: 64
End: 2019-08-19
Attending: FAMILY MEDICINE
Payer: MEDICARE

## 2019-08-19 ENCOUNTER — HOSPITAL ENCOUNTER (OUTPATIENT)
Dept: RADIOLOGY | Facility: MEDICAL CENTER | Age: 64
End: 2019-08-19
Attending: FAMILY MEDICINE
Payer: MEDICARE

## 2019-08-19 DIAGNOSIS — Z12.31 ENCOUNTER FOR SCREENING MAMMOGRAM FOR MALIGNANT NEOPLASM OF BREAST: ICD-10-CM

## 2019-08-19 LAB
25(OH)D3 SERPL-MCNC: 58 NG/ML (ref 30–100)
ALBUMIN SERPL BCP-MCNC: 4.4 G/DL (ref 3.2–4.9)
ALBUMIN/GLOB SERPL: 1.7 G/DL
ALP SERPL-CCNC: 64 U/L (ref 30–99)
ALT SERPL-CCNC: 20 U/L (ref 2–50)
ANION GAP SERPL CALC-SCNC: 11 MMOL/L (ref 0–11.9)
AST SERPL-CCNC: 16 U/L (ref 12–45)
BASOPHILS # BLD AUTO: 0.7 % (ref 0–1.8)
BASOPHILS # BLD: 0.04 K/UL (ref 0–0.12)
BILIRUB SERPL-MCNC: 0.6 MG/DL (ref 0.1–1.5)
BUN SERPL-MCNC: 19 MG/DL (ref 8–22)
CALCIUM SERPL-MCNC: 10 MG/DL (ref 8.5–10.5)
CHLORIDE SERPL-SCNC: 102 MMOL/L (ref 96–112)
CHOLEST SERPL-MCNC: 122 MG/DL (ref 100–199)
CO2 SERPL-SCNC: 24 MMOL/L (ref 20–33)
CREAT SERPL-MCNC: 1.25 MG/DL (ref 0.5–1.4)
EOSINOPHIL # BLD AUTO: 0.08 K/UL (ref 0–0.51)
EOSINOPHIL NFR BLD: 1.4 % (ref 0–6.9)
ERYTHROCYTE [DISTWIDTH] IN BLOOD BY AUTOMATED COUNT: 42.1 FL (ref 35.9–50)
FASTING STATUS PATIENT QL REPORTED: NORMAL
GLOBULIN SER CALC-MCNC: 2.6 G/DL (ref 1.9–3.5)
GLUCOSE SERPL-MCNC: 107 MG/DL (ref 65–99)
HCT VFR BLD AUTO: 39.1 % (ref 37–47)
HDLC SERPL-MCNC: 39 MG/DL
HGB BLD-MCNC: 12.2 G/DL (ref 12–16)
IMM GRANULOCYTES # BLD AUTO: 0.03 K/UL (ref 0–0.11)
IMM GRANULOCYTES NFR BLD AUTO: 0.5 % (ref 0–0.9)
LDLC SERPL CALC-MCNC: 50 MG/DL
LYMPHOCYTES # BLD AUTO: 1.29 K/UL (ref 1–4.8)
LYMPHOCYTES NFR BLD: 23 % (ref 22–41)
MCH RBC QN AUTO: 27.4 PG (ref 27–33)
MCHC RBC AUTO-ENTMCNC: 31.2 G/DL (ref 33.6–35)
MCV RBC AUTO: 87.7 FL (ref 81.4–97.8)
MONOCYTES # BLD AUTO: 0.53 K/UL (ref 0–0.85)
MONOCYTES NFR BLD AUTO: 9.4 % (ref 0–13.4)
NEUTROPHILS # BLD AUTO: 3.65 K/UL (ref 2–7.15)
NEUTROPHILS NFR BLD: 65 % (ref 44–72)
NRBC # BLD AUTO: 0 K/UL
NRBC BLD-RTO: 0 /100 WBC
PLATELET # BLD AUTO: 249 K/UL (ref 164–446)
PMV BLD AUTO: 10.2 FL (ref 9–12.9)
POTASSIUM SERPL-SCNC: 4 MMOL/L (ref 3.6–5.5)
PROT SERPL-MCNC: 7 G/DL (ref 6–8.2)
RBC # BLD AUTO: 4.46 M/UL (ref 4.2–5.4)
SODIUM SERPL-SCNC: 137 MMOL/L (ref 135–145)
T3FREE SERPL-MCNC: 2.4 PG/ML (ref 2.4–4.2)
T4 FREE SERPL-MCNC: 0.94 NG/DL (ref 0.53–1.43)
TRIGL SERPL-MCNC: 167 MG/DL (ref 0–149)
TSH SERPL DL<=0.005 MIU/L-ACNC: 0.16 UIU/ML (ref 0.38–5.33)
WBC # BLD AUTO: 5.6 K/UL (ref 4.8–10.8)

## 2019-08-19 PROCEDURE — 36415 COLL VENOUS BLD VENIPUNCTURE: CPT

## 2019-08-19 PROCEDURE — 84481 FREE ASSAY (FT-3): CPT

## 2019-08-19 PROCEDURE — 84443 ASSAY THYROID STIM HORMONE: CPT

## 2019-08-19 PROCEDURE — 84439 ASSAY OF FREE THYROXINE: CPT

## 2019-08-19 PROCEDURE — 85025 COMPLETE CBC W/AUTO DIFF WBC: CPT

## 2019-08-19 PROCEDURE — 82306 VITAMIN D 25 HYDROXY: CPT

## 2019-08-19 PROCEDURE — 86800 THYROGLOBULIN ANTIBODY: CPT

## 2019-08-19 PROCEDURE — 80061 LIPID PANEL: CPT

## 2019-08-19 PROCEDURE — 77063 BREAST TOMOSYNTHESIS BI: CPT

## 2019-08-19 PROCEDURE — 80053 COMPREHEN METABOLIC PANEL: CPT

## 2019-08-20 LAB — THYROGLOB AB SERPL-ACNC: <0.9 IU/ML (ref 0–4)

## 2020-02-21 DIAGNOSIS — I10 ESSENTIAL HYPERTENSION: ICD-10-CM

## 2020-02-21 RX ORDER — LOSARTAN POTASSIUM 50 MG/1
TABLET ORAL
Qty: 90 TAB | Refills: 3 | Status: SHIPPED | OUTPATIENT
Start: 2020-02-21

## 2020-08-08 DIAGNOSIS — I48.0 PAROXYSMAL ATRIAL FIBRILLATION (HCC): ICD-10-CM

## 2020-08-10 RX ORDER — METOPROLOL TARTRATE 100 MG/1
TABLET ORAL
Qty: 60 TAB | Refills: 0 | Status: SHIPPED | OUTPATIENT
Start: 2020-08-10

## 2020-12-23 ENCOUNTER — APPOINTMENT (RX ONLY)
Dept: URBAN - METROPOLITAN AREA CLINIC 22 | Facility: CLINIC | Age: 65
Setting detail: DERMATOLOGY
End: 2020-12-23

## 2020-12-23 VITALS — DIASTOLIC BLOOD PRESSURE: 86 MMHG | SYSTOLIC BLOOD PRESSURE: 140 MMHG

## 2020-12-23 PROBLEM — D04.4 CARCINOMA IN SITU OF SKIN OF SCALP AND NECK: Status: ACTIVE | Noted: 2020-12-23

## 2020-12-23 PROCEDURE — ? DIAGNOSIS COMMENT

## 2020-12-23 PROCEDURE — 17311 MOHS 1 STAGE H/N/HF/G: CPT

## 2020-12-23 PROCEDURE — ? REFERRAL CORRESPONDENCE

## 2020-12-23 PROCEDURE — ? MOHS SURGERY

## 2020-12-23 PROCEDURE — 13132 CMPLX RPR F/C/C/M/N/AX/G/H/F: CPT

## 2020-12-23 NOTE — HPI: PROCEDURE (MOHS)
Has The Growth Been Previously Biopsied?: has been previously biopsied
Body Location Override (Optional): Right neck/ jaw

## 2020-12-23 NOTE — PROCEDURE: MOHS SURGERY
Mohs Case Number: [id number]
Previous Accession (Optional): [id number]
Biopsy Photograph Reviewed: No (no photograph available)
Referring Physician (Optional): Kennedy Martinez
Consent Type: Consent 1 (Standard)
Eye Shield Used: No
Surgeon Performing Repair (Optional): Reddy Lloyd M.D.
Initial Size Of Lesion: 0.6
X Size Of Lesion In Cm (Optional): 0.5
Number Of Stages: 1
Primary Defect Width In Cm (Final Defect Size - Required For Flaps/Grafts): 0.7
Repair Type: Complex Repair
Oculoplastic Surgeon Procedure Text (A): After obtaining clear surgical margins the patient was sent to oculoplastics for surgical repair.  The patient understands they will receive post-surgical care and follow-up from the referring physician's office.
Otolaryngologist Procedure Text (A): After obtaining clear surgical margins the patient was sent to otolaryngology for surgical repair.  The patient understands they will receive post-surgical care and follow-up from the referring physician's office.
Plastic Surgeon Procedure Text (A): After obtaining clear surgical margins the patient was sent to plastics for surgical repair.  The patient understands they will receive post-surgical care and follow-up from the referring physician's office.
Mid-Level Procedure Text (A): After obtaining clear surgical margins the patient was sent to a mid-level provider for surgical repair.  The patient understands they will receive post-surgical care and follow-up from the mid-level provider.
Provider Procedure Text (A): After obtaining clear surgical margins the defect was repaired by another provider.
Asc Procedure Text (A): After obtaining clear surgical margins the patient was sent to an ASC for surgical repair.  The patient understands they will receive post-surgical care and follow-up from the ASC physician.
Suturegard Retention Suture: 2-0 Nylon
Retention Suture Bite Size: 3 mm
Length To Time In Minutes Device Was In Place: 10
Simple / Intermediate / Complex Repair - Final Wound Length In Cm: 3.6
Complex Requirements: Extensive Undermining Performed?: Yes
Width Of Defect Perpendicular To Closure In Cm (Required): 0.9
Distance Of Undermining In Cm (Required): 1.2
Undermining Type: Entire Wound
Debridement Text: The wound edges were debrided prior to proceeding with the closure to facilitate wound healing.
Helical Rim Text: The closure involved the helical rim.
Vermilion Border Text: The closure involved the vermilion border.
Nostril Rim Text: The closure involved the nostril rim.
Retention Suture Text: Retention sutures were placed to support the closure and prevent dehiscence.
Secondary Defect Length In Cm (Required For Flaps): 0
Area H Indication Text: Tumors in this location are included in Area H (eyelids, eyebrows, nose, lips, chin, ear, pre-auricular, post-auricular, temple, genitalia, hands, feet, ankles and areola).  Tissue conservation is critical in these anatomic locations.
Area M Indication Text: Tumors in this location are included in Area M (cheek, forehead, scalp, neck, jawline and pretibial skin).  Mohs surgery is indicated for tumors in these anatomic locations.
Area L Indication Text: Tumors in this location are included in Area L (trunk and extremities).  Mohs surgery is indicated for larger tumors, or tumors with aggressive histologic features, in these anatomic locations.
Tumor Debulked?: curette
Depth Of Tumor Invasion (For Histology): tumor not visualized (deep and peripheral margins are clear of tumor)
Surgical Defect Length In Cm (Optional): 1.0
Special Stains Stage 1 - Results: Base On Clearance Noted Above
Stage 2: Additional Anesthesia Type: 1% lidocaine with epinephrine and a 1:10 solution of 8.4% sodium bicarbonate
Stage 4: Additional Anesthesia Type: 1% lidocaine with epinephrine
Include Tumor Staging In Mohs Note?: Please Select the Appropriate Response
Staging Info: By selecting yes to the question above you will include information on AJCC 8 tumor staging in your Mohs note. Information on tumor staging will be automatically added for SCCs on the head and neck. AJCC 8 includes tumor size, tumor depth, perineural involvement and bone invasion.
Tumor Depth: Less than 6mm from granular layer and no invasion beyond the subcutaneous fat
Medical Necessity Statement: Based on my medical judgement, Mohs surgery is the most appropriate treatment for this cancer compared to other treatments.
Alternatives Discussed Intro (Do Not Add Period): I discussed alternative treatments to Mohs surgery and specifically discussed the risks and benefits of
Consent 1/Introductory Paragraph: The rationale for Mohs was explained to the patient and consent was obtained. The risks, benefits and alternatives to therapy were discussed in detail. Specifically, the risks of infection, scarring, bleeding, prolonged wound healing, incomplete removal, allergy to anesthesia, nerve injury and recurrence were addressed. Prior to the procedure, the treatment site was clearly identified and confirmed by the patient. All components of Universal Protocol/PAUSE Rule completed.
Consent 2/Introductory Paragraph: Mohs surgery was explained to the patient and consent was obtained. The risks, benefits and alternatives to therapy were discussed in detail. Specifically, the risks of infection, scarring, bleeding, prolonged wound healing, incomplete removal, allergy to anesthesia, nerve injury and recurrence were addressed. Prior to the procedure, the treatment site was clearly identified and confirmed by the patient. All components of Universal Protocol/PAUSE Rule completed.
Consent 3/Introductory Paragraph: I gave the patient a chance to ask questions they had about the procedure.  Following this I explained the Mohs procedure and consent was obtained. The risks, benefits and alternatives to therapy were discussed in detail. Specifically, the risks of infection, scarring, bleeding, prolonged wound healing, incomplete removal, allergy to anesthesia, nerve injury and recurrence were addressed. Prior to the procedure, the treatment site was clearly identified and confirmed by the patient. All components of Universal Protocol/PAUSE Rule completed.
Consent (Temporal Branch)/Introductory Paragraph: The rationale for Mohs was explained to the patient and consent was obtained. The risks, benefits and alternatives to therapy were discussed in detail. Specifically, the risks of damage to the temporal branch of the facial nerve, infection, scarring, bleeding, prolonged wound healing, incomplete removal, allergy to anesthesia, and recurrence were addressed. Prior to the procedure, the treatment site was clearly identified and confirmed by the patient. All components of Universal Protocol/PAUSE Rule completed.
Consent (Marginal Mandibular)/Introductory Paragraph: The rationale for Mohs was explained to the patient and consent was obtained. The risks, benefits and alternatives to therapy were discussed in detail. Specifically, the risks of damage to the marginal mandibular branch of the facial nerve, infection, scarring, bleeding, prolonged wound healing, incomplete removal, allergy to anesthesia, and recurrence were addressed. Prior to the procedure, the treatment site was clearly identified and confirmed by the patient. All components of Universal Protocol/PAUSE Rule completed.
Consent (Spinal Accessory)/Introductory Paragraph: The rationale for Mohs was explained to the patient and consent was obtained. The risks, benefits and alternatives to therapy were discussed in detail. Specifically, the risks of damage to the spinal accessory nerve, infection, scarring, bleeding, prolonged wound healing, incomplete removal, allergy to anesthesia, and recurrence were addressed. Prior to the procedure, the treatment site was clearly identified and confirmed by the patient. All components of Universal Protocol/PAUSE Rule completed.
Consent (Near Eyelid Margin)/Introductory Paragraph: The rationale for Mohs was explained to the patient and consent was obtained. The risks, benefits and alternatives to therapy were discussed in detail. Specifically, the risks of ectropion or eyelid deformity, infection, scarring, bleeding, prolonged wound healing, incomplete removal, allergy to anesthesia, nerve injury and recurrence were addressed. Prior to the procedure, the treatment site was clearly identified and confirmed by the patient. All components of Universal Protocol/PAUSE Rule completed.
Consent (Ear)/Introductory Paragraph: The rationale for Mohs was explained to the patient and consent was obtained. The risks, benefits and alternatives to therapy were discussed in detail. Specifically, the risks of ear deformity, infection, scarring, bleeding, prolonged wound healing, incomplete removal, allergy to anesthesia, nerve injury and recurrence were addressed. Prior to the procedure, the treatment site was clearly identified and confirmed by the patient. All components of Universal Protocol/PAUSE Rule completed.
Consent (Nose)/Introductory Paragraph: The rationale for Mohs was explained to the patient and consent was obtained. The risks, benefits and alternatives to therapy were discussed in detail. Specifically, the risks of nasal deformity, changes in the flow of air through the nose, infection, scarring, bleeding, prolonged wound healing, incomplete removal, allergy to anesthesia, nerve injury and recurrence were addressed. Prior to the procedure, the treatment site was clearly identified and confirmed by the patient. All components of Universal Protocol/PAUSE Rule completed.
Consent (Lip)/Introductory Paragraph: The rationale for Mohs was explained to the patient and consent was obtained. The risks, benefits and alternatives to therapy were discussed in detail. Specifically, the risks of lip deformity, changes in the oral aperture, infection, scarring, bleeding, prolonged wound healing, incomplete removal, allergy to anesthesia, nerve injury and recurrence were addressed. Prior to the procedure, the treatment site was clearly identified and confirmed by the patient. All components of Universal Protocol/PAUSE Rule completed.
Consent (Scalp)/Introductory Paragraph: The rationale for Mohs was explained to the patient and consent was obtained. The risks, benefits and alternatives to therapy were discussed in detail. Specifically, the risks of changes in hair growth pattern secondary to repair, infection, scarring, bleeding, prolonged wound healing, incomplete removal, allergy to anesthesia, nerve injury and recurrence were addressed. Prior to the procedure, the treatment site was clearly identified and confirmed by the patient. All components of Universal Protocol/PAUSE Rule completed.
Detail Level: Detailed
Postop Diagnosis: same
Anesthesia Volume In Cc: 7
Additional Anesthesia Volume In Cc: 6
Hemostasis: Electricator
Estimated Blood Loss (Cc): minimal
Repair Anesthesia Method: local infiltration
Undermining Location (Optional): in the deep fat
Brow Lift Text: A midfrontal incision was made medially to the defect to allow access to the tissues just superior to the left eyebrow. Following careful dissection inferiorly in a supraperiosteal plane to the level of the left eyebrow, several 3-0 monocryl sutures were used to resuspend the eyebrow orbicularis oculi muscular unit to the superior frontal bone periosteum. This resulted in an appropriate reapproximation of static eyebrow symmetry and correction of the left brow ptosis.
Deep Sutures: 4-0 Maxon
Epidermal Sutures: 5-0 Surgipro
Epidermal Closure: running
Suturegard Intro: Intraoperative tissue expansion was performed, utilizing the SUTUREGARD device, in order to reduce wound tension.
Suturegard Body: The suture ends were repeatedly re-tightened and re-clamped to achieve the desired tissue expansion.
Hemigard Intro: Due to skin fragility and wound tension, it was decided to use HEMIGARD adhesive retention suture devices to permit a linear closure. The skin was cleaned and dried for a 6cm distance away from the wound. Excessive hair, if present, was removed to allow for adhesion.
Hemigard Postcare Instructions: The HEMIGARD strips are to remain completely dry for at least 5-7 days.
Donor Site Anesthesia Type: same as repair anesthesia
Epidermal Closure Graft Donor Site (Optional): simple interrupted
Graft Donor Site Bandage (Optional-Leave Blank If You Don't Want In Note): Steri-strips and a pressure bandage were applied to the donor site.
Closure 2 Information: This tab is for additional flaps and grafts, including complex repair and grafts and complex repair and flaps. You can also specify a different location for the additional defect, if the location is the same you do not need to select a new one. We will insert the automated text for the repair you select below just as we do for solitary flaps and grafts. Please note that at this time if you select a location with a different insurance zone you will need to override the ICD10 and CPT if appropriate.
Closure 3 Information: This tab is for additional flaps and grafts above and beyond our usual structured repairs.  Please note if you enter information here it will not currently bill and you will need to add the billing information manually.
Wound Care: Petrolatum
Dressing: pressure dressing with telfa
Dressing (No Sutures): dry sterile dressing
Suture Removal: 14 days
Unna Boot Text: An Unna boot was placed to help immobilize the limb and facilitate more rapid healing.
Home Suture Removal Text: Patient was provided instructions on removing sutures and will remove their sutures at home.  If they have any questions or difficulties they will call the office.
Post-Care Instructions: I reviewed with the patient in detail post-care instructions. Patient is not to engage in any heavy lifting, exercise, or swimming for the next 14 days. Should the patient develop any fevers, chills, bleeding, severe pain patient will contact the office immediately.
Pain Refusal Text: I offered to prescribe pain medication but the patient refused to take this medication.
Mauc Instructions: By selecting yes to the question below the MAUC number will be added into the note.  This will be calculated automatically based on the diagnosis chosen, the size entered, the body zone selected (H,M,L) and the specific indications you chose. You will also have the option to override the Mohs AUC if you disagree with the automatically calculated number and this option is found in the Case Summary tab.
Where Do You Want The Question To Include Opioid Counseling Located?: Case Summary Tab
Eye Protection Verbiage: Before proceeding with the stage, a plastic scleral shield was inserted. The globe was anesthetized with a few drops of 1% lidocaine with 1:100,000 epinephrine. Then, an appropriate sized scleral shield was chosen and coated with lacrilube ointment. The shield was gently inserted and left in place for the duration of each stage. After the stage was completed, the shield was gently removed.
Mohs Method Verbiage: An incision at a 45 degree angle following the standard Mohs approach was done and the specimen was harvested as a microscopic controlled layer.
Surgeon/Pathologist Verbiage (Will Incorporate Name Of Surgeon From Intro If Not Blank): operated in two distinct and integrated capacities as the surgeon and pathologist.
Mohs Histo Method Verbiage: Each section was then chromacoded and processed in the Mohs lab using the Mohs protocol and submitted for frozen section.
Subsequent Stages Histo Method Verbiage: Using a similar technique to that described above, a thin layer of tissue was removed from all areas where tumor was visible on the previous stage.  The tissue was again oriented, mapped, dyed, and processed as above.
Mohs Rapid Report Verbiage: The area of clinically evident tumor was marked with skin marking ink and appropriately hatched.  The initial incision was made following the Mohs approach through the skin.  The specimen was taken to the lab, divided into the necessary number of pieces, chromacoded and processed according to the Mohs protocol.  This was repeated in successive stages until a tumor free defect was achieved.
Complex Repair Preamble Text (Leave Blank If You Do Not Want): Extensive wide undermining was performed.
Intermediate Repair Preamble Text (Leave Blank If You Do Not Want): Undermining was performed with blunt dissection.
Non-Graft Cartilage Fenestration Text: The cartilage was fenestrated with a 2mm punch biopsy to help facilitate healing.
Graft Cartilage Fenestration Text: The cartilage was fenestrated with a 2mm punch biopsy to help facilitate graft survival and healing.
Secondary Intention Text (Leave Blank If You Do Not Want): The defect will heal with secondary intention.
No Repair - Repaired With Adjacent Surgical Defect Text (Leave Blank If You Do Not Want): After obtaining clear surgical margins the defect was repaired concurrently with another surgical defect which was in close approximation.
Advancement Flap (Single) Text: The defect edges were debeveled with a #15 scalpel blade.  Given the location of the defect and the proximity to free margins a single advancement flap was deemed most appropriate.  Using a sterile surgical marker, an appropriate advancement flap was drawn incorporating the defect and placing the expected incisions within the relaxed skin tension lines where possible.    The area thus outlined was incised deep to adipose tissue with a #15 scalpel blade.  The skin margins were undermined to an appropriate distance in all directions utilizing iris scissors.
Advancement Flap (Double) Text: The defect edges were debeveled with a #15 scalpel blade.  Given the location of the defect and the proximity to free margins a double advancement flap was deemed most appropriate.  Using a sterile surgical marker, the appropriate advancement flaps were drawn incorporating the defect and placing the expected incisions within the relaxed skin tension lines where possible.    The area thus outlined was incised deep to adipose tissue with a #15 scalpel blade.  The skin margins were undermined to an appropriate distance in all directions utilizing iris scissors.
Burow's Advancement Flap Text: The defect edges were debeveled with a #15 scalpel blade.  Given the location of the defect and the proximity to free margins a Burow's advancement flap was deemed most appropriate.  Using a sterile surgical marker, the appropriate advancement flap was drawn incorporating the defect and placing the expected incisions within the relaxed skin tension lines where possible.    The area thus outlined was incised deep to adipose tissue with a #15 scalpel blade.  The skin margins were undermined to an appropriate distance in all directions utilizing iris scissors.
Chonodrocutaneous Helical Advancement Flap Text: The defect edges were debeveled with a #15 scalpel blade.  Given the location of the defect and the proximity to free margins a chondrocutaneous helical advancement flap was deemed most appropriate.  Using a sterile surgical marker, the appropriate advancement flap was drawn incorporating the defect and placing the expected incisions within the relaxed skin tension lines where possible.    The area thus outlined was incised deep to adipose tissue with a #15 scalpel blade.  The skin margins were undermined to an appropriate distance in all directions utilizing iris scissors.
Crescentic Advancement Flap Text: The defect edges were debeveled with a #15 scalpel blade.  Given the location of the defect and the proximity to free margins a crescentic advancement flap was deemed most appropriate.  Using a sterile surgical marker, the appropriate advancement flap was drawn incorporating the defect and placing the expected incisions within the relaxed skin tension lines where possible.    The area thus outlined was incised deep to adipose tissue with a #15 scalpel blade.  The skin margins were undermined to an appropriate distance in all directions utilizing iris scissors.
A-T Advancement Flap Text: The defect edges were debeveled with a #15 scalpel blade.  Given the location of the defect, shape of the defect and the proximity to free margins an A-T advancement flap was deemed most appropriate.  Using a sterile surgical marker, an appropriate advancement flap was drawn incorporating the defect and placing the expected incisions within the relaxed skin tension lines where possible.    The area thus outlined was incised deep to adipose tissue with a #15 scalpel blade.  The skin margins were undermined to an appropriate distance in all directions utilizing iris scissors.
O-T Advancement Flap Text: The defect edges were debeveled with a #15 scalpel blade.  Given the location of the defect, shape of the defect and the proximity to free margins an O-T advancement flap was deemed most appropriate.  Using a sterile surgical marker, an appropriate advancement flap was drawn incorporating the defect and placing the expected incisions within the relaxed skin tension lines where possible.    The area thus outlined was incised deep to adipose tissue with a #15 scalpel blade.  The skin margins were undermined to an appropriate distance in all directions utilizing iris scissors.
O-L Flap Text: The defect edges were debeveled with a #15 scalpel blade.  Given the location of the defect, shape of the defect and the proximity to free margins an O-L flap was deemed most appropriate.  Using a sterile surgical marker, an appropriate advancement flap was drawn incorporating the defect and placing the expected incisions within the relaxed skin tension lines where possible.    The area thus outlined was incised deep to adipose tissue with a #15 scalpel blade.  The skin margins were undermined to an appropriate distance in all directions utilizing iris scissors.
O-Z Flap Text: The defect edges were debeveled with a #15 scalpel blade.  Given the location of the defect, shape of the defect and the proximity to free margins an O-Z flap was deemed most appropriate.  Using a sterile surgical marker, an appropriate transposition flap was drawn incorporating the defect and placing the expected incisions within the relaxed skin tension lines where possible. The area thus outlined was incised deep to adipose tissue with a #15 scalpel blade.  The skin margins were undermined to an appropriate distance in all directions utilizing iris scissors.
Double O-Z Flap Text: The defect edges were debeveled with a #15 scalpel blade.  Given the location of the defect, shape of the defect and the proximity to free margins a Double O-Z flap was deemed most appropriate.  Using a sterile surgical marker, an appropriate transposition flap was drawn incorporating the defect and placing the expected incisions within the relaxed skin tension lines where possible. The area thus outlined was incised deep to adipose tissue with a #15 scalpel blade.  The skin margins were undermined to an appropriate distance in all directions utilizing iris scissors.
V-Y Flap Text: The defect edges were debeveled with a #15 scalpel blade.  Given the location of the defect, shape of the defect and the proximity to free margins a V-Y flap was deemed most appropriate.  Using a sterile surgical marker, an appropriate advancement flap was drawn incorporating the defect and placing the expected incisions within the relaxed skin tension lines where possible.    The area thus outlined was incised deep to adipose tissue with a #15 scalpel blade.  The skin margins were undermined to an appropriate distance in all directions utilizing iris scissors.
Advancement-Rotation Flap Text: The defect edges were debeveled with a #15 scalpel blade.  Given the location of the defect, shape of the defect and the proximity to free margins an advancement-rotation flap was deemed most appropriate.  Using a sterile surgical marker, an appropriate flap was drawn incorporating the defect and placing the expected incisions within the relaxed skin tension lines where possible. The area thus outlined was incised deep to adipose tissue with a #15 scalpel blade.  The skin margins were undermined to an appropriate distance in all directions utilizing iris scissors.
Mercedes Flap Text: The defect edges were debeveled with a #15 scalpel blade.  Given the location of the defect, shape of the defect and the proximity to free margins a Mercedes flap was deemed most appropriate.  Using a sterile surgical marker, an appropriate advancement flap was drawn incorporating the defect and placing the expected incisions within the relaxed skin tension lines where possible. The area thus outlined was incised deep to adipose tissue with a #15 scalpel blade.  The skin margins were undermined to an appropriate distance in all directions utilizing iris scissors.
Modified Advancement Flap Text: The defect edges were debeveled with a #15 scalpel blade.  Given the location of the defect, shape of the defect and the proximity to free margins a modified advancement flap was deemed most appropriate.  Using a sterile surgical marker, an appropriate advancement flap was drawn incorporating the defect and placing the expected incisions within the relaxed skin tension lines where possible.    The area thus outlined was incised deep to adipose tissue with a #15 scalpel blade.  The skin margins were undermined to an appropriate distance in all directions utilizing iris scissors.
Mucosal Advancement Flap Text: Given the location of the defect, shape of the defect and the proximity to free margins a mucosal advancement flap was deemed most appropriate. Incisions were made with a 15 blade scalpel in the appropriate fashion along the cutaneous vermilion border and the mucosal lip. The remaining actinically damaged mucosal tissue was excised.  The mucosal advancement flap was then elevated to the gingival sulcus with care taken to preserve the neurovascular structures and advanced into the primary defect. Care was taken to ensure that precise realignment of the vermilion border was achieved.
Peng Advancement Flap Text: The defect edges were debeveled with a #15 scalpel blade.  Given the location of the defect, shape of the defect and the proximity to free margins a Peng advancement flap was deemed most appropriate.  Using a sterile surgical marker, an appropriate advancement flap was drawn incorporating the defect and placing the expected incisions within the relaxed skin tension lines where possible. The area thus outlined was incised deep to adipose tissue with a #15 scalpel blade.  The skin margins were undermined to an appropriate distance in all directions utilizing iris scissors.
Hatchet Flap Text: The defect edges were debeveled with a #15 scalpel blade.  Given the location of the defect, shape of the defect and the proximity to free margins a hatchet flap was deemed most appropriate.  Using a sterile surgical marker, an appropriate hatchet flap was drawn incorporating the defect and placing the expected incisions within the relaxed skin tension lines where possible.    The area thus outlined was incised deep to adipose tissue with a #15 scalpel blade.  The skin margins were undermined to an appropriate distance in all directions utilizing iris scissors.
Rotation Flap Text: The defect edges were debeveled with a #15 scalpel blade.  Given the location of the defect, shape of the defect and the proximity to free margins a rotation flap was deemed most appropriate.  Using a sterile surgical marker, an appropriate rotation flap was drawn incorporating the defect and placing the expected incisions within the relaxed skin tension lines where possible.    The area thus outlined was incised deep to adipose tissue with a #15 scalpel blade.  The skin margins were undermined to an appropriate distance in all directions utilizing iris scissors.
Spiral Flap Text: The defect edges were debeveled with a #15 scalpel blade.  Given the location of the defect, shape of the defect and the proximity to free margins a spiral flap was deemed most appropriate.  Using a sterile surgical marker, an appropriate rotation flap was drawn incorporating the defect and placing the expected incisions within the relaxed skin tension lines where possible. The area thus outlined was incised deep to adipose tissue with a #15 scalpel blade.  The skin margins were undermined to an appropriate distance in all directions utilizing iris scissors.
Star Wedge Flap Text: The defect edges were debeveled with a #15 scalpel blade.  Given the location of the defect, shape of the defect and the proximity to free margins a star wedge flap was deemed most appropriate.  Using a sterile surgical marker, an appropriate rotation flap was drawn incorporating the defect and placing the expected incisions within the relaxed skin tension lines where possible. The area thus outlined was incised deep to adipose tissue with a #15 scalpel blade.  The skin margins were undermined to an appropriate distance in all directions utilizing iris scissors.
Transposition Flap Text: The defect edges were debeveled with a #15 scalpel blade.  Given the location of the defect and the proximity to free margins a transposition flap was deemed most appropriate.  Using a sterile surgical marker, an appropriate transposition flap was drawn incorporating the defect.    The area thus outlined was incised deep to adipose tissue with a #15 scalpel blade.  The skin margins were undermined to an appropriate distance in all directions utilizing iris scissors.
Muscle Hinge Flap Text: The defect edges were debeveled with a #15 scalpel blade.  Given the size, depth and location of the defect and the proximity to free margins a muscle hinge flap was deemed most appropriate.  Using a sterile surgical marker, an appropriate hinge flap was drawn incorporating the defect. The area thus outlined was incised with a #15 scalpel blade.  The skin margins were undermined to an appropriate distance in all directions utilizing iris scissors.
Nasal Turnover Hinge Flap Text: The defect edges were debeveled with a #15 scalpel blade.  Given the size, depth, location of the defect and the defect being full thickness a nasal turnover hinge flap was deemed most appropriate.  Using a sterile surgical marker, an appropriate hinge flap was drawn incorporating the defect. The area thus outlined was incised with a #15 scalpel blade. The flap was designed to recreate the nasal mucosal lining and the alar rim. The skin margins were undermined to an appropriate distance in all directions utilizing iris scissors.
Nasalis-Muscle-Based Myocutaneous Island Pedicle Flap Text: Using a #15 blade, an incision was made around the donor flap to the level of the nasalis muscle. Wide lateral undermining was then performed in both the subcutaneous plane above the nasalis muscle, and in a submuscular plane just above periosteum. This allowed the formation of a free nasalis muscle axial pedicle (based on the angular artery) which was still attached to the actual cutaneous flap, increasing its mobility and vascular viability. Hemostasis was obtained with pinpoint electrocoagulation. The flap was mobilized into position and the pivotal anchor points positioned and stabilized with buried interrupted sutures. Subcutaneous and dermal tissues were closed in a multilayered fashion with sutures. Tissue redundancies were excised, and the epidermal edges were apposed without significant tension and sutured with sutures.
Orbicularis Oris Muscle Flap Text: The defect edges were debeveled with a #15 scalpel blade.  Given that the defect affected the competency of the oral sphincter an obicularis oris muscle flap was deemed most appropriate to restore this competency and normal muscle function.  Using a sterile surgical marker, an appropriate flap was drawn incorporating the defect. The area thus outlined was incised with a #15 scalpel blade.
Melolabial Transposition Flap Text: The defect edges were debeveled with a #15 scalpel blade.  Given the location of the defect and the proximity to free margins a melolabial flap was deemed most appropriate.  Using a sterile surgical marker, an appropriate melolabial transposition flap was drawn incorporating the defect.    The area thus outlined was incised deep to adipose tissue with a #15 scalpel blade.  The skin margins were undermined to an appropriate distance in all directions utilizing iris scissors.
Rhombic Flap Text: The defect edges were debeveled with a #15 scalpel blade.  Given the location of the defect and the proximity to free margins a rhombic flap was deemed most appropriate.  Using a sterile surgical marker, an appropriate rhombic flap was drawn incorporating the defect.    The area thus outlined was incised deep to adipose tissue with a #15 scalpel blade.  The skin margins were undermined to an appropriate distance in all directions utilizing iris scissors.
Rhomboid Transposition Flap Text: The defect edges were debeveled with a #15 scalpel blade.  Given the location of the defect and the proximity to free margins a rhomboid transposition flap was deemed most appropriate.  Using a sterile surgical marker, an appropriate rhomboid flap was drawn incorporating the defect.    The area thus outlined was incised deep to adipose tissue with a #15 scalpel blade.  The skin margins were undermined to an appropriate distance in all directions utilizing iris scissors.
Bi-Rhombic Flap Text: The defect edges were debeveled with a #15 scalpel blade.  Given the location of the defect and the proximity to free margins a bi-rhombic flap was deemed most appropriate.  Using a sterile surgical marker, an appropriate rhombic flap was drawn incorporating the defect. The area thus outlined was incised deep to adipose tissue with a #15 scalpel blade.  The skin margins were undermined to an appropriate distance in all directions utilizing iris scissors.
Helical Rim Advancement Flap Text: The defect edges were debeveled with a #15 blade scalpel.  Given the location of the defect and the proximity to free margins (helical rim) a double helical rim advancement flap was deemed most appropriate.  Using a sterile surgical marker, the appropriate advancement flaps were drawn incorporating the defect and placing the expected incisions between the helical rim and antihelix where possible.  The area thus outlined was incised through and through with a #15 scalpel blade.  With a skin hook and iris scissors, the flaps were gently and sharply undermined and freed up.
Bilateral Helical Rim Advancement Flap Text: The defect edges were debeveled with a #15 blade scalpel.  Given the location of the defect and the proximity to free margins (helical rim) a bilateral helical rim advancement flap was deemed most appropriate.  Using a sterile surgical marker, the appropriate advancement flaps were drawn incorporating the defect and placing the expected incisions between the helical rim and antihelix where possible.  The area thus outlined was incised through and through with a #15 scalpel blade.  With a skin hook and iris scissors, the flaps were gently and sharply undermined and freed up.
Ear Star Wedge Flap Text: The defect edges were debeveled with a #15 blade scalpel.  Given the location of the defect and the proximity to free margins (helical rim) an ear star wedge flap was deemed most appropriate.  Using a sterile surgical marker, the appropriate flap was drawn incorporating the defect and placing the expected incisions between the helical rim and antihelix where possible.  The area thus outlined was incised through and through with a #15 scalpel blade.
Banner Transposition Flap Text: The defect edges were debeveled with a #15 scalpel blade.  Given the location of the defect and the proximity to free margins a Banner transposition flap was deemed most appropriate.  Using a sterile surgical marker, an appropriate flap drawn around the defect. The area thus outlined was incised deep to adipose tissue with a #15 scalpel blade.  The skin margins were undermined to an appropriate distance in all directions utilizing iris scissors.
Bilobed Flap Text: The defect edges were debeveled with a #15 scalpel blade.  Given the location of the defect and the proximity to free margins a bilobe flap was deemed most appropriate.  Using a sterile surgical marker, an appropriate bilobe flap drawn around the defect.    The area thus outlined was incised deep to adipose tissue with a #15 scalpel blade.  The skin margins were undermined to an appropriate distance in all directions utilizing iris scissors.
Bilobed Transposition Flap Text: The defect edges were debeveled with a #15 scalpel blade.  Given the location of the defect and the proximity to free margins a bilobed transposition flap was deemed most appropriate.  Using a sterile surgical marker, an appropriate bilobe flap drawn around the defect.    The area thus outlined was incised deep to adipose tissue with a #15 scalpel blade.  The skin margins were undermined to an appropriate distance in all directions utilizing iris scissors.
Trilobed Flap Text: The defect edges were debeveled with a #15 scalpel blade.  Given the location of the defect and the proximity to free margins a trilobed flap was deemed most appropriate.  Using a sterile surgical marker, an appropriate trilobed flap drawn around the defect.    The area thus outlined was incised deep to adipose tissue with a #15 scalpel blade.  The skin margins were undermined to an appropriate distance in all directions utilizing iris scissors.
Dorsal Nasal Flap Text: The defect edges were debeveled with a #15 scalpel blade.  Given the location of the defect and the proximity to free margins a dorsal nasal flap was deemed most appropriate.  Using a sterile surgical marker, an appropriate dorsal nasal flap was drawn around the defect.    The area thus outlined was incised deep to adipose tissue with a #15 scalpel blade.  The skin margins were undermined to an appropriate distance in all directions utilizing iris scissors.
Island Pedicle Flap Text: The defect edges were debeveled with a #15 scalpel blade.  Given the location of the defect, shape of the defect and the proximity to free margins an island pedicle advancement flap was deemed most appropriate.  Using a sterile surgical marker, an appropriate advancement flap was drawn incorporating the defect, outlining the appropriate donor tissue and placing the expected incisions within the relaxed skin tension lines where possible.    The area thus outlined was incised deep to adipose tissue with a #15 scalpel blade.  The skin margins were undermined to an appropriate distance in all directions around the primary defect and laterally outward around the island pedicle utilizing iris scissors.  There was minimal undermining beneath the pedicle flap.
Island Pedicle Flap With Canthal Suspension Text: The defect edges were debeveled with a #15 scalpel blade.  Given the location of the defect, shape of the defect and the proximity to free margins an island pedicle advancement flap was deemed most appropriate.  Using a sterile surgical marker, an appropriate advancement flap was drawn incorporating the defect, outlining the appropriate donor tissue and placing the expected incisions within the relaxed skin tension lines where possible. The area thus outlined was incised deep to adipose tissue with a #15 scalpel blade.  The skin margins were undermined to an appropriate distance in all directions around the primary defect and laterally outward around the island pedicle utilizing iris scissors.  There was minimal undermining beneath the pedicle flap. A suspension suture was placed in the canthal tendon to prevent tension and prevent ectropion.
Alar Island Pedicle Flap Text: The defect edges were debeveled with a #15 scalpel blade.  Given the location of the defect, shape of the defect and the proximity to the alar rim an island pedicle advancement flap was deemed most appropriate.  Using a sterile surgical marker, an appropriate advancement flap was drawn incorporating the defect, outlining the appropriate donor tissue and placing the expected incisions within the nasal ala running parallel to the alar rim. The area thus outlined was incised with a #15 scalpel blade.  The skin margins were undermined minimally to an appropriate distance in all directions around the primary defect and laterally outward around the island pedicle utilizing iris scissors.  There was minimal undermining beneath the pedicle flap.
Double Island Pedicle Flap Text: The defect edges were debeveled with a #15 scalpel blade.  Given the location of the defect, shape of the defect and the proximity to free margins a double island pedicle advancement flap was deemed most appropriate.  Using a sterile surgical marker, an appropriate advancement flap was drawn incorporating the defect, outlining the appropriate donor tissue and placing the expected incisions within the relaxed skin tension lines where possible.    The area thus outlined was incised deep to adipose tissue with a #15 scalpel blade.  The skin margins were undermined to an appropriate distance in all directions around the primary defect and laterally outward around the island pedicle utilizing iris scissors.  There was minimal undermining beneath the pedicle flap.
Island Pedicle Flap-Requiring Vessel Identification Text: The defect edges were debeveled with a #15 scalpel blade.  Given the location of the defect, shape of the defect and the proximity to free margins an island pedicle advancement flap was deemed most appropriate.  Using a sterile surgical marker, an appropriate advancement flap was drawn, based on the axial vessel mentioned above, incorporating the defect, outlining the appropriate donor tissue and placing the expected incisions within the relaxed skin tension lines where possible.    The area thus outlined was incised deep to adipose tissue with a #15 scalpel blade.  The skin margins were undermined to an appropriate distance in all directions around the primary defect and laterally outward around the island pedicle utilizing iris scissors.  There was minimal undermining beneath the pedicle flap.
Keystone Flap Text: The defect edges were debeveled with a #15 scalpel blade.  Given the location of the defect, shape of the defect a keystone flap was deemed most appropriate.  Using a sterile surgical marker, an appropriate keystone flap was drawn incorporating the defect, outlining the appropriate donor tissue and placing the expected incisions within the relaxed skin tension lines where possible. The area thus outlined was incised deep to adipose tissue with a #15 scalpel blade.  The skin margins were undermined to an appropriate distance in all directions around the primary defect and laterally outward around the flap utilizing iris scissors.
O-T Plasty Text: The defect edges were debeveled with a #15 scalpel blade.  Given the location of the defect, shape of the defect and the proximity to free margins an O-T plasty was deemed most appropriate.  Using a sterile surgical marker, an appropriate O-T plasty was drawn incorporating the defect and placing the expected incisions within the relaxed skin tension lines where possible.    The area thus outlined was incised deep to adipose tissue with a #15 scalpel blade.  The skin margins were undermined to an appropriate distance in all directions utilizing iris scissors.
O-Z Plasty Text: The defect edges were debeveled with a #15 scalpel blade.  Given the location of the defect, shape of the defect and the proximity to free margins an O-Z plasty (double transposition flap) was deemed most appropriate.  Using a sterile surgical marker, the appropriate transposition flaps were drawn incorporating the defect and placing the expected incisions within the relaxed skin tension lines where possible.    The area thus outlined was incised deep to adipose tissue with a #15 scalpel blade.  The skin margins were undermined to an appropriate distance in all directions utilizing iris scissors.  Hemostasis was achieved with electrocautery.  The flaps were then transposed into place, one clockwise and the other counterclockwise, and anchored with interrupted buried subcutaneous sutures.
Double O-Z Plasty Text: The defect edges were debeveled with a #15 scalpel blade.  Given the location of the defect, shape of the defect and the proximity to free margins a Double O-Z plasty (double transposition flap) was deemed most appropriate.  Using a sterile surgical marker, the appropriate transposition flaps were drawn incorporating the defect and placing the expected incisions within the relaxed skin tension lines where possible. The area thus outlined was incised deep to adipose tissue with a #15 scalpel blade.  The skin margins were undermined to an appropriate distance in all directions utilizing iris scissors.  Hemostasis was achieved with electrocautery.  The flaps were then transposed into place, one clockwise and the other counterclockwise, and anchored with interrupted buried subcutaneous sutures.
V-Y Plasty Text: The defect edges were debeveled with a #15 scalpel blade.  Given the location of the defect, shape of the defect and the proximity to free margins an V-Y advancement flap was deemed most appropriate.  Using a sterile surgical marker, an appropriate advancement flap was drawn incorporating the defect and placing the expected incisions within the relaxed skin tension lines where possible.    The area thus outlined was incised deep to adipose tissue with a #15 scalpel blade.  The skin margins were undermined to an appropriate distance in all directions utilizing iris scissors.
H Plasty Text: Given the location of the defect, shape of the defect and the proximity to free margins a H-plasty was deemed most appropriate for repair.  Using a sterile surgical marker, the appropriate advancement arms of the H-plasty were drawn incorporating the defect and placing the expected incisions within the relaxed skin tension lines where possible. The area thus outlined was incised deep to adipose tissue with a #15 scalpel blade. The skin margins were undermined to an appropriate distance in all directions utilizing iris scissors.  The opposing advancement arms were then advanced into place in opposite direction and anchored with interrupted buried subcutaneous sutures.
W Plasty Text: The lesion was extirpated to the level of the fat with a #15 scalpel blade.  Given the location of the defect, shape of the defect and the proximity to free margins a W-plasty was deemed most appropriate for repair.  Using a sterile surgical marker, the appropriate transposition arms of the W-plasty were drawn incorporating the defect and placing the expected incisions within the relaxed skin tension lines where possible.    The area thus outlined was incised deep to adipose tissue with a #15 scalpel blade.  The skin margins were undermined to an appropriate distance in all directions utilizing iris scissors.  The opposing transposition arms were then transposed into place in opposite direction and anchored with interrupted buried subcutaneous sutures.
Z Plasty Text: The lesion was extirpated to the level of the fat with a #15 scalpel blade.  Given the location of the defect, shape of the defect and the proximity to free margins a Z-plasty was deemed most appropriate for repair.  Using a sterile surgical marker, the appropriate transposition arms of the Z-plasty were drawn incorporating the defect and placing the expected incisions within the relaxed skin tension lines where possible.    The area thus outlined was incised deep to adipose tissue with a #15 scalpel blade.  The skin margins were undermined to an appropriate distance in all directions utilizing iris scissors.  The opposing transposition arms were then transposed into place in opposite direction and anchored with interrupted buried subcutaneous sutures.
Zygomaticofacial Flap Text: Given the location of the defect, shape of the defect and the proximity to free margins a zygomaticofacial flap was deemed most appropriate for repair.  Using a sterile surgical marker, the appropriate flap was drawn incorporating the defect and placing the expected incisions within the relaxed skin tension lines where possible. The area thus outlined was incised deep to adipose tissue with a #15 scalpel blade with preservation of a vascular pedicle.  The skin margins were undermined to an appropriate distance in all directions utilizing iris scissors.  The flap was then placed into the defect and anchored with interrupted buried subcutaneous sutures.
Cheek Interpolation Flap Text: A decision was made to reconstruct the defect utilizing an interpolation axial flap and a staged reconstruction.  A telfa template was made of the defect.  This telfa template was then used to outline the Cheek Interpolation flap.  The donor area for the pedicle flap was then injected with anesthesia.  The flap was excised through the skin and subcutaneous tissue down to the layer of the underlying musculature.  The interpolation flap was carefully excised within this deep plane to maintain its blood supply.  The edges of the donor site were undermined.   The donor site was closed in a primary fashion.  The pedicle was then rotated into position and sutured.  Once the tube was sutured into place, adequate blood supply was confirmed with blanching and refill.  The pedicle was then wrapped with xeroform gauze and dressed appropriately with a telfa and gauze bandage to ensure continued blood supply and protect the attached pedicle.
Cheek-To-Nose Interpolation Flap Text: A decision was made to reconstruct the defect utilizing an interpolation axial flap and a staged reconstruction.  A telfa template was made of the defect.  This telfa template was then used to outline the Cheek-To-Nose Interpolation flap.  The donor area for the pedicle flap was then injected with anesthesia.  The flap was excised through the skin and subcutaneous tissue down to the layer of the underlying musculature.  The interpolation flap was carefully excised within this deep plane to maintain its blood supply.  The edges of the donor site were undermined.   The donor site was closed in a primary fashion.  The pedicle was then rotated into position and sutured.  Once the tube was sutured into place, adequate blood supply was confirmed with blanching and refill.  The pedicle was then wrapped with xeroform gauze and dressed appropriately with a telfa and gauze bandage to ensure continued blood supply and protect the attached pedicle.
Interpolation Flap Text: A decision was made to reconstruct the defect utilizing an interpolation axial flap and a staged reconstruction.  A telfa template was made of the defect.  This telfa template was then used to outline the interpolation flap.  The donor area for the pedicle flap was then injected with anesthesia.  The flap was excised through the skin and subcutaneous tissue down to the layer of the underlying musculature.  The interpolation flap was carefully excised within this deep plane to maintain its blood supply.  The edges of the donor site were undermined.   The donor site was closed in a primary fashion.  The pedicle was then rotated into position and sutured.  Once the tube was sutured into place, adequate blood supply was confirmed with blanching and refill.  The pedicle was then wrapped with xeroform gauze and dressed appropriately with a telfa and gauze bandage to ensure continued blood supply and protect the attached pedicle.
Melolabial Interpolation Flap Text: A decision was made to reconstruct the defect utilizing an interpolation axial flap and a staged reconstruction.  A telfa template was made of the defect.  This telfa template was then used to outline the melolabial interpolation flap.  The donor area for the pedicle flap was then injected with anesthesia.  The flap was excised through the skin and subcutaneous tissue down to the layer of the underlying musculature.  The pedicle flap was carefully excised within this deep plane to maintain its blood supply.  The edges of the donor site were undermined.   The donor site was closed in a primary fashion.  The pedicle was then rotated into position and sutured.  Once the tube was sutured into place, adequate blood supply was confirmed with blanching and refill.  The pedicle was then wrapped with xeroform gauze and dressed appropriately with a telfa and gauze bandage to ensure continued blood supply and protect the attached pedicle.
Mastoid Interpolation Flap Text: A decision was made to reconstruct the defect utilizing an interpolation axial flap and a staged reconstruction.  A telfa template was made of the defect.  This telfa template was then used to outline the mastoid interpolation flap.  The donor area for the pedicle flap was then injected with anesthesia.  The flap was excised through the skin and subcutaneous tissue down to the layer of the underlying musculature.  The pedicle flap was carefully excised within this deep plane to maintain its blood supply.  The edges of the donor site were undermined.   The donor site was closed in a primary fashion.  The pedicle was then rotated into position and sutured.  Once the tube was sutured into place, adequate blood supply was confirmed with blanching and refill.  The pedicle was then wrapped with xeroform gauze and dressed appropriately with a telfa and gauze bandage to ensure continued blood supply and protect the attached pedicle.
Posterior Auricular Interpolation Flap Text: A decision was made to reconstruct the defect utilizing an interpolation axial flap and a staged reconstruction.  A telfa template was made of the defect.  This telfa template was then used to outline the posterior auricular interpolation flap.  The donor area for the pedicle flap was then injected with anesthesia.  The flap was excised through the skin and subcutaneous tissue down to the layer of the underlying musculature.  The pedicle flap was carefully excised within this deep plane to maintain its blood supply.  The edges of the donor site were undermined.   The donor site was closed in a primary fashion.  The pedicle was then rotated into position and sutured.  Once the tube was sutured into place, adequate blood supply was confirmed with blanching and refill.  The pedicle was then wrapped with xeroform gauze and dressed appropriately with a telfa and gauze bandage to ensure continued blood supply and protect the attached pedicle.
Paramedian Forehead Flap Text: A decision was made to reconstruct the defect utilizing an interpolation axial flap and a staged reconstruction.  A telfa template was made of the defect.  This telfa template was then used to outline the paramedian forehead pedicle flap.  The donor area for the pedicle flap was then injected with anesthesia.  The flap was excised through the skin and subcutaneous tissue down to the layer of the underlying musculature.  The pedicle flap was carefully excised within this deep plane to maintain its blood supply.  The edges of the donor site were undermined.   The donor site was closed in a primary fashion.  The pedicle was then rotated into position and sutured.  Once the tube was sutured into place, adequate blood supply was confirmed with blanching and refill.  The pedicle was then wrapped with xeroform gauze and dressed appropriately with a telfa and gauze bandage to ensure continued blood supply and protect the attached pedicle.
Cheiloplasty (Less Than 50%) Text: A decision was made to reconstruct the defect with a  cheiloplasty.  The defect was undermined extensively.  Additional obicularis oris muscle was excised with a 15 blade scalpel.  The defect was converted into a full thickness wedge, of less than 50% of the vertical height of the lip, to facilite a better cosmetic result.  Small vessels were then tied off with 5-0 monocyrl. The obicularis oris, superficial fascia, adipose and dermis were then reapproximated.  After the deeper layers were approximated the epidermis was reapproximated with particular care given to realign the vermilion border.
Cheiloplasty (Complex) Text: A decision was made to reconstruct the defect with a  cheiloplasty.  The defect was undermined extensively.  Additional obicularis oris muscle was excised with a 15 blade scalpel.  The defect was converted into a full thickness wedge to facilite a better cosmetic result.  Small vessels were then tied off with 5-0 monocyrl. The obicularis oris, superficial fascia, adipose and dermis were then reapproximated.  After the deeper layers were approximated the epidermis was reapproximated with particular care given to realign the vermilion border.
Ear Wedge Repair Text: A wedge excision was completed by carrying down an excision through the full thickness of the ear and cartilage with an inward facing Burow's triangle. The wound was then closed in a layered fashion.
Full Thickness Lip Wedge Repair (Flap) Text: Given the location of the defect and the proximity to free margins a full thickness wedge repair was deemed most appropriate.  Using a sterile surgical marker, the appropriate repair was drawn incorporating the defect and placing the expected incisions perpendicular to the vermilion border.  The vermilion border was also meticulously outlined to ensure appropriate reapproximation during the repair.  The area thus outlined was incised through and through with a #15 scalpel blade.  The muscularis and dermis were reaproximated with deep sutures following hemostasis. Care was taken to realign the vermilion border before proceeding with the superficial closure.  Once the vermilion was realigned the superfical and mucosal closure was finished.
Ftsg Text: The defect edges were debeveled with a #15 scalpel blade.  Given the location of the defect, shape of the defect and the proximity to free margins a full thickness skin graft was deemed most appropriate.  Using a sterile surgical marker, the primary defect shape was transferred to the donor site. The area thus outlined was incised deep to adipose tissue with a #15 scalpel blade.  The harvested graft was then trimmed of adipose tissue until only dermis and epidermis was left.  The skin margins of the secondary defect were undermined to an appropriate distance in all directions utilizing iris scissors.  The secondary defect was closed with interrupted buried subcutaneous sutures.  The skin edges were then re-apposed with running  sutures.  The skin graft was then placed in the primary defect and oriented appropriately.
Split-Thickness Skin Graft Text: The defect edges were debeveled with a #15 scalpel blade.  Given the location of the defect, shape of the defect and the proximity to free margins a split thickness skin graft was deemed most appropriate.  Using a sterile surgical marker, the primary defect shape was transferred to the donor site. The split thickness graft was then harvested.  The skin graft was then placed in the primary defect and oriented appropriately.
Burow's Graft Text: The defect edges were debeveled with a #15 scalpel blade.  Given the location of the defect, shape of the defect, the proximity to free margins and the presence of a standing cone deformity a Burow's skin graft was deemed most appropriate. The standing cone was removed and this tissue was then trimmed to the shape of the primary defect. The adipose tissue was also removed until only dermis and epidermis were left.  The skin margins of the secondary defect were undermined to an appropriate distance in all directions utilizing iris scissors.  The secondary defect was closed with interrupted buried subcutaneous sutures.  The skin edges were then re-apposed with running  sutures.  The skin graft was then placed in the primary defect and oriented appropriately.
Cartilage Graft Text: The defect edges were debeveled with a #15 scalpel blade.  Given the location of the defect, shape of the defect, the fact the defect involved a full thickness cartilage defect a cartilage graft was deemed most appropriate.  An appropriate donor site was identified, cleansed, and anesthetized. The cartilage graft was then harvested and transferred to the recipient site, oriented appropriately and then sutured into place.  The secondary defect was then repaired using a primary closure.
Composite Graft Text: The defect edges were debeveled with a #15 scalpel blade.  Given the location of the defect, shape of the defect, the proximity to free margins and the fact the defect was full thickness a composite graft was deemed most appropriate.  The defect was outline and then transferred to the donor site.  A full thickness graft was then excised from the donor site. The graft was then placed in the primary defect, oriented appropriately and then sutured into place.  The secondary defect was then repaired using a primary closure.
Epidermal Autograft Text: The defect edges were debeveled with a #15 scalpel blade.  Given the location of the defect, shape of the defect and the proximity to free margins an epidermal autograft was deemed most appropriate.  Using a sterile surgical marker, the primary defect shape was transferred to the donor site. The epidermal graft was then harvested.  The skin graft was then placed in the primary defect and oriented appropriately.
Dermal Autograft Text: The defect edges were debeveled with a #15 scalpel blade.  Given the location of the defect, shape of the defect and the proximity to free margins a dermal autograft was deemed most appropriate.  Using a sterile surgical marker, the primary defect shape was transferred to the donor site. The area thus outlined was incised deep to adipose tissue with a #15 scalpel blade.  The harvested graft was then trimmed of adipose and epidermal tissue until only dermis was left.  The skin graft was then placed in the primary defect and oriented appropriately.
Skin Substitute Text: The defect edges were debeveled with a #15 scalpel blade.  Given the location of the defect, shape of the defect and the proximity to free margins a skin substitute graft was deemed most appropriate.  The graft material was trimmed to fit the size of the defect. The graft was then placed in the primary defect and oriented appropriately.
Tissue Cultured Epidermal Autograft Text: The defect edges were debeveled with a #15 scalpel blade.  Given the location of the defect, shape of the defect and the proximity to free margins a tissue cultured epidermal autograft was deemed most appropriate.  The graft was then trimmed to fit the size of the defect.  The graft was then placed in the primary defect and oriented appropriately.
Xenograft Text: The defect edges were debeveled with a #15 scalpel blade.  Given the location of the defect, shape of the defect and the proximity to free margins a xenograft was deemed most appropriate.  The graft was then trimmed to fit the size of the defect.  The graft was then placed in the primary defect and oriented appropriately.
Purse String (Simple) Text: Given the location of the defect and the characteristics of the surrounding skin a purse string closure was deemed most appropriate.  Undermining was performed circumfirentially around the surgical defect.  A purse string suture was then placed and tightened.
Purse String (Intermediate) Text: Given the location of the defect and the characteristics of the surrounding skin a purse string intermediate closure was deemed most appropriate.  Undermining was performed circumfirentially around the surgical defect.  A purse string suture was then placed and tightened.
Partial Purse String (Simple) Text: Given the location of the defect and the characteristics of the surrounding skin a simple purse string closure was deemed most appropriate.  Undermining was performed circumfirentially around the surgical defect.  A purse string suture was then placed and tightened. Wound tension only allowed a partial closure of the circular defect.
Partial Purse String (Intermediate) Text: Given the location of the defect and the characteristics of the surrounding skin an intermediate purse string closure was deemed most appropriate.  Undermining was performed circumfirentially around the surgical defect.  A purse string suture was then placed and tightened. Wound tension only allowed a partial closure of the circular defect.
Localized Dermabrasion With Wire Brush Text: The patient was draped in routine manner.  Localized dermabrasion using 3 x 17 mm wire brush was performed in routine manner to papillary dermis. This spot dermabrasion is being performed to complete skin cancer reconstruction. It also will eliminate the other sun damaged precancerous cells that are known to be part of the regional effect of a lifetime's worth of sun exposure. This localized dermabrasion is therapeutic and should not be considered cosmetic in any regard.
Tarsorrhaphy Text: A tarsorrhaphy was performed using Frost sutures.
Complex Repair And Flap Additional Text (Will Appearing After The Standard Complex Repair Text): The complex repair was not sufficient to completely close the primary defect. The remaining additional defect was repaired with the flap mentioned below.
Complex Repair And Graft Additional Text (Will Appearing After The Standard Complex Repair Text): The complex repair was not sufficient to completely close the primary defect. The remaining additional defect was repaired with the graft mentioned below.
Manual Repair Warning Statement: We plan on removing the manually selected variable below in favor of our much easier automatic structured text blocks found in the previous tab. We decided to do this to help make the flow better and give you the full power of structured data. Manual selection is never going to be ideal in our platform and I would encourage you to avoid using manual selection from this point on, especially since I will be sunsetting this feature. It is important that you do one of two things with the customized text below. First, you can save all of the text in a word file so you can have it for future reference. Second, transfer the text to the appropriate area in the Library tab. Lastly, if there is a flap or graft type which we do not have you need to let us know right away so I can add it in before the variable is hidden. No need to panic, we plan to give you roughly 6 months to make the change.
Same Histology In Subsequent Stages Text: The pattern and morphology of the tumor is as described in the first stage.
No Residual Tumor Seen Histology Text: There were no malignant cells seen in the sections examined.
Inflammation Suggestive Of Cancer Camouflage Histology Text: There was a dense lymphocytic infiltrate which prevented adequate histologic evaluation of adjacent structures.
Bcc Histology Text: There were numerous aggregates of basaloid cells.
Bcc Infiltrative Histology Text: There were numerous aggregates of basaloid cells demonstrating an infiltrative pattern.
Mart-1 - Positive Histology Text: MART-1 staining demonstrates areas of higher density and clustering of melanocytes with Pagetoid spread upwards within the epidermis. The surgical margins are positive for tumor cells.
Mart-1 - Negative Histology Text: MART-1 staining demonstrates a normal density and pattern of melanocytes along the dermal-epidermal junction. The surgical margins are negative for tumor cells.
Information: Selecting Yes will display possible errors in your note based on the variables you have selected. This validation is only offered as a suggestion for you. PLEASE NOTE THAT THE VALIDATION TEXT WILL BE REMOVED WHEN YOU FINALIZE YOUR NOTE. IF YOU WANT TO FAX A PRELIMINARY NOTE YOU WILL NEED TO TOGGLE THIS TO 'NO' IF YOU DO NOT WANT IT IN YOUR FAXED NOTE.

## 2021-01-06 ENCOUNTER — APPOINTMENT (RX ONLY)
Dept: URBAN - METROPOLITAN AREA CLINIC 22 | Facility: CLINIC | Age: 66
Setting detail: DERMATOLOGY
End: 2021-01-06

## 2021-01-06 DIAGNOSIS — Z48.817 ENCOUNTER FOR SURGICAL AFTERCARE FOLLOWING SURGERY ON THE SKIN AND SUBCUTANEOUS TISSUE: ICD-10-CM

## 2021-01-06 PROCEDURE — 99024 POSTOP FOLLOW-UP VISIT: CPT

## 2021-01-06 PROCEDURE — ? POST-OP WOUND EVALUATION

## 2021-01-06 ASSESSMENT — LOCATION ZONE DERM: LOCATION ZONE: NECK

## 2021-01-06 ASSESSMENT — LOCATION SIMPLE DESCRIPTION DERM: LOCATION SIMPLE: NECK

## 2021-01-06 ASSESSMENT — LOCATION DETAILED DESCRIPTION DERM: LOCATION DETAILED: RIGHT SUPERIOR LATERAL NECK

## 2021-01-06 NOTE — PROCEDURE: POST-OP WOUND EVALUATION
Detail Level: Detailed
Add 50430 Cpt? (Important Note: In 2017 The Use Of 22229 Is Being Tracked By Cms To Determine Future Global Period Reimbursement For Global Periods): yes
Wound Evaluated By (Optional): Reddy Lloyd MD
Wound Diameter In Cm(Optional): 0
Wound Crusting?: clean
Sutures?: intact
Wound Edema?: mild
Wound Color?: red

## 2021-01-12 NOTE — PROGRESS NOTES
<p>Prior to commencing surgery, patient identification, surgical procedure, site, and side were confirmed by Dr. Karen Ortega. Following topical proparacaine anesthesia, the patient was positioned at the YAG laser, a contact lens coupled to the cornea with methylcellulose and a peripheral iridotomy placed using 5.2 Mj x 240. without complication. Excess methylcellulose was washed from the eyes, one drop of Econopred Plus 1% instilled and the patient returned to the holding area having tolerated the procedure well and without complication. </p><p>MRN:[id number]</p> [facility] Hospitalist Progress Note    Date of Service: 2017    Chief Complaint  61 y.o. female admitted 2017 with syncope.    Interval Problem Update  Ms. Alfaro has a hx of htn and recently diagnosed afib.  CT chest in the ER revealed alveolar hemorrhage. She required rescue bipap and was admitted to the ICU. She required intubation on  with bronchoscopy.   She remains in afib rate that is rate-controlled. She is tolerating tube feeds. 3,000 ml urine over night.   On 6/10 I met with her sister is at bedside and we discussed her condition. Ms. Alfaro does not have any children and is not . She has 2 sisters that live locally. She is on propofol at 49. She is tolerating diuresis well. I discussed with her nurse.  Consultants/Specialty  Pulmonology  Cardiology  Nephrology  I discussed with Dr. Mata on Hot Rounds.   Disposition  ICU        Review of Systems   Unable to perform ROS: intubated      Physical Exam  Laboratory/Imaging   Hemodynamics  Temp (24hrs), Av.9 °C (98.5 °F), Min:36.4 °C (97.5 °F), Max:37.6 °C (99.7 °F)   Temperature: 37.1 °C (98.7 °F)  Pulse  Av.7  Min: 62  Max: 121 Heart Rate (Monitored): (!) 114  NIBP: 119/78 mmHg      Respiratory  [place] Vent Mode: APVCMV, Rate (breaths/min): 20, PEEP/CPAP: 12, PEEP/CPAP: 12, FiO2: 40, P Peak (PIP): 21, P MEAN: 14   Respiration: 20, Pulse Oximetry: 98 %     Work Of Breathing / Effort: Vented  RUL Breath Sounds: Crackles, RML Breath Sounds: Crackles, RLL Breath Sounds: Diminished, CAROLANN Breath Sounds: Clear, LLL Breath Sounds: Diminished    Fluids    Intake/Output Summary (Last 24 hours) at 17 0815  Last data filed at 17 0800   Gross per 24 hour   Intake 1760.26 ml   Output   8080 ml   Net -6319.74 ml       Nutrition  Orders Placed This Encounter   Procedures   • Diet NPO     Standing Status: Standing      Number of Occurrences: 1      Standing Expiration Date:      Order Specific Question:  Type:     Answer:  Now [1]      Order Specific Question:  Restrict to:     Answer:  Strict [1]     Physical Exam   Constitutional: She appears well-nourished. No distress.   HENT:   Head: Atraumatic.   Poor dentition.   cortrak Nares.    Eyes: No scleral icterus.   Neck:   Right sided central line without bleeding.   Cardiovascular:   Distant, irregular heart sounds.    Pulmonary/Chest:   Vent, moderate air movement. No wheezing. +crackles.    Abdominal: Soft. She exhibits no distension.   Genitourinary:   Atmayo cath.   Musculoskeletal: She exhibits edema. She exhibits no tenderness.   Edema hands and feet   Neurological:   Sedated on propofol. She opens her eyes and withdraws to pain but does not follow commands.    Skin: No erythema. There is pallor.   Very long toenails with severe onychomycosis.       Recent Labs      06/09/17   0355  06/10/17   0351  06/11/17   0424   WBC  11.7*  10.4  9.4   RBC  3.43*  3.68*  3.84*   HEMOGLOBIN  9.9*  10.4*  10.8*   HEMATOCRIT  29.0*  31.2*  32.5*   MCV  84.5  84.8  84.6   MCH  28.9  28.3  28.1   MCHC  34.1  33.3*  33.2*   RDW  43.2  43.7  44.0   PLATELETCT  306  304  343   MPV  9.1  8.7*  8.6*     Recent Labs      06/09/17   0355  06/10/17   0351 06/11/17   0424   SODIUM  130*  136  138   POTASSIUM  3.4*  3.2*  3.0*   CHLORIDE  98  102  99   CO2  20  24  29   GLUCOSE  152*  190*  154*   BUN  57*  41*  32*   CREATININE  2.23*  1.56*  1.16   CALCIUM  8.4*  8.3*  8.6             Recent Labs      06/09/17   0355  06/11/17   0424   TRIGLYCERIDE  77  131   HDL  28*   --    LDL  39   --           Assessment/Plan     Severe sepsis (CMS-HCC) (present on admission)  Assessment & Plan  Source appears respiratory. IV abx azithromycin and rocephin with stop dates. Query non-infectious SIRS with cardiac source.  The organ system dysfunction associated with this disease process is the respiratory system as is evidenced by respiratory failure requiring intubation.     Acute respiratory failure with hypoxia (CMS-HCC)  (present on admission)  Assessment & Plan  Requiring rescue bipap followed by intubation on 6/9. Bronch done 6/9.      Pulmonology consultation.   Echo showed normal EF at 65%.  Aggressive IV diuresis.     CT chest:    Extensive bilateral patchy and groundglass airspace opacities may represent pulmonary edema, multifocal pneumonia. Hemorrhage is also a consideration in the correct clinical setting. Follow-up to radiographic resolution is recommended.    Small bilateral pleural effusions, right greater than left.    Cardiomegaly.    Mildly prominent mediastinal lymph nodes are nonspecific.    Coronary atherosclerotic plaque.    Small hiatal hernia.    KIRT (acute kidney injury) (CMS-[facility]) (present on admission)  Assessment & Plan  Cr on admit was 2.95 with baseline 1.1 per review of the records from 2016.   Consistent with ATN. IV fluids have been given.   Nephrology consultation.   Follow daily BMP.    Atrial fibrillation (CMS-[facility]) (present on admission)  Assessment & Plan  Eliquis as an outpatient. Rate control on coreg as an outpatient.    Recently diagnosed.  Holding full-dose anticoag given possible pulmonary hemorrhage seen on CT chest.  Okay to start heparin prophylaxis.     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-[facility]) (present on admission)  Assessment & Plan  Sliding scale    Hyponatremia (present on admission)  Assessment & Plan  Na 124 on admit. NS given.    Hypothyroid (present on admission)  Assessment & Plan  replacement    HTN (hypertension) (present on admission)  Assessment & Plan  On cardizem, hyzaar 100/25, clonidine, coreg, norvasc as an outpatient.      Labs reviewed and Medications reviewed  Tamayo catheter: Unconscious / Sedated Patient on a Ventilator  Central line in place: Sepsis    DVT Prophylaxis: Heparin

## 2021-02-03 ENCOUNTER — APPOINTMENT (RX ONLY)
Dept: URBAN - METROPOLITAN AREA CLINIC 22 | Facility: CLINIC | Age: 66
Setting detail: DERMATOLOGY
End: 2021-02-03

## 2021-02-03 DIAGNOSIS — Z48.817 ENCOUNTER FOR SURGICAL AFTERCARE FOLLOWING SURGERY ON THE SKIN AND SUBCUTANEOUS TISSUE: ICD-10-CM

## 2021-02-03 PROCEDURE — ? POST-OP WOUND EVALUATION

## 2021-02-03 PROCEDURE — 99024 POSTOP FOLLOW-UP VISIT: CPT

## 2021-02-03 ASSESSMENT — LOCATION ZONE DERM: LOCATION ZONE: NECK

## 2021-02-03 ASSESSMENT — LOCATION SIMPLE DESCRIPTION DERM: LOCATION SIMPLE: NECK

## 2021-02-03 ASSESSMENT — LOCATION DETAILED DESCRIPTION DERM: LOCATION DETAILED: RIGHT SUPERIOR LATERAL NECK

## 2021-02-03 NOTE — PROCEDURE: POST-OP WOUND EVALUATION
Detail Level: Detailed
Add 85255 Cpt? (Important Note: In 2017 The Use Of 51120 Is Being Tracked By Cms To Determine Future Global Period Reimbursement For Global Periods): yes
Wound Evaluated By (Optional): Reddy Lloyd MD
Wound Diameter In Cm(Optional): 0
Sutures?: intact
Wound Edema?: mild
Patient To Follow-Up With?: their primary dermatologist

## 2021-03-03 DIAGNOSIS — Z23 NEED FOR VACCINATION: ICD-10-CM

## 2022-12-13 ENCOUNTER — HOSPITAL ENCOUNTER (OUTPATIENT)
Dept: RADIOLOGY | Facility: MEDICAL CENTER | Age: 67
End: 2022-12-13
Attending: FAMILY MEDICINE
Payer: MEDICARE

## 2022-12-13 DIAGNOSIS — Z12.31 VISIT FOR SCREENING MAMMOGRAM: ICD-10-CM

## 2022-12-13 PROCEDURE — 77067 SCR MAMMO BI INCL CAD: CPT

## 2023-04-21 NOTE — PROGRESS NOTES
Pulmonary Critical Care Progress Note     Date of service: 6/15/2017    Interval Events:  24 hour interval history reviewed  Reason for visit:  Respiratory failure, acute pulmonary edema  Unable to provide CC or ROS due to intubation and sedation       On sedation vacation, follows commands, nods appropriately.   Propofol 15   SR  SBT - RSBI 92, not tolerating for NiF or VC.   CXR with improved edema, left > right lung opacities      PFSH:  No change.    Respiratory:  Pulse Oximetry: 97 %  Vent:  CMV, PEEP 8, 35%  Vent waveforms and airway  reviewed in detail  CXR with improved edema, left > right lung opacities  Few coarse crackles bilaterally    Recent Labs      06/13/17   0443  06/14/17   0511  06/15/17   0533   ISTATAPH  7.520*  7.470  7.464   ISTATAPCO2  47.2*  52.6*  48.1*   ISTATAPO2  77  94*  84   ISTATATCO2  40*  40*  36*   [id number]  96  98  97   ISTATARTHCO3  38.6*  38.2*  34.6*   ISTATARTBE  14*  13*  10*   ISTATTEMP  99.5 F  99.2 F  98.5 F   ISTATFIO2  35  35  35   ISTATSPEC  Arterial  Arterial  Arterial   ISTATAPHTC  7.512*  7.464  7.465   [id number]  79  96*  84     HemoDynamics:  Pulse: 91, Heart Rate (Monitored): 93  NIBP: 129/72 mmHg     SR    Neuro:  Sedated, arouses and nods  Propofol 15    Fluids:  Intake/Output       06/13/17 0700 - 06/14/17 0659 06/14/17 0700 - 06/15/17 0659 06/15/17 0700 - 06/16/17 0659      1275-2007 5317-0317 Total 7567-5433 1832-6303 Total 3686-2849 4967-7766 Total       Intake    I.V.  118  404.4 522.4  31.1  -- 31.1  --  -- --    Propofol Volume 118 404.4 522.4 31.1 -- 31.1 -- -- --    Other  --  -- --  120  60 180  --  -- --    Medications (P.O./ Enteral Liquids) -- -- -- 120 60 180 -- -- --    Enteral  540  540 1080  630  540 1170  --  -- --    Enteral Volume  540 450 990 -- -- --    Free Water / Tube Flush -- -- -- 90 90 180 -- -- --    Total Intake 658 944.4 1602.4 781.1 600 1381.1 -- -- --       Output    Urine  2320  1500 3820  1060  500 1560   --  -- --    Indwelling Cathether 2320 1500 3820 9409 589 5739 -- -- --    Total Output 2320 1500 3820 6963 783 1653 -- -- --       Net I/O     -1662.1 -555.6 -2217.7 -278.9 100 -178.9 -- -- --           Recent Labs      17   0500  06/15/17   045   SODIUM  140  141  145  147*   POTASSIUM  4.0  4.2  4.0  4.0   CHLORIDE  98  100  102  107   CO2  33  35*  34*  33   BUN  33*  36*  43*  41*   CREATININE  1.02  1.03  0.99  0.96   MAGNESIUM  1.7  1.9  2.2  1.9   PHOSPHORUS  2.6   --   3.1   --    CALCIUM  9.0  8.9  9.4  9.2     GI/Nutrition:  Abd soft ND/NT  Tolerating enteral TF    Liver Function  Recent Labs      17   0500  06/15/17   0450   ALTSGPT  28   --   32   --    ASTSGOT  14   --   24   --    ALKPHOSPHAT  200*   --   229*   --    TBILIRUBIN  0.4   --   0.3   --    PREALBUMIN  12.0*   --    --    --    GLUCOSE  166*  165*  181*  181*     Heme:  Recent Labs      17   0500  06/15/17   0450   RBC  3.94*  4.00*  3.89*   HEMOGLOBIN  10.9*  11.0*  10.7*   HEMATOCRIT  33.8*  35.1*  35.0*   PLATELETCT  365  352  353     Infectious Disease:  Temp  Av.7 °C (99.9 °F)  Min: 37.1 °C (98.7 °F)  Max: 38.3 °C (100.9 °F)     Recent Labs      17   0500  06/15/17   0450   WBC  9.1  9.0  8.7  9.7   NEUTSPOLYS  72.70*  71.40  71.10  74.60*   LYMPHOCYTES  9.10*  10.40*  10.90*  9.10*   MONOCYTES  12.30  11.60  12.10  10.50   EOSINOPHILS  4.10  4.00  2.30  2.80   BASOPHILS  0.30  0.30  0.50  0.80   ASTSGOT  14   --   24   --    ALTSGPT  28   --   32   --    ALKPHOSPHAT  200*   --   229*   --    TBILIRUBIN  0.4   --   0.3   --      Current Facility-Administered Medications   Medication Dose Frequency Provider Last Rate Last Dose   • furosemide (LASIX) tablet 20 mg  20 mg BID DIURETIC Evens Cadena M.D.   20 mg at 06/15/17 0533   • potassium chloride (KLOR-CON) 20 MEQ packet 20 mEq   20 mEq BID Evens Cadena M.D.   20 mEq at 06/14/17 2011   • atorvastatin (LIPITOR) tablet 40 mg  40 mg QHS Nishant Sebastian M.D.   40 mg at 06/14/17 2012   • famotidine (PEPCID) tablet 20 mg  20 mg BID Josefa Moreno, TORRI   20 mg at 06/14/17 2011   • apixaban (ELIQUIS) 2.5mg tablet 5 mg  5 mg BID Jaime Bush M.D.   5 mg at 06/14/17 2012   • insulin lispro (HUMALOG) injection 3-14 Units  3-14 Units Q6HRS Antonio Mata M.D.   3 Units at 06/15/17 0536   • propofol (DIPRIVAN) injection  5-80 mcg/kg/min Continuous Sid Patino, PHARMD 26.9 mL/hr at 06/15/17 0541 40 mcg/kg/min at 06/15/17 0541   • Respiratory Care per Protocol   Continuous RT Antonio Mata M.D.       • chlorhexidine (PERIDEX) 0.12 % solution 15 mL  15 mL BID Antonio Mata M.D.   15 mL at 06/14/17 2011   • lidocaine (XYLOCAINE) 1%  injection  1-2 mL Q30 MIN PRN Antonio Mata M.D.   2 mL at 06/14/17 1115   • MD ALERT...Adult ICU Electrolyte Replacement per Pharmacy Protocol   pharmacy to dose Antonio Mata M.D.       • fentaNYL (SUBLIMAZE) injection 25 mcg  25 mcg Q HOUR PRN Antonio Mata M.D.   25 mcg at 06/12/17 1526    Or   • fentaNYL (SUBLIMAZE) injection 50 mcg  50 mcg Q HOUR PRN Antonio Mata M.D.        Or   • fentaNYL (SUBLIMAZE) injection 100 mcg  100 mcg Q HOUR PRN Antonio Mata M.D.   100 mcg at 06/14/17 1229   • ipratropium-albuterol (DUONEB) nebulizer solution 3 mL  3 mL Q2HRS PRN (RT) Antonio Mata M.D.       • ipratropium-albuterol (DUONEB) nebulizer solution 3 mL  3 mL Q4HRS (RT) Jaime Bush M.D.   3 mL at 06/15/17 0308   • glucose 4 g chewable tablet 16 g  16 g Q15 MIN PRN Anson Manuel M.D.        And   • dextrose 50% (D50W) injection 25 mL  25 mL Q15 MIN PRN Anson Manuel M.D.       • levothyroxine (SYNTHROID) tablet 88 mcg  88 mcg AM ES Anson Manuel M.D.   88 mcg at 06/15/17 0533   • senna-docusate (PERICOLACE or SENOKOT S) 8.6-50 MG per tablet 2 Tab  2 Tab BID Anson Manuel M.D.   2 Tab at 06/14/17  2011    And   • polyethylene glycol/lytes (MIRALAX) PACKET 1 Packet  1 Packet QDAY PRN Anson Manuel M.D.   1 Packet at 06/12/17 0920    And   • magnesium hydroxide (MILK OF MAGNESIA) suspension 30 mL  30 mL QDAY PRN Anson Manuel M.D.        And   • bisacodyl (DULCOLAX) suppository 10 mg  10 mg QDAY PRN Anson Manuel M.D.       • ondansetron (ZOFRAN) syringe/vial injection 4 mg  4 mg Q4HRS PRN Anson Manuel M.D.       • ondansetron (ZOFRAN ODT) dispertab 4 mg  4 mg Q4HRS PRN Anson Manuel M.D.       • acetaminophen (TYLENOL) tablet 650 mg  650 mg Q6HRS PRN Anson Manuel M.D.         Last reviewed on 6/8/2017  9:34 AM by Mindi Benites, Pharmacy Int    Quality  Measures:  Labs reviewed, Medications reviewed and Radiology images reviewed  Tamayo catheter: Critically Ill - Requiring Accurate Measurement of Urinary Output  Central line in place: Need for access      DVT prophylaxis - mechanical: SCDs  Ulcer prophylaxis: Yes        Assessment and Plan:    VDRF - intubated 6/9   - cont vent support   - SBT as tolerated  Acute hypoxemic respiratory failure  Acute pulmonary edema   - force diuresis as tolerated  Query pneumonia   - finished 5 days of Rocephin and Zithromax 6/12 - observe off antibiotics  Fever - source not clear - improved today   - UA unremarkable   - no leukocytosis   - hold on antibiotics for now  PAF - now in SR   - optimize K and Mg   - anticoagulated with apixaban  Suspect acute on chronic diastolic heart failure   - force diuresis as tolerated  DM 2 - cont insulin  Hx of HTN  Dyslipidemia  Hypernatremia   - start free water    Critical Care Time:  32 minutes  58845  No time overlap  Time excludes procedures  Discussed with RN, RT, Team    ILilli (Scribedgardo), am scribing for, and in the presence of, Evens Riggs M.D.    Electronically signed by: Lilli Abel (Toñito), 6/15/2017    IEvens M.D. personally performed the services described in this documentation, as scribed by  Lilli Abel in my presence, and it is both accurate and complete.   Yes